# Patient Record
Sex: FEMALE | Race: WHITE
[De-identification: names, ages, dates, MRNs, and addresses within clinical notes are randomized per-mention and may not be internally consistent; named-entity substitution may affect disease eponyms.]

---

## 2019-12-24 ENCOUNTER — HOSPITAL ENCOUNTER (INPATIENT)
Dept: HOSPITAL 8 - ED | Age: 84
LOS: 3 days | Discharge: HOME | DRG: 871 | End: 2019-12-27
Attending: HOSPITALIST | Admitting: HOSPITALIST
Payer: COMMERCIAL

## 2019-12-24 VITALS — SYSTOLIC BLOOD PRESSURE: 104 MMHG | DIASTOLIC BLOOD PRESSURE: 53 MMHG

## 2019-12-24 VITALS — SYSTOLIC BLOOD PRESSURE: 108 MMHG | DIASTOLIC BLOOD PRESSURE: 56 MMHG

## 2019-12-24 VITALS — BODY MASS INDEX: 31.6 KG/M2 | WEIGHT: 156.75 LBS | HEIGHT: 59 IN

## 2019-12-24 DIAGNOSIS — G90.9: ICD-10-CM

## 2019-12-24 DIAGNOSIS — E11.65: ICD-10-CM

## 2019-12-24 DIAGNOSIS — Z83.3: ICD-10-CM

## 2019-12-24 DIAGNOSIS — N17.9: ICD-10-CM

## 2019-12-24 DIAGNOSIS — E78.5: ICD-10-CM

## 2019-12-24 DIAGNOSIS — A41.9: Primary | ICD-10-CM

## 2019-12-24 DIAGNOSIS — I10: ICD-10-CM

## 2019-12-24 DIAGNOSIS — R65.20: ICD-10-CM

## 2019-12-24 DIAGNOSIS — J18.9: ICD-10-CM

## 2019-12-24 DIAGNOSIS — K05.10: ICD-10-CM

## 2019-12-24 DIAGNOSIS — E87.6: ICD-10-CM

## 2019-12-24 LAB
<PLATELET ESTIMATE>: ADEQUATE
<PLT MORPHOLOGY>: (no result)
ALBUMIN SERPL-MCNC: 3 G/DL (ref 3.4–5)
ANION GAP SERPL CALC-SCNC: 8 MMOL/L (ref 5–15)
BAND#(MANUAL): 3 X10^3/UL
BILIRUB DIRECT SERPL-MCNC: NORMAL MG/DL
CALCIUM SERPL-MCNC: 8.3 MG/DL (ref 8.5–10.1)
CHLORIDE SERPL-SCNC: 103 MMOL/L (ref 98–107)
CREAT SERPL-MCNC: 1.26 MG/DL (ref 0.55–1.02)
CULTURE INDICATED?: YES
ERYTHROCYTE [DISTWIDTH] IN BLOOD BY AUTOMATED COUNT: 11.9 % (ref 9.6–15.2)
EST. AVERAGE GLUCOSE BLD GHB EST-MCNC: 128 MG/DL (ref 0–126)
LYMPH#(MANUAL): 0.6 X10^3/UL (ref 1–3.4)
LYMPHS% (MANUAL): 4 % (ref 22–44)
MCH RBC QN AUTO: 32.3 PG (ref 27–34.8)
MCHC RBC AUTO-ENTMCNC: 33.1 G/DL (ref 32.4–35.8)
MCV RBC AUTO: 97.5 FL (ref 80–100)
MD: YES
METAMYELOCYTES# (MANUAL): 0.15 X10^3/UL (ref 0–0)
METAMYELOCYTES% (MANUAL): 1 % (ref 0–1)
MICROSCOPIC: (no result)
MONOS#(MANUAL): 0.3 X10^3/UL (ref 0.3–2.7)
MONOS% (MANUAL): 2 % (ref 2–9)
NEUTS BAND NFR BLD: 20 % (ref 0–7)
PLATELET # BLD AUTO: 312 X10^3/UL (ref 130–400)
PMV BLD AUTO: 7.6 FL (ref 7.4–10.4)
RBC # BLD AUTO: 3.78 X10^6/UL (ref 3.82–5.3)
SEG#(MANUAL): 10.95 X10^3/UL (ref 1.8–6.8)
SEGS% (MANUAL): 73 % (ref 42–75)

## 2019-12-24 PROCEDURE — 0T9B70Z DRAINAGE OF BLADDER WITH DRAINAGE DEVICE, VIA NATURAL OR ARTIFICIAL OPENING: ICD-10-PCS | Performed by: HOSPITALIST

## 2019-12-24 PROCEDURE — 87077 CULTURE AEROBIC IDENTIFY: CPT

## 2019-12-24 PROCEDURE — 82040 ASSAY OF SERUM ALBUMIN: CPT

## 2019-12-24 PROCEDURE — 87040 BLOOD CULTURE FOR BACTERIA: CPT

## 2019-12-24 PROCEDURE — 85025 COMPLETE CBC W/AUTO DIFF WBC: CPT

## 2019-12-24 PROCEDURE — 87186 SC STD MICRODIL/AGAR DIL: CPT

## 2019-12-24 PROCEDURE — 80053 COMPREHEN METABOLIC PANEL: CPT

## 2019-12-24 PROCEDURE — 87400 INFLUENZA A/B EACH AG IA: CPT

## 2019-12-24 PROCEDURE — 80048 BASIC METABOLIC PNL TOTAL CA: CPT

## 2019-12-24 PROCEDURE — 84100 ASSAY OF PHOSPHORUS: CPT

## 2019-12-24 PROCEDURE — 96375 TX/PRO/DX INJ NEW DRUG ADDON: CPT

## 2019-12-24 PROCEDURE — 96365 THER/PROPH/DIAG IV INF INIT: CPT

## 2019-12-24 PROCEDURE — 71045 X-RAY EXAM CHEST 1 VIEW: CPT

## 2019-12-24 PROCEDURE — 83735 ASSAY OF MAGNESIUM: CPT

## 2019-12-24 PROCEDURE — 83036 HEMOGLOBIN GLYCOSYLATED A1C: CPT

## 2019-12-24 PROCEDURE — 83880 ASSAY OF NATRIURETIC PEPTIDE: CPT

## 2019-12-24 PROCEDURE — 36415 COLL VENOUS BLD VENIPUNCTURE: CPT

## 2019-12-24 PROCEDURE — 93005 ELECTROCARDIOGRAM TRACING: CPT

## 2019-12-24 PROCEDURE — 84145 PROCALCITONIN (PCT): CPT

## 2019-12-24 PROCEDURE — 83605 ASSAY OF LACTIC ACID: CPT

## 2019-12-24 PROCEDURE — 87086 URINE CULTURE/COLONY COUNT: CPT

## 2019-12-24 PROCEDURE — 81001 URINALYSIS AUTO W/SCOPE: CPT

## 2019-12-24 RX ADMIN — SODIUM CHLORIDE SCH MLS/HR: 0.9 INJECTION, SOLUTION INTRAVENOUS at 18:08

## 2019-12-24 RX ADMIN — SODIUM CHLORIDE SCH MLS/HR: 0.9 INJECTION, SOLUTION INTRAVENOUS at 22:00

## 2019-12-24 RX ADMIN — SODIUM CHLORIDE SCH MLS/HR: 0.9 INJECTION, SOLUTION INTRAVENOUS at 18:13

## 2019-12-24 RX ADMIN — AMPICILLIN AND SULBACTAM SCH MLS/HR: 1; .5 INJECTION, POWDER, FOR SOLUTION INTRAVENOUS at 23:18

## 2019-12-24 RX ADMIN — HEPARIN SODIUM SCH UNITS: 5000 INJECTION, SOLUTION INTRAVENOUS; SUBCUTANEOUS at 18:21

## 2019-12-24 RX ADMIN — SODIUM CHLORIDE SCH MLS/HR: 0.9 INJECTION, SOLUTION INTRAVENOUS at 22:13

## 2019-12-24 RX ADMIN — LOVASTATIN SCH MG: 20 TABLET ORAL at 19:51

## 2019-12-24 RX ADMIN — AMPICILLIN AND SULBACTAM SCH MLS/HR: 1; .5 INJECTION, POWDER, FOR SOLUTION INTRAVENOUS at 18:13

## 2019-12-24 NOTE — NUR
TWO ATTEMPTS  TO CATH PT FOR URINE   UNSUCCESSFUL       ERP TO HOLD FURTHER 
FLUID RE CHEST XRAY AND CONCERNS FOR FLUID RETENTION IN THE LUNGS

## 2019-12-24 NOTE — NUR
ASSIST RN: PT BIB [facility] FROM HOME FOR C/O DEHYDRATION, WEAKNESS & LOW BLOOD 
PRESSURE. PER FAMILY, PT HAD AN EPISODE OF "SHAKING, LIKE SHE WAS HYPOTHERMIC" 
THIS MORNING. PT HADN'T TAKEN HER BP MED [place], AND FAMILY NOTED HER BP WAS 
HIGH, SO SHE TOOK IT THEN (AROUND 1100). THEN PT STARTED FEELING WEAK AND 
WANTED TO GO TO BED, AND FAMILY NOTICED PT'S BP WAS LOW. BS  (PT IS 
PRE-DIABETIC). EMS NOTED BP OF 76/51. PT WAS GIVEN 500ML NS EN ROUTE. NO 
12-LEAD DONE BY EMS. PT IS CURRENTLY TAKING AMOXICILLIN FOR CHRONIC GINGIVITIS. 
[place] HERE VISITING FAMILY. ARRIVES TO ED A&OX4, VSS AT THIS TIME. ERP IN 
ROOM IMMEDIATELY. REPORTED TO ESTUARDO PRIMARY RN.

## 2019-12-25 VITALS — DIASTOLIC BLOOD PRESSURE: 62 MMHG | SYSTOLIC BLOOD PRESSURE: 143 MMHG

## 2019-12-25 VITALS — DIASTOLIC BLOOD PRESSURE: 61 MMHG | SYSTOLIC BLOOD PRESSURE: 101 MMHG

## 2019-12-25 VITALS — DIASTOLIC BLOOD PRESSURE: 61 MMHG | SYSTOLIC BLOOD PRESSURE: 94 MMHG

## 2019-12-25 VITALS — DIASTOLIC BLOOD PRESSURE: 64 MMHG | SYSTOLIC BLOOD PRESSURE: 117 MMHG

## 2019-12-25 LAB
<PLATELET ESTIMATE>: ADEQUATE
<PLT MORPHOLOGY>: (no result)
ALBUMIN SERPL-MCNC: 2.3 G/DL (ref 3.4–5)
ALP SERPL-CCNC: 51 U/L (ref 45–117)
ALT SERPL-CCNC: 16 U/L (ref 12–78)
ANION GAP SERPL CALC-SCNC: 7 MMOL/L (ref 5–15)
BAND#(MANUAL): 2.52 X10^3/UL
BILIRUB DIRECT SERPL-MCNC: NORMAL MG/DL
BILIRUB SERPL-MCNC: 0.4 MG/DL (ref 0.2–1)
CALCIUM SERPL-MCNC: 7.4 MG/DL (ref 8.5–10.1)
CHLORIDE SERPL-SCNC: 108 MMOL/L (ref 98–107)
CREAT SERPL-MCNC: 1.12 MG/DL (ref 0.55–1.02)
ERYTHROCYTE [DISTWIDTH] IN BLOOD BY AUTOMATED COUNT: 12.1 % (ref 9.6–15.2)
LYMPH#(MANUAL): 0.67 X10^3/UL (ref 1–3.4)
LYMPHS% (MANUAL): 4 % (ref 22–44)
MCH RBC QN AUTO: 32.6 PG (ref 27–34.8)
MCHC RBC AUTO-ENTMCNC: 33.5 G/DL (ref 32.4–35.8)
MCV RBC AUTO: 97.5 FL (ref 80–100)
MD: YES
NEUTS BAND NFR BLD: 15 % (ref 0–7)
PLATELET # BLD AUTO: 235 X10^3/UL (ref 130–400)
PMV BLD AUTO: 7.5 FL (ref 7.4–10.4)
PROT SERPL-MCNC: 5 G/DL (ref 6.4–8.2)
RBC # BLD AUTO: 3.23 X10^6/UL (ref 3.82–5.3)
SEG#(MANUAL): 13.61 X10^3/UL (ref 1.8–6.8)
SEGS% (MANUAL): 81 % (ref 42–75)

## 2019-12-25 RX ADMIN — SODIUM CHLORIDE SCH MLS/HR: 0.9 INJECTION, SOLUTION INTRAVENOUS at 03:58

## 2019-12-25 RX ADMIN — ASPIRIN SCH MG: 81 TABLET, COATED ORAL at 08:32

## 2019-12-25 RX ADMIN — ACETAMINOPHEN PRN MG: 325 TABLET, FILM COATED ORAL at 22:53

## 2019-12-25 RX ADMIN — HEPARIN SODIUM SCH UNITS: 5000 INJECTION, SOLUTION INTRAVENOUS; SUBCUTANEOUS at 18:23

## 2019-12-25 RX ADMIN — DOXYCYCLINE SCH MLS/HR: 100 INJECTION, POWDER, LYOPHILIZED, FOR SOLUTION INTRAVENOUS at 16:30

## 2019-12-25 RX ADMIN — LACTOBACILLUS TAB SCH TAB: TAB at 20:40

## 2019-12-25 RX ADMIN — ACETAMINOPHEN PRN MG: 325 TABLET, FILM COATED ORAL at 00:40

## 2019-12-25 RX ADMIN — LACTOBACILLUS TAB SCH TAB: TAB at 16:31

## 2019-12-25 RX ADMIN — LOVASTATIN SCH MG: 20 TABLET ORAL at 20:40

## 2019-12-25 RX ADMIN — DOXYCYCLINE SCH MLS/HR: 100 INJECTION, POWDER, LYOPHILIZED, FOR SOLUTION INTRAVENOUS at 04:30

## 2019-12-25 RX ADMIN — SODIUM CHLORIDE, SODIUM LACTATE, POTASSIUM CHLORIDE, AND CALCIUM CHLORIDE SCH MLS/HR: .6; .31; .03; .02 INJECTION, SOLUTION INTRAVENOUS at 08:32

## 2019-12-25 RX ADMIN — AMPICILLIN AND SULBACTAM SCH MLS/HR: 1; .5 INJECTION, POWDER, FOR SOLUTION INTRAVENOUS at 11:46

## 2019-12-25 RX ADMIN — HEPARIN SODIUM SCH UNITS: 5000 INJECTION, SOLUTION INTRAVENOUS; SUBCUTANEOUS at 01:30

## 2019-12-25 RX ADMIN — AMPICILLIN AND SULBACTAM SCH MLS/HR: 1; .5 INJECTION, POWDER, FOR SOLUTION INTRAVENOUS at 18:23

## 2019-12-25 RX ADMIN — HEPARIN SODIUM SCH UNITS: 5000 INJECTION, SOLUTION INTRAVENOUS; SUBCUTANEOUS at 08:32

## 2019-12-25 RX ADMIN — AMPICILLIN AND SULBACTAM SCH MLS/HR: 1; .5 INJECTION, POWDER, FOR SOLUTION INTRAVENOUS at 05:30

## 2019-12-26 VITALS — SYSTOLIC BLOOD PRESSURE: 156 MMHG | DIASTOLIC BLOOD PRESSURE: 73 MMHG

## 2019-12-26 VITALS — DIASTOLIC BLOOD PRESSURE: 77 MMHG | SYSTOLIC BLOOD PRESSURE: 169 MMHG

## 2019-12-26 VITALS — SYSTOLIC BLOOD PRESSURE: 138 MMHG | DIASTOLIC BLOOD PRESSURE: 65 MMHG

## 2019-12-26 VITALS — DIASTOLIC BLOOD PRESSURE: 66 MMHG | SYSTOLIC BLOOD PRESSURE: 147 MMHG

## 2019-12-26 LAB
ALBUMIN SERPL-MCNC: 2.5 G/DL (ref 3.4–5)
ALP SERPL-CCNC: 65 U/L (ref 45–117)
ALT SERPL-CCNC: 30 U/L (ref 12–78)
ANION GAP SERPL CALC-SCNC: 9 MMOL/L (ref 5–15)
BASOPHILS # BLD AUTO: 0.07 X10^3/UL (ref 0–0.1)
BASOPHILS NFR BLD AUTO: 1 % (ref 0–1)
BILIRUB SERPL-MCNC: 0.5 MG/DL (ref 0.2–1)
CALCIUM SERPL-MCNC: 7.5 MG/DL (ref 8.5–10.1)
CHLORIDE SERPL-SCNC: 103 MMOL/L (ref 98–107)
CREAT SERPL-MCNC: 0.8 MG/DL (ref 0.55–1.02)
EOSINOPHIL # BLD AUTO: 0.19 X10^3/UL (ref 0–0.4)
EOSINOPHIL NFR BLD AUTO: 2 % (ref 1–7)
ERYTHROCYTE [DISTWIDTH] IN BLOOD BY AUTOMATED COUNT: 11.6 % (ref 9.6–15.2)
LYMPHOCYTES # BLD AUTO: 0.83 X10^3/UL (ref 1–3.4)
LYMPHOCYTES NFR BLD AUTO: 10 % (ref 22–44)
MCH RBC QN AUTO: 32.2 PG (ref 27–34.8)
MCHC RBC AUTO-ENTMCNC: 33.4 G/DL (ref 32.4–35.8)
MCV RBC AUTO: 96.3 FL (ref 80–100)
MD: NO
MONOCYTES # BLD AUTO: 0.4 X10^3/UL (ref 0.2–0.8)
MONOCYTES NFR BLD AUTO: 5 % (ref 2–9)
NEUTROPHILS # BLD AUTO: 6.96 X10^3/UL (ref 1.8–6.8)
NEUTROPHILS NFR BLD AUTO: 82 % (ref 42–75)
PLATELET # BLD AUTO: 189 X10^3/UL (ref 130–400)
PMV BLD AUTO: 8.4 FL (ref 7.4–10.4)
PROT SERPL-MCNC: 5.3 G/DL (ref 6.4–8.2)
RBC # BLD AUTO: 3.4 X10^6/UL (ref 3.82–5.3)

## 2019-12-26 RX ADMIN — AMPICILLIN AND SULBACTAM SCH MLS/HR: 1; .5 INJECTION, POWDER, FOR SOLUTION INTRAVENOUS at 12:13

## 2019-12-26 RX ADMIN — HEPARIN SODIUM SCH UNITS: 5000 INJECTION, SOLUTION INTRAVENOUS; SUBCUTANEOUS at 18:17

## 2019-12-26 RX ADMIN — AMPICILLIN AND SULBACTAM SCH MLS/HR: 1; .5 INJECTION, POWDER, FOR SOLUTION INTRAVENOUS at 00:35

## 2019-12-26 RX ADMIN — AMPICILLIN AND SULBACTAM SCH MLS/HR: 1; .5 INJECTION, POWDER, FOR SOLUTION INTRAVENOUS at 18:17

## 2019-12-26 RX ADMIN — ACETAMINOPHEN PRN MG: 325 TABLET, FILM COATED ORAL at 20:14

## 2019-12-26 RX ADMIN — SODIUM CHLORIDE, SODIUM LACTATE, POTASSIUM CHLORIDE, AND CALCIUM CHLORIDE SCH MLS/HR: .6; .31; .03; .02 INJECTION, SOLUTION INTRAVENOUS at 04:04

## 2019-12-26 RX ADMIN — ASPIRIN SCH MG: 81 TABLET, COATED ORAL at 09:19

## 2019-12-26 RX ADMIN — DOXYCYCLINE SCH MLS/HR: 100 INJECTION, POWDER, LYOPHILIZED, FOR SOLUTION INTRAVENOUS at 15:38

## 2019-12-26 RX ADMIN — LOVASTATIN SCH MG: 20 TABLET ORAL at 20:14

## 2019-12-26 RX ADMIN — LACTOBACILLUS TAB SCH TAB: TAB at 09:19

## 2019-12-26 RX ADMIN — LACTOBACILLUS TAB SCH TAB: TAB at 20:11

## 2019-12-26 RX ADMIN — LACTOBACILLUS TAB SCH TAB: TAB at 15:38

## 2019-12-26 RX ADMIN — AMPICILLIN AND SULBACTAM SCH MLS/HR: 1; .5 INJECTION, POWDER, FOR SOLUTION INTRAVENOUS at 06:29

## 2019-12-26 RX ADMIN — DOXYCYCLINE SCH MLS/HR: 100 INJECTION, POWDER, LYOPHILIZED, FOR SOLUTION INTRAVENOUS at 04:04

## 2019-12-26 RX ADMIN — HEPARIN SODIUM SCH UNITS: 5000 INJECTION, SOLUTION INTRAVENOUS; SUBCUTANEOUS at 12:13

## 2019-12-26 RX ADMIN — HEPARIN SODIUM SCH UNITS: 5000 INJECTION, SOLUTION INTRAVENOUS; SUBCUTANEOUS at 01:43

## 2019-12-27 VITALS — DIASTOLIC BLOOD PRESSURE: 60 MMHG | SYSTOLIC BLOOD PRESSURE: 138 MMHG

## 2019-12-27 VITALS — SYSTOLIC BLOOD PRESSURE: 166 MMHG | DIASTOLIC BLOOD PRESSURE: 86 MMHG

## 2019-12-27 VITALS — SYSTOLIC BLOOD PRESSURE: 127 MMHG | DIASTOLIC BLOOD PRESSURE: 78 MMHG

## 2019-12-27 VITALS — DIASTOLIC BLOOD PRESSURE: 70 MMHG | SYSTOLIC BLOOD PRESSURE: 150 MMHG

## 2019-12-27 LAB
ALBUMIN SERPL-MCNC: 2.4 G/DL (ref 3.4–5)
ALP SERPL-CCNC: 70 U/L (ref 45–117)
ALT SERPL-CCNC: 29 U/L (ref 12–78)
ANION GAP SERPL CALC-SCNC: 9 MMOL/L (ref 5–15)
BASOPHILS # BLD AUTO: 0.05 X10^3/UL (ref 0–0.1)
BASOPHILS NFR BLD AUTO: 1 % (ref 0–1)
BILIRUB SERPL-MCNC: 0.4 MG/DL (ref 0.2–1)
CALCIUM SERPL-MCNC: 7.6 MG/DL (ref 8.5–10.1)
CHLORIDE SERPL-SCNC: 100 MMOL/L (ref 98–107)
CREAT SERPL-MCNC: 0.71 MG/DL (ref 0.55–1.02)
EOSINOPHIL # BLD AUTO: 0.42 X10^3/UL (ref 0–0.4)
EOSINOPHIL NFR BLD AUTO: 6 % (ref 1–7)
ERYTHROCYTE [DISTWIDTH] IN BLOOD BY AUTOMATED COUNT: 11.8 % (ref 9.6–15.2)
LYMPHOCYTES # BLD AUTO: 1.45 X10^3/UL (ref 1–3.4)
LYMPHOCYTES NFR BLD AUTO: 20 % (ref 22–44)
MCH RBC QN AUTO: 32.5 PG (ref 27–34.8)
MCHC RBC AUTO-ENTMCNC: 33.5 G/DL (ref 32.4–35.8)
MCV RBC AUTO: 97 FL (ref 80–100)
MD: NO
MONOCYTES # BLD AUTO: 0.5 X10^3/UL (ref 0.2–0.8)
MONOCYTES NFR BLD AUTO: 7 % (ref 2–9)
NEUTROPHILS # BLD AUTO: 4.9 X10^3/UL (ref 1.8–6.8)
NEUTROPHILS NFR BLD AUTO: 67 % (ref 42–75)
PLATELET # BLD AUTO: 185 X10^3/UL (ref 130–400)
PMV BLD AUTO: 8.2 FL (ref 7.4–10.4)
PROT SERPL-MCNC: 5.3 G/DL (ref 6.4–8.2)
RBC # BLD AUTO: 3.26 X10^6/UL (ref 3.82–5.3)

## 2019-12-27 RX ADMIN — AMPICILLIN AND SULBACTAM SCH MLS/HR: 1; .5 INJECTION, POWDER, FOR SOLUTION INTRAVENOUS at 00:39

## 2019-12-27 RX ADMIN — AMPICILLIN AND SULBACTAM SCH MLS/HR: 1; .5 INJECTION, POWDER, FOR SOLUTION INTRAVENOUS at 06:07

## 2019-12-27 RX ADMIN — DOXYCYCLINE SCH MLS/HR: 100 INJECTION, POWDER, LYOPHILIZED, FOR SOLUTION INTRAVENOUS at 04:44

## 2019-12-27 RX ADMIN — AMPICILLIN AND SULBACTAM SCH MLS/HR: 1; .5 INJECTION, POWDER, FOR SOLUTION INTRAVENOUS at 12:30

## 2019-12-27 RX ADMIN — LACTOBACILLUS TAB SCH TAB: TAB at 08:45

## 2019-12-27 RX ADMIN — HEPARIN SODIUM SCH UNITS: 5000 INJECTION, SOLUTION INTRAVENOUS; SUBCUTANEOUS at 10:30

## 2019-12-27 RX ADMIN — ASPIRIN SCH MG: 81 TABLET, COATED ORAL at 08:45

## 2019-12-27 RX ADMIN — HEPARIN SODIUM SCH UNITS: 5000 INJECTION, SOLUTION INTRAVENOUS; SUBCUTANEOUS at 01:38

## 2020-10-06 ENCOUNTER — HOSPITAL ENCOUNTER (EMERGENCY)
Dept: HOSPITAL 8 - ED | Age: 85
Discharge: HOME | End: 2020-10-06
Payer: COMMERCIAL

## 2020-10-06 VITALS — DIASTOLIC BLOOD PRESSURE: 54 MMHG | SYSTOLIC BLOOD PRESSURE: 144 MMHG

## 2020-10-06 VITALS — WEIGHT: 146.39 LBS | HEIGHT: 59 IN | BODY MASS INDEX: 29.51 KG/M2

## 2020-10-06 DIAGNOSIS — R11.2: ICD-10-CM

## 2020-10-06 DIAGNOSIS — Z90.89: ICD-10-CM

## 2020-10-06 DIAGNOSIS — I10: ICD-10-CM

## 2020-10-06 DIAGNOSIS — Z87.891: ICD-10-CM

## 2020-10-06 DIAGNOSIS — E11.9: ICD-10-CM

## 2020-10-06 DIAGNOSIS — R10.9: ICD-10-CM

## 2020-10-06 DIAGNOSIS — E87.1: Primary | ICD-10-CM

## 2020-10-06 LAB
ALBUMIN SERPL-MCNC: 3.3 G/DL (ref 3.4–5)
ALP SERPL-CCNC: 80 U/L (ref 45–117)
ALT SERPL-CCNC: 16 U/L (ref 12–78)
ANION GAP SERPL CALC-SCNC: 9 MMOL/L (ref 5–15)
BASOPHILS # BLD AUTO: 0.1 X10^3/UL (ref 0–0.1)
BASOPHILS NFR BLD AUTO: 1 % (ref 0–1)
BILIRUB SERPL-MCNC: 0.9 MG/DL (ref 0.2–1)
CALCIUM SERPL-MCNC: 8.7 MG/DL (ref 8.5–10.1)
CHLORIDE SERPL-SCNC: 91 MMOL/L (ref 98–107)
CREAT SERPL-MCNC: 0.93 MG/DL (ref 0.55–1.02)
EOSINOPHIL # BLD AUTO: 0.1 X10^3/UL (ref 0–0.4)
EOSINOPHIL NFR BLD AUTO: 1 % (ref 1–7)
ERYTHROCYTE [DISTWIDTH] IN BLOOD BY AUTOMATED COUNT: 14.3 % (ref 9.6–15.2)
LYMPHOCYTES # BLD AUTO: 2.1 X10^3/UL (ref 1–3.4)
LYMPHOCYTES NFR BLD AUTO: 19 % (ref 22–44)
MCH RBC QN AUTO: 32.3 PG (ref 27–34.8)
MCHC RBC AUTO-ENTMCNC: 34.4 G/DL (ref 32.4–35.8)
MD: NO
MONOCYTES # BLD AUTO: 0.8 X10^3/UL (ref 0.2–0.8)
MONOCYTES NFR BLD AUTO: 7 % (ref 2–9)
NEUTROPHILS # BLD AUTO: 8 X10^3/UL (ref 1.8–6.8)
NEUTROPHILS NFR BLD AUTO: 72 % (ref 42–75)
PLATELET # BLD AUTO: 328 X10^3/UL (ref 130–400)
PMV BLD AUTO: 8 FL (ref 7.4–10.4)
PROT SERPL-MCNC: 6.6 G/DL (ref 6.4–8.2)
RBC # BLD AUTO: 3.73 X10^6/UL (ref 3.82–5.3)
TROPONIN I SERPL-MCNC: 0.01 NG/ML (ref 0–0.04)

## 2020-10-06 PROCEDURE — 93005 ELECTROCARDIOGRAM TRACING: CPT

## 2020-10-06 PROCEDURE — 71045 X-RAY EXAM CHEST 1 VIEW: CPT

## 2020-10-06 PROCEDURE — 76700 US EXAM ABDOM COMPLETE: CPT

## 2020-10-06 PROCEDURE — 84484 ASSAY OF TROPONIN QUANT: CPT

## 2020-10-06 PROCEDURE — 36415 COLL VENOUS BLD VENIPUNCTURE: CPT

## 2020-10-06 PROCEDURE — 80053 COMPREHEN METABOLIC PANEL: CPT

## 2020-10-06 PROCEDURE — 99285 EMERGENCY DEPT VISIT HI MDM: CPT

## 2020-10-06 PROCEDURE — 85025 COMPLETE CBC W/AUTO DIFF WBC: CPT

## 2020-10-06 PROCEDURE — 83690 ASSAY OF LIPASE: CPT

## 2020-10-06 NOTE — NUR
BREAK RN NOTE: PT PRESENTS TO ED WITH C/O NAUSEA X 1 WEEK. PT REFERRED TO ED 
FOR ULTRASOUND AFTER BEING SEEN AT URGENT CARE AND INSURANCE DENIED BY OP 
IMAGING.  DENIES VOMITING, DENIES PAIN, DENIES OTHER SX. PT PLACED ON ALL 
MONITORS, NSR ON CARDIAC MONITOR WITH NO ECTOPY NOTED. FAMILY AT BEDSIDE. PT 
A&O, RESPS EVEN AND UNLABORED, NADN.

## 2020-11-02 ENCOUNTER — TELEPHONE (OUTPATIENT)
Dept: SCHEDULING | Facility: IMAGING CENTER | Age: 85
End: 2020-11-02

## 2020-12-16 ENCOUNTER — APPOINTMENT (RX ONLY)
Dept: URBAN - METROPOLITAN AREA CLINIC 20 | Facility: CLINIC | Age: 85
Setting detail: DERMATOLOGY
End: 2020-12-16

## 2020-12-16 DIAGNOSIS — L21.8 OTHER SEBORRHEIC DERMATITIS: ICD-10-CM

## 2020-12-16 DIAGNOSIS — L57.0 ACTINIC KERATOSIS: ICD-10-CM

## 2020-12-16 PROCEDURE — 17000 DESTRUCT PREMALG LESION: CPT

## 2020-12-16 PROCEDURE — 17003 DESTRUCT PREMALG LES 2-14: CPT

## 2020-12-16 PROCEDURE — 99202 OFFICE O/P NEW SF 15 MIN: CPT | Mod: 25

## 2020-12-16 PROCEDURE — ? COUNSELING

## 2020-12-16 PROCEDURE — ? ADDITIONAL NOTES

## 2020-12-16 PROCEDURE — ? LIQUID NITROGEN

## 2020-12-16 ASSESSMENT — LOCATION SIMPLE DESCRIPTION DERM
LOCATION SIMPLE: ANTERIOR SCALP
LOCATION SIMPLE: RIGHT CHEEK
LOCATION SIMPLE: LEFT CHEEK
LOCATION SIMPLE: RIGHT ZYGOMA
LOCATION SIMPLE: LEFT TEMPLE

## 2020-12-16 ASSESSMENT — LOCATION DETAILED DESCRIPTION DERM
LOCATION DETAILED: RIGHT LATERAL SUBMANDIBULAR CHEEK
LOCATION DETAILED: LEFT LATERAL MALAR CHEEK
LOCATION DETAILED: MID-FRONTAL SCALP
LOCATION DETAILED: RIGHT LATERAL MALAR CHEEK
LOCATION DETAILED: LEFT CENTRAL TEMPLE
LOCATION DETAILED: RIGHT CENTRAL ZYGOMA

## 2020-12-16 ASSESSMENT — LOCATION ZONE DERM
LOCATION ZONE: FACE
LOCATION ZONE: SCALP

## 2020-12-16 NOTE — PROCEDURE: MIPS QUALITY
Quality 111:Pneumonia Vaccination Status For Older Adults: Pneumococcal Vaccination not Administered or Previously Received, Reason not Otherwise Specified
Quality 130: Documentation Of Current Medications In The Medical Record: Current Medications Documented
Quality 431: Preventive Care And Screening: Unhealthy Alcohol Use - Screening: Patient screened for unhealthy alcohol use using a single question and scores less than 2 times per year
Quality 226: Preventive Care And Screening: Tobacco Use: Screening And Cessation Intervention: Patient screened for tobacco use and is an ex/non-smoker
Detail Level: Detailed

## 2020-12-16 NOTE — PROCEDURE: LIQUID NITROGEN
Consent: The patient's consent was obtained including but not limited to risks of crusting, scabbing, blistering, scarring, darker or lighter pigmentary change, recurrence, incomplete removal and infection. RTC in 2 months if lesion(s) persistent.
Duration Of Freeze Thaw-Cycle (Seconds): 10
Render Note In Bullet Format When Appropriate: No
Render Post-Care Instructions In Note?: yes
Detail Level: Detailed
Number Of Freeze-Thaw Cycles: 2 freeze-thaw cycles
Post-Care Instructions: I reviewed with the patient in detail post-care instructions. Patient is to wear sunprotection, and avoid picking at any of the treated lesions. Pt may apply Vaseline to crusted or scabbing areas.

## 2020-12-18 ENCOUNTER — OFFICE VISIT (OUTPATIENT)
Dept: MEDICAL GROUP | Facility: PHYSICIAN GROUP | Age: 85
End: 2020-12-18
Payer: MEDICARE

## 2020-12-18 VITALS
TEMPERATURE: 98.2 F | OXYGEN SATURATION: 96 % | BODY MASS INDEX: 28.63 KG/M2 | RESPIRATION RATE: 16 BRPM | SYSTOLIC BLOOD PRESSURE: 128 MMHG | DIASTOLIC BLOOD PRESSURE: 84 MMHG | HEART RATE: 78 BPM | HEIGHT: 59 IN | WEIGHT: 142 LBS

## 2020-12-18 DIAGNOSIS — R26.89 BALANCE PROBLEM: ICD-10-CM

## 2020-12-18 DIAGNOSIS — Z23 NEED FOR VACCINATION: ICD-10-CM

## 2020-12-18 DIAGNOSIS — R05.9 COUGH: ICD-10-CM

## 2020-12-18 PROBLEM — J84.9 INTERSTITIAL LUNG DISEASE (HCC): Status: ACTIVE | Noted: 2020-02-10

## 2020-12-18 PROCEDURE — 99204 OFFICE O/P NEW MOD 45 MIN: CPT | Mod: 25 | Performed by: FAMILY MEDICINE

## 2020-12-18 PROCEDURE — G0008 ADMIN INFLUENZA VIRUS VAC: HCPCS | Performed by: FAMILY MEDICINE

## 2020-12-18 PROCEDURE — 90662 IIV NO PRSV INCREASED AG IM: CPT | Performed by: FAMILY MEDICINE

## 2020-12-18 RX ORDER — LOSARTAN POTASSIUM 100 MG/1
TABLET ORAL
COMMUNITY
Start: 2020-01-07 | End: 2020-12-18 | Stop reason: SDUPTHER

## 2020-12-18 RX ORDER — LOVASTATIN 20 MG/1
TABLET ORAL
COMMUNITY
Start: 2019-04-02 | End: 2020-12-18 | Stop reason: SDUPTHER

## 2020-12-18 RX ORDER — HYDROCHLOROTHIAZIDE 12.5 MG/1
12.5 TABLET ORAL DAILY
Qty: 90 TAB | Refills: 3 | Status: SHIPPED | OUTPATIENT
Start: 2020-12-18 | End: 2021-03-18

## 2020-12-18 RX ORDER — LOSARTAN POTASSIUM 100 MG/1
100 TABLET ORAL DAILY
Qty: 90 TAB | Refills: 3 | Status: SHIPPED | OUTPATIENT
Start: 2020-12-18 | End: 2022-05-03 | Stop reason: SDUPTHER

## 2020-12-18 RX ORDER — LOVASTATIN 20 MG/1
20 TABLET ORAL DAILY
Qty: 90 TAB | Refills: 3 | Status: SHIPPED | OUTPATIENT
Start: 2020-12-18 | End: 2022-02-01 | Stop reason: SDUPTHER

## 2020-12-18 RX ORDER — BLOOD-GLUCOSE METER
EACH MISCELLANEOUS
COMMUNITY
Start: 2019-01-30 | End: 2021-01-29

## 2020-12-18 RX ORDER — DEXAMETHASONE 4 MG/1
1 TABLET ORAL 2 TIMES DAILY
Qty: 1 EACH | Refills: 6 | Status: SHIPPED | OUTPATIENT
Start: 2020-12-18 | End: 2021-04-26 | Stop reason: SDUPTHER

## 2020-12-18 RX ORDER — HYDROCHLOROTHIAZIDE 12.5 MG/1
TABLET ORAL
COMMUNITY
Start: 2020-01-07 | End: 2020-12-18 | Stop reason: SDUPTHER

## 2020-12-18 SDOH — HEALTH STABILITY: MENTAL HEALTH: HOW MANY STANDARD DRINKS CONTAINING ALCOHOL DO YOU HAVE ON A TYPICAL DAY?: 1 OR 2

## 2020-12-18 SDOH — HEALTH STABILITY: MENTAL HEALTH: HOW OFTEN DO YOU HAVE 6 OR MORE DRINKS ON ONE OCCASION?: LESS THAN MONTHLY

## 2020-12-18 SDOH — HEALTH STABILITY: MENTAL HEALTH: HOW OFTEN DO YOU HAVE A DRINK CONTAINING ALCOHOL?: NEVER

## 2020-12-18 NOTE — PROGRESS NOTES
"Subjective:     CC:  Diagnoses of Need for vaccination, Cough, and Balance problem were pertinent to this visit.    HISTORY OF THE PRESENT ILLNESS: Patient is a 85 y.o. female. This pleasant patient is here today to establish care and discuss the following problems.   Prior PCP: Desiree    Cough  This is a chronic condition.  Symptom onset: has had a persistent cough since 12/19 after being hospitalized for PNA  Current symptoms: dry cough, episodic.   Since onset symptoms are: Unchanged  Modifying factors: she had imaging done 12/19 while in the hospital and told she had \"lung damage\"   Treatments tried: none. She smoked for 30 years but quit 20 years.   Associated symptoms: denies any SOB or wheezing. No acid reflux. No postnasal drip.       Balance problem  This is a chronic problem  The patient has been getting worsening balance problem  She feels less steady when walking but no hx of falls.       Allergies: Patient has no known allergies.    Current Outpatient Medications Ordered in Epic   Medication Sig Dispense Refill   • Aspirin Buf,CaCarb-MgCarb-MgO, 81 MG Tab Take  by mouth.     • Blood Glucose Monitoring Suppl (ONETOUCH VERIO FLEX SYSTEM) w/Device Kit Use as directed     • glucose blood strip Use as directed     • hydroCHLOROthiazide (HYDRODIURIL) 12.5 MG tablet Take 1 tablet by mouth daily     • losartan (COZAAR) 100 MG Tab Take 1 tablet by mouth daily     • lovastatin (MEVACOR) 20 MG Tab Take 1 tablet by mouth daily     • fluticasone (FLOVENT HFA) 110 MCG/ACT Aerosol Inhale 1 Puff 2 times a day. 1 Each 6     No current Epic-ordered facility-administered medications on file.        Past Medical History:   Diagnosis Date   • HTN (hypertension)        No past surgical history on file.    Social History     Tobacco Use   • Smoking status: Former Smoker   • Smokeless tobacco: Never Used   Substance Use Topics   • Alcohol use: Yes     Frequency: Never     Drinks per session: 1 or 2     Binge frequency: Less " "than monthly   • Drug use: Not Currently       Social History     Social History Narrative   • Not on file       Family History   Problem Relation Age of Onset   • Stroke Mother    • Cancer Father         lung       Health Maintenance: Completed    ROS:   Gen: no fevers/chills, no changes in weight  Eyes: no changes in vision  ENT: no sore throat, no hearing loss, no bloody nose  Pulm: no sob  CV: no chest pain, no palpitations  GI: no nausea/vomiting, no diarrhea  : no dysuria  MSk: no myalgias  Skin: no rash  Neuro: no headaches, no numbness/tingling  Heme/Lymph: no easy bruising      Objective:     Exam: /84   Pulse 78   Temp 36.8 °C (98.2 °F) (Temporal)   Resp 16   Ht 1.499 m (4' 11\")   Wt 64.4 kg (142 lb)   SpO2 96%  Body mass index is 28.68 kg/m².    General: Normal appearing. No distress.  HENT: Normocephalic. Ears normal shape and contour, canals are clear bilaterally, tympanic membranes are benign, nasal mucosa benign, oropharynx is without erythema, edema or exudates.   Eyes: Conjunctiva clear lids without ptosis, pupils equal and reactive to light accommodation.  Neck: Supple without JVD. Thyroid is not enlarged.  Pulmonary: Clear to ausculation.  Normal effort. No rales, ronchi, or wheezing.  Cardiovascular: Regular rate and rhythm without murmur. Radial pulses are intact and equal bilaterally.  Abdomen: Soft, nontender, nondistended. Normal bowel sounds. Liver and spleen are not palpable  Neurologic: Moves all extremities  Lymph: No cervical or supraclavicular lymph nodes are palpable  Skin: Warm and dry.  No obvious lesions.  Musculoskeletal: Normal gait. No extremity cyanosis, clubbing, or edema.  Psych: Normal mood and affect. Alert and oriented x3. Judgment and insight is normal.      Assessment & Plan:   85 y.o. female with the following -    1. Need for vaccination  - INFLUENZA VACCINE, HIGH DOSE (65+ ONLY)    2. Cough  This is a chronic problem.  The patient has been experiencing a " nonproductive cough over the past year ever since she was hospitalized for pneumonia.  She does have a history of smoking and does chronic pulmonary disease exacerbated by the pneumonia is a likely possibility.  At this time I would like to do a trial of ICS to see if this improves things.  I would also like to pursue PFTs.  The patient denies any acid reflux or postnasal drip symptomatology to suggest other potential causes.  However, we will continue to investigate this.  - PULMONARY FUNCTION TESTS -Test requested: Complete Pulmonary Function Test; Future  - fluticasone (FLOVENT HFA) 110 MCG/ACT Aerosol; Inhale 1 Puff 2 times a day.  Dispense: 1 Each; Refill: 6    3. Balance problem  This is a chronic, worsening condition.  The patient has been noticing balance problems especially upon walking.  Fortunately, no history of falls.  She is amenable to being referred to physical therapy for strengthening exercises.  - REFERRAL TO PHYSICAL THERAPY      Return in about 6 months (around 6/18/2021).    Please note that this dictation was created using voice recognition software. I have made every reasonable attempt to correct obvious errors, but I expect that there are errors of grammar and possibly content that I did not discover before finalizing the note.

## 2020-12-18 NOTE — ASSESSMENT & PLAN NOTE
"This is a chronic condition.  Symptom onset: has had a persistent cough since 12/19 after being hospitalized for PNA  Current symptoms: dry cough, episodic.   Since onset symptoms are: Unchanged  Modifying factors: she had imaging done 12/19 while in the hospital and told she had \"lung damage\"   Treatments tried: none. She smoked for 30 years but quit 20 years.   Associated symptoms: denies any SOB or wheezing. No acid reflux. No postnasal drip.     "

## 2020-12-18 NOTE — ASSESSMENT & PLAN NOTE
This is a chronic problem  The patient has been getting worsening balance problem  She feels less steady when walking but no hx of falls.

## 2020-12-31 NOTE — OP THERAPY EVALUATION
Outpatient Physical Therapy  INITIAL EVALUATION    [facility]  [address], [address]  [place] [address]  Phone:  [phone]    Date of Evaluation: 2021    Patient: Grisel Saha  YOB: 1935  MRN: [id number]     Referring Provider: Cricket Miller M.D.  [address]  [place] [address]   Referring Diagnosis Balance problem [R26.89]     Time Calculation    Start time: 1000  Stop time: 1100 Time Calculation (min): 60 minutes         Chief Complaint: Difficulty Walking and Loss Of Balance    Visit Diagnoses     ICD-10-CM   1. Balance problem  R26.89         Subjective:   History of Present Illness:     Date of onset:  2020    Mechanism of injury:  The patient is an 85 year old female who reports having trouble with her balance  For ~1-2 years. She tries to walk 45-60 minutes day. She has trouble with stairs and curbs. She has more trouble going down stairs. She has hx of diabetes II and possibly vestibular. She likes to walk every day. She indicates having no stairs in her home. She uses handrails quite often to go up and down the steps. 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  Quality of life:  Good  Ear problems: none  Sleep disturbance:  Not disrupted  Pain:     Current pain ratin    At best pain ratin    At worst pain ratin    Quality:  Aching    Pain timing:  In the evening and in the morning    Relieving factors:  Pain medication and cane    Aggravating factors:  Prolonged standing, walking and stair climbing  Social Support:     Lives in:  One-story house  Hand dominance:  Right  Patient Goals:     Patient goals for therapy:  Improved balance    Other patient goals:  Confidence in balance; increased strength       Past Medical History:   Diagnosis Date   • HTN (hypertension)      No past surgical history on file.  Social History     Tobacco Use   • Smoking status: Former Smoker   • Smokeless tobacco: Never Used    Substance Use Topics   • Alcohol use: Yes     Frequency: Never     Drinks per session: 1 or 2     Binge frequency: Less than monthly     Family and Occupational History     Socioeconomic History   • Marital status:      Spouse name: Not on file   • Number of children: Not on file   • Years of education: Not on file   • Highest education level: Not on file   Occupational History   • Not on file       Objective     Strength:      Left Hip   Planes of Motion   Flexion: 4+  Extension: 5  Abduction: 4+  Adduction: 4+  External rotation: 4+  Internal rotation: 4+    Right Hip   Planes of Motion   Flexion: 4+  Extension: 5  Abduction: 5  Adduction: 4+  External rotation: 4+  Internal rotation: 4+  Ambulation     Observational Gait   Increased right stance time. Decreased walking speed, stride length, left stance time, left swing time, right swing time, left step length and right step length. Stride width: narrow.    Left foot contact pattern: foot flat  Right foot contact pattern: foot flat    Functional Assessment   Squat   Trunk lean right.     Single Leg Stance   Left: 1 seconds  Right: 3 seconds        Therapeutic Exercises (CPT 51866):     1. Bridges, 10x    2. SLS , 5x each LE, needs support as reference    3. Standing trunk rotations , 5x each side       Time-based treatments/modalities:    Physical Therapy Timed Treatment Charges  Therapeutic exercise minutes (CPT 14010): 10 minutes      Assessment, Response and Plan:   Impairments: activity intolerance, impaired balance, impaired physical strength and lacks appropriate home exercise program    Assessment details:  The patient is a 85 year old female who reports having balance deficits and bilateral knee pain. She has difficulty with stepping up and down off of curbs. She feels limited with negotiating steps but more with stepping down. She was given a Leija Balance test which indicates a low fall risk in the community scoring 49/56 overall score. She  "demonstrates deviation in squatting motion during functional movement and has difficulty with performing full squat without feeling unsteady needing support. The patient has limitations descending and ascending stairs without the need for support. The patient would benefit from skilled physical therapy to address balance and gait training limitations working on strength and conditioning, mobility and stability, activity tolerance and functional movement.   Barriers to therapy:  None  Prognosis: good    Goals:   Short Term Goals:   1) Indep with HEP   2) Able to step on/off a 3-4\" step with/without support 25-50% of the time  3) Increase strength to quads/hamstring muscles bilaterally 4+/5 to 5/5  Short term goal time span:  2-4 weeks      Long Term Goals:    1) Progression/regression advancing HEP    2) Negotiates up and down the stairs without the need for support   3) Performs tasks requiring squatting motions more often 50-75% without the need for support.  Long term goal time span:  4-6 weeks    Plan:   Therapy options:  Physical therapy treatment to continue  Planned therapy interventions:  Functional Training, Self Care (CPT 68451), Gait Training (CPT 73489), Neuromuscular Re-education (CPT 78332), Therapeutic Activities (CPT 98124), Therapeutic Exercise (CPT 38108) and Self Care ADL Training (CPT 37724)  Frequency:  2x week  Duration in weeks:  6  Duration in visits:  12  Discussed with:  Patient      Functional Assessment Used  Leija Balance Total Score (0-56): 49     Referring provider co-signature:  I have reviewed this plan of care and my co-signature certifies the need for services.    Certification Period: 01/04/2021 to  02/15/21    Physician Signature: ________________________________ Date: ______________               "

## 2021-01-04 ENCOUNTER — PHYSICAL THERAPY (OUTPATIENT)
Dept: PHYSICAL THERAPY | Facility: REHABILITATION | Age: 86
End: 2021-01-04
Attending: FAMILY MEDICINE
Payer: MEDICARE

## 2021-01-04 DIAGNOSIS — R26.89 BALANCE PROBLEM: ICD-10-CM

## 2021-01-04 PROCEDURE — 97110 THERAPEUTIC EXERCISES: CPT

## 2021-01-04 PROCEDURE — 97162 PT EVAL MOD COMPLEX 30 MIN: CPT

## 2021-01-04 SDOH — ECONOMIC STABILITY: GENERAL: QUALITY OF LIFE: GOOD

## 2021-01-04 ASSESSMENT — ENCOUNTER SYMPTOMS
EXACERBATED BY: WALKING
EXACERBATED BY: PROLONGED STANDING
PAIN SCALE: 1
PAIN TIMING: IN THE MORNING
EXACERBATED BY: STAIR CLIMBING
PAIN SCALE AT HIGHEST: 1
ALLEVIATING FACTORS: PAIN MEDICATION
PAIN TIMING: IN THE EVENING
PAIN SCALE AT LOWEST: 0
QUALITY: ACHING

## 2021-01-04 ASSESSMENT — BALANCE ASSESSMENTS
LONG VERSION TOTAL SCORE (MAX 56): 49
STANDING ON ONE LEG: 2
LOOK OVER LEFT AND RIGHT SHOULDERS WHILE STANDING: 4
STANDING TO SITTING: 4
SITTING UNSUPPORTED: 4
SITTING TO STANDING: 4
REACHING FORWARD WITH OUTSTRETCHED ARM WHILE STANDING: 3
TRANSFERS: 4
PLACE ALTERNATE FOOT ON STEP OR STOOL WHILE STANDING UNSUPPORTED: 3
PICK UP OBJECT FROM THE FLOOR FROM A STANDING POSITION: 4
TURN 360 DEGREES: 3
STANDING UNSUPPORTED ONE FOOT IN FRONT: 2
STANDING UNSUPPORTED WITH FEET TOGETHER: 4
STANDING UNSUPPORTED: 4
LONG VERSION TOTAL SCORE (MAX 56): 49
STANDING UNSUPPORTED WITH EYES CLOSED: 4

## 2021-01-04 ASSESSMENT — ACTIVITIES OF DAILY LIVING (ADL): POOR_BALANCE: 1

## 2021-01-06 ENCOUNTER — PATIENT MESSAGE (OUTPATIENT)
Dept: MEDICAL GROUP | Facility: PHYSICIAN GROUP | Age: 86
End: 2021-01-06

## 2021-01-06 ENCOUNTER — PHYSICAL THERAPY (OUTPATIENT)
Dept: PHYSICAL THERAPY | Facility: REHABILITATION | Age: 86
End: 2021-01-06
Attending: FAMILY MEDICINE
Payer: MEDICARE

## 2021-01-06 ENCOUNTER — TELEPHONE (OUTPATIENT)
Dept: MEDICAL GROUP | Facility: PHYSICIAN GROUP | Age: 86
End: 2021-01-06

## 2021-01-06 DIAGNOSIS — R26.89 BALANCE PROBLEM: ICD-10-CM

## 2021-01-06 DIAGNOSIS — E11.9 CONTROLLED TYPE 2 DIABETES MELLITUS WITHOUT COMPLICATION, WITHOUT LONG-TERM CURRENT USE OF INSULIN (HCC): ICD-10-CM

## 2021-01-06 PROCEDURE — 97112 NEUROMUSCULAR REEDUCATION: CPT

## 2021-01-06 PROCEDURE — 97110 THERAPEUTIC EXERCISES: CPT

## 2021-01-06 NOTE — OP THERAPY DAILY TREATMENT
"  Outpatient Physical Therapy  DAILY TREATMENT     [facility] [address], [address]  [place] [address]  Phone:  [phone]    Date: 01/06/2021    Patient: Grisel Saha  YOB: 1935  MRN: [id number]     Time Calculation    Start time: 1130  Stop time: 1200 Time Calculation (min): 30 minutes         Chief Complaint: Difficulty Walking and Loss Of Balance    Visit #: 2    SUBJECTIVE:  The patient reports having trouble with walking over giving surfaces and losing her balance at times.    OBJECTIVE:  Current objective measures:       improve stability and balance with flat and variable surfaces      Therapeutic Exercises (CPT 04833):     1. Bridges, 10x    2. SLS , 5x each LE, needs support as reference    3. Supine trunk rotations , 5x each side     4. Step ups 7\" lateral and forward steps , 10x each    5. Sit to stands, 5x, fatigue response in LE's    Therapeutic Treatments and Modalities:     1. Gait Training (CPT 22597),  gait pattern, used dowels for recipriocal UE?LE movement during gait     2. Neuromuscular Re-education (CPT 92297), lumbar , pelvic control; neuro-reeducation     Time-based treatments/modalities:    Physical Therapy Timed Treatment Charges  Gait training minutes (CPT 23873): 5 minutes  Neuromusc re-ed, balance, coor, post minutes (CPT 13731): 10 minutes  Therapeutic exercise minutes (CPT 47563): 15 minutes      ASSESSMENT:   Response to treatment:   Patient needed support during tasks requiring double and single leg stance positions; had fear avoidance without grabbing onto a wall bar and taking forward and lateral steps    PLAN/RECOMMENDATIONS:   Plan for treatment: therapy treatment to continue next visit.  Planned interventions for next visit: continue with current treatment.       "

## 2021-01-07 RX ORDER — GLUCOSAMINE HCL/CHONDROITIN SU 500-400 MG
CAPSULE ORAL
Qty: 100 EACH | Refills: 0 | Status: SHIPPED | OUTPATIENT
Start: 2021-01-07 | End: 2021-06-02

## 2021-01-07 RX ORDER — LANCETS 30 GAUGE
EACH MISCELLANEOUS
Qty: 100 EACH | Refills: 0 | Status: SHIPPED | OUTPATIENT
Start: 2021-01-07 | End: 2021-09-07

## 2021-01-08 ENCOUNTER — HOSPITAL ENCOUNTER (OUTPATIENT)
Dept: LAB | Facility: MEDICAL CENTER | Age: 86
End: 2021-01-08
Attending: FAMILY MEDICINE
Payer: MEDICARE

## 2021-01-08 DIAGNOSIS — E11.9 CONTROLLED TYPE 2 DIABETES MELLITUS WITHOUT COMPLICATION, WITHOUT LONG-TERM CURRENT USE OF INSULIN (HCC): ICD-10-CM

## 2021-01-08 PROCEDURE — 83036 HEMOGLOBIN GLYCOSYLATED A1C: CPT

## 2021-01-08 PROCEDURE — 36415 COLL VENOUS BLD VENIPUNCTURE: CPT

## 2021-01-09 LAB
EST. AVERAGE GLUCOSE BLD GHB EST-MCNC: 137 MG/DL
HBA1C MFR BLD: 6.4 % (ref 0–5.6)

## 2021-01-09 NOTE — OP THERAPY DAILY TREATMENT
"  Outpatient Physical Therapy  DAILY TREATMENT     [facility] Therapy [address], [address]  [place] [address]  Phone:  [phone]    Date: 01/11/2021    Patient: Grisel Saha  YOB: 1935  MRN: [id number]     Time Calculation    Start time: 0800  Stop time: 0835 Time Calculation (min): 35 minutes         Chief Complaint: Difficulty Walking and Loss Of Balance    Visit #: 3    SUBJECTIVE:  The patient reports walking more this weekend. Notes some (L) knee pain during activity    OBJECTIVE:  Current objective measures:        Improve short base of support activity; decrease fear avoidance of using steps      Therapeutic Exercises (CPT 57631):     1. Bridges, 10x    2. SLS , 5x each LE, needs support     3. Supine trunk rotations , 5x each side     4. Step ups 7\" lateral and forward steps , 10x each, diffiuclty fwd steps using (L) LE; lateral steps OK    5. Tandem heel toe 1/4 squats, \"popping\" and pain to (L) LE    6. Squats wide, 15x    7. Tandem heel toe walks, 4 x15 feet , occasional support with II bars    8. Treadmill, 1.6mph    Therapeutic Treatments and Modalities:     1. Gait Training (CPT 17597),  gait pattern, used dowels for recipriocal UE?LE movement during gait     2. Neuromuscular Re-education (CPT 64995), lumbar , pelvic control; neuro-reeducation     Time-based treatments/modalities:    Physical Therapy Timed Treatment Charges  Neuromusc re-ed, balance, coor, post minutes (CPT 75834): 15 minutes  Therapeutic exercise minutes (CPT 78896): 20 minutes      ASSESSMENT:   Response to treatment:   Performed static tasks in tandem positions; increased difficulty over the (L) LE with knee \"popping\" during 1/4 squats from tandem positioning; recommended patient try using wraparound knee brace for (L) LE to provide support.  Patient had increased (L) knee pain stepping up with (L) LE but not laterally from (L) side of steps.    PLAN/RECOMMENDATIONS:   Plan for treatment: " therapy treatment to continue next visit.  Planned interventions for next visit: continue with current treatment.

## 2021-01-11 ENCOUNTER — PHYSICAL THERAPY (OUTPATIENT)
Dept: PHYSICAL THERAPY | Facility: REHABILITATION | Age: 86
End: 2021-01-11
Attending: FAMILY MEDICINE
Payer: MEDICARE

## 2021-01-11 DIAGNOSIS — Z23 NEED FOR VACCINATION: ICD-10-CM

## 2021-01-11 DIAGNOSIS — R26.89 BALANCE PROBLEM: ICD-10-CM

## 2021-01-11 PROCEDURE — 97110 THERAPEUTIC EXERCISES: CPT

## 2021-01-11 PROCEDURE — 97112 NEUROMUSCULAR REEDUCATION: CPT

## 2021-01-12 NOTE — OP THERAPY DAILY TREATMENT
"  Outpatient Physical Therapy  DAILY TREATMENT     [facility] [address] [facility], [address]  [place] [address]  Phone:  [phone]    Date: 01/13/2021    Patient: Grisel Saha  YOB: 1935  MRN: [id number]     Time Calculation    Start time: 0800  Stop time: 0830 Time Calculation (min): 30 minutes         Chief Complaint: Difficulty Walking and Loss Of Balance    Visit #: 4    SUBJECTIVE:  The patient reports walking some on the treadmill 3x per day for a total of 60 minutes.    OBJECTIVE:  Current objective measures:       Static and dynamic balance over (L) LE    Therapeutic Exercises (CPT 19283):     1. Bridges, 10x    2. SLS/ heel toe raises/ calf stretches    3. Supine trunk rotations , 5x each side     4. Step ups 7\" lateral and forward steps , 10x each, diffiuclty fwd steps using (L) LE; lateral steps OK    5. Tandem heel toe 1/4 squats, \"popping\" and pain to (L) LE    6. Squats wide, 15x    7. Tandem heel toe walks, 4 x15 feet , occasional support with II bars    8. Treadmill, 1.6mph    9. Abdominal crunches    10. Sit to stands    11. Leg extensions green tb, 2 x10 reps    12. Hamstring curls  green tb, 2 x10 reps    Therapeutic Treatments and Modalities:     1. Gait Training (CPT 31720),  gait pattern, used dowels for recipriocal UE?LE movement during gait     2. Neuromuscular Re-education (CPT 94003), lumbar , pelvic control; neuro-reeducation     Time-based treatments/modalities:    Physical Therapy Timed Treatment Charges  Neuromusc re-ed, balance, coor, post minutes (CPT 41654): 15 minutes  Therapeutic exercise minutes (CPT 18689): 15 minutes      ASSESSMENT:   Response to treatment:   Strengthening quadriceps and hamstring muscles using theraband for isolated functional movement; patient has difficulty with standing over the (L) LE and weightbearing to step onto 7\" step without occasional support; weakness during transition over steps    PLAN/RECOMMENDATIONS: "   Plan for treatment: therapy treatment to continue next visit.  Planned interventions for next visit: continue with current treatment.

## 2021-01-13 ENCOUNTER — PHYSICAL THERAPY (OUTPATIENT)
Dept: PHYSICAL THERAPY | Facility: REHABILITATION | Age: 86
End: 2021-01-13
Attending: FAMILY MEDICINE
Payer: MEDICARE

## 2021-01-13 DIAGNOSIS — R26.89 BALANCE PROBLEM: ICD-10-CM

## 2021-01-13 PROCEDURE — 97112 NEUROMUSCULAR REEDUCATION: CPT

## 2021-01-13 PROCEDURE — 97110 THERAPEUTIC EXERCISES: CPT

## 2021-01-16 NOTE — OP THERAPY DAILY TREATMENT
"  Outpatient Physical Therapy  DAILY TREATMENT     [facility] [address] [facility], [address]  [place] [address]  Phone:  [phone]    Date: 01/18/2021    Patient: Grisel Saha  YOB: 1935  MRN: [id number]     Time Calculation                   Chief Complaint: No chief complaint on file.    Visit #: 5    SUBJECTIVE:  The patient reports     OBJECTIVE:  Current objective measures:           Therapeutic Exercises (CPT 42228):     1. Bridges, 10x    2. SLS/ heel toe raises/ calf stretches    3. Supine trunk rotations , 5x each side     4. Step ups 7\" lateral and forward steps , 10x each, diffiuclty fwd steps using (L) LE; lateral steps OK    5. Tandem heel toe 1/4 squats, \"popping\" and pain to (L) LE    6. Squats wide, 15x    7. Tandem heel toe walks, 4 x15 feet , occasional support with II bars    8. Treadmill, 1.6mph    9. Abdominal crunches    10. Sit to stands    11. Leg extensions green tb, 2 x10 reps    12. Hamstring curls  green tb, 2 x10 reps    Therapeutic Treatments and Modalities:     1. Gait Training (CPT 43832),  gait pattern, used dowels for recipriocal UE?LE movement during gait     2. Neuromuscular Re-education (CPT 86560), lumbar , pelvic control; neuro-reeducation     Time-based treatments/modalities:           ASSESSMENT:   Response to treatment:     PLAN/RECOMMENDATIONS:   Plan for treatment: therapy treatment to continue next visit.  Planned interventions for next visit: continue with current treatment.       "

## 2021-01-18 ENCOUNTER — APPOINTMENT (OUTPATIENT)
Dept: PHYSICAL THERAPY | Facility: REHABILITATION | Age: 86
End: 2021-01-18
Attending: FAMILY MEDICINE
Payer: MEDICARE

## 2021-01-21 ENCOUNTER — PHYSICAL THERAPY (OUTPATIENT)
Dept: PHYSICAL THERAPY | Facility: REHABILITATION | Age: 86
End: 2021-01-21
Attending: FAMILY MEDICINE
Payer: MEDICARE

## 2021-01-21 DIAGNOSIS — R26.89 BALANCE PROBLEM: ICD-10-CM

## 2021-01-21 PROCEDURE — 97110 THERAPEUTIC EXERCISES: CPT

## 2021-01-21 PROCEDURE — 97112 NEUROMUSCULAR REEDUCATION: CPT

## 2021-01-21 NOTE — OP THERAPY DAILY TREATMENT
"  Outpatient Physical Therapy  DAILY TREATMENT     [facility] Physical Therapy [place] [address] [facility], [address]  [place] [address]  Phone:  [phone]    Date: 01/21/2021    Patient: Grisel Saha  YOB: 1935  MRN: [id number]     Time Calculation    Start time: 0800  Stop time: 0830 Time Calculation (min): 30 minutes         Chief Complaint: Difficulty Walking and Loss Of Balance    Visit #: 5    SUBJECTIVE:  The patient reports (L) knee pain the last few days; would like to go follow up with her physician for treatment.    OBJECTIVE:  Current objective measures:       Improve static and dynamic balance over variable surfaces    Therapeutic Exercises (CPT 75478):     1. Bridges, 10x    2. SLS/ heel toe raises    3. Supine trunk rotations , 5x each side     4. Step ups 7\" lateral and forward steps , 10x each, diffiuclty fwd steps using (L) LE; lateral steps OK    5. Tandem heel toe 1/4 squats, \"popping\" and pain to (L) LE    6. Squats wide, 15x    7. Tandem heel toe walks, 4 x15 feet , occasional support with II bars    8. Treadmill, 1.6mph    9. Abdominal crunches    10. Sit to stands    11. Leg extensions green tb, 2 x10 reps    12. Hamstring curls  green tb, 2 x10 reps    13. Calf stretch, 3 x30 sec    Therapeutic Treatments and Modalities:     2. Neuromuscular Re-education (CPT 40371), lumbar , pelvic control; neuro-reeducation , use of 1/2 foam roller for static dynamci balance     Time-based treatments/modalities:    Physical Therapy Timed Treatment Charges  Neuromusc re-ed, balance, coor, post minutes (CPT 36382): 15 minutes  Therapeutic exercise minutes (CPT 58190): 15 minutes      ASSESSMENT:   Response to treatment:   Demonstrated good control using 1/2 foam roller in plantarflexion/dorsiflexion movement occasional support needed; unable to perform inversion eversion without support from wall bar    PLAN/RECOMMENDATIONS:   Plan for treatment: therapy treatment to continue next " visit.  Planned interventions for next visit: continue with current treatment.

## 2021-01-26 ENCOUNTER — IMMUNIZATION (OUTPATIENT)
Dept: FAMILY PLANNING/WOMEN'S HEALTH CLINIC | Facility: IMMUNIZATION CENTER | Age: 86
End: 2021-01-26
Attending: INTERNAL MEDICINE
Payer: MEDICARE

## 2021-01-26 DIAGNOSIS — Z23 NEED FOR VACCINATION: ICD-10-CM

## 2021-01-26 DIAGNOSIS — Z23 ENCOUNTER FOR VACCINATION: Primary | ICD-10-CM

## 2021-01-26 PROCEDURE — 0001A PFIZER SARS-COV-2 VACCINE: CPT

## 2021-01-26 PROCEDURE — 91300 PFIZER SARS-COV-2 VACCINE: CPT

## 2021-02-18 ENCOUNTER — IMMUNIZATION (OUTPATIENT)
Dept: FAMILY PLANNING/WOMEN'S HEALTH CLINIC | Facility: IMMUNIZATION CENTER | Age: 86
End: 2021-02-18
Attending: INTERNAL MEDICINE
Payer: MEDICARE

## 2021-02-18 DIAGNOSIS — Z23 ENCOUNTER FOR VACCINATION: Primary | ICD-10-CM

## 2021-02-18 PROCEDURE — 91300 PFIZER SARS-COV-2 VACCINE: CPT | Performed by: INTERNAL MEDICINE

## 2021-02-18 PROCEDURE — 0002A PFIZER SARS-COV-2 VACCINE: CPT | Performed by: INTERNAL MEDICINE

## 2021-04-14 ENCOUNTER — PATIENT MESSAGE (OUTPATIENT)
Dept: HEALTH INFORMATION MANAGEMENT | Facility: OTHER | Age: 86
End: 2021-04-14

## 2021-04-20 ENCOUNTER — OFFICE VISIT (OUTPATIENT)
Dept: MEDICAL GROUP | Facility: PHYSICIAN GROUP | Age: 86
End: 2021-04-20
Payer: MEDICARE

## 2021-04-20 VITALS
HEART RATE: 85 BPM | OXYGEN SATURATION: 96 % | TEMPERATURE: 98.2 F | DIASTOLIC BLOOD PRESSURE: 60 MMHG | WEIGHT: 143.6 LBS | RESPIRATION RATE: 15 BRPM | BODY MASS INDEX: 29 KG/M2 | SYSTOLIC BLOOD PRESSURE: 130 MMHG

## 2021-04-20 DIAGNOSIS — G44.89 OTHER HEADACHE SYNDROME: ICD-10-CM

## 2021-04-20 DIAGNOSIS — R79.9 ABNORMAL FINDING OF BLOOD CHEMISTRY, UNSPECIFIED: ICD-10-CM

## 2021-04-20 DIAGNOSIS — J84.9 INTERSTITIAL LUNG DISEASE (HCC): ICD-10-CM

## 2021-04-20 DIAGNOSIS — G44.201 ACUTE INTRACTABLE TENSION-TYPE HEADACHE: ICD-10-CM

## 2021-04-20 PROCEDURE — 8041 PR SCP AHA: Performed by: FAMILY MEDICINE

## 2021-04-20 PROCEDURE — 99214 OFFICE O/P EST MOD 30 MIN: CPT | Performed by: FAMILY MEDICINE

## 2021-04-20 ASSESSMENT — PATIENT HEALTH QUESTIONNAIRE - PHQ9: CLINICAL INTERPRETATION OF PHQ2 SCORE: 0

## 2021-04-20 NOTE — PROGRESS NOTES
QuestionableSubjective:     Chief Complaint   Patient presents with   • Head Pain     since sat.        HPI:   Grisel presents today to discuss the following.    Interstitial lung disease (HCC)  This is a chronic condition.  Well-controlled with Flovent  No acute issues at this time.    Other headache syndrome  New issue  Does not have history of headaches  However, 3 days ago developed a sharp pain starting to the right neck and radiated to the top of the head.   Very sharp  Rated 8/10  Woke up with the headache  Lasted for about 30-45min  Did not take anything to make it better  Denies any associated numbness, vision changes, weakness or extremity numbness  However, she has noted some increased right eye droopiness (already had some at baseline)  Has not happened before.   Takes baby aspirin        Past Medical History:   Diagnosis Date   • HTN (hypertension)        Social History     Tobacco Use   • Smoking status: Former Smoker   • Smokeless tobacco: Never Used   Substance Use Topics   • Alcohol use: Yes   • Drug use: Not Currently       Current Outpatient Medications Ordered in Epic   Medication Sig Dispense Refill   • Blood Glucose Meter Kit Test blood sugar 1x daily. Lopez Freedom Lite blood glucose monitoring kit. 1 Kit 0   • Blood Glucose Test Strips Use one Abbott Freedom Lite strip to test blood sugar once daily early morning before first meal. 100 Each 3   • Lancets Use one Abbott Susan Lite lancet to test blood sugar once daily early morning before first meal. 100 Each 0   • Alcohol Swabs Wipe site with prep pad prior to injection. 100 Each 0   • fluticasone (FLOVENT HFA) 110 MCG/ACT Aerosol Inhale 1 Puff 2 times a day. 1 Each 6   • losartan (COZAAR) 100 MG Tab Take 1 Tab by mouth every day. TAKE 1 TABLET BY MOUTH DAILY 90 Tab 3   • lovastatin (MEVACOR) 20 MG Tab Take 1 Tab by mouth every day. TAKE 1 TABLET BY MOUTH DAILY 90 Tab 3     No current Epic-ordered facility-administered medications on file.        Allergies:  Patient has no known allergies.    Health Maintenance: Completed    ROS:  Gen: no fevers/chills, no changes in weight  Eyes: no changes in vision  ENT: no sore throat, no hearing loss, no bloody nose  Pulm: no sob, no cough      Objective:     Exam:  /60 (BP Location: Left arm, Patient Position: Sitting, BP Cuff Size: Adult)   Pulse 85   Temp 36.8 °C (98.2 °F) (Temporal)   Resp 15   Wt 65.1 kg (143 lb 9.6 oz)   SpO2 96%   BMI 29.00 kg/m²  Body mass index is 29 kg/m².    Gen: Alert and oriented, No apparent distress.  HENT: TMs are clear. Oropharynx is w/o erythema or exudates. Neck is supple without cervical lymphadenopathy. No JVD.   Eyes: PERRLA  Lungs: Normal effort, CTA bilaterally, no wheezes, rhonchi, or rales  CV: Regular rate and rhythm. No murmurs, rubs, or gallops.  Abdomen: Soft, nontender, nondistended. Normal bowel sounds. Liver and spleen are not palpable  Ext: No clubbing, cyanosis, edema.  Skin: no rash, lesions or ulcers  Neuro: Cranial nerves II through XII are intact.  Except for questionable left tongue deviation and correctable right eye ptosis.       Assessment & Plan:     85 y.o. female with the following -     1. Other headache syndrome  2. Acute intractable tension-type headache  3. Abnormal finding of blood chemistry, unspecified   This is a new problem.  Patient presents today reporting worrisome new onset headache 3 days ago with no prior/significant headache history in the past.  The headache has resolved but did last for 35 to 40 minutes.  A started on her right neck and radiated to the top of her head.  She describes the pain is very intense.  No associated vision loss or other neurological symptoms.  However, on physical exam she does appear to have some right sided ptosis that is correctable.  Neurological exam also shows a questionable left tongue deviation.  I discussed my differential diagnosis with the patient and how I would like to proceed with an  MRI for further evaluation.  She takes baby aspirin as part of her daily regimen.  I did provide her with strict emergency room precautions should the headache return and present with additional symptoms.  I will also order baseline blood work for risk stratification.  I will consider referral to neurology based on results.  The patient feels comfortable with plan.  - MR-BRAIN-WITH & W/O; Future  - CBC WITHOUT DIFFERENTIAL; Future  - Comp Metabolic Panel; Future  - HEMOGLOBIN A1C; Future  - Lipid Profile; Future  - TSH WITH REFLEX TO FT4; Future    4. Interstitial lung disease (HCC)  Chronic, stable condition.  On Flovent      Return in about 6 weeks (around 6/1/2021).    Please note that this dictation was created using voice recognition software. I have made every reasonable attempt to correct obvious errors, but I expect that there are errors of grammar and possibly content that I did not discover before finalizing the note.

## 2021-04-20 NOTE — PROGRESS NOTES
Annual Health Assessment Questions:    1.  Are you currently engaging in any exercise or physical activity? Yes    2.  How would you describe your mood or emotional well-being today? fair    3.  Have you had any falls in the last year? No    4.  Have you noticed any problems with your balance or had difficulty walking? Yes    5.  In the last six months have you experienced any leakage of urine? Yes    6. DPA/Advanced Directive: Patient has Durable Power of , but it is not on file. Instructed to bring in a copy to scan into their chart.

## 2021-04-20 NOTE — ASSESSMENT & PLAN NOTE
New issue  Does not have history of headaches  However, 3 days ago developed a sharp pain starting to the right neck and radiated to the top of the head.   Very sharp  Rated 8/10  Woke up with the headache  Lasted for about 30-45min  Did not take anything to make it better  Denies any associated numbness, vision changes, weakness or extremity numbness  However, she has noted some increased right eye droopiness (already had some at baseline)  Has not happened before.   Takes baby aspirin

## 2021-04-26 ENCOUNTER — TELEPHONE (OUTPATIENT)
Dept: MEDICAL GROUP | Facility: PHYSICIAN GROUP | Age: 86
End: 2021-04-26

## 2021-04-26 ENCOUNTER — TELEMEDICINE (OUTPATIENT)
Dept: MEDICAL GROUP | Facility: PHYSICIAN GROUP | Age: 86
End: 2021-04-26
Payer: MEDICARE

## 2021-04-26 VITALS — WEIGHT: 142 LBS | BODY MASS INDEX: 28.63 KG/M2 | TEMPERATURE: 97.2 F | HEIGHT: 59 IN

## 2021-04-26 DIAGNOSIS — J84.9 INTERSTITIAL LUNG DISEASE (HCC): ICD-10-CM

## 2021-04-26 DIAGNOSIS — R05.9 COUGH: ICD-10-CM

## 2021-04-26 PROCEDURE — 99214 OFFICE O/P EST MOD 30 MIN: CPT | Mod: 95,CR | Performed by: FAMILY MEDICINE

## 2021-04-26 PROCEDURE — 8041 PR SCP AHA: Mod: 95,CR | Performed by: FAMILY MEDICINE

## 2021-04-26 RX ORDER — DEXAMETHASONE 4 MG/1
1 TABLET ORAL 2 TIMES DAILY
Qty: 1 EACH | Refills: 6 | Status: SHIPPED | OUTPATIENT
Start: 2021-04-26 | End: 2021-06-02

## 2021-04-26 RX ORDER — BENZONATATE 100 MG/1
100 CAPSULE ORAL 3 TIMES DAILY PRN
Qty: 21 CAPSULE | Refills: 0 | Status: SHIPPED | OUTPATIENT
Start: 2021-04-26 | End: 2021-05-03

## 2021-04-26 RX ORDER — AZELASTINE 1 MG/ML
1 SPRAY, METERED NASAL 2 TIMES DAILY
Qty: 30 ML | Refills: 3 | Status: SHIPPED | OUTPATIENT
Start: 2021-04-26 | End: 2021-06-02

## 2021-04-26 NOTE — PROGRESS NOTES
Annual Health Assessment Questions:    1.  Are you currently engaging in any exercise or physical activity? Yes    2.  How would you describe your mood or emotional well-being today? good    3.  Have you had any falls in the last year? No    4.  Have you noticed any problems with your balance or had difficulty walking? No    5.  In the last six months have you experienced any leakage of urine? Yes    6. DPA/Advanced Directive: Patient has Living Will, but it is not on file. Instructed to bring in a copy to scan into their chart.

## 2021-04-26 NOTE — PROGRESS NOTES
Virtual Visit: Established Patient   This visit was conducted via Zoom using secure and encrypted videoconferencing technology. The patient was in a private location in the state of [place].    The patient's identity was confirmed and verbal consent was obtained for this virtual visit.    Subjective:   CC:   Chief Complaint   Patient presents with   • Cough     dry cough, x1 week        Grisel Saha is a 85 y.o. female presenting for evaluation and management of:    #cough  New issue  Has had it for the past week  Described as sharp  Feels a tickle in her throat   Does not have any associated symptoms   Wakes up at night and gets coughing spells   Denies any vomiting   Denies any SOB  Overall, she feels as though it is getting worse  Moved to [place] 6 months ago  Denies any GERD issue  Denies metallic taste in her mouth    #pulmonary fibrosis  This is a chronic condition.  The patient is on Flovent as needed for this issue.  At baseline denies any shortness of breath or cough.      ROS   Denies any recent fevers or chills. No nausea or vomiting. No chest pains or shortness of breath.     No Known Allergies    Current medicines (including changes today)  Current Outpatient Medications   Medication Sig Dispense Refill   • Blood Glucose Meter Kit Test blood sugar 1x daily. Lopez Freedom Lite blood glucose monitoring kit. 1 Kit 0   • Blood Glucose Test Strips Use one Abbott Freedom Lite strip to test blood sugar once daily early morning before first meal. 100 Each 3   • Lancets Use one Abbott Glasford Lite lancet to test blood sugar once daily early morning before first meal. 100 Each 0   • Alcohol Swabs Wipe site with prep pad prior to injection. 100 Each 0   • losartan (COZAAR) 100 MG Tab Take 1 Tab by mouth every day. TAKE 1 TABLET BY MOUTH DAILY 90 Tab 3   • lovastatin (MEVACOR) 20 MG Tab Take 1 Tab by mouth every day. TAKE 1 TABLET BY MOUTH DAILY 90 Tab 3   • fluticasone (FLOVENT HFA) 110 MCG/ACT Aerosol Inhale 1 Puff  "2 times a day. (Patient not taking: Reported on 4/26/2021) 1 Each 6     No current facility-administered medications for this visit.       Patient Active Problem List    Diagnosis Date Noted   • Other headache syndrome 04/20/2021   • Cough 12/18/2020   • Balance problem 12/18/2020   • Interstitial lung disease (HCC) 02/10/2020   • Essential hypertension 09/13/2004   • Hyperlipidemia 03/07/2004       Family History   Problem Relation Age of Onset   • Stroke Mother    • Cancer Father         lung       She  has a past medical history of HTN (hypertension).  She  has no past surgical history on file.       Objective:   Temp 36.2 °C (97.2 °F) (Temporal) Comment: pt reported  Ht 1.499 m (4' 11\") Comment: pt reported  Wt 64.4 kg (142 lb) Comment: pt reported  BMI 28.68 kg/m²     Physical Exam:  Constitutional: Alert, no distress, well-groomed.  Skin: No rashes in visible areas.  Eye: Round. Conjunctiva clear, lids normal. No icterus.   ENMT: Lips pink without lesions, good dentition, moist mucous membranes. Phonation normal.  Neck: No masses, no thyromegaly. Moves freely without pain.  Respiratory: Unlabored respiratory effort, no cough or audible wheeze  Psych: Alert and oriented x3, normal affect and mood.       Assessment and Plan:   The following treatment plan was discussed:     1. Cough  This is a new problem.  She has been complaining of a cough of 1 week duration.  She feels as though it is getting worse.  Described as a dry coughing spells.  She denies any other associated symptoms.  However, I do recommend that she get screened for COVID-19 and she is amenable to that.  I also wonder as to whether her interstitial lung disease may be acting up.  As such, I do recommend resuming her Flovent.  I will also provide her with a prescription for azelastine as it is possible that she may be experiencing seasonal allergies.  Tessalon Perles prescribed for symptomatic relief in the meantime.    2. Interstitial lung " disease (HCC)  Chronic, stable condition.  Has been on Flovent in the past.  Currently not taking it.  Will resume today.      Follow-up: No follow-ups on file.

## 2021-04-26 NOTE — TELEPHONE ENCOUNTER
ESTABLISHED PATIENT PRE-VISIT PLANNING     Patient was NOT contacted to complete PVP.     Note: Patient will not be contacted if there is no indication to call.     1.  Reviewed notes from the last few office visits within the medical group: Yes    2.  If any orders were placed at last visit or intended to be done for this visit (i.e. 6 mos follow-up), do we have Results/Consult Notes?         •  Labs - labs ordered, not completed  Note: If patient appointment is for lab review and patient did not complete labs, check with provider if OK to reschedule patient until labs completed.       •  Imaging - scheduled       •  Referrals - No referrals were ordered at last office visit.    3. Is this appointment scheduled as a Hospital Follow-Up? No    4.  Immunizations were updated in Epic using Reconcile Outside Information activity? Yes    5.  Patient is due for the following Health Maintenance Topics:   Health Maintenance Due   Topic Date Due   • Annual Wellness Visit  Never done   • FASTING LIPID PROFILE  Never done   • URINE ACR / MICROALBUMIN  Never done   • DIABETES MONOFILAMENT / LE EXAM  Never done   • RETINAL SCREENING  Never done   • SERUM CREATININE  Never done   • IMM HEP B VACCINE (1 of 3 - Risk 3-dose series) Never done   • PAP SMEAR  Never done   • MAMMOGRAM  Never done   • COLONOSCOPY  Never done   • BONE DENSITY  Never done   • IMM ZOSTER VACCINES (2 of 3) 02/07/2014         6.  AHA (Pulse8) form printed for Provider? Yes

## 2021-05-20 ENCOUNTER — HOSPITAL ENCOUNTER (OUTPATIENT)
Dept: LAB | Facility: MEDICAL CENTER | Age: 86
End: 2021-05-20
Attending: FAMILY MEDICINE
Payer: MEDICARE

## 2021-05-20 DIAGNOSIS — R79.9 ABNORMAL FINDING OF BLOOD CHEMISTRY, UNSPECIFIED: ICD-10-CM

## 2021-05-20 DIAGNOSIS — R05.9 COUGH: ICD-10-CM

## 2021-05-20 DIAGNOSIS — G44.89 OTHER HEADACHE SYNDROME: ICD-10-CM

## 2021-05-20 DIAGNOSIS — G44.201 ACUTE INTRACTABLE TENSION-TYPE HEADACHE: ICD-10-CM

## 2021-05-20 LAB
ALBUMIN SERPL BCP-MCNC: 4 G/DL (ref 3.2–4.9)
ALBUMIN/GLOB SERPL: 1.4 G/DL
ALP SERPL-CCNC: 89 U/L (ref 30–99)
ALT SERPL-CCNC: 9 U/L (ref 2–50)
ANION GAP SERPL CALC-SCNC: 10 MMOL/L (ref 7–16)
AST SERPL-CCNC: 9 U/L (ref 12–45)
BILIRUB SERPL-MCNC: 0.4 MG/DL (ref 0.1–1.5)
BUN SERPL-MCNC: 13 MG/DL (ref 8–22)
CALCIUM SERPL-MCNC: 9.2 MG/DL (ref 8.5–10.5)
CHLORIDE SERPL-SCNC: 97 MMOL/L (ref 96–112)
CHOLEST SERPL-MCNC: 156 MG/DL (ref 100–199)
CO2 SERPL-SCNC: 25 MMOL/L (ref 20–33)
COVID ORDER STATUS COVID19: NORMAL
CREAT SERPL-MCNC: 0.6 MG/DL (ref 0.5–1.4)
ERYTHROCYTE [DISTWIDTH] IN BLOOD BY AUTOMATED COUNT: 40.9 FL (ref 35.9–50)
EST. AVERAGE GLUCOSE BLD GHB EST-MCNC: 131 MG/DL
GLOBULIN SER CALC-MCNC: 2.8 G/DL (ref 1.9–3.5)
GLUCOSE SERPL-MCNC: 149 MG/DL (ref 65–99)
HBA1C MFR BLD: 6.2 % (ref 4–5.6)
HCT VFR BLD AUTO: 37.8 % (ref 37–47)
HDLC SERPL-MCNC: 103 MG/DL
HGB BLD-MCNC: 13 G/DL (ref 12–16)
LDLC SERPL CALC-MCNC: 47 MG/DL
MCH RBC QN AUTO: 32.7 PG (ref 27–33)
MCHC RBC AUTO-ENTMCNC: 34.4 G/DL (ref 33.6–35)
MCV RBC AUTO: 95 FL (ref 81.4–97.8)
PLATELET # BLD AUTO: 354 K/UL (ref 164–446)
PMV BLD AUTO: 10.2 FL (ref 9–12.9)
POTASSIUM SERPL-SCNC: 4 MMOL/L (ref 3.6–5.5)
PROT SERPL-MCNC: 6.8 G/DL (ref 6–8.2)
RBC # BLD AUTO: 3.98 M/UL (ref 4.2–5.4)
SODIUM SERPL-SCNC: 132 MMOL/L (ref 135–145)
TRIGL SERPL-MCNC: 31 MG/DL (ref 0–149)
TSH SERPL DL<=0.005 MIU/L-ACNC: 0.81 UIU/ML (ref 0.38–5.33)
WBC # BLD AUTO: 8.2 K/UL (ref 4.8–10.8)

## 2021-05-20 PROCEDURE — C9803 HOPD COVID-19 SPEC COLLECT: HCPCS

## 2021-05-20 PROCEDURE — U0005 INFEC AGEN DETEC AMPLI PROBE: HCPCS

## 2021-05-20 PROCEDURE — 85027 COMPLETE CBC AUTOMATED: CPT

## 2021-05-20 PROCEDURE — 84443 ASSAY THYROID STIM HORMONE: CPT

## 2021-05-20 PROCEDURE — 83036 HEMOGLOBIN GLYCOSYLATED A1C: CPT

## 2021-05-20 PROCEDURE — 80061 LIPID PANEL: CPT

## 2021-05-20 PROCEDURE — 36415 COLL VENOUS BLD VENIPUNCTURE: CPT

## 2021-05-20 PROCEDURE — 80053 COMPREHEN METABOLIC PANEL: CPT

## 2021-05-20 PROCEDURE — U0003 INFECTIOUS AGENT DETECTION BY NUCLEIC ACID (DNA OR RNA); SEVERE ACUTE RESPIRATORY SYNDROME CORONAVIRUS 2 (SARS-COV-2) (CORONAVIRUS DISEASE [COVID-19]), AMPLIFIED PROBE TECHNIQUE, MAKING USE OF HIGH THROUGHPUT TECHNOLOGIES AS DESCRIBED BY CMS-2020-01-R: HCPCS

## 2021-05-21 LAB
SARS-COV-2 RNA RESP QL NAA+PROBE: NOTDETECTED
SPECIMEN SOURCE: NORMAL

## 2021-05-24 ENCOUNTER — HOSPITAL ENCOUNTER (OUTPATIENT)
Dept: RADIOLOGY | Facility: MEDICAL CENTER | Age: 86
End: 2021-05-24
Attending: FAMILY MEDICINE
Payer: MEDICARE

## 2021-05-24 DIAGNOSIS — G44.201 ACUTE INTRACTABLE TENSION-TYPE HEADACHE: ICD-10-CM

## 2021-05-24 DIAGNOSIS — G44.89 OTHER HEADACHE SYNDROME: ICD-10-CM

## 2021-05-24 PROCEDURE — 70553 MRI BRAIN STEM W/O & W/DYE: CPT | Mod: MG

## 2021-05-24 PROCEDURE — 700117 HCHG RX CONTRAST REV CODE 255: Performed by: FAMILY MEDICINE

## 2021-05-24 PROCEDURE — A9576 INJ PROHANCE MULTIPACK: HCPCS | Performed by: FAMILY MEDICINE

## 2021-05-24 RX ADMIN — GADOTERIDOL 13 ML: 279.3 INJECTION, SOLUTION INTRAVENOUS at 08:55

## 2021-05-25 ENCOUNTER — HOSPITAL ENCOUNTER (EMERGENCY)
Facility: MEDICAL CENTER | Age: 86
End: 2021-05-25
Attending: EMERGENCY MEDICINE
Payer: MEDICARE

## 2021-05-25 VITALS
OXYGEN SATURATION: 95 % | DIASTOLIC BLOOD PRESSURE: 93 MMHG | WEIGHT: 142.2 LBS | SYSTOLIC BLOOD PRESSURE: 198 MMHG | RESPIRATION RATE: 17 BRPM | BODY MASS INDEX: 27.92 KG/M2 | TEMPERATURE: 98 F | HEIGHT: 60 IN | HEART RATE: 95 BPM
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
## 2021-05-25 DIAGNOSIS — S81.812A SKIN TEAR OF LEFT LOWER LEG WITHOUT COMPLICATION, INITIAL ENCOUNTER: ICD-10-CM

## 2021-05-25 DIAGNOSIS — S41.119A: ICD-10-CM

## 2021-05-25 PROCEDURE — 90715 TDAP VACCINE 7 YRS/> IM: CPT | Performed by: EMERGENCY MEDICINE

## 2021-05-25 PROCEDURE — A6403 STERILE GAUZE>16 <= 48 SQ IN: HCPCS

## 2021-05-25 PROCEDURE — 700111 HCHG RX REV CODE 636 W/ 250 OVERRIDE (IP): Performed by: EMERGENCY MEDICINE

## 2021-05-25 PROCEDURE — 90471 IMMUNIZATION ADMIN: CPT

## 2021-05-25 PROCEDURE — 99282 EMERGENCY DEPT VISIT SF MDM: CPT | Mod: 25

## 2021-05-25 RX ORDER — HYDROCHLOROTHIAZIDE 25 MG/1
25 TABLET ORAL DAILY
Status: ON HOLD | COMMUNITY
End: 2022-01-10

## 2021-05-25 RX ADMIN — CLOSTRIDIUM TETANI TOXOID ANTIGEN (FORMALDEHYDE INACTIVATED), CORYNEBACTERIUM DIPHTHERIAE TOXOID ANTIGEN (FORMALDEHYDE INACTIVATED), BORDETELLA PERTUSSIS TOXOID ANTIGEN (GLUTARALDEHYDE INACTIVATED), BORDETELLA PERTUSSIS FILAMENTOUS HEMAGGLUTININ ANTIGEN (FORMALDEHYDE INACTIVATED), BORDETELLA PERTUSSIS PERTACTIN ANTIGEN, AND BORDETELLA PERTUSSIS FIMBRIAE 2/3 ANTIGEN 0.5 ML: 5; 2; 2.5; 5; 3; 5 INJECTION, SUSPENSION INTRAMUSCULAR at 09:19

## 2021-05-25 ASSESSMENT — PAIN DESCRIPTION - PAIN TYPE: TYPE: ACUTE PAIN

## 2021-05-25 ASSESSMENT — FIBROSIS 4 INDEX: FIB4 SCORE: 0.72

## 2021-05-25 NOTE — ED NOTES
Discharge teaching and paperwork provided regarding wound care and all questions/concerns answered. VSS, assessment stable. Given information regarding home care and MD follow up. Patient discharged to the care of her spouse and ambulated out of the ED.

## 2021-05-25 NOTE — ED TRIAGE NOTES
"Chief Complaint   Patient presents with   • T-5000 GLF     last night     86 yo female ambulatory to triage after GLF last night. Pt states \"I drank too much\" and fell in her home. Denies hitting head, -LOC -thinners. Patient has skin tear to L forearm and L lower leg. Patient hypertensive on arrival, reports she did not take her meds this am.    BP (!) 211/124   Pulse (!) 104   Temp 37.2 °C (98.9 °F) (Temporal)   Resp 16   Ht 1.524 m (5')   Wt 64.5 kg (142 lb 3.2 oz)   SpO2 95%   BMI 27.77 kg/m²     "

## 2021-05-25 NOTE — ED PROVIDER NOTES
"ED Provider Note      ER PROVIDER NOTE      CHIEF COMPLAINT  Chief Complaint   Patient presents with   • T-5000 GLF     last night       HPI  Grisel Saha is a 85 y.o. female who presents to the emergency department complaining of fall and wounds.  Patient reports she was \"drinking too much last night\".  She tripped and fell scraping her arm and leg.  She reports no headache or head injury or LOC.  No neck pain, no focal weakness numbness or tingling.  No chest pain or shortness of breath, no abdominal pain, nausea vomiting.  She reports no bony pain just pain at the skin tears.  Her tetanus status is unknown.  Does not take any blood thinners  REVIEW OF SYSTEMS  Pertinent positives include skin tear. Pertinent negatives include no headache. See HPI for details. All other systems reviewed and are negative.    PAST MEDICAL HISTORY   has a past medical history of HTN (hypertension).    SURGICAL HISTORY  patient denies any surgical history    FAMILY HISTORY  Family History   Problem Relation Age of Onset   • Stroke Mother    • Cancer Father         lung       SOCIAL HISTORY  Social History     Socioeconomic History   • Marital status:      Spouse name: Not on file   • Number of children: Not on file   • Years of education: Not on file   • Highest education level: Not on file   Occupational History   • Not on file   Tobacco Use   • Smoking status: Former Smoker   • Smokeless tobacco: Never Used   Vaping Use   • Vaping Use: Never used   Substance and Sexual Activity   • Alcohol use: Yes   • Drug use: Not Currently   • Sexual activity: Not Currently   Other Topics Concern   • Not on file   Social History Narrative   • Not on file     Social Determinants of Health     Financial Resource Strain:    • Difficulty of Paying Living Expenses:    Food Insecurity:    • Worried About Running Out of Food in the Last Year:    • Ran Out of Food in the Last Year:    Transportation Needs:    • Lack of Transportation (Medical):  "   • Lack of Transportation (Non-Medical):    Physical Activity:    • Days of Exercise per Week:    • Minutes of Exercise per Session:    Stress:    • Feeling of Stress :    Social Connections:    • Frequency of Communication with Friends and Family:    • Frequency of Social Gatherings with Friends and Family:    • Attends Yazidism Services:    • Active Member of Clubs or Organizations:    • Attends Club or Organization Meetings:    • Marital Status:    Intimate Partner Violence:    • Fear of Current or Ex-Partner:    • Emotionally Abused:    • Physically Abused:    • Sexually Abused:       Social History     Substance and Sexual Activity   Drug Use Not Currently       CURRENT MEDICATIONS  Home Medications     Reviewed by Agus Loera R.N. (Registered Nurse) on 05/25/21 at 0839  Med List Status: Partial   Medication Last Dose Status   Alcohol Swabs  Active   azelastine (ASTELIN) 137 MCG/SPRAY nasal spray  Active   Blood Glucose Meter Kit  Active   Blood Glucose Test Strips  Active   fluticasone (FLOVENT HFA) 110 MCG/ACT Aerosol  Active   hydroCHLOROthiazide (HYDRODIURIL) 25 MG Tab 5/24/2021 Active   Lancets  Active   losartan (COZAAR) 100 MG Tab 5/24/2021 Active   lovastatin (MEVACOR) 20 MG Tab 5/24/2021 Active                ALLERGIES  No Known Allergies    PHYSICAL EXAM  VITAL SIGNS: BP (!) 211/124   Pulse (!) 104   Temp 37.2 °C (98.9 °F) (Temporal)   Resp 16   Ht 1.524 m (5')   Wt 64.5 kg (142 lb 3.2 oz)   SpO2 95%   BMI 27.77 kg/m²   Pulse ox interpretation: I interpret this pulse ox as normal.    Constitutional: Alert in no apparent distress.  HENT: No signs of trauma, Bilateral external ears normal, Nose normal.   Eyes: Pupils are equal and reactive, Conjunctiva normal, Non-icteric.   Neck: Normal range of motion, No tenderness, Supple, No stridor.   Cardiovascular: Regular rate and rhythm, no murmurs.   Thorax & Lungs: Normal breath sounds, No respiratory distress, No wheezing, No chest  tenderness.   Abdomen: Bowel sounds normal, Soft, No tenderness, No masses, No pulsatile masses. No peritoneal signs.  Skin: Warm, Dry, No erythema, No rash.  There is a superficial skin tear, approximately 10 cm x 4 cm over the anterior shin on the left, there is some devascularized skin, no deep wound no bony tenderness, additional skin tear noted to the left forearm, approximately 2 cm, similar with some slight devascularized tissue, no deeper wound or bony tenderness, distal capillary refills less than 2 seconds and distal sensation is intact   back: No bony tenderness, No CVA tenderness.   Extremities: Intact distal pulses, No edema, No tenderness, No cyanosis,Negative Erica's sign.  Musculoskeletal: Good range of motion in all major joints. No tenderness to palpation or major deformities noted.   Neurologic: Alert , Normal motor function, Normal sensory function, No focal deficits noted.   Psychiatric: Affect normal, Judgment normal, Mood normal.     DIAGNOSTIC STUDIES / PROCEDURES      RADIOLOGY  No orders to display     The radiologist's interpretation of all radiological studies have been reviewed and images independently viewed by me.    COURSE & MEDICAL DECISION MAKING  Nursing notes, VS, PMSFHx reviewed in chart.    8:55 AM Patient seen and examined at bedside. Patient will be treated with tetanus.   Wound care is provided with trimming of the devascularized tissue and nonstick dressing        Decision Making:  This is a 85 y.o. female presenting after fall while intoxicated last night.  Patient does have 2 skin tears, no deep wounds requiring sutures or evidence of deep structure involvement.  She is neurovascularly intact.  She has no bony tenderness to suggest concomitant fracture, no headaches, chest pain, abdominal pain or other focal complaints of pain to suggest other traumatic injury.  Her tetanus has been updated.  She has been educated on wound care and return precautions.  Patient was noted to  be hypertensive but had not taken her medications today    The patient will return for new or worsening symptoms and is stable at the time of discharge.    The patient is referred to a primary physician for blood pressure management, diabetic screening, and for all other preventative health concerns.        DISPOSITION:  Patient will be discharged home in stable condition.    FOLLOW UP:  Cricket Miller M.D.  [address]  [facility] [id number]  [phone]    In 1 week        OUTPATIENT MEDICATIONS:  New Prescriptions    No medications on file         FINAL IMPRESSION  1. Fall, initial encounter    2. Skin tear of left lower leg without complication, initial encounter    3. Skin tear of upper arm without complication, unspecified laterality, initial encounter         The note accurately reflects work and decisions made by me.  Arjun Lopes M.D.  5/25/2021  9:05 AM

## 2021-05-27 ENCOUNTER — OFFICE VISIT (OUTPATIENT)
Dept: URGENT CARE | Facility: CLINIC | Age: 86
End: 2021-05-27
Payer: MEDICARE

## 2021-05-27 ENCOUNTER — PATIENT MESSAGE (OUTPATIENT)
Dept: MEDICAL GROUP | Facility: PHYSICIAN GROUP | Age: 86
End: 2021-05-27

## 2021-05-27 VITALS
HEART RATE: 88 BPM | WEIGHT: 143.6 LBS | HEIGHT: 60 IN | DIASTOLIC BLOOD PRESSURE: 82 MMHG | BODY MASS INDEX: 28.19 KG/M2 | SYSTOLIC BLOOD PRESSURE: 130 MMHG | OXYGEN SATURATION: 95 % | RESPIRATION RATE: 16 BRPM | TEMPERATURE: 97.9 F

## 2021-05-27 DIAGNOSIS — L03.90 WOUND CELLULITIS: ICD-10-CM

## 2021-05-27 DIAGNOSIS — Z51.89 ENCOUNTER FOR WOUND RE-CHECK: ICD-10-CM

## 2021-05-27 PROCEDURE — 99212 OFFICE O/P EST SF 10 MIN: CPT | Performed by: PHYSICIAN ASSISTANT

## 2021-05-27 RX ORDER — HYDROCODONE BITARTRATE AND ACETAMINOPHEN 5; 325 MG/1; MG/1
1 TABLET ORAL EVERY 8 HOURS PRN
Qty: 42 TABLET | Refills: 0 | Status: SHIPPED | OUTPATIENT
Start: 2021-05-27 | End: 2021-06-02

## 2021-05-27 ASSESSMENT — FIBROSIS 4 INDEX: FIB4 SCORE: 0.72

## 2021-05-28 ASSESSMENT — ENCOUNTER SYMPTOMS
NAUSEA: 0
ABDOMINAL PAIN: 0
MUSCULOSKELETAL NEGATIVE: 1
VOMITING: 0
DIARRHEA: 0
FEVER: 0
SHORTNESS OF BREATH: 0
DIZZINESS: 0
CHILLS: 0

## 2021-05-28 NOTE — PROGRESS NOTES
Subjective:      Grisel Saha is a 85 y.o. female who presents with Wound Check (fell monday morning, left leg, left arm, wants dressing change)        Patient is accompanied by her  and daughter.       Wound Check  She was originally treated 2 to 3 days ago (2 days). Her temperature was unmeasured prior to arrival. There has been no drainage from the wound. The redness has improved. The swelling has improved. The pain has improved. She has no difficulty moving the affected extremity or digit.     Patient presents to urgent care for a dressing change of wounds on her left forearm and left shin. She fell in the middle of the night 2 days ago and obtained a large skin tear to the left shin and a smaller skin tear to the left forearm. She reports some improvement in the pain but states it is still very painful. No fevers or apparent discharge from the site. She is waiting to follow up with the wound clinic. She is not taking any blood thinners.       Review of Systems   Constitutional: Negative for chills and fever.   HENT: Negative for congestion.    Respiratory: Negative for shortness of breath.    Cardiovascular: Negative for chest pain.   Gastrointestinal: Negative for abdominal pain, diarrhea, nausea and vomiting.   Genitourinary: Negative.    Musculoskeletal: Negative.    Skin: Negative for rash.        + skin tears   Neurological: Negative for dizziness.        Objective:     /82 (BP Location: Left arm, Patient Position: Sitting, BP Cuff Size: Adult)   Pulse 88   Temp 36.6 °C (97.9 °F) (Temporal)   Resp 16   Ht 1.524 m (5')   Wt 65.1 kg (143 lb 9.6 oz)   SpO2 95%   BMI 28.04 kg/m²      Physical Exam  Vitals and nursing note reviewed.   Constitutional:       General: She is not in acute distress.     Appearance: Normal appearance. She is well-developed. She is not diaphoretic.   HENT:      Head: Normocephalic and atraumatic.      Right Ear: External ear normal.      Left Ear: External ear  normal.   Eyes:      Conjunctiva/sclera: Conjunctivae normal.   Cardiovascular:      Rate and Rhythm: Normal rate.   Pulmonary:      Effort: Pulmonary effort is normal.   Musculoskeletal:         General: Normal range of motion.      Cervical back: Normal range of motion.   Skin:     General: Skin is warm and dry.             Comments: Large skin tear present on left anterior skin with mild amount of active bleeding over mid inferior aspect of the wound. Surrounding ecchymosis without surrounding erythema, edema, or overlying discharge noted.     Superficial skin tear noted on left medial forearm with overlying scabbing present. No surrounding erythema, edema, or discharge noted.    Neurological:      Mental Status: She is alert and oriented to person, place, and time.   Psychiatric:         Behavior: Behavior normal.               PMH:  has a past medical history of HTN (hypertension).  MEDS:   Current Outpatient Medications:   •  HYDROcodone-acetaminophen (NORCO) 5-325 MG Tab per tablet, Take 1 tablet by mouth every 8 hours as needed for up to 14 days., Disp: 42 tablet, Rfl: 0  •  hydroCHLOROthiazide (HYDRODIURIL) 25 MG Tab, Take 25 mg by mouth every day., Disp: , Rfl:   •  azelastine (ASTELIN) 137 MCG/SPRAY nasal spray, Administer 1 Spray into affected nostril(S) 2 times a day., Disp: 30 mL, Rfl: 3  •  fluticasone (FLOVENT HFA) 110 MCG/ACT Aerosol, Inhale 1 Puff 2 times a day., Disp: 1 Each, Rfl: 6  •  Blood Glucose Meter Kit, Test blood sugar 1x daily. Lopez Freedom Lite blood glucose monitoring kit., Disp: 1 Kit, Rfl: 0  •  Blood Glucose Test Strips, Use one Abbott Freedom Lite strip to test blood sugar once daily early morning before first meal., Disp: 100 Each, Rfl: 3  •  Lancets, Use one Abbott [place] Lite lancet to test blood sugar once daily early morning before first meal., Disp: 100 Each, Rfl: 0  •  Alcohol Swabs, Wipe site with prep pad prior to injection., Disp: 100 Each, Rfl: 0  •  losartan (COZAAR)  100 MG Tab, Take 1 Tab by mouth every day. TAKE 1 TABLET BY MOUTH DAILY, Disp: 90 Tab, Rfl: 3  •  lovastatin (MEVACOR) 20 MG Tab, Take 1 Tab by mouth every day. TAKE 1 TABLET BY MOUTH DAILY, Disp: 90 Tab, Rfl: 3  ALLERGIES: No Known Allergies  SURGHX: History reviewed. No pertinent surgical history.  SOCHX:  reports that she has quit smoking. She has never used smokeless tobacco. She reports current alcohol use. She reports previous drug use.  FH: family history includes Cancer in her father; Stroke in her mother.         Assessment/Plan:        1. Encounter for wound re-check      Dressing change:    -Local anesthesia of left shin wound with 2% lidocaine without epinephrine   -Dressing removed  -Wound cleaned with warm water and hibiclens, flushed extensively with sterile saline   -Topical polysporin applied   -Xeroform applied, nonadherent pads, and wound wrapped with coban   -RTC in 2 days for dressing change, follow up with wound care as soon as possible

## 2021-05-29 ENCOUNTER — OFFICE VISIT (OUTPATIENT)
Dept: URGENT CARE | Facility: CLINIC | Age: 86
End: 2021-05-29
Payer: MEDICARE

## 2021-05-29 VITALS
BODY MASS INDEX: 28.07 KG/M2 | TEMPERATURE: 98.3 F | SYSTOLIC BLOOD PRESSURE: 132 MMHG | WEIGHT: 143 LBS | HEIGHT: 60 IN | HEART RATE: 84 BPM | DIASTOLIC BLOOD PRESSURE: 76 MMHG

## 2021-05-29 DIAGNOSIS — T14.8XXA MULTIPLE SKIN TEARS: ICD-10-CM

## 2021-05-29 DIAGNOSIS — Z51.89 ENCOUNTER FOR WOUND RE-CHECK: ICD-10-CM

## 2021-05-29 PROCEDURE — 99213 OFFICE O/P EST LOW 20 MIN: CPT | Performed by: NURSE PRACTITIONER

## 2021-05-29 ASSESSMENT — FIBROSIS 4 INDEX: FIB4 SCORE: 0.73

## 2021-05-29 NOTE — PROGRESS NOTES
Chief Complaint   Patient presents with   • Wound Check     seen 2x days ago, left knee, wants dressing change       HISTORY OF PRESENT ILLNESS: Patient is a pleasant 86 y.o. female who presents to urgent care today with request for wound check and dressing change.  States that she had an accidental fall 4 days ago, causing a significant skin tear to her left lower leg and smaller tear to left upper arm.  She was seen in the emergency department after the incident and was instructed to follow-up with wound care.  She was seen by our clinic 2 days ago for a dressing change until she can be seen by wound care.  At that time a dressing change was performed.  Today she reports significant improvement and is requesting evaluation and dressing change.  She denies any fever, chills, malaise.  Notes her tetanus is up-to-date.    Patient Active Problem List    Diagnosis Date Noted   • Other headache syndrome 04/20/2021   • Cough 12/18/2020   • Balance problem 12/18/2020   • Interstitial lung disease (HCC) 02/10/2020   • Essential hypertension 09/13/2004   • Hyperlipidemia 03/07/2004       Allergies:Patient has no known allergies.    Current Outpatient Medications Ordered in Epic   Medication Sig Dispense Refill   • HYDROcodone-acetaminophen (NORCO) 5-325 MG Tab per tablet Take 1 tablet by mouth every 8 hours as needed for up to 14 days. 42 tablet 0   • hydroCHLOROthiazide (HYDRODIURIL) 25 MG Tab Take 25 mg by mouth every day.     • azelastine (ASTELIN) 137 MCG/SPRAY nasal spray Administer 1 Spray into affected nostril(S) 2 times a day. 30 mL 3   • fluticasone (FLOVENT HFA) 110 MCG/ACT Aerosol Inhale 1 Puff 2 times a day. 1 Each 6   • Blood Glucose Meter Kit Test blood sugar 1x daily. Lopez Freedom Lite blood glucose monitoring kit. 1 Kit 0   • Blood Glucose Test Strips Use one Abbott Freedom Lite strip to test blood sugar once daily early morning before first meal. 100 Each 3   • Lancets Use one Abbott Freedom Lite lancet  to test blood sugar once daily early morning before first meal. 100 Each 0   • Alcohol Swabs Wipe site with prep pad prior to injection. 100 Each 0   • losartan (COZAAR) 100 MG Tab Take 1 Tab by mouth every day. TAKE 1 TABLET BY MOUTH DAILY 90 Tab 3   • lovastatin (MEVACOR) 20 MG Tab Take 1 Tab by mouth every day. TAKE 1 TABLET BY MOUTH DAILY 90 Tab 3     No current Epic-ordered facility-administered medications on file.       Past Medical History:   Diagnosis Date   • HTN (hypertension)        Social History     Tobacco Use   • Smoking status: Former Smoker   • Smokeless tobacco: Never Used   Vaping Use   • Vaping Use: Never used   Substance Use Topics   • Alcohol use: Yes   • Drug use: Not Currently       Family Status   Relation Name Status   • Mo  (Not Specified)   • Fa  (Not Specified)     Family History   Problem Relation Age of Onset   • Stroke Mother    • Cancer Father         lung       ROS:  Review of Systems   Constitutional: Negative for fever, chills, weight loss, malaise, and fatigue.   HENT: Negative for ear pain, nosebleeds, congestion, sore throat and neck pain.    Eyes: Negative for vision changes.   Neuro: Negative for headache, sensory changes, weakness, seizure, LOC.   Cardiovascular: Negative for chest pain, palpitations, orthopnea and leg swelling.   Respiratory: Negative for cough, sputum production, shortness of breath and wheezing.   Gastrointestinal: Negative for abdominal pain, nausea, vomiting or diarrhea.   Genitourinary: Negative for dysuria, urgency and frequency.  Musculoskeletal: Positive for falls.    Negative for neck pain, back pain, joint pain, myalgias.   Skin: Positive for skin tears.  Negative for rash, diaphoresis.     Exam:  /76 (BP Location: Left arm, Patient Position: Sitting, BP Cuff Size: Adult)   Pulse 84   Temp 36.8 °C (98.3 °F) (Temporal)   Ht 1.524 m (5')   Wt 64.9 kg (143 lb)   General: well-nourished, well-developed female in NAD  Head: normocephalic,  atraumatic  Eyes: PERRLA, no conjunctival injection, acuity grossly intact, lids normal.  Ears: normal shape and symmetry, no tenderness, no discharge. External canals are without any significant edema or erythema. Tympanic membranes are without any inflammation, no effusion. Gross auditory acuity is intact.  Nose: symmetrical without tenderness, no discharge.  Mouth/Throat: reasonable hygiene, no erythema, exudates or tonsillar enlargement.  Neck: no masses, range of motion within normal limits, no tracheal deviation. No obvious thyroid enlargement.   Lymph: no cervical adenopathy. No supraclavicular adenopathy.   Neuro: alert and oriented. Cranial nerves 1-12 grossly intact. No sensory deficit.   Cardiovascular: regular rate and rhythm. No edema.  Pulmonary: no distress. Chest is symmetrical with respiration, no wheezes, crackles, or rhonchi.   Musculoskeletal: no clubbing, appropriate muscle tone, gait is antalgic.  Left upper extremity: There is a superficial skin tear noted to left medial forearm, no surrounding erythema, no active drainage.  Left lower extremity: There is a large skin tear present over the left anterior shin with mild bleeding after dressing removal.  There is no surrounding erythema or swelling.  Skin: warm, dry, intact, no clubbing, no cyanosis, no rashes.   Psych: appropriate mood, affect, judgement.         Assessment/Plan:  1. Encounter for wound re-check     2. Multiple skin tears           Previous clinic visit encounter reviewed and considered in medical decision making today.   Supportive care, differential diagnoses, and indications for immediate follow-up discussed with patient.  Patient's dressing was removed.  2% lidocaine applied topically to the left lower extremity followed by irrigation with saline.  Topical Polysporin, Xeroform, nonstick pads and Coban placed.  Left upper extremity is also cleaned with saline.  Topical Polysporin, Xeroform, nonstick pad and Coban is also  placed.  She is instructed to have dressing change again in 2 days either by urgent care and or wound care.  Pathogenesis of diagnosis discussed including typical length and natural progression.   Instructed to return to clinic or nearest emergency department for any change in condition, further concerns, or worsening of symptoms.  Patient states understanding of the plan of care and discharge instructions.  Instructed to make an appointment, for follow up, with her primary care provider.      I spent a total of 30 minutes with record review, exam, communication with the patient, wound care, and documentation of this encounter.  Please note that this dictation was created using voice recognition software. I have made every reasonable attempt to correct obvious errors, but I expect that there are errors of grammar and possibly content that I did not discover before finalizing the note.      MAXIMINO Dacosta.

## 2021-05-31 ENCOUNTER — OFFICE VISIT (OUTPATIENT)
Dept: URGENT CARE | Facility: CLINIC | Age: 86
End: 2021-05-31
Payer: MEDICARE

## 2021-05-31 VITALS
HEART RATE: 88 BPM | SYSTOLIC BLOOD PRESSURE: 124 MMHG | DIASTOLIC BLOOD PRESSURE: 66 MMHG | RESPIRATION RATE: 18 BRPM | TEMPERATURE: 96.9 F | OXYGEN SATURATION: 96 %

## 2021-05-31 DIAGNOSIS — S81.812D NONINFECTED SKIN TEAR OF LEFT LOWER EXTREMITY, SUBSEQUENT ENCOUNTER: ICD-10-CM

## 2021-05-31 PROCEDURE — 99213 OFFICE O/P EST LOW 20 MIN: CPT | Performed by: FAMILY MEDICINE

## 2021-05-31 ASSESSMENT — ENCOUNTER SYMPTOMS: FEVER: 0

## 2021-05-31 NOTE — PATIENT INSTRUCTIONS
Skin Tear  A skin tear is a wound in which the top layers of skin peel off. This is a common problem for older people. It can also be a problem for people who take certain medicines for too long.  To repair the skin, your doctor may use:  · Tape.  · Skin tape (adhesive) strips.  A bandage (dressing) may also be placed over the tape or skin tape strips.  Follow these instructions at home:  Wound care    · Clean the wound as told by your doctor. You may be told to keep the wound dry for the first few days. If you are told to clean the wound:  ? Wash the wound with mild soap and water, a wound cleanser, or a salt-water (saline) solution.  ? If you use soap, rinse the wound with water to remove all soap.  ? Do not rub the wound dry. Pat the wound gently, or let it air dry.  ? Keep the bandage dry as told by your doctor.  · Change any bandage as told by your doctor. This includes changing the bandage if it gets wet, gets dirty, or starts to smell bad. To change your bandage:  ? Wash your hands with soap and water before and after you change your bandage. If you cannot use soap and water, use hand .  ? Leave tape or skin tape strips in place. They may need to stay in place for 2 weeks or longer. If tape strips get loose and curl up, you may trim the loose edges. Do not remove tape strips completely unless your doctor says it is okay.  · Check your wound every day for signs of infection. Check for:  ? Redness, swelling, or pain.  ? More fluid or blood.  ? Warmth.  ? Pus or a bad smell.  · Do not scratch or pick at the wound.  · Protect the injured area until it has healed.  Medicines  · Take or apply over-the-counter and prescription medicines only as told by your doctor.  · If you were prescribed an antibiotic medicine, take or apply it as told by your doctor. Do not stop using the antibiotic even if your condition gets better.  General instructions    · Keep the bandage dry as told by your doctor.  · Do not take  baths, swim, use a hot tub, or do anything that puts your wound underwater until your doctor approves. Ask your doctor if you may take showers. You may only be allowed to take sponge baths.  · Keep all follow-up visits as told by your doctor. This is important.  Contact a doctor if:  · You have redness, swelling, or pain around your wound.  · You have more fluid or blood coming from your wound.  · Your wound feels warm to the touch.  · You have pus or a bad smell coming from your wound.  Get help right away if:  · You have a red streak that goes away from the skin tear.  · You have a fever and chills, and your symptoms get worse all of a sudden.  Summary  · A skin tear is a wound in which the top layers of skin peel off.  · To repair the skin, your doctor may use tape or skin tape strips.  · Change any bandage as told by your doctor.  · Take or apply over-the-counter and prescription medicines only as told by your doctor.  · Contact a doctor if you have signs of infection.  This information is not intended to replace advice given to you by your health care provider. Make sure you discuss any questions you have with your health care provider.  Document Released: 09/26/2009 Document Revised: 04/09/2020 Document Reviewed: 10/08/2019  Elsevier Patient Education © 2020 [employer]

## 2021-05-31 NOTE — PROGRESS NOTES
Subjective:   Grisel Saha is a 86 y.o. female who presents for Wound Check (lower left leg wound check)        Wound Check  Treated in ED: 5/25/2021. Previous treatment included wound cleansing or irrigation (Patient reports difficulty in changing dressings and states  is able to assist with wound dressings changes and is awaiting follow-up with primary care provider). There has been no drainage from the wound. There is no redness present. There is no swelling present. There is no pain present. She has no difficulty moving the affected extremity or digit.     PMH:  has a past medical history of HTN (hypertension).  MEDS:   Current Outpatient Medications:   •  HYDROcodone-acetaminophen (NORCO) 5-325 MG Tab per tablet, Take 1 tablet by mouth every 8 hours as needed for up to 14 days., Disp: 42 tablet, Rfl: 0  •  hydroCHLOROthiazide (HYDRODIURIL) 25 MG Tab, Take 25 mg by mouth every day., Disp: , Rfl:   •  azelastine (ASTELIN) 137 MCG/SPRAY nasal spray, Administer 1 Spray into affected nostril(S) 2 times a day., Disp: 30 mL, Rfl: 3  •  fluticasone (FLOVENT HFA) 110 MCG/ACT Aerosol, Inhale 1 Puff 2 times a day., Disp: 1 Each, Rfl: 6  •  Blood Glucose Meter Kit, Test blood sugar 1x daily. Lopez Freedom Lite blood glucose monitoring kit., Disp: 1 Kit, Rfl: 0  •  Blood Glucose Test Strips, Use one Abbott Freedom Lite strip to test blood sugar once daily early morning before first meal., Disp: 100 Each, Rfl: 3  •  Lancets, Use one Abbott [place] Lite lancet to test blood sugar once daily early morning before first meal., Disp: 100 Each, Rfl: 0  •  Alcohol Swabs, Wipe site with prep pad prior to injection., Disp: 100 Each, Rfl: 0  •  losartan (COZAAR) 100 MG Tab, Take 1 Tab by mouth every day. TAKE 1 TABLET BY MOUTH DAILY, Disp: 90 Tab, Rfl: 3  •  lovastatin (MEVACOR) 20 MG Tab, Take 1 Tab by mouth every day. TAKE 1 TABLET BY MOUTH DAILY, Disp: 90 Tab, Rfl: 3  ALLERGIES: No Known Allergies  SURGHX: History reviewed.  No pertinent surgical history.  SOCHX:  reports that she has quit smoking. She has never used smokeless tobacco. She reports current alcohol use. She reports previous drug use.  FH:   Family History   Problem Relation Age of Onset   • Stroke Mother    • Cancer Father         lung     Review of Systems   Constitutional: Negative for fever.        Objective:   /66 (BP Location: Left arm, Patient Position: Sitting, BP Cuff Size: Adult)   Pulse 88   Temp 36.1 °C (96.9 °F) (Temporal)   Resp 18   SpO2 96%   Physical Exam  Vitals and nursing note reviewed.   Constitutional:       General: She is not in acute distress.     Appearance: She is well-developed.   HENT:      Head: Normocephalic and atraumatic.      Right Ear: External ear normal.      Left Ear: External ear normal.      Nose: Nose normal.      Mouth/Throat:      Mouth: Mucous membranes are moist.   Eyes:      Conjunctiva/sclera: Conjunctivae normal.   Cardiovascular:      Rate and Rhythm: Normal rate.   Pulmonary:      Effort: Pulmonary effort is normal. No respiratory distress.      Breath sounds: Normal breath sounds.   Abdominal:      General: There is no distension.   Musculoskeletal:         General: Normal range of motion.      Left lower leg: No swelling, tenderness or bony tenderness. No edema.        Legs:    Skin:     General: Skin is warm and dry.   Neurological:      General: No focal deficit present.      Mental Status: She is alert and oriented to person, place, and time. Mental status is at baseline.      Gait: Gait (gait at baseline) normal.   Psychiatric:         Judgment: Judgment normal.           Assessment/Plan:   1. Noninfected skin tear of left lower extremity, subsequent encounter  - Dressing  - Burn Treatment; Future        Medical Decision Making/Course:  In the course of preparing for this visit with review of the pertinent past medical history, recent and past clinic visits, current medications, and in the further course of  obtaining the current history pertinent to the clinic visit today, performing an exam and evaluation, ordering and independently evaluating labs, tests, and/or procedures including wound dressing change and application of new dressing with Xeroform petroleum gauze and Covan elastic dressing, providing any pertinent counseling and education and recommending further coordination of care including recommendations to follow-up with primary care provider for recheck reevaluation consideration further management and/or return to the urgent care for any persistent or worsening symptoms, at least 20 minutes of total time were spent during this encounter.      Discussed close monitoring, return precautions, and supportive measures of maintaining adequate fluid hydration and caloric intake, relative rest and symptom management as needed for pain and/or fever.    Differential diagnosis, natural history, supportive care, and indications for immediate follow-up discussed.     Advised the patient to follow-up with the primary care physician for recheck, reevaluation, and consideration of further management.    Please note that this dictation was created using voice recognition software. I have worked with consultants from the vendor as well as technical experts from [facility] to optimize the interface. I have made every reasonable attempt to correct obvious errors, but I expect that there are errors of grammar and possibly content that I did not discover before finalizing the note.

## 2021-06-02 ENCOUNTER — OFFICE VISIT (OUTPATIENT)
Dept: URGENT CARE | Facility: CLINIC | Age: 86
End: 2021-06-02
Payer: MEDICARE

## 2021-06-02 VITALS
OXYGEN SATURATION: 96 % | TEMPERATURE: 98.1 F | BODY MASS INDEX: 28.07 KG/M2 | HEART RATE: 74 BPM | DIASTOLIC BLOOD PRESSURE: 82 MMHG | SYSTOLIC BLOOD PRESSURE: 142 MMHG | HEIGHT: 60 IN | RESPIRATION RATE: 16 BRPM | WEIGHT: 143 LBS

## 2021-06-02 DIAGNOSIS — Z51.89 ENCOUNTER FOR WOUND CARE: ICD-10-CM

## 2021-06-02 DIAGNOSIS — S81.812A NONINFECTED SKIN TEAR OF LEFT LOWER EXTREMITY, INITIAL ENCOUNTER: ICD-10-CM

## 2021-06-02 DIAGNOSIS — R26.89 BALANCE PROBLEM: ICD-10-CM

## 2021-06-02 PROCEDURE — 99213 OFFICE O/P EST LOW 20 MIN: CPT | Performed by: NURSE PRACTITIONER

## 2021-06-02 RX ORDER — ASPIRIN 81 MG/1
81 TABLET, CHEWABLE ORAL DAILY
COMMUNITY
End: 2022-05-03

## 2021-06-02 ASSESSMENT — ENCOUNTER SYMPTOMS
CHILLS: 0
NAUSEA: 0
VOMITING: 0
DIZZINESS: 0
FEVER: 0
HEADACHES: 0

## 2021-06-02 ASSESSMENT — FIBROSIS 4 INDEX: FIB4 SCORE: 0.73

## 2021-06-02 NOTE — PROGRESS NOTES
Grisel Saha is a 86 y.o. female who presents for Wound Check (wound check on lt. lower leg and lt. arm)      SAL Recinos is an 86-year-old female who is here today for wound check.  She was initially treated in the emergency department on 5/25/2021 for a skin tear on her left lower leg.    She has since then been seen in urgent care for dressing change and reevaluation of her wound.  She is having difficulty changing the dressings at home as her  has a hard time doing the dressing changes.  There has been no wound drainage.  There is no increasing pain or swelling.  There is pain if she accidentally bumps her leg.  She tries to keep it elevated during the day which helps with the discomfort.  She has no difficulty walking or moving her extremity.  She denies fever chills.  She has a past medical history of balance problems which is how she initially injured her leg no other aggravating or alleviating factors.  She was previously given a referral to wound care clinic but no one has contacted her to schedule an appointment.    Review of Systems   Constitutional: Negative for chills and fever.   Gastrointestinal: Negative for nausea and vomiting.   Musculoskeletal: Negative for joint pain.   Neurological: Negative for dizziness and headaches.       No Known Allergies  Past Medical History:   Diagnosis Date   • HTN (hypertension)      No past surgical history on file.  Family History   Problem Relation Age of Onset   • Stroke Mother    • Cancer Father         lung     Social History     Tobacco Use   • Smoking status: Former Smoker   • Smokeless tobacco: Never Used   Substance Use Topics   • Alcohol use: Yes     Patient Active Problem List   Diagnosis   • Essential hypertension   • Hyperlipidemia   • Interstitial lung disease (HCC)   • Cough   • Balance problem   • Other headache syndrome     Current Outpatient Medications on File Prior to Visit   Medication Sig Dispense Refill   • aspirin (ASA) 81 MG Chew Tab  chewable tablet Chew 81 mg every day.     • hydroCHLOROthiazide (HYDRODIURIL) 25 MG Tab Take 25 mg by mouth every day.     • Blood Glucose Meter Kit Test blood sugar 1x daily. Lopez Freedom Lite blood glucose monitoring kit. 1 Kit 0   • Blood Glucose Test Strips Use one Abbott Freedom Lite strip to test blood sugar once daily early morning before first meal. 100 Each 3   • Lancets Use one Abbott [place] Lite lancet to test blood sugar once daily early morning before first meal. 100 Each 0   • losartan (COZAAR) 100 MG Tab Take 1 Tab by mouth every day. TAKE 1 TABLET BY MOUTH DAILY 90 Tab 3   • lovastatin (MEVACOR) 20 MG Tab Take 1 Tab by mouth every day. TAKE 1 TABLET BY MOUTH DAILY 90 Tab 3   • HYDROcodone-acetaminophen (NORCO) 5-325 MG Tab per tablet Take 1 tablet by mouth every 8 hours as needed for up to 14 days. (Patient not taking: Reported on 6/2/2021) 42 tablet 0   • azelastine (ASTELIN) 137 MCG/SPRAY nasal spray Administer 1 Spray into affected nostril(S) 2 times a day. (Patient not taking: Reported on 6/2/2021) 30 mL 3   • fluticasone (FLOVENT HFA) 110 MCG/ACT Aerosol Inhale 1 Puff 2 times a day. (Patient not taking: Reported on 6/2/2021) 1 Each 6   • Alcohol Swabs Wipe site with prep pad prior to injection. (Patient not taking: Reported on 6/2/2021) 100 Each 0     No current facility-administered medications on file prior to visit.          Objective:     /82 (BP Location: Left arm, Patient Position: Sitting, BP Cuff Size: Adult)   Pulse 74   Temp 36.7 °C (98.1 °F) (Temporal)   Resp 16   Ht 1.524 m (5')   Wt 64.9 kg (143 lb)   SpO2 96%   BMI 27.93 kg/m²     Physical Exam  Vitals and nursing note reviewed.   Constitutional:       General: She is not in acute distress.     Appearance: Normal appearance. She is normal weight. She is not ill-appearing.   Cardiovascular:      Rate and Rhythm: Normal rate.      Pulses: Normal pulses.   Pulmonary:      Effort: Pulmonary effort is normal.   Skin:      General: Skin is warm.      Capillary Refill: Capillary refill takes less than 2 seconds.          Neurological:      Mental Status: She is alert and oriented to person, place, and time.   Psychiatric:         Mood and Affect: Mood normal.         Behavior: Behavior normal.         Thought Content: Thought content normal.             Assessment /Associated Orders:      1. Noninfected skin tear of left lower extremity, initial encounter     2. Encounter for wound care     3. Balance problem           Medical Decision Making:    Pt is clinically stable at today's acute urgent care visit.  No acute distress noted. Appropriate for outpatient management at this time.   Acute problem today with uncertain prognosis.     Reviewed chart today.  A wound referral had been placed.  It appears it is waiting to be scheduled.  I have given the patient phone number and address for her to call and schedule a wound care appointment in the wound care clinic.  If she cannot get an appointment in 2 days she should return to urgent care for redressing and evaluation of her wound.  Overall it appears to be healing well.  We have cleaned it and dressed it in urgent care today with Xeroform gauze and Telfa.  She tolerated it well.      Advised to follow-up with the primary care provider for recheck, reevaluation, and consideration of further management if necessary.   Discussed management options (risks,benefits, and alternatives to treatment). Expressed understanding and the treatment plan was agreed upon. Questions were encouraged and answered       Return to urgent care prn if new or worsening sx or if there is no improvement in condition prn . Red flags discussed and indications to immediately call 911 or present to the Emergency Department.     I personally reviewed prior external notes and test results pertinent to today's visit.  I have independently reviewed and interpreted all diagnostics ordered during this urgent care acute  visit.     Previous ER visit and urgent care visits for same complaint.  Time spent evaluating this patient was at least 20 minutes and includes preparing for visit, counseling/education, exam and evaluation, obtaining history, independent interpretation, ordering lab/test/procedures,medication management and documentation.

## 2021-06-03 ENCOUNTER — OFFICE VISIT (OUTPATIENT)
Dept: MEDICAL GROUP | Facility: PHYSICIAN GROUP | Age: 86
End: 2021-06-03
Payer: MEDICARE

## 2021-06-03 VITALS
SYSTOLIC BLOOD PRESSURE: 118 MMHG | OXYGEN SATURATION: 96 % | RESPIRATION RATE: 16 BRPM | BODY MASS INDEX: 27.92 KG/M2 | HEART RATE: 75 BPM | HEIGHT: 60 IN | TEMPERATURE: 99 F | DIASTOLIC BLOOD PRESSURE: 78 MMHG | WEIGHT: 142.2 LBS
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
## 2021-06-03 DIAGNOSIS — S81.802A WOUND OF LEFT LOWER EXTREMITY, INITIAL ENCOUNTER: ICD-10-CM

## 2021-06-03 PROCEDURE — 99214 OFFICE O/P EST MOD 30 MIN: CPT | Performed by: FAMILY MEDICINE

## 2021-06-03 ASSESSMENT — FIBROSIS 4 INDEX: FIB4 SCORE: 0.73

## 2021-06-03 NOTE — ASSESSMENT & PLAN NOTE
This is a new problem. The patient is status post fall with significant wound to her left lower extremity. She went to the emergency room on 5/25/2021. She has been experiencing a lot of pain to her leg. Went to urgent care yesterday for wound check. They recommended follow-up with me today.  I have placed a referral for her to establish with wound care.  Unfortunately, she has been unable to connect with them in spite of multiple attempts.  Her wound continues to heal without signs of infection.  Has been unable to establish with them

## 2021-06-03 NOTE — PROGRESS NOTES
Subjective:     Chief Complaint   Patient presents with   • Follow-Up     leg and arm injury       HPI:   Grisel presents today to discuss the following.      Wound of left leg  This is a new problem. The patient is status post fall with significant wound to her left lower extremity. She went to the emergency room on 5/25/2021. She has been experiencing a lot of pain to her leg. Went to urgent care yesterday for wound check. They recommended follow-up with me today.  I have placed a referral for her to establish with wound care.  Unfortunately, she has been unable to connect with them in spite of multiple attempts.  Her wound continues to heal without signs of infection.  Has been unable to establish with them        Past Medical History:   Diagnosis Date   • HTN (hypertension)        Current Outpatient Medications Ordered in Epic   Medication Sig Dispense Refill   • aspirin (ASA) 81 MG Chew Tab chewable tablet Chew 81 mg every day.     • hydroCHLOROthiazide (HYDRODIURIL) 25 MG Tab Take 25 mg by mouth every day.     • Blood Glucose Meter Kit Test blood sugar 1x daily. Lopez Freedom Lite blood glucose monitoring kit. 1 Kit 0   • Blood Glucose Test Strips Use one Abbott Freedom Lite strip to test blood sugar once daily early morning before first meal. 100 Each 3   • Lancets Use one Abbott [place] Lite lancet to test blood sugar once daily early morning before first meal. 100 Each 0   • losartan (COZAAR) 100 MG Tab Take 1 Tab by mouth every day. TAKE 1 TABLET BY MOUTH DAILY 90 Tab 3   • lovastatin (MEVACOR) 20 MG Tab Take 1 Tab by mouth every day. TAKE 1 TABLET BY MOUTH DAILY 90 Tab 3     No current Epic-ordered facility-administered medications on file.       Allergies:  Patient has no known allergies.    Health Maintenance: Completed    ROS:  Gen: no fevers/chills, no changes in weight  Eyes: no changes in vision  Pulm: no sob, no cough  CV: no chest pain, no palpitations  GI: no nausea/vomiting, no  diarrhea      Objective:     Exam:  /78 (BP Location: Right arm, Patient Position: Sitting, BP Cuff Size: Adult)   Pulse 75   Temp 37.2 °C (99 °F) (Temporal)   Resp 16   Ht 1.524 m (5')   Wt 64.5 kg (142 lb 3.2 oz)   SpO2 96%   BMI 27.77 kg/m²  Body mass index is 27.77 kg/m².        Constitutional: Alert, no distress, well-groomed.  Skin: Warm, dry, good turgor, no rashes in visible areas.  Eye: Equal, round and reactive, conjunctiva clear, lids normal.  ENMT: Lips without lesions, good dentition, moist mucous membranes.  Neck: Trachea midline, no masses, no thyromegaly.  Respiratory: Unlabored respiratory effort, no cough.  MSK: Normal gait, moves all extremities.  Neuro: Grossly non-focal.   Psych: Alert and oriented x3, normal affect and mood.        Assessment & Plan:     86 y.o. female with the following -     1. Wound of left lower extremity, initial encounter  2. Fall, initial encounter  This is a new problem.  The patient is status post fall and had to go to the emergency room on 5/25/2021 for left lower extremity wound.  This wound has been very significant and large.  Fortunately, there have been no signs of infection.  She went to urgent care yesterday for wound check.  Dressing was not removed today.  I have placed a referral to have her establish with wound care.  Unfortunately, wound care has not been able to connect with the patient successfully.  She plans on walking in today and receiving an appointment.  I endorsed this.  -Awaiting wound care's recommendations      Return in about 3 months (around 9/3/2021).    Please note that this dictation was created using voice recognition software. I have made every reasonable attempt to correct obvious errors, but I expect that there are errors of grammar and possibly content that I did not discover before finalizing the note.

## 2021-06-04 ENCOUNTER — OFFICE VISIT (OUTPATIENT)
Dept: URGENT CARE | Facility: CLINIC | Age: 86
End: 2021-06-04
Payer: MEDICARE

## 2021-06-04 VITALS
TEMPERATURE: 99.1 F | WEIGHT: 142 LBS | DIASTOLIC BLOOD PRESSURE: 72 MMHG | RESPIRATION RATE: 17 BRPM | BODY MASS INDEX: 27.88 KG/M2 | HEART RATE: 72 BPM | SYSTOLIC BLOOD PRESSURE: 118 MMHG | HEIGHT: 60 IN | OXYGEN SATURATION: 97 %

## 2021-06-04 DIAGNOSIS — S81.812D NONINFECTED SKIN TEAR OF LEFT LOWER EXTREMITY, SUBSEQUENT ENCOUNTER: ICD-10-CM

## 2021-06-04 DIAGNOSIS — Z51.89 ENCOUNTER FOR WOUND CARE: ICD-10-CM

## 2021-06-04 DIAGNOSIS — T14.8XXA MULTIPLE SKIN TEARS: ICD-10-CM

## 2021-06-04 PROCEDURE — 99213 OFFICE O/P EST LOW 20 MIN: CPT | Performed by: PHYSICIAN ASSISTANT

## 2021-06-04 ASSESSMENT — ENCOUNTER SYMPTOMS
FEVER: 0
CHILLS: 0
MYALGIAS: 0

## 2021-06-04 ASSESSMENT — FIBROSIS 4 INDEX: FIB4 SCORE: 0.73

## 2021-06-04 NOTE — PROGRESS NOTES
Subjective:      Grisel Saha is a 86 y.o. female who presents with Open Wound (wound recheck on left leg )    Medications:    • aspirin Chew  • Blood Glucose Meter Kit  • Blood Glucose Test Strips  • hydroCHLOROthiazide Tabs  • Lancets  • losartan Tabs  • lovastatin Tabs    Allergies: Patient has no known allergies.    Problem List: Grisel Saha does not have any pertinent problems on file.    Surgical History:  No past surgical history on file.    Past Social Hx: Grisel Saha  reports that she has quit smoking. She has never used smokeless tobacco. She reports current alcohol use. She reports previous drug use.     Past Family Hx:  Grisel Saha family history includes Cancer in her father; Stroke in her mother.     Problem list, medications, and allergies reviewed by myself today in Epic.           PT presents to clinic today for wound check and dressing change of large skin tear to anterior left leg, and left upper arm (patient states this is well healing and has not needed bandaging since the last visit).  PT fell on 5/25/2021 and was seen in the ER initially for her wounds.  PT has followed up with both her PCP as well as in UC due to scheduling difficulty with wound care.  I anticipate she will need to followed by them once established for some time due to size of wound.      PT denies fever, chills, increased pain or erythema of wound. Pt does endorse some increase in swelling around her ankle, but denies pain or difficulty ambulating due to the swelling.  Patient states she feels that her bandages may have been a little too tight on the last dressing change and that may be causing the swelling.  PT has not yet heard from wound care for her appointment, so she is here as instructed by her primary care provider for wound check/bandage change.  Patient denies any other complaint today.    Wound Check  She was originally treated 5 to 10 days ago. Previous treatment included wound cleansing or irrigation (Xeroform  dressing). Maximum temperature: Denies fever. There is no redness present. Swelling Status: Mild no swelling at ankle inferior to the dressing. The pain has not changed. She has no difficulty moving the affected extremity or digit.       Review of Systems   Constitutional: Negative for chills, fever and malaise/fatigue.   Cardiovascular: Positive for leg swelling (mild LLE ankle swelling).   Musculoskeletal: Negative for joint pain and myalgias.   Skin:        Large skin tear to LLE, healing skin tear to LUE   All other systems reviewed and are negative.         Objective:     /72 (BP Location: Left arm, Patient Position: Sitting, BP Cuff Size: Adult)   Pulse 72   Temp 37.3 °C (99.1 °F) (Temporal)   Resp 17   Ht 1.524 m (5')   Wt 64.4 kg (142 lb)   SpO2 97%   BMI 27.73 kg/m²      Physical Exam  Vitals and nursing note reviewed. Exam conducted with a chaperone present.   Constitutional:       Appearance: Normal appearance. She is well-developed, well-groomed and normal weight. She is not ill-appearing or toxic-appearing.   HENT:      Head: Normocephalic and atraumatic.      Nose: Nose normal.      Mouth/Throat:      Mouth: Mucous membranes are moist.   Eyes:      Extraocular Movements: Extraocular movements intact.      Conjunctiva/sclera: Conjunctivae normal.      Pupils: Pupils are equal, round, and reactive to light.   Cardiovascular:      Rate and Rhythm: Normal rate and regular rhythm.      Pulses: Normal pulses.   Pulmonary:      Effort: Pulmonary effort is normal.   Musculoskeletal:         General: Normal range of motion.        Arms:       Cervical back: Normal range of motion and neck supple.        Legs:       Comments: Pt exhibits FROM of LLE and normal gait. Mild ankle edema consistent with injury.    Skin:     General: Skin is warm and dry.      Capillary Refill: Capillary refill takes less than 2 seconds.   Neurological:      General: No focal deficit present.      Mental Status: She is  alert and oriented to person, place, and time.      Gait: Gait normal.   Psychiatric:         Mood and Affect: Mood normal.         Behavior: Behavior is cooperative.              Assessment/Plan:         1. Noninfected skin tear of left lower extremity, subsequent encounter     2. Encounter for wound care     3. Multiple skin tears       I have reviewed the patient's chart, compared the picture in her chart to her wound today, which looks relatively unchanged, and not worse.  PT has significant skin loss, so will need follow up care for some time.  Her wound is stable today and is appropriate for outpatient care.     Until patient can be seen by wound care for bandage changes, and wound checks, advised patient to continue her appointments either in urgent care or with her primary care provider.  Patient verbalized agreement with this.    We did attempt to schedule an appointment with wound care for the patient however we were unable to do that during her visit, but wound care did say that would follow-up with her this morning to get her scheduled for an appointment.    Her left leg wound was cleaned with hibiclens, irrigated with saline and dressed with antibiotic ointment, and dressed with Xeroform gauze, 4 x 4's and a 4 inch Ace wrap.  Patient was more comfortable with the Ace wrap versus Coban, she felt that it would be easier to loosen if she noticed a worsening of her ankle swelling.  Patient tolerated procedure well.    PT left arm wound did not need any dressing or care in clinic today.     Time spent evaluating this patient was at least 20 minutes and includes preparing for visit, counseling/education, exam and evaluation, obtaining history, independent interpretation, ordering lab/test/procedures,medication management and documentation.     PT should follow up with PCP/UC or wound care in 1-2 days for wound check and dressing changes as discussed.      Discussed red flags and reasons to return to UC or ED.       Pt and/or family verbalized understanding of diagnosis and follow up instructions and was offered informational handout on diagnosis.  PT discharged.

## 2021-06-06 ENCOUNTER — OFFICE VISIT (OUTPATIENT)
Dept: URGENT CARE | Facility: CLINIC | Age: 86
End: 2021-06-06
Payer: MEDICARE

## 2021-06-06 VITALS
BODY MASS INDEX: 27.88 KG/M2 | HEART RATE: 86 BPM | OXYGEN SATURATION: 95 % | RESPIRATION RATE: 16 BRPM | SYSTOLIC BLOOD PRESSURE: 140 MMHG | HEIGHT: 60 IN | TEMPERATURE: 98.7 F | WEIGHT: 142 LBS | DIASTOLIC BLOOD PRESSURE: 78 MMHG

## 2021-06-06 DIAGNOSIS — S81.812D NONINFECTED SKIN TEAR OF LEFT LOWER EXTREMITY, SUBSEQUENT ENCOUNTER: ICD-10-CM

## 2021-06-06 PROCEDURE — 99213 OFFICE O/P EST LOW 20 MIN: CPT | Performed by: PHYSICIAN ASSISTANT

## 2021-06-06 ASSESSMENT — ENCOUNTER SYMPTOMS
SENSORY CHANGE: 0
FEVER: 0
CHILLS: 0
NAUSEA: 0
PALPITATIONS: 0
VOMITING: 0
SORE THROAT: 0
SHORTNESS OF BREATH: 0
TINGLING: 0
BLURRED VISION: 0

## 2021-06-06 ASSESSMENT — FIBROSIS 4 INDEX: FIB4 SCORE: 0.73

## 2021-06-06 NOTE — PROGRESS NOTES
Subjective:      Grisel Saha is a 86 y.o. female who presents with Wound Check (wants a dressing change, left leg )    HPI:  Grisel Saha is a 86 y.o. female who presents today for wound check.  Patient sustained a large skin tear to the anterior aspect of the left lower extremity on 5/25/2021.  Patient was initially seen in the emergency department for evaluation of her wounds.  Since that time patient has had multiple follow-ups with her PCP as well as urgent care for wound checks.  She was unable to get an appointment with wound care until 6/15/2021.  Patient was last in the urgent care 2 days ago.  At that time there was no evidence of infection.  Wound was cleaned with Hibiclens, irrigated with sterile saline, and then dressed with antibiotic ointment, Xeroform, Telfa, and an Ace wrap was applied.  Patient has not changed the dressing since her previous visit.  States the pain has mildly improved since previous visit.  She continues to have full range of motion and no fever or chills.  Denies any new injury.      Review of Systems   Constitutional: Negative for chills and fever.   HENT: Negative for sore throat.    Eyes: Negative for blurred vision.   Respiratory: Negative for shortness of breath.    Cardiovascular: Negative for chest pain and palpitations.   Gastrointestinal: Negative for nausea and vomiting.   Musculoskeletal: Negative for joint pain.   Skin:        skin tear of left lower leg   Neurological: Negative for tingling and sensory change.       PMH:  has a past medical history of HTN (hypertension).  MEDS:   Current Outpatient Medications:   •  aspirin (ASA) 81 MG Chew Tab chewable tablet, Chew 81 mg every day., Disp: , Rfl:   •  hydroCHLOROthiazide (HYDRODIURIL) 25 MG Tab, Take 25 mg by mouth every day., Disp: , Rfl:   •  Blood Glucose Meter Kit, Test blood sugar 1x daily. Lopez Freedom Lite blood glucose monitoring kit., Disp: 1 Kit, Rfl: 0  •  Blood Glucose Test Strips, Use one Abbott Freedom  Lite strip to test blood sugar once daily early morning before first meal., Disp: 100 Each, Rfl: 3  •  Lancets, Use one Abbott [place] Lite lancet to test blood sugar once daily early morning before first meal., Disp: 100 Each, Rfl: 0  •  losartan (COZAAR) 100 MG Tab, Take 1 Tab by mouth every day. TAKE 1 TABLET BY MOUTH DAILY, Disp: 90 Tab, Rfl: 3  •  lovastatin (MEVACOR) 20 MG Tab, Take 1 Tab by mouth every day. TAKE 1 TABLET BY MOUTH DAILY, Disp: 90 Tab, Rfl: 3  ALLERGIES: No Known Allergies  SURGHX: No past surgical history on file.  SOCHX:  reports that she has quit smoking. She has never used smokeless tobacco. She reports current alcohol use. She reports previous drug use.  FH: Family history was reviewed, no pertinent findings to report     Objective:     /78 (BP Location: Left arm, Patient Position: Sitting, BP Cuff Size: Adult)   Pulse 86   Temp 37.1 °C (98.7 °F) (Temporal)   Resp 16   Ht 1.524 m (5')   Wt 64.4 kg (142 lb)   SpO2 95%   BMI 27.73 kg/m²      Physical Exam  Constitutional:       Appearance: She is well-developed.   HENT:      Head: Normocephalic and atraumatic.      Right Ear: External ear normal.      Left Ear: External ear normal.   Eyes:      Conjunctiva/sclera: Conjunctivae normal.      Pupils: Pupils are equal, round, and reactive to light.   Cardiovascular:      Rate and Rhythm: Normal rate and regular rhythm.      Heart sounds: Normal heart sounds. No murmur heard.     Pulmonary:      Effort: Pulmonary effort is normal.      Breath sounds: Normal breath sounds. No wheezing.   Skin:     General: Skin is warm and dry.      Capillary Refill: Capillary refill takes less than 2 seconds.      Comments: Left anterior shin exhibits a very large area of exposed tissue with overlying skin flap.  There is some very mild scabbing noted at the superior aspect of the wound and edges of the wound continue to have granulation tissue.  There is still some very mild purplish erythema around  the wound edges circumferentially which seems to be unchanged from previous visit.  No purulent drainage.  No surrounding induration.  Very minimal tenderness to the area.  Patient has full range of motion of left lower extremity.  Distal neurovascular intact.  Normal gait.  There is still some mild ankle edema noted which is unchanged from previous visit.        Neurological:      Mental Status: She is alert and oriented to person, place, and time.   Psychiatric:         Behavior: Behavior normal.         Judgment: Judgment normal.              Assessment/Plan:     1. Noninfected skin tear of left lower extremity, subsequent encounter  -Wound was again cleaned with Hibiclens and irrigated with sterile saline.  A new dressing of antibiotic ointment, Xeroform, Telfa, and Ace wrap was applied.  Patient tolerated this well.  Patient was requesting to come back in 3 days versus 2.  I think that would be agreeable but if patient notices an increased amount of discharge on her dressing or if she starts to develop any worsening symptoms recommend that she return sooner.  Recommend the patient keep her left lower extremity elevated above the level of her heart to help with the ankle swelling.  She may continue to use OTC analgesics as needed for moderate to severe pain.            Differential Diagnosis, natural history, and supportive care discussed. Return to the Urgent Care or follow up with your PCP if symptoms fail to resolve, or for any new or worsening symptoms. Emergency room precautions discussed. Patient and/or family appears understanding of information.

## 2021-06-08 ENCOUNTER — OFFICE VISIT (OUTPATIENT)
Dept: URGENT CARE | Facility: CLINIC | Age: 86
End: 2021-06-08
Payer: MEDICARE

## 2021-06-08 VITALS
HEART RATE: 99 BPM | BODY MASS INDEX: 27.88 KG/M2 | RESPIRATION RATE: 18 BRPM | OXYGEN SATURATION: 99 % | SYSTOLIC BLOOD PRESSURE: 116 MMHG | WEIGHT: 142 LBS | DIASTOLIC BLOOD PRESSURE: 76 MMHG | TEMPERATURE: 98.8 F | HEIGHT: 60 IN

## 2021-06-08 DIAGNOSIS — S81.812D NONINFECTED SKIN TEAR OF LEFT LOWER EXTREMITY, SUBSEQUENT ENCOUNTER: ICD-10-CM

## 2021-06-08 PROCEDURE — 99212 OFFICE O/P EST SF 10 MIN: CPT | Performed by: PHYSICIAN ASSISTANT

## 2021-06-08 ASSESSMENT — FIBROSIS 4 INDEX: FIB4 SCORE: 0.73

## 2021-06-08 NOTE — PROGRESS NOTES
Subjective:   Grisel Saha is a 86 y.o. female who presents for Wound Check (wound check on left lower extremity )      HPI  86 y.o. female presents to urgent care with new problem to provider of wound check over E.   Patient sustained a large skin tear to the anterior aspect of the left lower extremity on 5/25/2021.  Patient was initially seen in the emergency department for evaluation of her wounds.  Since she has been seen in urgent care for wound checks.  Appointment with wound care scheduled 6/15/2021.  Patient was last in the urgent care 2 days ago.  At that time there was no evidence of infection.  Wound was cleaned with Hibiclens, irrigated with sterile saline, and then dressed with antibiotic ointment, Xeroform, Telfa, and an Ace wrap was applied.  Patient has not changed the dressing since her previous visit.  States the pain has mildly improved since previous visit.  She continues to have full range of motion and no fever or chills.  No increasing redness or pain.     Review of Systems   Constitutional: Negative for chills and fever.   Cardiovascular: Positive for leg swelling.   Gastrointestinal: Negative for nausea and vomiting.   Skin:        Skin tear    Neurological: Negative for tingling and sensory change.   Endo/Heme/Allergies: Does not bruise/bleed easily.   All other systems reviewed and are negative.      Patient Active Problem List   Diagnosis   • Essential hypertension   • Hyperlipidemia   • Interstitial lung disease (HCC)   • Cough   • Balance problem   • Other headache syndrome   • Wound of left leg   • Fall     History reviewed. No pertinent surgical history.  Social History     Tobacco Use   • Smoking status: Former Smoker   • Smokeless tobacco: Never Used   Vaping Use   • Vaping Use: Never used   Substance Use Topics   • Alcohol use: Yes   • Drug use: Not Currently      Family History   Problem Relation Age of Onset   • Stroke Mother    • Cancer Father         lung      (Allergies,  Medications, & Tobacco/Substance Use were reconciled by the Medical Assistant and reviewed by myself. The family history is prepopulated)     Objective:     /76 (BP Location: Left arm, Patient Position: Sitting, BP Cuff Size: Adult)   Pulse 99   Temp 37.1 °C (98.8 °F) (Temporal)   Resp 18   Ht 1.524 m (5')   Wt 64.4 kg (142 lb)   SpO2 99%   BMI 27.73 kg/m²     Physical Exam  Vitals reviewed.   Constitutional:       General: She is not in acute distress.     Appearance: She is well-developed and normal weight.   HENT:      Head: Normocephalic and atraumatic.      Mouth/Throat:      Mouth: Mucous membranes are moist.      Pharynx: Oropharynx is clear.   Eyes:      Conjunctiva/sclera: Conjunctivae normal.   Cardiovascular:      Rate and Rhythm: Normal rate and regular rhythm.   Pulmonary:      Effort: Pulmonary effort is normal. No respiratory distress.   Musculoskeletal:      Cervical back: Normal range of motion and neck supple.   Skin:     General: Skin is warm and dry.      Findings: No erythema.             Comments: large area of exposed tissue with overlying skin flap left anterior shin.  There is some very mild scabbing noted at the superior aspect of the wound and edges with good granulation tissue.  mild purplish erythema around the wound edges circumferentially which is unchanged from previous visit.  No purulent drainage.  No surrounding induration, extending erythema, or warmth. minimal tenderness. Patient has full range of motion of left lower extremity.  Distal neurovascular intact. BLE edema - mild   Neurological:      Mental Status: She is alert and oriented to person, place, and time.   Psychiatric:         Mood and Affect: Mood normal.         Behavior: Behavior normal.         Thought Content: Thought content normal.         Judgment: Judgment normal.         Assessment/Plan:     1. Noninfected skin tear of left lower extremity, subsequent encounter       Wound was again cleaned with  Hibiclens and irrigated with sterile saline.  A new dressing of antibiotic ointment, Xeroform, Telfa, and Ace wrap was applied.    Recommend continued dressing changes. Follow up with wound care as scheduled. Monitor for worsen symptoms or development of infection.   Differential diagnosis, natural history, supportive care, and indications for immediate follow-up discussed.    Advised the patient to follow-up with the primary care physician for recheck, reevaluation, and consideration of further management.  Patient verbalized understanding of treatment plan and has no further questions regarding care.     I personally reviewed prior external notes and test results pertinent to today's visit.     Please note that this dictation was created using voice recognition software. I have made a reasonable attempt to correct obvious errors, but I expect that there are errors of grammar and possibly content that I did not discover before finalizing the note.    This note was electronically signed by Kelly Dunlap PA-C

## 2021-06-10 ENCOUNTER — OFFICE VISIT (OUTPATIENT)
Dept: URGENT CARE | Facility: CLINIC | Age: 86
End: 2021-06-10
Payer: MEDICARE

## 2021-06-10 VITALS
BODY MASS INDEX: 27.88 KG/M2 | RESPIRATION RATE: 16 BRPM | HEART RATE: 76 BPM | OXYGEN SATURATION: 98 % | SYSTOLIC BLOOD PRESSURE: 124 MMHG | TEMPERATURE: 97.5 F | WEIGHT: 142 LBS | HEIGHT: 60 IN | DIASTOLIC BLOOD PRESSURE: 80 MMHG

## 2021-06-10 DIAGNOSIS — S81.812D NONINFECTED SKIN TEAR OF LEFT LOWER EXTREMITY, SUBSEQUENT ENCOUNTER: ICD-10-CM

## 2021-06-10 PROCEDURE — 99212 OFFICE O/P EST SF 10 MIN: CPT | Performed by: STUDENT IN AN ORGANIZED HEALTH CARE EDUCATION/TRAINING PROGRAM

## 2021-06-10 ASSESSMENT — FIBROSIS 4 INDEX: FIB4 SCORE: 0.73

## 2021-06-10 NOTE — PROGRESS NOTES
Subjective:   CHIEF COMPLAINT  Chief Complaint   Patient presents with   • Wound Check     dressing change       HPI  Grisel Saha is a 86 y.o. female who presents today for wound check.  Patient sustained a large skin tear to the anterior aspect of the left lower extremity on 5/25/2021.  Patient was initially seen in the emergency department for evaluation of her wounds.  Since that time patient has had multiple follow-ups with her PCP as well as urgent care for wound checks.  She was unable to get an appointment with wound care until 6/15/2021.  Patient was last in the urgent care 2 days ago.  At that time there was no evidence of infection.  Wound was cleaned with Hibiclens, irrigated with sterile saline, and then dressed with antibiotic ointment, Xeroform, Telfa, and an Ace wrap was applied.  Patient has not changed the dressing since her previous visit.  States the pain has mildly improved since previous visit.  She continues to have full range of motion and no fever or chills.  Denies any new injury.       REVIEW OF SYSTEMS  General: no fever or chills  GI: no nausea or vomiting  See HPI for further details.    PAST MEDICAL HISTORY  Patient Active Problem List    Diagnosis Date Noted   • Wound of left leg 06/03/2021   • Fall 06/03/2021   • Other headache syndrome 04/20/2021   • Cough 12/18/2020   • Balance problem 12/18/2020   • Interstitial lung disease (HCC) 02/10/2020   • Essential hypertension 09/13/2004   • Hyperlipidemia 03/07/2004       SURGICAL HISTORY  patient denies any surgical history    ALLERGIES  No Known Allergies    CURRENT MEDICATIONS  Home Medications     Reviewed by Blayne Lentz Ass't (Medical Assistant) on 06/10/21 at 0821  Med List Status: <None>   Medication Last Dose Status   aspirin (ASA) 81 MG Chew Tab chewable tablet Taking Active   Blood Glucose Meter Kit Taking Active   Blood Glucose Test Strips Taking Active   hydroCHLOROthiazide (HYDRODIURIL) 25 MG Tab Taking Active    Lancets Taking Active   losartan (COZAAR) 100 MG Tab Taking Active   lovastatin (MEVACOR) 20 MG Tab Taking Active                SOCIAL HISTORY  Social History     Tobacco Use   • Smoking status: Former Smoker   • Smokeless tobacco: Never Used   Vaping Use   • Vaping Use: Never used   Substance and Sexual Activity   • Alcohol use: Yes   • Drug use: Not Currently   • Sexual activity: Not Currently       FAMILY HISTORY  Family History   Problem Relation Age of Onset   • Stroke Mother    • Cancer Father         lung          Objective:   PHYSICAL EXAM  VITAL SIGNS: /80 (BP Location: Left arm, Patient Position: Sitting, BP Cuff Size: Adult)   Pulse 76   Temp 36.4 °C (97.5 °F) (Temporal)   Resp 16   Ht 1.524 m (5')   Wt 64.4 kg (142 lb)   SpO2 98%   BMI 27.73 kg/m²     Gen: no acute distress, normal voice  Skin: Left anterior shin exhibits a very large area of exposed tissue with overlying skin flap.  There is some very mild scabbing noted at the superior aspect of the wound and edges of the wound continue to have granulation tissue.  There is still some very mild purplish erythema around the wound edges circumferentially which seems to be unchanged from previous visit.  No purulent drainage.  No surrounding induration.  Very minimal tenderness to the area.  Patient has full range of motion of left lower extremity.  Distal neurovascular intact.   There is still some mild ankle edema noted .  Psych: normal affect, normal judgement, alert, awake    Assessment/Plan:     1. Noninfected skin tear of left lower extremity, subsequent encounter     Wound was again cleaned with Hibiclens and irrigated with sterile saline.  A new dressing of antibiotic ointment, Xeroform, Telfa, and Ace wrap was applied.  Patient tolerated this well.  Patient would like to begin managing the wound at home until following up with wound care next week, which I fully support.  She was given supplies and step-by-step instructions.   Certainly if she has any issues she was instructed to come back to the clinic so we can help with further management.  She may continue to use OTC analgesics as needed for moderate to severe pain    Differential diagnosis, natural history, supportive care, and indications for immediate follow-up discussed. All questions answered. Patient agrees with the plan of care.    Follow-up as needed if symptoms worsen or fail to improve to PCP, Urgent care or Emergency Room.    Please note that this dictation was created using voice recognition software. I have made a reasonable attempt to correct obvious errors, but I expect that there are errors of grammar and possibly content that I did not discover before finalizing the note.

## 2021-06-13 ENCOUNTER — OFFICE VISIT (OUTPATIENT)
Dept: URGENT CARE | Facility: CLINIC | Age: 86
End: 2021-06-13
Payer: MEDICARE

## 2021-06-13 VITALS
OXYGEN SATURATION: 95 % | HEART RATE: 85 BPM | HEIGHT: 60 IN | WEIGHT: 142 LBS | RESPIRATION RATE: 14 BRPM | TEMPERATURE: 98.1 F | BODY MASS INDEX: 27.88 KG/M2 | DIASTOLIC BLOOD PRESSURE: 72 MMHG | SYSTOLIC BLOOD PRESSURE: 132 MMHG

## 2021-06-13 DIAGNOSIS — S81.819A: ICD-10-CM

## 2021-06-13 DIAGNOSIS — L03.116 CELLULITIS OF LEFT LOWER EXTREMITY: ICD-10-CM

## 2021-06-13 PROCEDURE — 99213 OFFICE O/P EST LOW 20 MIN: CPT | Performed by: NURSE PRACTITIONER

## 2021-06-13 RX ORDER — CEPHALEXIN 500 MG/1
500 CAPSULE ORAL 4 TIMES DAILY
Qty: 20 CAPSULE | Refills: 0 | Status: SHIPPED | OUTPATIENT
Start: 2021-06-13 | End: 2021-06-15

## 2021-06-13 ASSESSMENT — ENCOUNTER SYMPTOMS
MYALGIAS: 1
FEVER: 0
NAUSEA: 0
CHILLS: 0
VOMITING: 0

## 2021-06-13 ASSESSMENT — FIBROSIS 4 INDEX: FIB4 SCORE: 0.73

## 2021-06-13 NOTE — PROGRESS NOTES
Subjective:     Grisel Saha is a 86 y.o. female who presents for Wound Check (left leg, pain, noticed red/ tender skin on top of wound, wants a dressing change )      Wound Check      Grisel Saha is a 86 y.o. female who presents today for wound check.  Patient sustained a large skin tear to the anterior aspect of the left lower extremity on 5/25/2021.  Patient was initially seen in the emergency department for evaluation of her wounds.  Since that time patient has had multiple follow-ups with her PCP as well as urgent care for wound checks.  She was unable to get an appointment with wound care until 6/15/2021.  Her last visit for a wound check was 3 days ago.  In this time.  She has developed pain in her lower extremity.  She states that this has been uncommon as her wound has not been painful up until this point.  Her pain level ranges from a 2/10 to a 4/10.  She has not used anything for the relief of her symptoms.  She notes that her legs are always swollen however, her left lower extremity is appearing red.     Review of Systems   Constitutional: Negative for chills and fever.   Gastrointestinal: Negative for nausea and vomiting.   Musculoskeletal: Positive for myalgias.       PMH:   Past Medical History:   Diagnosis Date   • HTN (hypertension)      ALLERGIES: No Known Allergies  SURGHX: No past surgical history on file.  SOCHX:   Social History     Socioeconomic History   • Marital status:      Spouse name: Not on file   • Number of children: Not on file   • Years of education: Not on file   • Highest education level: Not on file   Occupational History   • Not on file   Tobacco Use   • Smoking status: Former Smoker   • Smokeless tobacco: Never Used   Vaping Use   • Vaping Use: Never used   Substance and Sexual Activity   • Alcohol use: Yes   • Drug use: Not Currently   • Sexual activity: Not Currently   Other Topics Concern   • Not on file   Social History Narrative   • Not on file     Social  Determinants of Health     Financial Resource Strain:    • Difficulty of Paying Living Expenses:    Food Insecurity:    • Worried About Running Out of Food in the Last Year:    • Ran Out of Food in the Last Year:    Transportation Needs:    • Lack of Transportation (Medical):    • Lack of Transportation (Non-Medical):    Physical Activity:    • Days of Exercise per Week:    • Minutes of Exercise per Session:    Stress:    • Feeling of Stress :    Social Connections:    • Frequency of Communication with Friends and Family:    • Frequency of Social Gatherings with Friends and Family:    • Attends [employer] Services:    • Active Member of Clubs or Organizations:    • Attends Club or Organization Meetings:    • Marital Status:    Intimate Partner Violence:    • Fear of Current or Ex-Partner:    • Emotionally Abused:    • Physically Abused:    • Sexually Abused:      FH:   Family History   Problem Relation Age of Onset   • Stroke Mother    • Cancer Father         lung         Objective:   /72 (BP Location: Left arm, Patient Position: Sitting, BP Cuff Size: Adult)   Pulse 85   Temp 36.7 °C (98.1 °F) (Temporal)   Resp 14   Ht 1.524 m (5')   Wt 64.4 kg (142 lb)   SpO2 95%   BMI 27.73 kg/m²     Physical Exam  Vitals and nursing note reviewed.   Constitutional:       General: She is not in acute distress.     Appearance: Normal appearance. She is not ill-appearing.   HENT:      Head: Normocephalic and atraumatic.      Right Ear: External ear normal.      Left Ear: External ear normal.      Nose: No congestion or rhinorrhea.      Mouth/Throat:      Mouth: Mucous membranes are moist.   Eyes:      Extraocular Movements: Extraocular movements intact.      Pupils: Pupils are equal, round, and reactive to light.   Cardiovascular:      Rate and Rhythm: Normal rate and regular rhythm.      Pulses: Normal pulses.      Heart sounds: Normal heart sounds.   Pulmonary:      Effort: Pulmonary effort is normal.      Breath  sounds: Normal breath sounds.   Abdominal:      General: Abdomen is flat. Bowel sounds are normal.      Palpations: Abdomen is soft.      Tenderness: There is no abdominal tenderness. There is no right CVA tenderness or left CVA tenderness.   Musculoskeletal:         General: Normal range of motion.      Cervical back: Normal range of motion and neck supple.   Skin:     General: Skin is warm and dry.      Capillary Refill: Capillary refill takes less than 2 seconds.             Comments: Large skin tear with for stages of healing to left lower extremity.  Skin surrounding skin tear is warm to touch and slightly erythemic.  Negative for visualized drainage.  There is slight tenderness to palpation.   Neurological:      General: No focal deficit present.      Mental Status: She is alert and oriented to person, place, and time. Mental status is at baseline.   Psychiatric:         Mood and Affect: Mood normal.         Behavior: Behavior normal.         Thought Content: Thought content normal.         Judgment: Judgment normal.         Assessment/Plan:   Assessment    1. Cellulitis of left lower extremity  cephALEXin (KEFLEX) 500 MG Cap   2. ISTAP type 3 skin tear of lower extremity     She will be treated for beginning of cellulitis secondary to skin tear of left lower extremity.  She does have follow-up visit with wound care in 2 days.  Worsening signs of cellulitis discussed.  Follow-up in clinic sooner if needed.  AVS handout given and reviewed with patient. Pt educated on red flags and when to seek treatment back in ER or UC.

## 2021-06-15 ENCOUNTER — OFFICE VISIT (OUTPATIENT)
Dept: WOUND CARE | Facility: MEDICAL CENTER | Age: 86
End: 2021-06-15
Attending: FAMILY MEDICINE
Payer: MEDICARE

## 2021-06-15 VITALS
OXYGEN SATURATION: 96 % | RESPIRATION RATE: 20 BRPM | SYSTOLIC BLOOD PRESSURE: 149 MMHG | HEART RATE: 93 BPM | TEMPERATURE: 97.2 F | DIASTOLIC BLOOD PRESSURE: 87 MMHG

## 2021-06-15 DIAGNOSIS — R60.0 EDEMA OF BOTH LEGS: ICD-10-CM

## 2021-06-15 DIAGNOSIS — R23.8 AGE-RELATED SKIN FRAGILITY: ICD-10-CM

## 2021-06-15 DIAGNOSIS — S81.802D WOUND OF LEFT LOWER EXTREMITY, SUBSEQUENT ENCOUNTER: ICD-10-CM

## 2021-06-15 PROCEDURE — 11045 DBRDMT SUBQ TISS EACH ADDL: CPT

## 2021-06-15 PROCEDURE — 99213 OFFICE O/P EST LOW 20 MIN: CPT | Mod: 25 | Performed by: NURSE PRACTITIONER

## 2021-06-15 PROCEDURE — 11045 DBRDMT SUBQ TISS EACH ADDL: CPT | Performed by: NURSE PRACTITIONER

## 2021-06-15 PROCEDURE — 11042 DBRDMT SUBQ TIS 1ST 20SQCM/<: CPT

## 2021-06-15 PROCEDURE — 11042 DBRDMT SUBQ TIS 1ST 20SQCM/<: CPT | Performed by: NURSE PRACTITIONER

## 2021-06-15 PROCEDURE — 99214 OFFICE O/P EST MOD 30 MIN: CPT | Mod: 25

## 2021-06-15 ASSESSMENT — ENCOUNTER SYMPTOMS
DEPRESSION: 0
DIZZINESS: 0
PALPITATIONS: 0
FEVER: 0
NERVOUS/ANXIOUS: 0
BACK PAIN: 0
NAUSEA: 0
VOMITING: 0
CHILLS: 0
COUGH: 0
CONSTIPATION: 0
DIARRHEA: 0
SHORTNESS OF BREATH: 0

## 2021-06-15 ASSESSMENT — PAIN SCALES - GENERAL: PAINLEVEL: NO PAIN

## 2021-06-15 NOTE — PROGRESS NOTES
Provider Encounter- Full Thickness wound    HISTORY OF PRESENT ILLNESS  Wound History:    START OF CARE IN CLINIC: 6/15/2021    REFERRING PROVIDER: Cricket Miller M.D.     WOUND- Full Thickness Wound   LOCATION: Left anterior lower leg   HISTORY: Patient fell at home, striking her leg on a door jam, creating a very large skin tear on May 25, 2021.  She did not immediately seek medical attention, but went into an urgent care several days later.  A referral to the wound clinic was placed at that time, however she is not able to get an appointment for several weeks.  In the interim, she was going to urgent care every 2 days for dressing changes, they were applying Xeroform and gauze.  She completed a 5-day course of Keflex.    Pertinent Medical History: Interstitial lung disease, balance problem, controlled diabetes mellitus type 2    TOBACCO USE: Former smoker, quit 20 years ago.  Never used smokeless tobacco    Patient's problem list, allergies, and current medications reviewed and updated in Epic    Interval History:  6/15/2021 Initial clinic visit with HUGO Tanner, FNP-BC, [facility], [facility].  Grisel presents today accompanied by her .  She states she is feeling well, denies fevers, chills, nausea, vomiting, cough or shortness of breath.  She has been trying to elevate her leg throughout much of the day.  However, this is difficult for her, she states she likes to walk a lot.  She is willing to allow for some compression using a Tubigrip.  If she tolerates okay, we will plan to increase to 2 layer compression next week.  She would also like home health to assist with dressing changes in between clinic visits.      REVIEW OF SYSTEMS:   Review of Systems   Constitutional: Negative for chills and fever.   Respiratory: Negative for cough and shortness of breath.    Cardiovascular: Positive for leg swelling. Negative for chest pain and palpitations.        Swelling her legs for many years   Gastrointestinal:  Negative for constipation, diarrhea, nausea and vomiting.   Genitourinary: Negative for dysuria.   Musculoskeletal: Negative for back pain and joint pain.   Neurological: Negative for dizziness.   Psychiatric/Behavioral: Negative for depression. The patient is not nervous/anxious.        PHYSICAL EXAMINATION:   /87   Pulse 93   Temp 36.2 °C (97.2 °F)   Resp 20   SpO2 96%     Physical Exam  Constitutional:       Appearance: Normal appearance.   HENT:      Head: Normocephalic.   Eyes:      Pupils: Pupils are equal, round, and reactive to light.   Cardiovascular:      Rate and Rhythm: Normal rate.      Pulses: Normal pulses.      Comments: Pedal pulses palpable, 2+  Pulmonary:      Effort: Pulmonary effort is normal.   Musculoskeletal:         General: Swelling present.      Comments: Bilateral lower extremity edema, 2-3+   Skin:     General: Skin is warm.      Comments: Large full-thickness wound to the anterior left lower leg  Refer to wound flowsheet and photos   Neurological:      Mental Status: She is alert and oriented to person, place, and time.   Psychiatric:         Mood and Affect: Mood normal.         Behavior: Behavior normal.         Thought Content: Thought content normal.         Judgment: Judgment normal.         WOUND ASSESSMENT  Wound 06/15/21 Full Thickness Wound Leg Anterior;Lower Left (Active)   Wound Image    06/15/21 0815   Site Assessment Yellow;Pink;Red 06/15/21 0815   Periwound Assessment Clean;Dry;Fragile 06/15/21 0815   Margins Attached edges;Defined edges 06/15/21 0815   Drainage Amount Small 06/15/21 0815   Drainage Description Serosanguineous 06/15/21 0815   Treatments Cleansed;Topical Lidocaine;Provider debridement 06/15/21 0815   Wound Cleansing Normal Saline Irrigation 06/15/21 0815   Periwound Protectant Barrier Paste 06/15/21 0815   Dressing Options Antimicrobial 7 Day (Acticoat 7);Hydrofiber;Absorbent Abdominal Pad;Dry Roll Gauze;Tubigrip 06/15/21 0815   Dressing Changed  New 06/15/21 0815   Dressing Status Clean;Dry;Intact 06/15/21 0815   Dressing Change/Treatment Frequency Every 72 hrs, and As Needed 06/15/21 0815   NEXT Dressing Change/Treatment Date 06/18/21 06/15/21 0815   Non-staged Wound Description Full thickness 06/15/21 0815   Wound Length (cm) 17.5 cm 06/15/21 0815   Wound Width (cm) 8.2 cm 06/15/21 0815   Wound Depth (cm) 0.1 cm 06/15/21 0815   Wound Surface Area (cm^2) 143.5 cm^2 06/15/21 0815   Wound Volume (cm^3) 14.35 cm^3 06/15/21 0815   Post-Procedure Length (cm) 17.5 cm 06/15/21 0815   Post-Procedure Width (cm) 7.5 cm 06/15/21 0815   Post-Procedure Depth (cm) 0.1 cm 06/15/21 0815   Post-Procedure Surface Area (cm^2) 131.25 cm^2 06/15/21 0815   Post-Procedure Volume (cm^3) 13.12 cm^3 06/15/21 0815   Wound Bed Slough (%) 90 % 06/15/21 0815   Tunneling (cm) 0 cm 06/15/21 0815   Undermining (cm) 0 cm 06/15/21 0815   Wound Odor None 06/15/21 0815   Pulses Left;DP;PT;2+ 06/15/21 0815   Exposed Structures None 06/15/21 0815        PROCEDURE:   -2% viscous lidocaine applied topically to wound bed for approximately 5 minutes prior to debridement  -Curette used to debride wound bed.  Excisional debridement was performed to remove devitalized tissue until healthy, bleeding tissue was visualized.   Entire surface of wound, 131.25 cm2 debrided.  Tissue debrided into the subcutaneous layer.    -Bleeding controlled with manual pressure.    -Wound care completed by wound RN, refer to flowsheet  -Patient tolerated the procedure well, without c/o pain or discomfort.       Pertinent Labs and Diagnostics:    Labs:     A1c:   Lab Results   Component Value Date/Time    HBA1C 6.2 (H) 05/20/2021 06:49 AM          IMAGING: none    VASCULAR STUDIES: none     LAST  WOUND CULTURE:  DATE : None found in epic          ASSESSMENT AND PLAN:     1. Wound of left lower extremity, subsequent encounter  Comments: Full-thickness skin tear caused from fall at home    6/15/2021: Initial clinic visit.   Wound bed covered with thick layer of slough  -Excisional debridement of wound in clinic today, medically necessary to promote wound healing.  -Patient to return to clinic weekly for assessment and debridement  -Home health referral.  Home health to change dressing 1-2 times per week in between clinic visits    Wound care: Acticoat 7 to manage bioburden, Hydrofiber to manage exudate, foam cover dressing, Hypafix tape to secure,   -We will have home health switch this to silver Hydrofiber next clinic visit.    2. Edema of both legs  Comments: Patient has long history of edema to both legs    6/15/2021: 2-3+ edema both lower legs.  -Tubigrip applied to left lower leg to manage edema.  If patient tolerates, will increase to 2 layer compression next clinic visit to manage edema, and to accelerate healing    3. Age-related skin fragility  Comments: Complicating factor.          PATIENT EDUCATION  - Importance of adequate nutrition for wound healing  -Advised to go to ER for any increased redness, swelling, drainage, or odor, or if patient develops fever, chills, nausea or vomiting.     20 min spent face to face with patient, >50% of time spent counseling, coordinating care, reviewing records, discussing POC, educating patient regarding wound healing and progression.  This time was spent in excess to procedure time.       Please note that this note may have been created using voice recognition software. I have worked with technical experts from [employer] to optimize the interface.  I have made every reasonable attempt to correct obvious errors, but there may be errors of grammar and possibly content that I did not discover before finalizing the note.    N

## 2021-06-15 NOTE — PATIENT INSTRUCTIONS
-Keep your wound dressing clean, dry, and intact.    -Change your dressing every 3-4 days or if it becomes soiled, soaked, or falls off.    -Should you experience any significant changes in your wound(s), such as infection (redness, swelling, localized heat, increased pain, fever > 101 F, chills) or have any questions regarding your home care instructions, please contact the wound center at [phone]. If after hours, contact your primary care physician or go to the hospital emergency room.

## 2021-06-16 ENCOUNTER — HOME HEALTH ADMISSION (OUTPATIENT)
Dept: HOME HEALTH SERVICES | Facility: HOME HEALTHCARE | Age: 86
End: 2021-06-16
Payer: MEDICARE

## 2021-06-17 ASSESSMENT — ENCOUNTER SYMPTOMS
SENSORY CHANGE: 0
NAUSEA: 0
FEVER: 0
BRUISES/BLEEDS EASILY: 0
TINGLING: 0
ROS SKIN COMMENTS: SKIN TEAR
VOMITING: 0
CHILLS: 0

## 2021-06-19 ENCOUNTER — HOME CARE VISIT (OUTPATIENT)
Dept: HOME HEALTH SERVICES | Facility: HOME HEALTHCARE | Age: 86
End: 2021-06-19
Payer: MEDICARE

## 2021-06-19 VITALS
WEIGHT: 142 LBS | DIASTOLIC BLOOD PRESSURE: 80 MMHG | RESPIRATION RATE: 16 BRPM | SYSTOLIC BLOOD PRESSURE: 124 MMHG | OXYGEN SATURATION: 96 % | BODY MASS INDEX: 27.73 KG/M2 | HEART RATE: 76 BPM | TEMPERATURE: 98.4 F

## 2021-06-19 PROCEDURE — G0493 RN CARE EA 15 MIN HH/HOSPICE: HCPCS

## 2021-06-19 PROCEDURE — 665001 SOC-HOME HEALTH

## 2021-06-19 ASSESSMENT — PATIENT HEALTH QUESTIONNAIRE - PHQ9
CLINICAL INTERPRETATION OF PHQ2 SCORE: 0
2. FEELING DOWN, DEPRESSED, IRRITABLE, OR HOPELESS: 00
1. LITTLE INTEREST OR PLEASURE IN DOING THINGS: 00

## 2021-06-19 ASSESSMENT — FIBROSIS 4 INDEX: FIB4 SCORE: 0.73

## 2021-06-21 ENCOUNTER — DOCUMENTATION (OUTPATIENT)
Dept: MEDICAL GROUP | Facility: PHYSICIAN GROUP | Age: 86
End: 2021-06-21

## 2021-06-21 NOTE — PROGRESS NOTES
Medication chart review for [facility] services    PCP:  Cricket Miller M.D.  [address]  [place] [address]  Fax: [phone]    Current medication list     Current Outpatient Medications:   •  ibuprofen, 200 mg, Oral, PRN  •  aspirin, 81 mg, Oral, DAILY  •  hydroCHLOROthiazide, 25 mg, Oral, DAILY  •  Blood Glucose Meter Kit, Test blood sugar 1x daily. Lopez Freedom Lite blood glucose monitoring kit.  •  Blood Glucose Test Strips, Use one Abbott Freedom Lite strip to test blood sugar once daily early morning before first meal.  •  Lancets, Use one Abbott [place] Lite lancet to test blood sugar once daily early morning before first meal.  •  losartan, 100 mg, Oral, DAILY  •  lovastatin, 20 mg, Oral, DAILY    No Known Allergies    Labs     Lab Results   Component Value Date/Time    SODIUM 132 (L) 05/20/2021 06:49 AM    POTASSIUM 4.0 05/20/2021 06:49 AM    CHLORIDE 97 05/20/2021 06:49 AM    CO2 25 05/20/2021 06:49 AM    GLUCOSE 149 (H) 05/20/2021 06:49 AM    BUN 13 05/20/2021 06:49 AM    CREATININE 0.60 05/20/2021 06:49 AM      Lab Results   Component Value Date/Time    ALKPHOSPHAT 89 05/20/2021 06:49 AM    ASTSGOT 9 (L) 05/20/2021 06:49 AM    ALTSGPT 9 05/20/2021 06:49 AM    TBILIRUBIN 0.4 05/20/2021 06:49 AM    ALBUMIN 4.0 05/20/2021 06:49 AM          Assessment and Plan:   • Received referral from [facility]. Medications reviewed.         Cristian Garcia, PharmD, MS, BCACP, LCC  [facility]  Phone [phone] fax [phone]    This note was created using voice recognition software (Dragon). The accuracy of the dictation is limited by the abilities of the software. I have reviewed the note prior to signing, however some errors in grammar and context are still possible. If you have any questions related to this note please do not hesitate to contact our office.

## 2021-06-22 ENCOUNTER — OFFICE VISIT (OUTPATIENT)
Dept: WOUND CARE | Facility: MEDICAL CENTER | Age: 86
End: 2021-06-22
Attending: FAMILY MEDICINE
Payer: MEDICARE

## 2021-06-22 VITALS
OXYGEN SATURATION: 95 % | HEART RATE: 80 BPM | SYSTOLIC BLOOD PRESSURE: 158 MMHG | RESPIRATION RATE: 18 BRPM | DIASTOLIC BLOOD PRESSURE: 78 MMHG | TEMPERATURE: 97.4 F

## 2021-06-22 DIAGNOSIS — R23.8 AGE-RELATED SKIN FRAGILITY: ICD-10-CM

## 2021-06-22 DIAGNOSIS — S81.802D WOUND OF LEFT LOWER EXTREMITY, SUBSEQUENT ENCOUNTER: ICD-10-CM

## 2021-06-22 DIAGNOSIS — R60.0 EDEMA OF BOTH LEGS: ICD-10-CM

## 2021-06-22 PROCEDURE — 11042 DBRDMT SUBQ TIS 1ST 20SQCM/<: CPT

## 2021-06-22 PROCEDURE — 99213 OFFICE O/P EST LOW 20 MIN: CPT

## 2021-06-22 PROCEDURE — 99213 OFFICE O/P EST LOW 20 MIN: CPT | Mod: 25 | Performed by: NURSE PRACTITIONER

## 2021-06-22 PROCEDURE — 11045 DBRDMT SUBQ TISS EACH ADDL: CPT

## 2021-06-22 PROCEDURE — 11042 DBRDMT SUBQ TIS 1ST 20SQCM/<: CPT | Performed by: NURSE PRACTITIONER

## 2021-06-22 PROCEDURE — 11045 DBRDMT SUBQ TISS EACH ADDL: CPT | Performed by: NURSE PRACTITIONER

## 2021-06-22 ASSESSMENT — ENCOUNTER SYMPTOMS
NAUSEA: 0
VOMITING: 0
DIZZINESS: 0
PALPITATIONS: 0
CHILLS: 0
FEVER: 0
COUGH: 0
DEPRESSION: 0
SHORTNESS OF BREATH: 0
CONSTIPATION: 0
NERVOUS/ANXIOUS: 0
BACK PAIN: 0
DIARRHEA: 0

## 2021-06-22 ASSESSMENT — PAIN SCALES - GENERAL: PAINLEVEL: 3=SLIGHT PAIN

## 2021-06-22 NOTE — PATIENT INSTRUCTIONS
Avoid prolonged standing or sitting without elevating your legs.  - Apply tubigrip to your legs ending 2 fingers below back of knee without wrinkles.     Should you experience any significant changes in your wound(s), such as infection (redness, swelling, localized heat, increased pain, fever > 101 F, chills) or have any questions regarding your home care instructions, please contact the wound center at [phone]. If after hours, contact your primary care physician or go to the hospital emergency room.   Keep dressing clean, dry and covered while bathing. Only change dressing if it becomes over saturated, soiled or falls off.

## 2021-06-22 NOTE — PROGRESS NOTES
Provider Encounter- Full Thickness wound    HISTORY OF PRESENT ILLNESS  Wound History:    START OF CARE IN CLINIC: 6/15/2021    REFERRING PROVIDER: Cricket Miller M.D.     WOUND- Full Thickness Wound   LOCATION: Left anterior lower leg   HISTORY: Patient fell at home, striking her leg on a door jam, creating a very large skin tear on May 25, 2021.  She did not immediately seek medical attention, but went into an urgent care several days later.  A referral to the wound clinic was placed at that time, however she is not able to get an appointment for several weeks.  In the interim, she was going to urgent care every 2 days for dressing changes, they were applying Xeroform and gauze.  She completed a 5-day course of Keflex.    Pertinent Medical History: Interstitial lung disease, balance problem, controlled diabetes mellitus type 2    TOBACCO USE: Former smoker, quit 20 years ago.  Never used smokeless tobacco    Patient's problem list, allergies, and current medications reviewed and updated in Epic    Interval History:  6/15/2021 Initial clinic visit with HUGO Tanner, ROCCO-BC, CWOCN, CFCN.  Grisel presents today accompanied by her .  She states she is feeling well, denies fevers, chills, nausea, vomiting, cough or shortness of breath.  She has been trying to elevate her leg throughout much of the day.  However, this is difficult for her, she states she likes to walk a lot.  She is willing to allow for some compression using a Tubigrip.  If she tolerates okay, we will plan to increase to 2 layer compression next week.  She would also like home health to assist with dressing changes in between clinic visits.    6/22/21: Clinic visit with HUGO Stewart, ROCCO-BC. Patient reports feeling well, overall she is happy with the wound progress. Denies wound drainage, odor. Denies erythema. She does have some mild swelling to bilateral LE, which patient states is due to the fact that she hasn't been walking  much since her fall.  She does have significant pain associated with the wound, especially surrounding debridement.  She is requesting use of injectable lidocaine, stating that the pain was unbearable last week with topical lidocaine. Pt does not check her home BS, stating that she is compliant with her DM regimen and her A1C has always been well controlled. BP slightly elevated in clinic.  Pt is managed by her PCP for HTN.  She states that she is very nervous about the pain of debridement and feels that is why her BP is elevated.  Denies chest pain, shortness of breath, headache, dizziness, vision changes.      REVIEW OF SYSTEMS:   Review of Systems   Constitutional: Negative for chills and fever.   Respiratory: Negative for cough and shortness of breath.    Cardiovascular: Positive for leg swelling. Negative for chest pain and palpitations.        Swelling her legs for many years   Gastrointestinal: Negative for constipation, diarrhea, nausea and vomiting.   Genitourinary: Negative for dysuria.   Musculoskeletal: Negative for back pain and joint pain.   Neurological: Negative for dizziness.   Psychiatric/Behavioral: Negative for depression. The patient is not nervous/anxious.        PHYSICAL EXAMINATION:   /78   Pulse 80   Temp 36.3 °C (97.4 °F)   Resp 18   SpO2 95%     Physical Exam  Constitutional:       Appearance: Normal appearance.   HENT:      Head: Normocephalic.   Eyes:      Pupils: Pupils are equal, round, and reactive to light.   Cardiovascular:      Rate and Rhythm: Normal rate.      Pulses: Normal pulses.      Comments: Pedal pulses palpable, 2+  Pulmonary:      Effort: Pulmonary effort is normal.   Musculoskeletal:         General: Swelling present.      Comments: Bilateral lower extremity edema, 2-3+   Skin:     General: Skin is warm.      Comments: Large full-thickness wound to the anterior left lower leg  Refer to wound flowsheet and photos   Neurological:      Mental Status: She is alert  and oriented to person, place, and time.   Psychiatric:         Mood and Affect: Mood normal.         Behavior: Behavior normal.         Thought Content: Thought content normal.         Judgment: Judgment normal.         WOUND ASSESSMENT     Wound 06/15/21 Full Thickness Wound Leg Anterior;Lower Left (Active)   Wound Image    06/22/21 1045   Site Assessment Red;Yellow;Other (Comment) 06/22/21 1045   Periwound Assessment Other (Comment);Edema 06/22/21 1045   Margins Attached edges 06/22/21 1045   Drainage Amount Moderate 06/22/21 1045   Drainage Description Serosanguineous 06/22/21 1045   Treatments Cleansed;Topical Lidocaine;Injectible Lidocaine;Provider debridement;Site care 06/22/21 1045   Wound Cleansing Puracyn Spray 06/22/21 1045   Periwound Protectant Barrier Paste 06/22/21 1045   Dressing Options Hydrofiber Silver;Absorbent Abdominal Pad;Dry Roll Gauze;Hypafix Tape;Tubigrip 06/22/21 1045   Dressing Changed New 06/15/21 0815   Dressing Status Clean;Dry;Intact 06/15/21 0815   Dressing Change/Treatment Frequency Every 72 hrs, and As Needed 06/15/21 0815   NEXT Dressing Change/Treatment Date 06/18/21 06/15/21 0815   Non-staged Wound Description Full thickness 06/22/21 1045   Wound Length (cm) 16.1 cm 06/22/21 1045   Wound Width (cm) 7.1 cm 06/22/21 1045   Wound Depth (cm) 0.1 cm 06/22/21 1045   Wound Surface Area (cm^2) 114.31 cm^2 06/22/21 1045   Wound Volume (cm^3) 11.43 cm^3 06/22/21 1045   Post-Procedure Length (cm) 16.1 cm 06/22/21 1045   Post-Procedure Width (cm) 7.2 cm 06/22/21 1045   Post-Procedure Depth (cm) 0.1 cm 06/22/21 1045   Post-Procedure Surface Area (cm^2) 115.92 cm^2 06/22/21 1045   Post-Procedure Volume (cm^3) 11.59 cm^3 06/22/21 1045   Wound Healing % 20 06/22/21 1045   Wound Bed Granulation (%) 25 % 06/22/21 1045   Wound Bed Slough (%) 75 % 06/22/21 1045   Tunneling (cm) 0 cm 06/22/21 1045   Undermining (cm) 0 cm 06/22/21 1045   Wound Odor None 06/22/21 1045   Pulses Left;DP;PT;2+ 06/15/21  0815   Exposed Structures None 06/22/21 1045         PROCEDURE:  Excisional debridement with injected lidocaine of left lower extremity wound. Consent signed. Time out performed prior to start of procedure.   -2% viscous lidocaine applied topically to wound bed for approximately 5 minutes prior to debridement. Injectable lidocaine also used.   -Curette used to debride wound bed.  Excisional debridement was performed to remove devitalized tissue until healthy, bleeding tissue was visualized.   Entire surface of wound, 115.92 cm2 debrided.  Tissue debrided into the subcutaneous layer.    -Bleeding controlled with manual pressure.    -Wound care completed by wound RN, refer to flowsheet  -Patient tolerated the procedure well, without c/o pain or discomfort.       Pertinent Labs and Diagnostics:    Labs:     A1c:   Lab Results   Component Value Date/Time    HBA1C 6.2 (H) 05/20/2021 06:49 AM          IMAGING: none    VASCULAR STUDIES: none     LAST  WOUND CULTURE:  DATE : None found in epic          ASSESSMENT AND PLAN:     1. Wound of left lower extremity, subsequent encounter  Comments: Full-thickness skin tear caused from fall at home    6/22/2021: Wound bed covered with thin layer of slough, some residual from Acticoat 7  -Excisional debridement of wound in clinic today, medically necessary to promote wound healing.  -Patient to return to clinic weekly for assessment and debridement  -Home health to change dressing 1-2 times per week in between clinic visits    Wound care: Hydrofiber silver to manage exudate, Absorbent Abdominal Pad;Dry Roll Gauze;Hypafix Tape;Tubigrip    2. Edema of both legs  Comments: Patient has long history of edema to both legs    6/22/2021: 2+ edema both lower legs.  -Tubigrip applied to bilateral LE to manage edema. Pt does have significant pain associated with wound and I am not sure she would be able to tolerate more compression. Continue to reassess and can potentially increase to 2 layer  compression next clinic visit to manage edema, and to accelerate healing    3. Age-related skin fragility  Comments: Complicating factor.      PATIENT EDUCATION  - Importance of adequate nutrition for wound healing  -Advised to go to ER for any increased redness, swelling, drainage, or odor, or if patient develops fever, chills, nausea or vomiting.     My total time spent caring for the patient on the day of the encounter was 25 minutes.   This does not include time spent on separately billable procedures/tests.        Please note that this note may have been created using voice recognition software. I have worked with technical experts from [employer] to optimize the interface.  I have made every reasonable attempt to correct obvious errors, but there may be errors of grammar and possibly content that I did not discover before finalizing the note.    N

## 2021-06-24 NOTE — PROGRESS NOTES
Received message from home health regarding request for topical lidocaine for wound care.  Rx for 5% topical lidocaine called to patient's pharmacy, with instructions to use for dressing changes.

## 2021-06-25 ENCOUNTER — HOME CARE VISIT (OUTPATIENT)
Dept: HOME HEALTH SERVICES | Facility: HOME HEALTHCARE | Age: 86
End: 2021-06-25
Payer: MEDICARE

## 2021-06-25 VITALS
OXYGEN SATURATION: 95 % | SYSTOLIC BLOOD PRESSURE: 124 MMHG | DIASTOLIC BLOOD PRESSURE: 80 MMHG | HEART RATE: 69 BPM | RESPIRATION RATE: 16 BRPM | TEMPERATURE: 98.9 F

## 2021-06-25 PROCEDURE — G0299 HHS/HOSPICE OF RN EA 15 MIN: HCPCS

## 2021-06-25 ASSESSMENT — ACTIVITIES OF DAILY LIVING (ADL)
CURRENT_FUNCTION: STAND BY ASSIST
AMBULATION ASSISTANCE: STAND BY ASSIST

## 2021-06-25 ASSESSMENT — ENCOUNTER SYMPTOMS
NAUSEA: DENIES
VOMITING: DENIES
MUSCLE WEAKNESS: 1

## 2021-06-29 ENCOUNTER — OFFICE VISIT (OUTPATIENT)
Dept: WOUND CARE | Facility: MEDICAL CENTER | Age: 86
End: 2021-06-29
Attending: FAMILY MEDICINE
Payer: MEDICARE

## 2021-06-29 ENCOUNTER — HOME CARE VISIT (OUTPATIENT)
Dept: HOME HEALTH SERVICES | Facility: HOME HEALTHCARE | Age: 86
End: 2021-06-29
Payer: MEDICARE

## 2021-06-29 VITALS
TEMPERATURE: 97.2 F | RESPIRATION RATE: 18 BRPM | OXYGEN SATURATION: 92 % | DIASTOLIC BLOOD PRESSURE: 77 MMHG | HEART RATE: 102 BPM | SYSTOLIC BLOOD PRESSURE: 173 MMHG

## 2021-06-29 DIAGNOSIS — R60.0 EDEMA OF BOTH LEGS: ICD-10-CM

## 2021-06-29 DIAGNOSIS — S81.802D WOUND OF LEFT LOWER EXTREMITY, SUBSEQUENT ENCOUNTER: Primary | ICD-10-CM

## 2021-06-29 DIAGNOSIS — R23.8 AGE-RELATED SKIN FRAGILITY: ICD-10-CM

## 2021-06-29 PROCEDURE — 99213 OFFICE O/P EST LOW 20 MIN: CPT | Performed by: NURSE PRACTITIONER

## 2021-06-29 PROCEDURE — 99213 OFFICE O/P EST LOW 20 MIN: CPT

## 2021-06-29 ASSESSMENT — ENCOUNTER SYMPTOMS
NERVOUS/ANXIOUS: 0
SHORTNESS OF BREATH: 0
FEVER: 0
PALPITATIONS: 0
VOMITING: 0
DIZZINESS: 0
CONSTIPATION: 0
COUGH: 0
NAUSEA: 0
CHILLS: 0
BACK PAIN: 0
DIARRHEA: 0
DEPRESSION: 0

## 2021-06-29 ASSESSMENT — ACTIVITIES OF DAILY LIVING (ADL): OASIS_M1830: 05

## 2021-06-29 NOTE — PROGRESS NOTES
Provider Encounter- Full Thickness wound    HISTORY OF PRESENT ILLNESS  Wound History:    START OF CARE IN CLINIC: 6/15/2021    REFERRING PROVIDER: Cricket Miller M.D.     WOUND- Full Thickness Wound   LOCATION: Left anterior lower leg   HISTORY: Patient fell at home, striking her leg on a door jam, creating a very large skin tear on May 25, 2021.  She did not immediately seek medical attention, but went into an urgent care several days later.  A referral to the wound clinic was placed at that time, however she is not able to get an appointment for several weeks.  In the interim, she was going to urgent care every 2 days for dressing changes, they were applying Xeroform and gauze.  She completed a 5-day course of Keflex.    Pertinent Medical History: Interstitial lung disease, balance problem, controlled diabetes mellitus type 2    TOBACCO USE: Former smoker, quit 20 years ago.  Never used smokeless tobacco    Patient's problem list, allergies, and current medications reviewed and updated in Epic    Interval History:  6/15/2021 Initial clinic visit with HUGO Tanner, ROCCO-BC, CWOCN, CFCN.  Grisel presents today accompanied by her .  She states she is feeling well, denies fevers, chills, nausea, vomiting, cough or shortness of breath.  She has been trying to elevate her leg throughout much of the day.  However, this is difficult for her, she states she likes to walk a lot.  She is willing to allow for some compression using a Tubigrip.  If she tolerates okay, we will plan to increase to 2 layer compression next week.  She would also like home health to assist with dressing changes in between clinic visits.    6/22/21: Clinic visit with HUGO Stewart, ROCCO-BC. Patient reports feeling well, overall she is happy with the wound progress. Denies wound drainage, odor. Denies erythema. She does have some mild swelling to bilateral LE, which patient states is due to the fact that she hasn't been walking  much since her fall.  She does have significant pain associated with the wound, especially surrounding debridement.  She is requesting use of injectable lidocaine, stating that the pain was unbearable last week with topical lidocaine. Pt does not check her home BS, stating that she is compliant with her DM regimen and her A1C has always been well controlled. BP slightly elevated in clinic.  Pt is managed by her PCP for HTN.  She states that she is very nervous about the pain of debridement and feels that is why her BP is elevated.  Denies chest pain, shortness of breath, headache, dizziness, vision changes.    6/29/2021 : Clinic visit with Angy Sorensen, HUGO, ROCCO-BC, DEYAN, CFDONNIE.  Grisel continues to feel well, reports that she has had significantly less pain from her wound over the past week.  She did not  topical lidocaine from her pharmacy, called in last week.  She states normally she gets a phone call or text from her pharmacy when prescriptions are ready, which she did not.  She also states that she does not think she needs it anymore, as her pain has diminished.  Home health continues to see her in between clinic visits.      REVIEW OF SYSTEMS:   Review of Systems   Constitutional: Negative for chills and fever.   Respiratory: Negative for cough and shortness of breath.    Cardiovascular: Positive for leg swelling. Negative for chest pain and palpitations.        Swelling her legs for many years   Gastrointestinal: Negative for constipation, diarrhea, nausea and vomiting.   Genitourinary: Negative for dysuria.   Musculoskeletal: Negative for back pain and joint pain.   Neurological: Negative for dizziness.   Psychiatric/Behavioral: Negative for depression. The patient is not nervous/anxious.        PHYSICAL EXAMINATION:   BP (!) 173/77   Pulse (!) 102   Temp 36.2 °C (97.2 °F) (Temporal)   Resp 18   SpO2 92%     Physical Exam  Constitutional:       Appearance: Normal appearance.   HENT:      Head:  Normocephalic.   Eyes:      Pupils: Pupils are equal, round, and reactive to light.   Cardiovascular:      Rate and Rhythm: Normal rate.      Pulses: Normal pulses.      Comments: Pedal pulses palpable, 2+  Pulmonary:      Effort: Pulmonary effort is normal.   Musculoskeletal:         General: Swelling present.      Comments: Bilateral lower extremity edema, 2-3+   Skin:     General: Skin is warm.      Comments: Large full-thickness wound to the anterior left lower leg  Refer to wound flowsheet and photos   Neurological:      Mental Status: She is alert and oriented to person, place, and time.   Psychiatric:         Mood and Affect: Mood normal.         Behavior: Behavior normal.         Thought Content: Thought content normal.         Judgment: Judgment normal.         WOUND ASSESSMENT   Wound 06/15/21 Full Thickness Wound LLE anterior  (Active)   Wound Image   06/29/21 1400   Site Assessment Red;Yellow 06/29/21 1400   Periwound Assessment Scar tissue;Fragile 06/29/21 1400   Margins Attached edges 06/29/21 1400   Drainage Amount Small 06/29/21 1400   Drainage Description Serosanguineous 06/29/21 1400   Treatments Cleansed;Site care 06/29/21 1400   Wound Cleansing Normal Saline Irrigation 06/29/21 1400   Periwound Protectant Barrier Paste 06/29/21 1400   Dressing Cleansing/Solutions Not Applicable 06/29/21 1400   Dressing Options Mepitel One;Hydrofiber Silver;Absorbent Abdominal Pad;Dry Roll Gauze;Hypafix Tape;Tubigrip 06/29/21 1400   Dressing Changed New 06/15/21 0815   Dressing Status Clean;Dry;Intact 06/15/21 0815   Dressing Change/Treatment Frequency Every 72 hrs, and As Needed 06/15/21 0815   NEXT Dressing Change/Treatment Date 06/18/21 06/15/21 0815   Non-staged Wound Description Full thickness 06/29/21 1400   Wound Length (cm) 16.1 cm 06/22/21 1045   Wound Width (cm) 7.1 cm 06/22/21 1045   Wound Depth (cm) 0.1 cm 06/22/21 1045   Wound Surface Area (cm^2) 114.31 cm^2 06/22/21 1045   Wound Volume (cm^3) 11.43  cm^3 06/22/21 1045   Post-Procedure Length (cm) 16.1 cm 06/22/21 1045   Post-Procedure Width (cm) 7.2 cm 06/22/21 1045   Post-Procedure Depth (cm) 0.1 cm 06/22/21 1045   Post-Procedure Surface Area (cm^2) 115.92 cm^2 06/22/21 1045   Post-Procedure Volume (cm^3) 11.59 cm^3 06/22/21 1045   Wound Healing % 20 06/22/21 1045   Wound Bed Granulation (%) 25 % 06/22/21 1045   Wound Bed Slough (%) 75 % 06/22/21 1045   Tunneling (cm) 0 cm 06/22/21 1045   Undermining (cm) 0 cm 06/22/21 1045   Wound Odor None 06/22/21 1045   Pulses Left;DP;PT;2+ 06/15/21 0815   Exposed Structures None 06/22/21 1045       Wound 06/29/21 Left anterolateral LE (Active)   Wound Image   06/29/21 1400   Site Assessment Red;Yellow 06/29/21 1400   Periwound Assessment Scar tissue;Fragile 06/29/21 1400   Margins Attached edges 06/29/21 1400   Drainage Amount Moderate 06/29/21 1400   Drainage Description Serosanguineous 06/29/21 1400   Treatments Cleansed;Site care 06/29/21 1400   Wound Cleansing Normal Saline Irrigation 06/29/21 1400   Periwound Protectant Barrier Paste 06/29/21 1400   Dressing Cleansing/Solutions Not Applicable 06/29/21 1400   Dressing Options Mepitel One;Hydrofiber Silver;Absorbent Abdominal Pad;Dry Roll Gauze;Hypafix Tape;Tubigrip 06/29/21 1400   Non-staged Wound Description Full thickness 06/29/21 1400   Wound Length (cm) 6 cm 06/29/21 1400   Wound Width (cm) 2.6 cm 06/29/21 1400   Wound Depth (cm) 0.1 cm 06/29/21 1400   Wound Surface Area (cm^2) 15.6 cm^2 06/29/21 1400   Wound Volume (cm^3) 1.56 cm^3 06/29/21 1400   Wound Bed Slough (%) 50 % 06/29/21 1400   Wound Bed Slough % - (Post-Procedure) 0 % 06/29/21 1400   Tunneling (cm) 0 cm 06/29/21 1400   Undermining (cm) 0 cm 06/29/21 1400   Wound Odor None 06/29/21 1400   Exposed Structures None 06/29/21 1400               PROCEDURE:    -No debridement today  -Wound care completed by wound RN, refer to flowsheet  -Patient tolerated the procedure well, without c/o pain or discomfort.        Pertinent Labs and Diagnostics:    Labs:     A1c:   Lab Results   Component Value Date/Time    HBA1C 6.2 (H) 05/20/2021 06:49 AM          IMAGING: none    VASCULAR STUDIES: none     LAST  WOUND CULTURE:  DATE : None found in epic          ASSESSMENT AND PLAN:     1. Wound of left lower extremity, subsequent encounter  Comments: Full-thickness skin tear caused from fall at home    6/29/2021: Most of the wound is covered with new epithelialization, and crusting.  Drainage has decreased significantly.  I did not feel debridement was necessary today.  -Patient advised to remove her dressing approximately 1 hour before home health visit, shower with tepid water, used soft washcloth with mild soap over her wound.  She may also do this before her clinic visit next week  -Patient to return to clinic weekly for assessment and debridement  -Home health to change dressing 1-2 times per week in between clinic visits    Wound care: Mepitel contact layer, Hydrofiber silver to manage exudate, Absorbent Abdominal Pad;Dry Roll Gauze;Hypafix Tape;Tubigrip    2. Edema of both legs  Comments: Patient has long history of edema to both legs    6/29/2021: 2+ edema both lower legs.  -Tubigrip applied to bilateral LE to manage edema. Pt does have significant pain associated with wound and I am not sure she would be able to tolerate more compression. Continue to reassess and can potentially increase to 2 layer compression next clinic visit to manage edema, and to accelerate healing    3. Age-related skin fragility  Comments: Complicating factor.      PATIENT EDUCATION  - Importance of adequate nutrition for wound healing  -Advised to go to ER for any increased redness, swelling, drainage, or odor, or if patient develops fever, chills, nausea or vomiting.     My total time spent caring for the patient on the day of the encounter was 20 minutes.   This does not include time spent on separately billable procedures/tests.        Please  note that this note may have been created using voice recognition software. I have worked with technical experts from [facility] to optimize the interface.  I have made every reasonable attempt to correct obvious errors, but there may be errors of grammar and possibly content that I did not discover before finalizing the note.    N

## 2021-06-29 NOTE — PATIENT INSTRUCTIONS
-Keep dressings clean and dry. Change dressings if they become over saturated, soiled or fall off.     -Avoid prolonged standing or sitting without elevating your legs.    -Remove your compression garments if you have severe pain, severe swelling, numbness, color change, or temperature change in your toes. If you need to remove your compression garments, do so by unrolling them. Do not cut the compression garments off, this is to prevent cutting yourself on accident.    -Should you experience any significant changes in your wound(s), such as signs of infection (redness, swelling, localized heat, increased pain, fever > 101 F, chills) or have any questions regarding your home care instructions, please contact the wound center at [phone]. If after hours, contact your primary care physician or go to the hospital emergency room.

## 2021-06-29 NOTE — CASE COMMUNICATION
Quality Review Completed for 6/19 OASIS by HARSH Beck RN on June 29, 2021:  Edits completed by HARSH Beck RN:  1. Added poor ambulation to homebound status per narrative  2.  is 6/16 per valid referral date  3.  is early in the episode of care  4.  added #1 falls to risks for readmits per EMR reports  5. Pneumonia response is yes per EMR  6. Per narrative for supervision level of assist for Current Level of Function,  , 1810, 1820, 1845 changed to 2.  changed to 5 due to lack of safety equipment in the shower, lacking grab bars, .  changed to 2 and  changed to 3  7.  changed to 3, per guidelines when ambulation is with supervision only    8.  F is CG assist per narrative  9. [id number] E, F, G, H is supervision per narrative  10. [id number] E, I, J, K  and P is supervision per narrative  11. Added ambulate only w/assist to safety measures and F2F to 485 forms  12. Resolved depression interventions. PHQ-2=0, no hx, not on medications.

## 2021-07-01 PROCEDURE — G0180 MD CERTIFICATION HHA PATIENT: HCPCS | Performed by: FAMILY MEDICINE

## 2021-07-02 ENCOUNTER — HOME CARE VISIT (OUTPATIENT)
Dept: HOME HEALTH SERVICES | Facility: HOME HEALTHCARE | Age: 86
End: 2021-07-02
Payer: MEDICARE

## 2021-07-02 PROCEDURE — G0493 RN CARE EA 15 MIN HH/HOSPICE: HCPCS

## 2021-07-03 VITALS
DIASTOLIC BLOOD PRESSURE: 60 MMHG | TEMPERATURE: 98.6 F | OXYGEN SATURATION: 98 % | HEART RATE: 67 BPM | RESPIRATION RATE: 16 BRPM | SYSTOLIC BLOOD PRESSURE: 121 MMHG

## 2021-07-03 ASSESSMENT — ENCOUNTER SYMPTOMS
MUSCLE WEAKNESS: 1
VOMITING: DENIES
NAUSEA: DENIES

## 2021-07-03 ASSESSMENT — ACTIVITIES OF DAILY LIVING (ADL)
CURRENT_FUNCTION: STAND BY ASSIST
AMBULATION ASSISTANCE: STAND BY ASSIST

## 2021-07-04 NOTE — CASE COMMUNICATION
I agree with changes.   Michael Ac RN  ----- Message -----  From: Jo Beck R.N.  Sent: 6/29/2021   8:59 AM PDT  To: Carine Ac R.N.      Quality Review Completed for 6/19 OASIS by HARSH Beck RN on June 29, 2021:  Edits completed by HARSH Beck RN:  1. Added poor ambulation to homebound status per narrative  2.  is 6/16 per valid referral date  3.  is early in the episode of care  4.  added #1 falls to risks for readmits per EMR reports  5. Pneumonia response is yes per EMR  6. Per narrative for supervision level of assist for Current Level of Function,  , 1810, 1820, 1845 changed to 2.  changed to 5 due to lack of safety equipment in the shower, lacking grab bars, .  changed to 2 and  changed to 3  7.  changed to 3, per guidelines when ambulation is with supervision only    8.  F is CG assist per narrative  9. [id number] E, F, G, H is supervision per narrative  10. [id number] E, I, J, K  and P is supervision per narrative  11. Added ambulate only w/assist to safety measures and F2F to 485 forms  12. Resolved depression interventions. PHQ-2=0, no hx, not on medications.

## 2021-07-06 ENCOUNTER — NON-PROVIDER VISIT (OUTPATIENT)
Dept: WOUND CARE | Facility: MEDICAL CENTER | Age: 86
End: 2021-07-06
Attending: FAMILY MEDICINE
Payer: MEDICARE

## 2021-07-06 PROCEDURE — 97597 DBRDMT OPN WND 1ST 20 CM/<: CPT

## 2021-07-06 NOTE — PATIENT INSTRUCTIONS
-Keep your wound dressing clean, dry, and intact.    -Should you experience any significant changes in your wound(s), such as infection (redness, swelling, localized heat, increased pain, fever > 101 F, chills) or have any questions regarding your home care instructions, please contact the wound center at [phone]. If after hours, contact your primary care physician or go to the hospital emergency room.

## 2021-07-06 NOTE — PROCEDURES
CSWD with scissors and forceps to remove non viable tissue over newly epithelialized tissue of anterior leg and non viable tissue over now 2 very small wounds anterolateral LLE. Scant bleeding noted from anterolateral wounds only, controlled with manual pressure. ~ 18 cm 2 debrided. Patient tolerated well.

## 2021-07-09 ENCOUNTER — HOME CARE VISIT (OUTPATIENT)
Dept: HOME HEALTH SERVICES | Facility: HOME HEALTHCARE | Age: 86
End: 2021-07-09
Payer: MEDICARE

## 2021-07-09 VITALS
TEMPERATURE: 98.3 F | OXYGEN SATURATION: 95 % | HEART RATE: 74 BPM | RESPIRATION RATE: 16 BRPM | SYSTOLIC BLOOD PRESSURE: 126 MMHG | DIASTOLIC BLOOD PRESSURE: 72 MMHG

## 2021-07-09 PROCEDURE — G0493 RN CARE EA 15 MIN HH/HOSPICE: HCPCS

## 2021-07-09 ASSESSMENT — ENCOUNTER SYMPTOMS
NAUSEA: DENIES
VOMITING: DENIES

## 2021-07-13 ENCOUNTER — OFFICE VISIT (OUTPATIENT)
Dept: WOUND CARE | Facility: MEDICAL CENTER | Age: 86
End: 2021-07-13
Attending: FAMILY MEDICINE
Payer: MEDICARE

## 2021-07-13 VITALS
HEART RATE: 83 BPM | TEMPERATURE: 97.7 F | DIASTOLIC BLOOD PRESSURE: 73 MMHG | SYSTOLIC BLOOD PRESSURE: 162 MMHG | RESPIRATION RATE: 18 BRPM | OXYGEN SATURATION: 92 %

## 2021-07-13 DIAGNOSIS — R23.8 AGE-RELATED SKIN FRAGILITY: ICD-10-CM

## 2021-07-13 DIAGNOSIS — S81.802D WOUND OF LEFT LOWER EXTREMITY, SUBSEQUENT ENCOUNTER: ICD-10-CM

## 2021-07-13 DIAGNOSIS — R60.0 EDEMA OF BOTH LEGS: ICD-10-CM

## 2021-07-13 PROCEDURE — 11042 DBRDMT SUBQ TIS 1ST 20SQCM/<: CPT

## 2021-07-13 PROCEDURE — 11042 DBRDMT SUBQ TIS 1ST 20SQCM/<: CPT | Performed by: NURSE PRACTITIONER

## 2021-07-13 ASSESSMENT — ENCOUNTER SYMPTOMS
CONSTIPATION: 0
COUGH: 0
FEVER: 0
BACK PAIN: 0
PALPITATIONS: 0
CHILLS: 0
DIZZINESS: 0
SHORTNESS OF BREATH: 0
DEPRESSION: 0
DIARRHEA: 0
VOMITING: 0
NAUSEA: 0
NERVOUS/ANXIOUS: 0

## 2021-07-13 NOTE — PATIENT INSTRUCTIONS
-Keep your wound dressing clean, dry, and intact.    -Change your dressing if it becomes soiled, soaked, or falls off.    -Should you experience any significant changes in your wound(s), such as infection (redness, swelling, localized heat, increased pain, fever > 101 F, chills) or have any questions regarding your home care instructions, please contact the wound center at [phone]. If after hours, contact your primary care physician or go to the hospital emergency room.

## 2021-07-13 NOTE — PROGRESS NOTES
Provider Encounter- Full Thickness wound    HISTORY OF PRESENT ILLNESS  Wound History:    START OF CARE IN CLINIC: 6/15/2021    REFERRING PROVIDER: Cricket Miller M.D.     WOUND- Full Thickness Wound   LOCATION: Left anterior lower leg   HISTORY: Patient fell at home, striking her leg on a door jam, creating a very large skin tear on May 25, 2021.  She did not immediately seek medical attention, but went into an urgent care several days later.  A referral to the wound clinic was placed at that time, however she is not able to get an appointment for several weeks.  In the interim, she was going to urgent care every 2 days for dressing changes, they were applying Xeroform and gauze.  She completed a 5-day course of Keflex.    Pertinent Medical History: Interstitial lung disease, balance problem, controlled diabetes mellitus type 2    TOBACCO USE: Former smoker, quit 20 years ago.  Never used smokeless tobacco    Patient's problem list, allergies, and current medications reviewed and updated in Epic    Interval History:  6/15/2021 Initial clinic visit with HUGO Tanner, ROCCO-BC, CWOCN, CFCN.  Grisel presents today accompanied by her .  She states she is feeling well, denies fevers, chills, nausea, vomiting, cough or shortness of breath.  She has been trying to elevate her leg throughout much of the day.  However, this is difficult for her, she states she likes to walk a lot.  She is willing to allow for some compression using a Tubigrip.  If she tolerates okay, we will plan to increase to 2 layer compression next week.  She would also like home health to assist with dressing changes in between clinic visits.    6/22/21: Clinic visit with HUGO Stewart, ROCCO-BC. Patient reports feeling well, overall she is happy with the wound progress. Denies wound drainage, odor. Denies erythema. She does have some mild swelling to bilateral LE, which patient states is due to the fact that she hasn't been walking  much since her fall.  She does have significant pain associated with the wound, especially surrounding debridement.  She is requesting use of injectable lidocaine, stating that the pain was unbearable last week with topical lidocaine. Pt does not check her home BS, stating that she is compliant with her DM regimen and her A1C has always been well controlled. BP slightly elevated in clinic.  Pt is managed by her PCP for HTN.  She states that she is very nervous about the pain of debridement and feels that is why her BP is elevated.  Denies chest pain, shortness of breath, headache, dizziness, vision changes.    6/29/2021 : Clinic visit with HUGO Tanner, DAHLIA, MISHEL, LEI.  Grisel continues to feel well, reports that she has had significantly less pain from her wound over the past week.  She did not  topical lidocaine from her pharmacy, called in last week.  She states normally she gets a phone call or text from her pharmacy when prescriptions are ready, which she did not.  She also states that she does not think she needs it anymore, as her pain has diminished.  Home health continues to see her in between clinic visits.    7/13/2021 : Clinic visit with HUGO Tanner, DAHLIA, MISHEL, LEI.  Grisel is feeling well today, pleased with progress of her wound.  She reports very little drainage over the past week.  Home health continues to see her in between clinic visits.      REVIEW OF SYSTEMS:   Review of Systems   Constitutional: Negative for chills and fever.   Respiratory: Negative for cough and shortness of breath.    Cardiovascular: Positive for leg swelling. Negative for chest pain and palpitations.        Swelling her legs for many years   Gastrointestinal: Negative for constipation, diarrhea, nausea and vomiting.   Genitourinary: Negative for dysuria.   Musculoskeletal: Negative for back pain and joint pain.   Neurological: Negative for dizziness.   Psychiatric/Behavioral: Negative for depression.  The patient is not nervous/anxious.        PHYSICAL EXAMINATION:   BP (!) 162/73   Pulse 83   Temp 36.5 °C (97.7 °F)   Resp 18   SpO2 92%     Physical Exam  Constitutional:       Appearance: Normal appearance.   HENT:      Head: Normocephalic.   Eyes:      Pupils: Pupils are equal, round, and reactive to light.   Cardiovascular:      Rate and Rhythm: Normal rate.      Pulses: Normal pulses.      Comments: Pedal pulses palpable, 2+  Pulmonary:      Effort: Pulmonary effort is normal.   Musculoskeletal:         General: Swelling present.      Comments: Bilateral lower extremity edema, 2-3+   Skin:     General: Skin is warm.      Comments: Large full-thickness wound to the anterior left lower leg  Refer to wound flowsheet and photos   Neurological:      Mental Status: She is alert and oriented to person, place, and time.   Psychiatric:         Mood and Affect: Mood normal.         Behavior: Behavior normal.         Thought Content: Thought content normal.         Judgment: Judgment normal.         WOUND ASSESSMENT      Wound 06/29/21 Left anterolateral LE superior and inferior small open area (Active)   Wound Image    07/13/21 0900   Site Assessment Pink;Red;Yellow 07/13/21 0900   Periwound Assessment Scar tissue;Fragile 07/13/21 0900   Margins Attached edges 07/13/21 0900   Drainage Amount Small 07/13/21 0900   Drainage Description Serosanguineous 07/13/21 0900   Treatments Cleansed;Topical Lidocaine;Provider debridement;Site care 07/13/21 0900   Wound Cleansing Puracyn [place] 07/13/21 0900   Periwound Protectant Skin Protectant Wipes to Periwound;Barrier Paste 07/13/21 0900   Dressing Cleansing/Solutions Not Applicable 07/13/21 0900   Dressing Options Hydrofiber Silver;Silicone Adhesive Foam;Tubigrip 07/13/21 0900   Dressing Changed Changed 07/13/21 0900   Dressing Status Clean;Dry;Intact 07/13/21 0900   Non-staged Wound Description Full thickness 07/13/21 0900   Wound Length (cm) 1.1 cm 07/13/21 0900   Wound  Width (cm) 0.4 cm 07/13/21 0900   Wound Depth (cm) 0.1 cm 07/13/21 0900   Wound Surface Area (cm^2) 0.44 cm^2 07/13/21 0900   Wound Volume (cm^3) 0.04 cm^3 07/13/21 0900   Post-Procedure Length (cm) 1.1 cm 07/13/21 0900   Post-Procedure Width (cm) 0.5 cm 07/13/21 0900   Post-Procedure Depth (cm) 0.1 cm 07/13/21 0900   Post-Procedure Surface Area (cm^2) 0.55 cm^2 07/13/21 0900   Post-Procedure Volume (cm^3) 0.06 cm^3 07/13/21 0900   Wound Healing % 97 07/13/21 0900   Wound Bed Slough (%) 50 % 06/29/21 1400   Wound Bed Slough % - (Post-Procedure) 0 % 06/29/21 1400   Tunneling (cm) 0 cm 07/13/21 0900   Undermining (cm) 0 cm 07/13/21 0900   Wound Odor None 07/13/21 0900   Exposed Structures None 07/13/21 0900         PROCEDURE:   -2% viscous lidocaine applied topically to wound bed for approximately 5 minutes prior to debridement  -Curette used to debride wound bed.  Excisional debridement was performed to remove devitalized tissue until healthy, bleeding tissue was visualized.   Entire surface of wound, 0.55cm2 debrided.  Tissue debrided into the subcutaneous layer.    -Bleeding controlled with manual pressure.    -Wound care completed by wound RN, refer to flowsheet  -Patient tolerated the procedure well, without c/o pain or discomfort.       Pertinent Labs and Diagnostics:    Labs:     A1c:   Lab Results   Component Value Date/Time    HBA1C 6.2 (H) 05/20/2021 06:49 AM          IMAGING: none    VASCULAR STUDIES: none     LAST  WOUND CULTURE:  DATE : None found in epic          ASSESSMENT AND PLAN:     1. Wound of left lower extremity, subsequent encounter  Comments: Full-thickness skin tear caused from fall at home    7/13/2021: Wound area continues to decrease significantly.  However, small area still remains open over knee joint.  For that reason, I have asked her to return to clinic next week.  Anticipate discharge at that time.  -Excisional debridement of wound in clinic today, medically necessary to promote wound  healing.  -Patient to return to clinic weekly for assessment and debridement  -Home health to change dressing 1-2 times per week in between clinic visits    Wound care: Mepitel contact layer, Hydrofiber silver to manage exudate, Absorbent Abdominal Pad;Dry Roll Gauze;Hypafix Tape;Tubigrip    2. Edema of both legs  Comments: Patient has long history of edema to both legs    7/13/2021: 2+ edema both lower legs.  -Tubigrip applied to bilateral LE to manage edema. Pt does have significant pain associated with wound and I am not sure she would be able to tolerate more compression. Continue to reassess and can potentially increase to 2 layer compression next clinic visit to manage edema, and to accelerate healing    3. Age-related skin fragility  Comments: Complicating factor.      PATIENT EDUCATION  - Importance of adequate nutrition for wound healing  -Advised to go to ER for any increased redness, swelling, drainage, or odor, or if patient develops fever, chills, nausea or vomiting.             Please note that this note may have been created using voice recognition software. I have worked with technical experts from [employer] to optimize the interface.  I have made every reasonable attempt to correct obvious errors, but there may be errors of grammar and possibly content that I did not discover before finalizing the note.    N

## 2021-07-16 ENCOUNTER — HOME CARE VISIT (OUTPATIENT)
Dept: HOME HEALTH SERVICES | Facility: HOME HEALTHCARE | Age: 86
End: 2021-07-16
Payer: MEDICARE

## 2021-07-16 VITALS
HEART RATE: 76 BPM | RESPIRATION RATE: 16 BRPM | TEMPERATURE: 98.2 F | OXYGEN SATURATION: 96 % | SYSTOLIC BLOOD PRESSURE: 128 MMHG | DIASTOLIC BLOOD PRESSURE: 68 MMHG

## 2021-07-16 PROCEDURE — G0493 RN CARE EA 15 MIN HH/HOSPICE: HCPCS

## 2021-07-16 SDOH — ECONOMIC STABILITY: HOUSING INSECURITY: HOME SAFETY: PER PATIENT, SMOKE ALARMS IN WORKING ORDER AND FIRE EXTINGUISHERS PRESENT IN HOME AND ON TRAILER

## 2021-07-16 ASSESSMENT — PATIENT HEALTH QUESTIONNAIRE - PHQ9: CLINICAL INTERPRETATION OF PHQ2 SCORE: 0

## 2021-07-16 ASSESSMENT — ACTIVITIES OF DAILY LIVING (ADL)
OASIS_M1830: 01
HOME_HEALTH_OASIS: 00

## 2021-07-16 NOTE — CASE COMMUNICATION
Agree with changes.   ----- Message -----  From: Christine Burnham R.N.  Sent: 7/16/2021  11:41 AM PDT  To: Gardenia Gay R.N.      Quality Review for 7/16/21 [place] OASIS by NNEKA Burnham, DAVID on  July 16, 2021      Edits completed by NNEKA Burnham RN:  1.  A and E are NA; C is yes per the care plan

## 2021-07-16 NOTE — CASE COMMUNICATION
Quality Review for 7/16/21 [place] OASIS by NNEKA Burnham, DAVID on  July 16, 2021      Edits completed by NNEKA Burnham RN:  1.  A and E are NA; C is yes per the care plan

## 2021-07-20 ENCOUNTER — OFFICE VISIT (OUTPATIENT)
Dept: WOUND CARE | Facility: MEDICAL CENTER | Age: 86
End: 2021-07-20
Attending: FAMILY MEDICINE
Payer: MEDICARE

## 2021-07-20 VITALS
RESPIRATION RATE: 20 BRPM | OXYGEN SATURATION: 97 % | HEART RATE: 84 BPM | SYSTOLIC BLOOD PRESSURE: 170 MMHG | DIASTOLIC BLOOD PRESSURE: 80 MMHG | TEMPERATURE: 97.7 F

## 2021-07-20 DIAGNOSIS — S81.802D WOUND OF LEFT LOWER EXTREMITY, SUBSEQUENT ENCOUNTER: ICD-10-CM

## 2021-07-20 DIAGNOSIS — T14.8XXA WOUND INFECTION: ICD-10-CM

## 2021-07-20 DIAGNOSIS — R60.0 EDEMA OF BOTH LEGS: ICD-10-CM

## 2021-07-20 DIAGNOSIS — L08.9 WOUND INFECTION: ICD-10-CM

## 2021-07-20 DIAGNOSIS — R23.8 AGE-RELATED SKIN FRAGILITY: ICD-10-CM

## 2021-07-20 PROCEDURE — 99211 OFF/OP EST MAY X REQ PHY/QHP: CPT

## 2021-07-20 PROCEDURE — 99213 OFFICE O/P EST LOW 20 MIN: CPT | Performed by: NURSE PRACTITIONER

## 2021-07-20 ASSESSMENT — ENCOUNTER SYMPTOMS
DEPRESSION: 0
BACK PAIN: 0
NERVOUS/ANXIOUS: 0
NAUSEA: 0
SHORTNESS OF BREATH: 0
DIZZINESS: 0
FEVER: 0
COUGH: 0
CHILLS: 0
CONSTIPATION: 0
VOMITING: 0
DIARRHEA: 0
PALPITATIONS: 0

## 2021-07-20 ASSESSMENT — PAIN SCALES - GENERAL: PAINLEVEL: NO PAIN

## 2021-07-20 NOTE — PROGRESS NOTES
Provider Encounter- Full Thickness wound    HISTORY OF PRESENT ILLNESS  Wound History:    START OF CARE IN CLINIC: 6/15/2021    REFERRING PROVIDER: Cricket Miller M.D.     WOUND- Full Thickness Wound   LOCATION: Left anterior lower leg   HISTORY: Patient fell at home, striking her leg on a door jam, creating a very large skin tear on May 25, 2021.  She did not immediately seek medical attention, but went into an urgent care several days later.  A referral to the wound clinic was placed at that time, however she is not able to get an appointment for several weeks.  In the interim, she was going to urgent care every 2 days for dressing changes, they were applying Xeroform and gauze.  She completed a 5-day course of Keflex.    Pertinent Medical History: Interstitial lung disease, balance problem, controlled diabetes mellitus type 2    TOBACCO USE: Former smoker, quit 20 years ago.  Never used smokeless tobacco    Patient's problem list, allergies, and current medications reviewed and updated in Epic    Interval History:  6/15/2021 Initial clinic visit with HUGO Tanner, ROCCO-BC, CWOCN, CFCN.  Grisel presents today accompanied by her .  She states she is feeling well, denies fevers, chills, nausea, vomiting, cough or shortness of breath.  She has been trying to elevate her leg throughout much of the day.  However, this is difficult for her, she states she likes to walk a lot.  She is willing to allow for some compression using a Tubigrip.  If she tolerates okay, we will plan to increase to 2 layer compression next week.  She would also like home health to assist with dressing changes in between clinic visits.    6/22/21: Clinic visit with HUGO Stewart, ROCCO-BC. Patient reports feeling well, overall she is happy with the wound progress. Denies wound drainage, odor. Denies erythema. She does have some mild swelling to bilateral LE, which patient states is due to the fact that she hasn't been walking  much since her fall.  She does have significant pain associated with the wound, especially surrounding debridement.  She is requesting use of injectable lidocaine, stating that the pain was unbearable last week with topical lidocaine. Pt does not check her home BS, stating that she is compliant with her DM regimen and her A1C has always been well controlled. BP slightly elevated in clinic.  Pt is managed by her PCP for HTN.  She states that she is very nervous about the pain of debridement and feels that is why her BP is elevated.  Denies chest pain, shortness of breath, headache, dizziness, vision changes.    6/29/2021 : Clinic visit with HUGO Tanner, DALHIA, MISHEL, LEI.  Grisel continues to feel well, reports that she has had significantly less pain from her wound over the past week.  She did not  topical lidocaine from her pharmacy, called in last week.  She states normally she gets a phone call or text from her pharmacy when prescriptions are ready, which she did not.  She also states that she does not think she needs it anymore, as her pain has diminished.  Home health continues to see her in between clinic visits.    7/13/2021 : Clinic visit with HUGO Tanner, DAHLIA, MISHEL, LEI.  Grisel is feeling well today, pleased with progress of her wound.  She reports very little drainage over the past week.  Home health continues to see her in between clinic visits.    7/20/2021 : Clinic visit with HUGO Tanner FNP-BC, MISHEL, LEI.  Grisel continues to feel well.  Her wound is almost resolved, however presents with a small amount of pus today, appears to be superficial.  Patient agreed to apply topical antibiotic (mupirocin), and return to clinic next week for what hopefully will be her final clinic visit.      REVIEW OF SYSTEMS:   Review of Systems   Constitutional: Negative for chills and fever.   Respiratory: Negative for cough and shortness of breath.    Cardiovascular: Positive for leg  swelling. Negative for chest pain and palpitations.        Swelling her legs for many years   Gastrointestinal: Negative for constipation, diarrhea, nausea and vomiting.   Genitourinary: Negative for dysuria.   Musculoskeletal: Negative for back pain and joint pain.   Neurological: Negative for dizziness.   Psychiatric/Behavioral: Negative for depression. The patient is not nervous/anxious.        PHYSICAL EXAMINATION:   BP (!) 170/80   Pulse 84   Temp 36.5 °C (97.7 °F)   Resp 20   SpO2 97%     Physical Exam  Constitutional:       Appearance: Normal appearance.   HENT:      Head: Normocephalic.   Eyes:      Pupils: Pupils are equal, round, and reactive to light.   Cardiovascular:      Rate and Rhythm: Normal rate.      Pulses: Normal pulses.      Comments: Pedal pulses palpable, 2+  Pulmonary:      Effort: Pulmonary effort is normal.   Musculoskeletal:         General: Swelling present.      Comments: Bilateral lower extremity edema, 2-3+   Skin:     General: Skin is warm.      Comments: Large full-thickness wound to the anterior left lower leg  Refer to wound flowsheet and photos   Neurological:      Mental Status: She is alert and oriented to person, place, and time.   Psychiatric:         Mood and Affect: Mood normal.         Behavior: Behavior normal.         Thought Content: Thought content normal.         Judgment: Judgment normal.         WOUND ASSESSMENT   Wound 06/29/21 Left anterolateral LE superior and inferior small open area (Active)   Wound Image   07/20/21 0900   Site Assessment Pink;Red;Yellow 07/13/21 0900   Periwound Assessment Scar tissue;Fragile 07/20/21 0900   Margins Attached edges 07/20/21 0900   Drainage Amount None 07/20/21 0900   Drainage Description Serosanguineous 07/13/21 0900   Treatments Cleansed 07/20/21 0900   Wound Cleansing Normal Saline Irrigation 07/20/21 0900   Periwound Protectant Skin Protectant Wipes to Periwound 07/20/21 0900   Dressing Cleansing/Solutions Normal Saline  07/20/21 0900   Dressing Options Other (Comments) 07/20/21 0900   Dressing Changed New 07/20/21 0900   Dressing Status Clean;Dry;Intact 07/20/21 0900   Non-staged Wound Description Full thickness 07/20/21 0900   Wound Length (cm) 1.1 cm 07/13/21 0900   Wound Width (cm) 0.4 cm 07/13/21 0900   Wound Depth (cm) 0.1 cm 07/13/21 0900   Wound Surface Area (cm^2) 0.44 cm^2 07/13/21 0900   Wound Volume (cm^3) 0.04 cm^3 07/13/21 0900   Post-Procedure Length (cm) 1.1 cm 07/13/21 0900   Post-Procedure Width (cm) 0.5 cm 07/13/21 0900   Post-Procedure Depth (cm) 0.1 cm 07/13/21 0900   Post-Procedure Surface Area (cm^2) 0.55 cm^2 07/13/21 0900   Post-Procedure Volume (cm^3) 0.06 cm^3 07/13/21 0900   Wound Healing % 97 07/13/21 0900   Wound Bed Slough (%) 50 % 06/29/21 1400   Wound Bed Slough % - (Post-Procedure) 0 % 06/29/21 1400   Tunneling (cm) 0 cm 07/20/21 0900   Undermining (cm) 0 cm 07/20/21 0900   Wound Odor None 07/20/21 0900   Exposed Structures None 07/20/21 0900            PROCEDURE:   -2% viscous lidocaine applied topically to wound bed for approximately 5 minutes prior to debridement  -No debridement today   -Manual pressure applied, expressed small amount of purulent drainage (approximately 0.1 mL)  -Wound care completed by RN  -Patient tolerated the procedure well, without c/o pain or discomfort.       Pertinent Labs and Diagnostics:    Labs:     A1c:   Lab Results   Component Value Date/Time    HBA1C 6.2 (H) 05/20/2021 06:49 AM          IMAGING: none    VASCULAR STUDIES: none     LAST  WOUND CULTURE:  DATE : None found in epic          ASSESSMENT AND PLAN:     1. Wound of left lower extremity, subsequent encounter  Comments: Full-thickness skin tear caused from fall at home    7/20/2021: Wound nearly resolved, however presents today with small amount of purulent drainage which appears to be superficial.  No periwound erythema, no odor, no induration..  -No need for debridement today  -Rx for mupirocin ointment  sent to patient's pharmacy, patient to apply daily  -Patient to return to clinic next week for reassessment, will hopefully be discharged at that time    Wound care: Mupirocin ointment, silicone foam cover dressing    2. Edema of both legs  Comments: Patient has long history of edema to both legs    7/20/2021: 2+ edema both lower legs.  -Tubigrip applied to bilateral LE to manage edema.   -Patient encouraged to wear at least some degree of compression every day, start with 15 to 20 mm compression    3. Age-related skin fragility  Comments: Complicating factor.      PATIENT EDUCATION  - Importance of adequate nutrition for wound healing  -Advised to go to ER for any increased redness, swelling, drainage, or odor, or if patient develops fever, chills, nausea or vomiting.         20min spent  with patient, time spent counseling, coordinating care, reviewing records, discussing POC, educating patient regarding wound healing and progression.  This time was spent in excess to procedure time.     Please note that this note may have been created using voice recognition software. I have worked with technical experts from [employer] to optimize the interface.  I have made every reasonable attempt to correct obvious errors, but there may be errors of grammar and possibly content that I did not discover before finalizing the note.    N

## 2021-07-20 NOTE — PATIENT INSTRUCTIONS
Reviewed POC, importance of keeping the secondary dressing dry and intact, nutrition for wound healing, compression therapy and leg elevation to reduce edema, s/s of complications/infection, when to notify MD/go to ER.  Pt verbalized understanding to all.

## 2021-07-27 ENCOUNTER — APPOINTMENT (OUTPATIENT)
Dept: WOUND CARE | Facility: MEDICAL CENTER | Age: 86
End: 2021-07-27
Attending: FAMILY MEDICINE
Payer: MEDICARE

## 2021-08-16 ENCOUNTER — APPOINTMENT (RX ONLY)
Dept: URBAN - METROPOLITAN AREA CLINIC 20 | Facility: CLINIC | Age: 86
Setting detail: DERMATOLOGY
End: 2021-08-16

## 2021-08-16 DIAGNOSIS — L57.0 ACTINIC KERATOSIS: ICD-10-CM

## 2021-08-16 DIAGNOSIS — L81.4 OTHER MELANIN HYPERPIGMENTATION: ICD-10-CM

## 2021-08-16 PROCEDURE — ? LIQUID NITROGEN

## 2021-08-16 PROCEDURE — 99212 OFFICE O/P EST SF 10 MIN: CPT | Mod: 25

## 2021-08-16 PROCEDURE — 17003 DESTRUCT PREMALG LES 2-14: CPT

## 2021-08-16 PROCEDURE — 17000 DESTRUCT PREMALG LESION: CPT

## 2021-08-16 PROCEDURE — ? COUNSELING

## 2021-08-16 ASSESSMENT — LOCATION DETAILED DESCRIPTION DERM
LOCATION DETAILED: LEFT SUPERIOR LATERAL BUCCAL CHEEK
LOCATION DETAILED: RIGHT INFERIOR MEDIAL MALAR CHEEK
LOCATION DETAILED: LEFT INFERIOR LATERAL MALAR CHEEK
LOCATION DETAILED: RIGHT INFERIOR CENTRAL MALAR CHEEK
LOCATION DETAILED: RIGHT LATERAL MALAR CHEEK

## 2021-08-16 ASSESSMENT — LOCATION SIMPLE DESCRIPTION DERM
LOCATION SIMPLE: RIGHT CHEEK
LOCATION SIMPLE: LEFT CHEEK

## 2021-08-16 ASSESSMENT — LOCATION ZONE DERM: LOCATION ZONE: FACE

## 2021-08-16 NOTE — PROCEDURE: LIQUID NITROGEN
Detail Level: Detailed
Number Of Freeze-Thaw Cycles: 2 freeze-thaw cycles
Render Post-Care Instructions In Note?: yes
Duration Of Freeze Thaw-Cycle (Seconds): 10
Post-Care Instructions: I reviewed with the patient in detail post-care instructions. Patient is to wear sunprotection, and avoid picking at any of the treated lesions. Pt may apply Vaseline to crusted or scabbing areas.
Consent: The patient's consent was obtained including but not limited to risks of crusting, scabbing, blistering, scarring, darker or lighter pigmentary change, recurrence, incomplete removal and infection. RTC in 2 months if lesion(s) persistent.
Render Note In Bullet Format When Appropriate: No

## 2021-09-07 ENCOUNTER — OFFICE VISIT (OUTPATIENT)
Dept: MEDICAL GROUP | Facility: PHYSICIAN GROUP | Age: 86
End: 2021-09-07
Payer: MEDICARE

## 2021-09-07 VITALS
DIASTOLIC BLOOD PRESSURE: 70 MMHG | WEIGHT: 138.6 LBS | SYSTOLIC BLOOD PRESSURE: 172 MMHG | BODY MASS INDEX: 27.94 KG/M2 | HEART RATE: 72 BPM | OXYGEN SATURATION: 93 % | HEIGHT: 59 IN | TEMPERATURE: 98.2 F

## 2021-09-07 DIAGNOSIS — E11.9 CONTROLLED TYPE 2 DIABETES MELLITUS WITHOUT COMPLICATION, WITHOUT LONG-TERM CURRENT USE OF INSULIN (HCC): ICD-10-CM

## 2021-09-07 DIAGNOSIS — R11.0 NAUSEA: ICD-10-CM

## 2021-09-07 DIAGNOSIS — R05.9 COUGH: ICD-10-CM

## 2021-09-07 DIAGNOSIS — I10 ESSENTIAL HYPERTENSION: ICD-10-CM

## 2021-09-07 PROCEDURE — 99214 OFFICE O/P EST MOD 30 MIN: CPT | Performed by: FAMILY MEDICINE

## 2021-09-07 RX ORDER — ALBUTEROL SULFATE 90 UG/1
2 AEROSOL, METERED RESPIRATORY (INHALATION) EVERY 4 HOURS PRN
Qty: 1 EACH | Refills: 3 | Status: SHIPPED | OUTPATIENT
Start: 2021-09-07 | End: 2021-09-30

## 2021-09-07 RX ORDER — ONDANSETRON 4 MG/1
4 TABLET, FILM COATED ORAL EVERY 4 HOURS PRN
Qty: 20 TABLET | Refills: 3 | Status: SHIPPED | OUTPATIENT
Start: 2021-09-07 | End: 2021-09-21

## 2021-09-07 ASSESSMENT — FIBROSIS 4 INDEX: FIB4 SCORE: 0.73

## 2021-09-07 NOTE — ASSESSMENT & PLAN NOTE
New issue  Has had nausea unrelated to eating.  Denies any vomiting  Denies any abd pain  Denies any d/c  Denies any vision changes  Denies any lightheadedness
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
## 2021-09-07 NOTE — ASSESSMENT & PLAN NOTE
Unstable. Monitoring BP at home. Currently taking losartan 100mg and HCTZ 25mg as directed. Not monitoring BP at home.
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
## 2021-09-10 ENCOUNTER — HOSPITAL ENCOUNTER (OUTPATIENT)
Dept: LAB | Facility: MEDICAL CENTER | Age: 86
End: 2021-09-10
Attending: FAMILY MEDICINE
Payer: MEDICARE

## 2021-09-10 DIAGNOSIS — E11.9 CONTROLLED TYPE 2 DIABETES MELLITUS WITHOUT COMPLICATION, WITHOUT LONG-TERM CURRENT USE OF INSULIN (HCC): ICD-10-CM

## 2021-09-10 DIAGNOSIS — I10 ESSENTIAL HYPERTENSION: ICD-10-CM

## 2021-09-10 LAB
ALBUMIN SERPL BCP-MCNC: 3.9 G/DL (ref 3.2–4.9)
ALBUMIN/GLOB SERPL: 1.4 G/DL
ALP SERPL-CCNC: 98 U/L (ref 30–99)
ALT SERPL-CCNC: 10 U/L (ref 2–50)
ANION GAP SERPL CALC-SCNC: 13 MMOL/L (ref 7–16)
AST SERPL-CCNC: 20 U/L (ref 12–45)
BILIRUB SERPL-MCNC: 0.9 MG/DL (ref 0.1–1.5)
BUN SERPL-MCNC: 11 MG/DL (ref 8–22)
CALCIUM SERPL-MCNC: 9 MG/DL (ref 8.5–10.5)
CHLORIDE SERPL-SCNC: 92 MMOL/L (ref 96–112)
CO2 SERPL-SCNC: 24 MMOL/L (ref 20–33)
CREAT SERPL-MCNC: 0.64 MG/DL (ref 0.5–1.4)
ERYTHROCYTE [DISTWIDTH] IN BLOOD BY AUTOMATED COUNT: 42.5 FL (ref 35.9–50)
EST. AVERAGE GLUCOSE BLD GHB EST-MCNC: 120 MG/DL
GLOBULIN SER CALC-MCNC: 2.7 G/DL (ref 1.9–3.5)
GLUCOSE SERPL-MCNC: 98 MG/DL (ref 65–99)
HBA1C MFR BLD: 5.8 % (ref 4–5.6)
HCT VFR BLD AUTO: 37.2 % (ref 37–47)
HGB BLD-MCNC: 13 G/DL (ref 12–16)
MCH RBC QN AUTO: 33.1 PG (ref 27–33)
MCHC RBC AUTO-ENTMCNC: 34.9 G/DL (ref 33.6–35)
MCV RBC AUTO: 94.7 FL (ref 81.4–97.8)
PLATELET # BLD AUTO: 381 K/UL (ref 164–446)
PMV BLD AUTO: 9.6 FL (ref 9–12.9)
POTASSIUM SERPL-SCNC: 3.7 MMOL/L (ref 3.6–5.5)
PROT SERPL-MCNC: 6.6 G/DL (ref 6–8.2)
RBC # BLD AUTO: 3.93 M/UL (ref 4.2–5.4)
SODIUM SERPL-SCNC: 129 MMOL/L (ref 135–145)
WBC # BLD AUTO: 9.1 K/UL (ref 4.8–10.8)

## 2021-09-10 PROCEDURE — 36415 COLL VENOUS BLD VENIPUNCTURE: CPT

## 2021-09-10 PROCEDURE — 83036 HEMOGLOBIN GLYCOSYLATED A1C: CPT

## 2021-09-10 PROCEDURE — 80053 COMPREHEN METABOLIC PANEL: CPT

## 2021-09-10 PROCEDURE — 85027 COMPLETE CBC AUTOMATED: CPT

## 2021-09-14 ENCOUNTER — OFFICE VISIT (OUTPATIENT)
Dept: MEDICAL GROUP | Facility: PHYSICIAN GROUP | Age: 86
End: 2021-09-14
Payer: MEDICARE

## 2021-09-14 ENCOUNTER — APPOINTMENT (OUTPATIENT)
Dept: LAB | Facility: MEDICAL CENTER | Age: 86
End: 2021-09-14
Payer: MEDICARE

## 2021-09-14 VITALS
OXYGEN SATURATION: 94 % | WEIGHT: 139.2 LBS | BODY MASS INDEX: 28.06 KG/M2 | DIASTOLIC BLOOD PRESSURE: 64 MMHG | HEIGHT: 59 IN | TEMPERATURE: 99.6 F | HEART RATE: 84 BPM | SYSTOLIC BLOOD PRESSURE: 140 MMHG

## 2021-09-14 DIAGNOSIS — E87.1 HYPONATREMIA: ICD-10-CM

## 2021-09-14 DIAGNOSIS — I10 ESSENTIAL HYPERTENSION: ICD-10-CM

## 2021-09-14 PROCEDURE — 99214 OFFICE O/P EST MOD 30 MIN: CPT | Performed by: FAMILY MEDICINE

## 2021-09-14 RX ORDER — AMLODIPINE BESYLATE 10 MG/1
10 TABLET ORAL DAILY
Qty: 90 TABLET | Refills: 3 | Status: SHIPPED | OUTPATIENT
Start: 2021-09-14 | End: 2021-09-30

## 2021-09-14 ASSESSMENT — FIBROSIS 4 INDEX: FIB4 SCORE: 1.43

## 2021-09-14 NOTE — ASSESSMENT & PLAN NOTE
Chronic issue  Noticing worsening hypoNa of 129 last checked this week  She is on HCTZ  She is asymptomatic   Eating okay

## 2021-09-14 NOTE — PROGRESS NOTES
"Subjective:     Chief Complaint   Patient presents with   • Lab Results       HPI:   Grisel presents today to discuss the following.    Hyponatremia  Chronic issue  Noticing worsening hypoNa of 129 last checked this week  She is on HCTZ  She is asymptomatic   Eating okay      Past Medical History:   Diagnosis Date   • HTN (hypertension)        Current Outpatient Medications Ordered in Epic   Medication Sig Dispense Refill   • amLODIPine (NORVASC) 10 MG Tab Take 1 Tablet by mouth every day. 90 Tablet 3   • albuterol 108 (90 Base) MCG/ACT Aero Soln inhalation aerosol Inhale 2 Puffs every four hours as needed for Shortness of Breath. 1 Each 3   • ondansetron (ZOFRAN) 4 MG Tab tablet Take 1 Tablet by mouth every four hours as needed for Nausea/Vomiting for up to 14 days. 20 Tablet 3   • aspirin (ASA) 81 MG Chew Tab chewable tablet Chew 81 mg every day.     • hydroCHLOROthiazide (HYDRODIURIL) 25 MG Tab Take 25 mg by mouth every day.     • losartan (COZAAR) 100 MG Tab Take 1 Tab by mouth every day. TAKE 1 TABLET BY MOUTH DAILY 90 Tab 3   • lovastatin (MEVACOR) 20 MG Tab Take 1 Tab by mouth every day. TAKE 1 TABLET BY MOUTH DAILY 90 Tab 3     No current Epic-ordered facility-administered medications on file.       Allergies:  Patient has no known allergies.    Health Maintenance: Completed    ROS:  Gen: no fevers/chills, no changes in weight  Eyes: no changes in vision  Pulm: no sob, no cough  CV: no chest pain, no palpitations  GI: no nausea/vomiting, no diarrhea      Objective:     Exam:  /64 (BP Location: Right arm, Patient Position: Sitting, BP Cuff Size: Adult)   Pulse 84   Temp 37.6 °C (99.6 °F) (Temporal)   Ht 1.499 m (4' 11\")   Wt 63.1 kg (139 lb 3.2 oz)   SpO2 94%   BMI 28.11 kg/m²  Body mass index is 28.11 kg/m².          Constitutional: Alert, no distress, well-groomed.  Skin: Warm, dry, good turgor, no rashes in visible areas.  Eye: Equal, round and reactive, conjunctiva clear, lids normal.  ENMT: " Lips without lesions, good dentition, moist mucous membranes.  Neck: Trachea midline, no masses, no thyromegaly.  Respiratory: Unlabored respiratory effort, no cough.  MSK: Normal gait, moves all extremities.  Neuro: Grossly non-focal.   Psych: Alert and oriented x3, normal affect and mood.        Assessment & Plan:     86 y.o. female with the following -     1. Hyponatremia  This is a chronic, worsening condition.  Over the past 3 months the patient has had worsening hyponatremia of 129 last checked a few days ago.  She is asymptomatic.  In terms of etiology I looked at her medication regimen and I suspect the hydrochlorothiazide is the likely culprit.  This was recently added to control her blood pressure.  I will discontinue hydrochlorothiazide effective immediately and replace it with amlodipine.  She will increase her salt intake slightly.  Strict emergency room precautions recommended if she ever becomes symptomatic.  Symptoms of hyponatremia were discussed with the patient.  She will follow up closely in 2 weeks and complete a BMP a few days prior.  - Basic Metabolic Panel; Future    2. Essential hypertension  Chronic, stable condition.  We will discontinue hydrochlorothiazide as above and replaced with amlodipine.  Continue with losartan 100 mg daily.  She will also continue to monitor blood pressure at home.  - amLODIPine (NORVASC) 10 MG Tab; Take 1 Tablet by mouth every day.  Dispense: 90 Tablet; Refill: 3  - Basic Metabolic Panel; Future      Return in about 2 weeks (around 9/28/2021).    Please note that this dictation was created using voice recognition software. I have made every reasonable attempt to correct obvious errors, but I expect that there are errors of grammar and possibly content that I did not discover before finalizing the note.

## 2021-09-24 ENCOUNTER — TELEPHONE (OUTPATIENT)
Dept: HEALTH INFORMATION MANAGEMENT | Facility: OTHER | Age: 86
End: 2021-09-24

## 2021-09-28 ENCOUNTER — HOSPITAL ENCOUNTER (OUTPATIENT)
Dept: LAB | Facility: MEDICAL CENTER | Age: 86
End: 2021-09-28
Attending: FAMILY MEDICINE
Payer: MEDICARE

## 2021-09-28 DIAGNOSIS — I10 ESSENTIAL HYPERTENSION: ICD-10-CM

## 2021-09-28 DIAGNOSIS — E87.1 HYPONATREMIA: ICD-10-CM

## 2021-09-28 LAB
ANION GAP SERPL CALC-SCNC: 13 MMOL/L (ref 7–16)
BUN SERPL-MCNC: 8 MG/DL (ref 8–22)
CALCIUM SERPL-MCNC: 9.1 MG/DL (ref 8.5–10.5)
CHLORIDE SERPL-SCNC: 97 MMOL/L (ref 96–112)
CO2 SERPL-SCNC: 25 MMOL/L (ref 20–33)
CREAT SERPL-MCNC: 0.64 MG/DL (ref 0.5–1.4)
GLUCOSE SERPL-MCNC: 110 MG/DL (ref 65–99)
POTASSIUM SERPL-SCNC: 3.9 MMOL/L (ref 3.6–5.5)
SODIUM SERPL-SCNC: 135 MMOL/L (ref 135–145)

## 2021-09-28 PROCEDURE — 80048 BASIC METABOLIC PNL TOTAL CA: CPT

## 2021-09-28 PROCEDURE — 36415 COLL VENOUS BLD VENIPUNCTURE: CPT

## 2021-09-30 ENCOUNTER — OFFICE VISIT (OUTPATIENT)
Dept: MEDICAL GROUP | Facility: PHYSICIAN GROUP | Age: 86
End: 2021-09-30
Payer: MEDICARE

## 2021-09-30 VITALS
BODY MASS INDEX: 27.62 KG/M2 | HEART RATE: 102 BPM | WEIGHT: 137 LBS | DIASTOLIC BLOOD PRESSURE: 58 MMHG | HEIGHT: 59 IN | TEMPERATURE: 99.2 F | OXYGEN SATURATION: 94 % | SYSTOLIC BLOOD PRESSURE: 132 MMHG

## 2021-09-30 DIAGNOSIS — L50.9 URTICARIA: ICD-10-CM

## 2021-09-30 DIAGNOSIS — I10 ESSENTIAL HYPERTENSION: ICD-10-CM

## 2021-09-30 DIAGNOSIS — K59.01 SLOW TRANSIT CONSTIPATION: ICD-10-CM

## 2021-09-30 PROCEDURE — 99213 OFFICE O/P EST LOW 20 MIN: CPT | Performed by: FAMILY MEDICINE

## 2021-09-30 RX ORDER — SENNA AND DOCUSATE SODIUM 50; 8.6 MG/1; MG/1
1 TABLET, FILM COATED ORAL DAILY
Qty: 30 TABLET | Refills: 11 | Status: SHIPPED | OUTPATIENT
Start: 2021-09-30 | End: 2022-01-08

## 2021-09-30 RX ORDER — ONDANSETRON 4 MG/1
4 TABLET, FILM COATED ORAL EVERY 4 HOURS PRN
Qty: 20 TABLET | Refills: 3 | Status: SHIPPED | OUTPATIENT
Start: 2021-09-30 | End: 2021-10-14

## 2021-09-30 RX ORDER — TRIAMCINOLONE ACETONIDE 1 MG/G
CREAM TOPICAL
Qty: 80 G | Refills: 3 | Status: SHIPPED | OUTPATIENT
Start: 2021-09-30 | End: 2022-05-03

## 2021-09-30 ASSESSMENT — FIBROSIS 4 INDEX: FIB4 SCORE: 1.43

## 2021-09-30 NOTE — ASSESSMENT & PLAN NOTE
Stable. Monitoring BP at home. Currently taking HCTZ and losartan as directed. Was recommending amlodipine instead of HCTZ due to hyponatremia. However she stopped taking it as it gave her constipation. Still taking HCTZ but hypoNa resolved on its own.    Also taking baby aspirin. Denies lightheadedness, vision changes, headache, palpitations or leg swelling.

## 2021-09-30 NOTE — PROGRESS NOTES
Subjective:     Chief Complaint   Patient presents with   • Lab Results   • Nausea   • Constipation   • Rash     swelling, left hand, x2days       HPI:   Grisel presents today to discuss the following.    Essential hypertension  Stable. Monitoring BP at home. Currently taking HCTZ and losartan as directed. Was recommending amlodipine instead of HCTZ due to hyponatremia. However she stopped taking it as it gave her constipation. Still taking HCTZ but hypoNa resolved on its own.    Also taking baby aspirin. Denies lightheadedness, vision changes, headache, palpitations or leg swelling.      Slow transit constipation  New issue  Has had constipation up to 1 week and a difficult one now once every 3-4 days.  Taking some miralax which didn't help    Urticaria  New issue  Has had a rash to dorsal left wrist with severe itching   Hydrocortisone cream only helped some      Past Medical History:   Diagnosis Date   • HTN (hypertension)        Current Outpatient Medications Ordered in Epic   Medication Sig Dispense Refill   • sennosides-docusate sodium (SENOKOT-S) 8.6-50 MG tablet Take 1 Tablet by mouth every day. 30 Tablet 11   • triamcinolone acetonide (KENALOG) 0.1 % Cream Apply to affected area twice daily for 14 day 80 g 3   • ondansetron (ZOFRAN) 4 MG Tab tablet Take 1 Tablet by mouth every four hours as needed for Nausea/Vomiting for up to 14 days. 20 Tablet 3   • aspirin (ASA) 81 MG Chew Tab chewable tablet Chew 81 mg every day.     • hydroCHLOROthiazide (HYDRODIURIL) 25 MG Tab Take 25 mg by mouth every day.     • losartan (COZAAR) 100 MG Tab Take 1 Tab by mouth every day. TAKE 1 TABLET BY MOUTH DAILY 90 Tab 3   • lovastatin (MEVACOR) 20 MG Tab Take 1 Tab by mouth every day. TAKE 1 TABLET BY MOUTH DAILY 90 Tab 3     No current Epic-ordered facility-administered medications on file.       Allergies:  Patient has no known allergies.    Health Maintenance: Completed    ROS:  Gen: no fevers/chills, no changes in  "weight  Eyes: no changes in vision  Pulm: no sob, no cough  CV: no chest pain, no palpitations  GI: no nausea/vomiting, no diarrhea      Objective:     Exam:  /58 (BP Location: Right arm, Patient Position: Sitting, BP Cuff Size: Adult)   Pulse (!) 102   Temp 37.3 °C (99.2 °F) (Temporal)   Ht 1.499 m (4' 11\")   Wt 62.1 kg (137 lb)   SpO2 94%   BMI 27.67 kg/m²  Body mass index is 27.67 kg/m².        Constitutional: Alert, no distress, well-groomed.  Skin: Inspection of left dorsal wrist shows evidence of a rash   eye: Equal, round and reactive, conjunctiva clear, lids normal.  ENMT: Lips without lesions, good dentition, moist mucous membranes.  Neck: Trachea midline, no masses, no thyromegaly.  Respiratory: Unlabored respiratory effort, no cough.  MSK: Normal gait, moves all extremities.  Neuro: Grossly non-focal.   Psych: Alert and oriented x3, normal affect and mood.        Assessment & Plan:     86 y.o. female with the following -     1. Essential hypertension  This is a chronic, stable condition.  We will continue with the same regimen hydrochlorothiazide and losartan due to intolerance to amlodipine.  Repeat blood testing today shows normal sodium level.  We will continue to monitor this in the future.    2. Slow transit constipation  This is a new problem.  Has had worsening constipation.  We will do a trial of Senokot today.  - sennosides-docusate sodium (SENOKOT-S) 8.6-50 MG tablet; Take 1 Tablet by mouth every day.  Dispense: 30 Tablet; Refill: 11    3. Urticaria  This is a new problem.  Likely contact dermatitis.  We will do a trial of Kenalog cream twice daily.      Return in about 3 months (around 12/30/2021).    Please note that this dictation was created using voice recognition software. I have made every reasonable attempt to correct obvious errors, but I expect that there are errors of grammar and possibly content that I did not discover before finalizing the note.      "

## 2021-09-30 NOTE — ASSESSMENT & PLAN NOTE
New issue  Has had a rash to dorsal left wrist with severe itching   Hydrocortisone cream only helped some

## 2021-09-30 NOTE — ASSESSMENT & PLAN NOTE
New issue  Has had constipation up to 1 week and a difficult one now once every 3-4 days.  Taking some miralax which didn't help

## 2022-01-08 ENCOUNTER — APPOINTMENT (OUTPATIENT)
Dept: RADIOLOGY | Facility: MEDICAL CENTER | Age: 87
End: 2022-01-08
Attending: EMERGENCY MEDICINE
Payer: MEDICARE

## 2022-01-08 ENCOUNTER — HOSPITAL ENCOUNTER (OUTPATIENT)
Facility: MEDICAL CENTER | Age: 87
End: 2022-01-10
Attending: EMERGENCY MEDICINE | Admitting: STUDENT IN AN ORGANIZED HEALTH CARE EDUCATION/TRAINING PROGRAM
Payer: MEDICARE

## 2022-01-08 DIAGNOSIS — S42.211A CLOSED DISPLACED FRACTURE OF SURGICAL NECK OF RIGHT HUMERUS, UNSPECIFIED FRACTURE MORPHOLOGY, INITIAL ENCOUNTER: ICD-10-CM

## 2022-01-08 DIAGNOSIS — S00.83XA CONTUSION OF FACE, INITIAL ENCOUNTER: ICD-10-CM

## 2022-01-08 DIAGNOSIS — S42.291A OTHER CLOSED DISPLACED FRACTURE OF PROXIMAL END OF RIGHT HUMERUS, INITIAL ENCOUNTER: ICD-10-CM

## 2022-01-08 DIAGNOSIS — R79.89 ELEVATED TROPONIN: ICD-10-CM

## 2022-01-08 DIAGNOSIS — S42.291A: ICD-10-CM

## 2022-01-08 DIAGNOSIS — S09.90XA CLOSED HEAD INJURY, INITIAL ENCOUNTER: ICD-10-CM
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
## 2022-01-08 DIAGNOSIS — S20.211A CONTUSION OF RIGHT CHEST WALL, INITIAL ENCOUNTER: ICD-10-CM

## 2022-01-08 PROBLEM — E87.6 HYPOKALEMIA: Status: ACTIVE | Noted: 2022-01-08

## 2022-01-08 PROBLEM — E87.1 HYPONATREMIA: Chronic | Status: ACTIVE | Noted: 2021-09-14

## 2022-01-08 PROBLEM — D72.829 LEUCOCYTOSIS: Status: ACTIVE | Noted: 2022-01-08

## 2022-01-08 LAB
ALBUMIN SERPL BCP-MCNC: 3.6 G/DL (ref 3.2–4.9)
ALBUMIN/GLOB SERPL: 1.6 G/DL
ALP SERPL-CCNC: 87 U/L (ref 30–99)
ALT SERPL-CCNC: 14 U/L (ref 2–50)
ANION GAP SERPL CALC-SCNC: 16 MMOL/L (ref 7–16)
APTT PPP: 28.5 SEC (ref 24.7–36)
AST SERPL-CCNC: 24 U/L (ref 12–45)
BASOPHILS # BLD AUTO: 0.5 % (ref 0–1.8)
BASOPHILS # BLD: 0.07 K/UL (ref 0–0.12)
BILIRUB SERPL-MCNC: 0.4 MG/DL (ref 0.1–1.5)
BUN SERPL-MCNC: 10 MG/DL (ref 8–22)
CALCIUM SERPL-MCNC: 7.9 MG/DL (ref 8.5–10.5)
CHLORIDE SERPL-SCNC: 98 MMOL/L (ref 96–112)
CK SERPL-CCNC: 208 U/L (ref 0–154)
CO2 SERPL-SCNC: 19 MMOL/L (ref 20–33)
CREAT SERPL-MCNC: 0.48 MG/DL (ref 0.5–1.4)
EKG IMPRESSION: NORMAL
EOSINOPHIL # BLD AUTO: 0.01 K/UL (ref 0–0.51)
EOSINOPHIL NFR BLD: 0.1 % (ref 0–6.9)
ERYTHROCYTE [DISTWIDTH] IN BLOOD BY AUTOMATED COUNT: 46.3 FL (ref 35.9–50)
GLOBULIN SER CALC-MCNC: 2.2 G/DL (ref 1.9–3.5)
GLUCOSE SERPL-MCNC: 133 MG/DL (ref 65–99)
HCT VFR BLD AUTO: 31.6 % (ref 37–47)
HGB BLD-MCNC: 11.2 G/DL (ref 12–16)
IMM GRANULOCYTES # BLD AUTO: 0.12 K/UL (ref 0–0.11)
IMM GRANULOCYTES NFR BLD AUTO: 0.8 % (ref 0–0.9)
INR PPP: 1.01 (ref 0.87–1.13)
LYMPHOCYTES # BLD AUTO: 1.25 K/UL (ref 1–4.8)
LYMPHOCYTES NFR BLD: 8.5 % (ref 22–41)
MCH RBC QN AUTO: 33.6 PG (ref 27–33)
MCHC RBC AUTO-ENTMCNC: 35.4 G/DL (ref 33.6–35)
MCV RBC AUTO: 94.9 FL (ref 81.4–97.8)
MONOCYTES # BLD AUTO: 0.75 K/UL (ref 0–0.85)
MONOCYTES NFR BLD AUTO: 5.1 % (ref 0–13.4)
NEUTROPHILS # BLD AUTO: 12.58 K/UL (ref 2–7.15)
NEUTROPHILS NFR BLD: 85 % (ref 44–72)
NRBC # BLD AUTO: 0 K/UL
NRBC BLD-RTO: 0 /100 WBC
PLATELET # BLD AUTO: 248 K/UL (ref 164–446)
PMV BLD AUTO: 9.5 FL (ref 9–12.9)
POTASSIUM SERPL-SCNC: 3.4 MMOL/L (ref 3.6–5.5)
PROT SERPL-MCNC: 5.8 G/DL (ref 6–8.2)
PROTHROMBIN TIME: 13 SEC (ref 12–14.6)
RBC # BLD AUTO: 3.33 M/UL (ref 4.2–5.4)
SODIUM SERPL-SCNC: 133 MMOL/L (ref 135–145)
TROPONIN T SERPL-MCNC: 29 NG/L (ref 6–19)
TROPONIN T SERPL-MCNC: 37 NG/L (ref 6–19)
WBC # BLD AUTO: 14.8 K/UL (ref 4.8–10.8)

## 2022-01-08 PROCEDURE — 700111 HCHG RX REV CODE 636 W/ 250 OVERRIDE (IP): Performed by: EMERGENCY MEDICINE

## 2022-01-08 PROCEDURE — 85025 COMPLETE CBC W/AUTO DIFF WBC: CPT | Mod: 91

## 2022-01-08 PROCEDURE — 85730 THROMBOPLASTIN TIME PARTIAL: CPT

## 2022-01-08 PROCEDURE — 96375 TX/PRO/DX INJ NEW DRUG ADDON: CPT

## 2022-01-08 PROCEDURE — G0378 HOSPITAL OBSERVATION PER HR: HCPCS

## 2022-01-08 PROCEDURE — 73030 X-RAY EXAM OF SHOULDER: CPT | Mod: RT

## 2022-01-08 PROCEDURE — 70450 CT HEAD/BRAIN W/O DYE: CPT | Mod: ME

## 2022-01-08 PROCEDURE — A9270 NON-COVERED ITEM OR SERVICE: HCPCS | Performed by: STUDENT IN AN ORGANIZED HEALTH CARE EDUCATION/TRAINING PROGRAM

## 2022-01-08 PROCEDURE — 700111 HCHG RX REV CODE 636 W/ 250 OVERRIDE (IP): Performed by: STUDENT IN AN ORGANIZED HEALTH CARE EDUCATION/TRAINING PROGRAM

## 2022-01-08 PROCEDURE — 71045 X-RAY EXAM CHEST 1 VIEW: CPT

## 2022-01-08 PROCEDURE — 80053 COMPREHEN METABOLIC PANEL: CPT | Mod: 91

## 2022-01-08 PROCEDURE — 700105 HCHG RX REV CODE 258: Performed by: STUDENT IN AN ORGANIZED HEALTH CARE EDUCATION/TRAINING PROGRAM

## 2022-01-08 PROCEDURE — 96374 THER/PROPH/DIAG INJ IV PUSH: CPT

## 2022-01-08 PROCEDURE — 700102 HCHG RX REV CODE 250 W/ 637 OVERRIDE(OP): Performed by: STUDENT IN AN ORGANIZED HEALTH CARE EDUCATION/TRAINING PROGRAM

## 2022-01-08 PROCEDURE — 96376 TX/PRO/DX INJ SAME DRUG ADON: CPT

## 2022-01-08 PROCEDURE — 85610 PROTHROMBIN TIME: CPT

## 2022-01-08 PROCEDURE — 700111 HCHG RX REV CODE 636 W/ 250 OVERRIDE (IP)

## 2022-01-08 PROCEDURE — 99285 EMERGENCY DEPT VISIT HI MDM: CPT

## 2022-01-08 PROCEDURE — 93005 ELECTROCARDIOGRAM TRACING: CPT | Performed by: EMERGENCY MEDICINE

## 2022-01-08 PROCEDURE — 99220 PR INITIAL OBSERVATION CARE,LEVL III: CPT | Performed by: STUDENT IN AN ORGANIZED HEALTH CARE EDUCATION/TRAINING PROGRAM

## 2022-01-08 PROCEDURE — 82550 ASSAY OF CK (CPK): CPT

## 2022-01-08 PROCEDURE — 70486 CT MAXILLOFACIAL W/O DYE: CPT | Mod: ME

## 2022-01-08 PROCEDURE — 84484 ASSAY OF TROPONIN QUANT: CPT | Mod: 91

## 2022-01-08 RX ORDER — ONDANSETRON 2 MG/ML
4 INJECTION INTRAMUSCULAR; INTRAVENOUS EVERY 4 HOURS PRN
Status: DISCONTINUED | OUTPATIENT
Start: 2022-01-08 | End: 2022-01-10 | Stop reason: HOSPADM

## 2022-01-08 RX ORDER — SODIUM CHLORIDE 9 MG/ML
INJECTION, SOLUTION INTRAVENOUS CONTINUOUS
Status: DISCONTINUED | OUTPATIENT
Start: 2022-01-08 | End: 2022-01-10

## 2022-01-08 RX ORDER — BISACODYL 10 MG
10 SUPPOSITORY, RECTAL RECTAL
Status: DISCONTINUED | OUTPATIENT
Start: 2022-01-08 | End: 2022-01-10 | Stop reason: HOSPADM

## 2022-01-08 RX ORDER — MORPHINE SULFATE 4 MG/ML
2 INJECTION INTRAVENOUS ONCE
Status: COMPLETED | OUTPATIENT
Start: 2022-01-08 | End: 2022-01-08

## 2022-01-08 RX ORDER — ACETAMINOPHEN 500 MG
1000 TABLET ORAL EVERY 6 HOURS PRN
Status: DISCONTINUED | OUTPATIENT
Start: 2022-01-13 | End: 2022-01-10 | Stop reason: HOSPADM

## 2022-01-08 RX ORDER — ACETAMINOPHEN 500 MG
1000 TABLET ORAL EVERY 6 HOURS
Status: DISCONTINUED | OUTPATIENT
Start: 2022-01-08 | End: 2022-01-10 | Stop reason: HOSPADM

## 2022-01-08 RX ORDER — POTASSIUM CHLORIDE 20 MEQ/1
40 TABLET, EXTENDED RELEASE ORAL ONCE
Status: COMPLETED | OUTPATIENT
Start: 2022-01-08 | End: 2022-01-08

## 2022-01-08 RX ORDER — AMOXICILLIN 250 MG
2 CAPSULE ORAL 2 TIMES DAILY
Status: DISCONTINUED | OUTPATIENT
Start: 2022-01-08 | End: 2022-01-10 | Stop reason: HOSPADM

## 2022-01-08 RX ORDER — ASPIRIN 81 MG/1
81 TABLET, CHEWABLE ORAL DAILY
Status: DISCONTINUED | OUTPATIENT
Start: 2022-01-08 | End: 2022-01-08

## 2022-01-08 RX ORDER — OXYCODONE HYDROCHLORIDE 5 MG/1
5 TABLET ORAL EVERY 4 HOURS PRN
Status: DISCONTINUED | OUTPATIENT
Start: 2022-01-08 | End: 2022-01-10 | Stop reason: HOSPADM

## 2022-01-08 RX ORDER — LOSARTAN POTASSIUM 50 MG/1
100 TABLET ORAL DAILY
Status: DISCONTINUED | OUTPATIENT
Start: 2022-01-08 | End: 2022-01-10 | Stop reason: HOSPADM

## 2022-01-08 RX ORDER — ACETAMINOPHEN 325 MG/1
650 TABLET ORAL EVERY 6 HOURS PRN
Status: DISCONTINUED | OUTPATIENT
Start: 2022-01-08 | End: 2022-01-08

## 2022-01-08 RX ORDER — ONDANSETRON 2 MG/ML
INJECTION INTRAMUSCULAR; INTRAVENOUS
Status: COMPLETED
Start: 2022-01-08 | End: 2022-01-08

## 2022-01-08 RX ORDER — LOVASTATIN 20 MG/1
20 TABLET ORAL DAILY
Status: DISCONTINUED | OUTPATIENT
Start: 2022-01-08 | End: 2022-01-10 | Stop reason: HOSPADM

## 2022-01-08 RX ORDER — ONDANSETRON 4 MG/1
4 TABLET, ORALLY DISINTEGRATING ORAL EVERY 4 HOURS PRN
Status: DISCONTINUED | OUTPATIENT
Start: 2022-01-08 | End: 2022-01-10 | Stop reason: HOSPADM

## 2022-01-08 RX ORDER — ONDANSETRON 2 MG/ML
4 INJECTION INTRAMUSCULAR; INTRAVENOUS ONCE
Status: COMPLETED | OUTPATIENT
Start: 2022-01-08 | End: 2022-01-08

## 2022-01-08 RX ORDER — POLYETHYLENE GLYCOL 3350 17 G/17G
1 POWDER, FOR SOLUTION ORAL
Status: DISCONTINUED | OUTPATIENT
Start: 2022-01-08 | End: 2022-01-10 | Stop reason: HOSPADM

## 2022-01-08 RX ORDER — LABETALOL HYDROCHLORIDE 5 MG/ML
20 INJECTION, SOLUTION INTRAVENOUS EVERY 4 HOURS PRN
Status: DISCONTINUED | OUTPATIENT
Start: 2022-01-08 | End: 2022-01-10 | Stop reason: HOSPADM

## 2022-01-08 RX ORDER — HYDROCHLOROTHIAZIDE 25 MG/1
25 TABLET ORAL DAILY
Status: DISCONTINUED | OUTPATIENT
Start: 2022-01-09 | End: 2022-01-09

## 2022-01-08 RX ADMIN — ACETAMINOPHEN 1000 MG: 500 TABLET ORAL at 14:20

## 2022-01-08 RX ADMIN — ONDANSETRON 4 MG: 2 INJECTION INTRAMUSCULAR; INTRAVENOUS at 09:30

## 2022-01-08 RX ADMIN — ONDANSETRON 4 MG: 2 INJECTION INTRAMUSCULAR; INTRAVENOUS at 19:34

## 2022-01-08 RX ADMIN — SODIUM CHLORIDE: 9 INJECTION, SOLUTION INTRAVENOUS at 16:07

## 2022-01-08 RX ADMIN — POTASSIUM CHLORIDE 40 MEQ: 1500 TABLET, EXTENDED RELEASE ORAL at 16:08

## 2022-01-08 RX ADMIN — SENNOSIDES AND DOCUSATE SODIUM 2 TABLET: 50; 8.6 TABLET ORAL at 17:13

## 2022-01-08 RX ADMIN — OXYCODONE 5 MG: 5 TABLET ORAL at 14:19

## 2022-01-08 RX ADMIN — ACETAMINOPHEN 1000 MG: 500 TABLET ORAL at 17:13

## 2022-01-08 RX ADMIN — MORPHINE SULFATE 2 MG: 4 INJECTION INTRAVENOUS at 10:52

## 2022-01-08 RX ADMIN — LOSARTAN POTASSIUM 100 MG: 50 TABLET, FILM COATED ORAL at 16:08

## 2022-01-08 RX ADMIN — ACETAMINOPHEN 1000 MG: 500 TABLET ORAL at 23:35

## 2022-01-08 ASSESSMENT — ENCOUNTER SYMPTOMS
SHORTNESS OF BREATH: 0
PALPITATIONS: 0
CONSTIPATION: 0
VOMITING: 0
DIARRHEA: 0
SORE THROAT: 0
ABDOMINAL PAIN: 0
HEADACHES: 0
FALLS: 1
DIZZINESS: 0
FEVER: 0
NAUSEA: 0
WHEEZING: 0
NERVOUS/ANXIOUS: 0
BLURRED VISION: 0
MYALGIAS: 0
FOCAL WEAKNESS: 0
COUGH: 0
SPUTUM PRODUCTION: 0
DOUBLE VISION: 0
SINUS PAIN: 0
CHILLS: 0

## 2022-01-08 ASSESSMENT — LIFESTYLE VARIABLES
HOW MANY TIMES IN THE PAST YEAR HAVE YOU HAD 5 OR MORE DRINKS IN A DAY: 0
HAVE PEOPLE ANNOYED YOU BY CRITICIZING YOUR DRINKING: NO
HOW MANY TIMES IN THE PAST YEAR HAVE YOU HAD 5 OR MORE DRINKS IN A DAY: 0
AVERAGE NUMBER OF DAYS PER WEEK YOU HAVE A DRINK CONTAINING ALCOHOL: 0
EVER HAD A DRINK FIRST THING IN THE MORNING TO STEADY YOUR NERVES TO GET RID OF A HANGOVER: NO
TOTAL SCORE: 0
CONSUMPTION TOTAL: NEGATIVE
AVERAGE NUMBER OF DAYS PER WEEK YOU HAVE A DRINK CONTAINING ALCOHOL: 0
ON A TYPICAL DAY WHEN YOU DRINK ALCOHOL HOW MANY DRINKS DO YOU HAVE: 0
DOES PATIENT WANT TO STOP DRINKING: NO
EVER FELT BAD OR GUILTY ABOUT YOUR DRINKING: NO
ALCOHOL_USE: NO
TOTAL SCORE: 0
HAVE YOU EVER FELT YOU SHOULD CUT DOWN ON YOUR DRINKING: NO
TOTAL SCORE: 0
HAVE PEOPLE ANNOYED YOU BY CRITICIZING YOUR DRINKING: NO
HAVE YOU EVER FELT YOU SHOULD CUT DOWN ON YOUR DRINKING: NO
DO YOU DRINK ALCOHOL: NO
EVER FELT BAD OR GUILTY ABOUT YOUR DRINKING: NO
TOTAL SCORE: 0
ON A TYPICAL DAY WHEN YOU DRINK ALCOHOL HOW MANY DRINKS DO YOU HAVE: 0
CONSUMPTION TOTAL: NEGATIVE
EVER HAD A DRINK FIRST THING IN THE MORNING TO STEADY YOUR NERVES TO GET RID OF A HANGOVER: NO
TOTAL SCORE: 0
TOTAL SCORE: 0

## 2022-01-08 ASSESSMENT — PAIN DESCRIPTION - PAIN TYPE
TYPE: ACUTE PAIN

## 2022-01-08 ASSESSMENT — PATIENT HEALTH QUESTIONNAIRE - PHQ9
SUM OF ALL RESPONSES TO PHQ9 QUESTIONS 1 AND 2: 0
2. FEELING DOWN, DEPRESSED, IRRITABLE, OR HOPELESS: NOT AT ALL
1. LITTLE INTEREST OR PLEASURE IN DOING THINGS: NOT AT ALL
2. FEELING DOWN, DEPRESSED, IRRITABLE, OR HOPELESS: NOT AT ALL
SUM OF ALL RESPONSES TO PHQ9 QUESTIONS 1 AND 2: 0
1. LITTLE INTEREST OR PLEASURE IN DOING THINGS: NOT AT ALL

## 2022-01-08 ASSESSMENT — PAIN DESCRIPTION - DESCRIPTORS: DESCRIPTORS: ACHING

## 2022-01-08 ASSESSMENT — FIBROSIS 4 INDEX
FIB4 SCORE: 2.22
FIB4 SCORE: 1.43

## 2022-01-08 NOTE — ASSESSMENT & PLAN NOTE
Ground-level fall versus possible syncope  Developed acute right-sided shoulder pain after fall  Right shoulder X ray - Comminuted acute angulated fracture of the humeral neck is identified which also appears to involve the greater tuberosity.  Orthopedic surgery was consulted in ER, no plan for surgical intervention, recommended a sling which was ordered by ERP.  Sling   Pain control   PT/OT

## 2022-01-08 NOTE — ED NOTES
Admitting physician at bedside. Patient medicated per orders. Awaiting bed assignment, patient updated on plan of care.

## 2022-01-08 NOTE — PROGRESS NOTES
Assessment completed. Pt A&Ox 4. Respirations are even and unlabored on room air. Pt reports pain on right shoulder at this time. Monitors applied, VS stable, call light and belongings within reach. POC updated (pain control). Pt educated on room and call light, pt verbalized understanding. Communication board updated. Needs met.

## 2022-01-08 NOTE — ED TRIAGE NOTES
Chief Complaint   Patient presents with   • GLF     denies hitting head, possible LOC   • Shoulder Pain     right shoulder      Patient brought in by [facility]. Patient states fell last night in kitchen, A&Ox 4. Has bruising around right eye, right chest. Denies hitting head, does not recall the event patient states she takes ASA.

## 2022-01-08 NOTE — H&P
Hospital Medicine History & Physical Note    Date of Service  1/8/2022    Primary Care Physician  Cricket Miller M.D.    Consultants  orthopedics    Specialist Names: Dr. Tadeo     Code Status  Full Code    Chief Complaint  Chief Complaint   Patient presents with   • GLF     denies hitting head, possible LOC   • Shoulder Pain     right shoulder        History of Presenting Illness  Grisel Saha is a 86 y.o. very pleasant female with medical history significant for hypertension, hyperlipidemia, who presented 1/8/2022 with ground-level fall sometime last night or earlier this morning (patient did not remember what actually happened, also possible for syncope ), associated with bruising of the face and chest, and severe acute right-sided shoulder pain. Pain is 7 out of 10, and able to move right shoulder because of pain. Patient denied chest pain, palpitation, cough, shortness of breath. The fall event was un witnessed,pt's  was asleep at the time. Pt lives with her  , both are very healthy & active. Her daughter lives down the road. Patient's daughter at bedside during exam. Patient does not use walker or cane at baseline, but daughter stated that she should use those.  At ER, vitals - stable.    Labs significant for leukocytosis, hyponatremia, hypokalemia, elevated troponin.  CT head & CT maxillofacial - nil acute   Right shoulder X ray - Comminuted acute angulated fracture of the humeral neck is identified which also appears to involve the greater tuberosity.  Orthopedic surgery was consulted in ER, no plan for surgical intervention, recommended a sling which was ordered by ERP.    I discussed the plan of care with patient, family, bedside RN and ERP.    Review of Systems  Review of Systems   Constitutional: Negative for chills, fever and malaise/fatigue.   HENT: Negative for congestion, ear discharge, ear pain, sinus pain and sore throat.    Eyes: Negative for blurred vision and double  vision.   Respiratory: Negative for cough, sputum production, shortness of breath and wheezing.    Cardiovascular: Negative for chest pain, palpitations and leg swelling.   Gastrointestinal: Negative for abdominal pain, constipation, diarrhea, nausea and vomiting.   Genitourinary: Negative for dysuria, frequency and urgency.   Musculoskeletal: Positive for falls and joint pain. Negative for myalgias.   Neurological: Negative for dizziness, focal weakness and headaches.   Psychiatric/Behavioral: The patient is not nervous/anxious.        Past Medical History   has a past medical history of HTN (hypertension).    Surgical History   has no past surgical history on file.     Family History  family history includes Cancer in her father; Stroke in her mother.   Family history reviewed with patient. There is no family history that is pertinent to the chief complaint.     Social History   reports that she has quit smoking. She has never used smokeless tobacco. She reports current alcohol use. She reports previous drug use.    Allergies  No Known Allergies    Medications  Prior to Admission Medications   Prescriptions Last Dose Informant Patient Reported? Taking?   ID NOW COVID-19 Kit   Yes No   Sig: TEST AS DIRECTED   aspirin (ASA) 81 MG Chew Tab chewable tablet   Yes No   Sig: Chew 81 mg every day.   hydroCHLOROthiazide (HYDRODIURIL) 25 MG Tab   Yes No   Sig: Take 25 mg by mouth every day.   losartan (COZAAR) 100 MG Tab   No No   Sig: Take 1 Tab by mouth every day. TAKE 1 TABLET BY MOUTH DAILY   lovastatin (MEVACOR) 20 MG Tab   No No   Sig: Take 1 Tab by mouth every day. TAKE 1 TABLET BY MOUTH DAILY   sennosides-docusate sodium (SENOKOT-S) 8.6-50 MG tablet   No No   Sig: Take 1 Tablet by mouth every day.   triamcinolone acetonide (KENALOG) 0.1 % Cream   No No   Sig: Apply to affected area twice daily for 14 day      Facility-Administered Medications Last Administration Doses Remaining   triamcinolone acetonide (KENALOG-40)  injection 80 mg 11/12/2021  7:03 AM    triamcinolone acetonide (KENALOG-40) injection 80 mg 11/12/2021  7:03 AM           Physical Exam  Temp:  [36.6 °C (97.8 °F)] 36.6 °C (97.8 °F)  Pulse:  [69-78] 69  Resp:  [16] 16  BP: (140-141)/(63-64) 141/63  SpO2:  [93 %] 93 %  Blood Pressure : 141/63   Temperature: 36.6 °C (97.8 °F)   Pulse: 69   Respiration: 16   Pulse Oximetry: 93 %       Physical Exam  Constitutional:       General: She is not in acute distress.  HENT:      Head: Normocephalic and atraumatic.      Nose: Nose normal.      Mouth/Throat:      Mouth: Mucous membranes are moist.      Pharynx: No posterior oropharyngeal erythema.   Eyes:      General: No scleral icterus.     Extraocular Movements: Extraocular movements intact.      Conjunctiva/sclera: Conjunctivae normal.      Pupils: Pupils are equal, round, and reactive to light.   Cardiovascular:      Rate and Rhythm: Normal rate and regular rhythm.      Pulses: Normal pulses.      Heart sounds: Normal heart sounds. No murmur heard.  No gallop.    Pulmonary:      Effort: Pulmonary effort is normal.      Breath sounds: Normal breath sounds. No stridor. No wheezing, rhonchi or rales.   Abdominal:      General: Bowel sounds are normal.      Palpations: Abdomen is soft.   Musculoskeletal:         General: Deformity and signs of injury present. No swelling or tenderness.      Cervical back: Normal range of motion and neck supple. No rigidity.      Comments: Right Shoulder Pain - ROM limited    Skin:     General: Skin is warm.      Findings: Bruising present.      Comments: Bruises over face & chest    Neurological:      General: No focal deficit present.      Mental Status: She is alert and oriented to person, place, and time.   Psychiatric:         Mood and Affect: Mood normal.         Behavior: Behavior normal.         Laboratory:  Recent Labs     01/08/22  0855   WBC 14.8*   RBC 3.33*   HEMOGLOBIN 11.2*   HEMATOCRIT 31.6*   MCV 94.9   MCH 33.6*   MCHC 35.4*    RDW 46.3   PLATELETCT 248   MPV 9.5     Recent Labs     01/08/22  0855   SODIUM 133*   POTASSIUM 3.4*   CHLORIDE 98   CO2 19*   GLUCOSE 133*   BUN 10   CREATININE 0.48*   CALCIUM 7.9*     Recent Labs     01/08/22  0855   ALTSGPT 14   ASTSGOT 24   ALKPHOSPHAT 87   TBILIRUBIN 0.4   GLUCOSE 133*     Recent Labs     01/08/22  0855   APTT 28.5   INR 1.01     No results for input(s): NTPROBNP in the last 72 hours.      Recent Labs     01/08/22  0855   TROPONINT 29*       Imaging:  DX-SHOULDER 2+ RIGHT   Final Result      1.  Comminuted acute angulated fracture of the humeral neck is identified which also appears to involve the greater tuberosity.      DX-CHEST-PORTABLE (1 VIEW)   Final Result         1.  Mild linear and interstitial opacifications are noted which could be due to edema pneumonitis or pneumonia.      2.  No consolidations identified.      3.  No pneumothorax.      CT-HEAD W/O   Final Result         NO ACUTE ABNORMALITIES ARE NOTED ON CT SCAN OF THE HEAD.      Decreased attenuation in the cerebral white matter likely indicates microvascular ischemic disease.         CT-MAXILLOFACIAL W/O PLUS RECONS   Final Result         No evidence of facial fracture.          X-Ray:  I have personally reviewed the images and compared with prior images.    Assessment/Plan:  I anticipate this patient is appropriate for observation status at this time.    * Fracture of anatomical neck of humerus, right, closed, initial encounter- (present on admission)  Assessment & Plan  Ground-level fall versus possible syncope  Developed acute right-sided shoulder pain after fall  Right shoulder X ray - Comminuted acute angulated fracture of the humeral neck is identified which also appears to involve the greater tuberosity.  Orthopedic surgery was consulted in ER, no plan for surgical intervention, recommended a sling which was ordered by ERP.  Sling   Pain control   PT/OT        Fall- (present on admission)  Assessment & Plan  Fall vs  possible syncope   Tele admit   Associated with right humeral neck fracture, no plan for surgical intervention per Ortho  Pain control  Fall precautions  PT/OT    Elevated troponin  Assessment & Plan  Will trend Trop   No chest pain   EKG reviewed       Leucocytosis  Assessment & Plan  Monitor for signs symptoms of active infection  Likely reactive from fall in right humeral neck fracture  CBC in AM    Hypokalemia  Assessment & Plan  Replete   CMP in AM  Pt is on thiazide     Hyponatremia- (present on admission)  Assessment & Plan  Chronic   Pt is on thiazide diuretics  Gentle IV fluid  CMP in AM     Hyperlipidemia- (present on admission)  Assessment & Plan  Continue home statin    Essential hypertension- (present on admission)  Assessment & Plan  Will monitor blood pressure  We will continue home losartan and HCTZ      VTE prophylaxis: SCDs/TEDs and enoxaparin ppx

## 2022-01-08 NOTE — ED PROVIDER NOTES
"ED Provider Note    CHIEF COMPLAINT  Chief Complaint   Patient presents with   • GLF     denies hitting head, possible LOC   • Shoulder Pain     right shoulder        HPI  Grisel Saha is a 86 y.o. female who presents stating that at sometime last night or early this morning she fell but she cannot remember.  The patient has severe right shoulder pain.  He also has bruising to the face.  The patient denies any focal neurologic deficits.  She denies chest pain.  She says that she bruises easily, she has a bruise on her chest.  She has bruising of her face.    REVIEW OF SYSTEMS  See HPI for further details. All other systems are negative.     PAST MEDICAL HISTORY   has a past medical history of HTN (hypertension).    SOCIAL HISTORY  Social History     Tobacco Use   • Smoking status: Former Smoker   • Smokeless tobacco: Never Used   Vaping Use   • Vaping Use: Never used   Substance and Sexual Activity   • Alcohol use: Yes   • Drug use: Not Currently   • Sexual activity: Not Currently       SURGICAL HISTORY  patient denies any surgical history    CURRENT MEDICATIONS  Home Medications     Reviewed by Erinn Smallwood R.N. (Registered Nurse) on 01/08/22 at 0906  Med List Status: Not Addressed   Medication Last Dose Status   aspirin (ASA) 81 MG Chew Tab chewable tablet  Active   hydroCHLOROthiazide (HYDRODIURIL) 25 MG Tab  Active   ID NOW COVID-19 Kit  Active   losartan (COZAAR) 100 MG Tab  Active   lovastatin (MEVACOR) 20 MG Tab  Active   sennosides-docusate sodium (SENOKOT-S) 8.6-50 MG tablet  Active   triamcinolone acetonide (KENALOG) 0.1 % Cream  Active   triamcinolone acetonide (KENALOG-40) injection 80 mg  Active   triamcinolone acetonide (KENALOG-40) injection 80 mg  Active                ALLERGIES  No Known Allergies    FAMILY HISTORY  No pertinent family history    PHYSICAL EXAM  VITAL SIGNS: /63   Pulse 69   Temp 36.6 °C (97.8 °F) (Temporal)   Resp 16   Ht 1.473 m (4' 10\")   Wt 61.2 kg (135 lb)   SpO2 " 93%   BMI 28.22 kg/m²  @VILMA[[id number]::@   Pulse ox interpretation: I interpret this pulse ox as normal.  Constitutional: Alert in no apparent distress.  HENT: Bruising under the right eye.  Eyes: Pupils are equal and reactive, Conjunctiva normal, Non-icteric.   Neck: Normal range of motion, No tenderness, Supple, No stridor.   Lymphatic: No lymphadenopathy noted.   Cardiovascular: Regular rate and rhythm, no murmurs.   Thorax & Lungs: Normal breath sounds, No respiratory distress, bruising to right chest wall.  Abdomen: Bowel sounds normal, Soft, No tenderness, No masses, No pulsatile masses. No peritoneal signs.  Skin: Warm, Dry, No erythema, No rash.   Back: No bony tenderness, No CVA tenderness.   Extremities: Intact distal pulses.  Musculoskeletal: Tenderness of right shoulder.  Neurologic: Alert , Normal motor function, Normal sensory function, No focal deficits noted.   Psychiatric: Affect normal, Judgment normal, Mood normal.       DIAGNOSTIC STUDIES / PROCEDURES    EKG  This is a 12-lead EKG interpretation by myself.  It is normal sinus rhythm at a rate of 72.  There is a PAC.  The intervals are normal, the axis is normal.  There are nonspecific ST-T wave changes.  My impression of this EKG, does not indicate ischemia at this time.    LABS  Labs Reviewed   CBC WITH DIFFERENTIAL - Abnormal; Notable for the following components:       Result Value    WBC 14.8 (*)     RBC 3.33 (*)     Hemoglobin 11.2 (*)     Hematocrit 31.6 (*)     MCH 33.6 (*)     MCHC 35.4 (*)     Neutrophils-Polys 85.00 (*)     Lymphocytes 8.50 (*)     Neutrophils (Absolute) 12.58 (*)     Immature Granulocytes (abs) 0.12 (*)     All other components within normal limits    Narrative:     Collected By:  Indicate which anticoagulants the patient is on:->UNKNOWN   COMP METABOLIC PANEL - Abnormal; Notable for the following components:    Sodium 133 (*)     Potassium 3.4 (*)     Co2 19 (*)     Glucose 133 (*)     Creatinine 0.48 (*)     Calcium  7.9 (*)     Total Protein 5.8 (*)     All other components within normal limits    Narrative:     Collected By:  Indicate which anticoagulants the patient is on:->UNKNOWN   TROPONIN - Abnormal; Notable for the following components:    Troponin T 29 (*)     All other components within normal limits    Narrative:     Collected By:  Indicate which anticoagulants the patient is on:->UNKNOWN   CREATINE KINASE - Abnormal; Notable for the following components:    CPK Total 208 (*)     All other components within normal limits    Narrative:     Collected By:  Indicate which anticoagulants the patient is on:->UNKNOWN   PROTHROMBIN TIME    Narrative:     Collected By:  Indicate which anticoagulants the patient is on:->UNKNOWN   APTT    Narrative:     Collected By:  Indicate which anticoagulants the patient is on:->UNKNOWN   ESTIMATED GFR    Narrative:     Collected By:  Indicate which anticoagulants the patient is on:->UNKNOWN         RADIOLOGY  DX-SHOULDER 2+ RIGHT   Final Result      1.  Comminuted acute angulated fracture of the humeral neck is identified which also appears to involve the greater tuberosity.      DX-CHEST-PORTABLE (1 VIEW)   Final Result         1.  Mild linear and interstitial opacifications are noted which could be due to edema pneumonitis or pneumonia.      2.  No consolidations identified.      3.  No pneumothorax.      CT-HEAD W/O   Final Result         NO ACUTE ABNORMALITIES ARE NOTED ON CT SCAN OF THE HEAD.      Decreased attenuation in the cerebral white matter likely indicates microvascular ischemic disease.         CT-MAXILLOFACIAL W/O PLUS RECONS   Final Result         No evidence of facial fracture.              COURSE & MEDICAL DECISION MAKING  Pertinent Labs & Imaging studies reviewed. (See chart for details)    The patient presents with right shoulder pain and bruising after a fall.  Head CT was ordered, maxillofacial CT was ordered, right shoulder x-ray was ordered, blood work was ordered,  EKG was ordered.    The patient received fentanyl IV in route.  She is currently nauseous, I have ordered 4 mg IV Zofran.    The patient's head CT, facial CT, chest x-ray are negative for acute disease.  The patient's right shoulder x-ray is positive for proximal humeral fracture.    The patient's total CPK is elevated, troponin is slightly elevated.    At this time is not clear whether the patient had a syncopal event, I spoke with the hospitalist Dr. Morales who will assess the patient for hospitalization.    I spoke with the orthopedic surgeon Dr. Tadeo who has reviewed the x-rays and thinks it will be nonoperative.  He would like me to place the patient in a sling which I have ordered.  The patient is in fair condition at the time of admission.        FINAL IMPRESSION  1. Other closed displaced fracture of proximal end of right humerus, initial encounter     2. Closed head injury, initial encounter     3. Contusion of face, initial encounter     4. Fall, initial encounter     5. Elevated troponin     6. Contusion of right chest wall, initial encounter             Electronically signed by: Salvador Silva M.D., 1/8/2022 9:03 AM

## 2022-01-08 NOTE — ASSESSMENT & PLAN NOTE
Monitor for signs symptoms of active infection  Likely reactive from fall in right humeral neck fracture  CBC in AM

## 2022-01-08 NOTE — ASSESSMENT & PLAN NOTE
Ground-level fall versus possible syncope  Developed acute right-sided shoulder pain after fall  Right shoulder X ray - Comminuted acute angulated fracture of the humeral neck is identified which also appears to involve the greater tuberosity.  Orthopedic surgery was consulted in ER, no plan for surgical intervention, recommended a sling which was ordered by ERP.  Sling   Pain control   PT/OT     He is in today for a Subsequent Medicare evaluation/ regular check  He continues to do very well, continues to remain active at the gym over the winter months, active at home during the summer, just back from [address], no cardiovascular complaints  He will continue on aspirin 81 mg daily, Crestor 10 mg daily along with metoprolol 12 5 mg daily as is  He last saw Cardiology at [facility] in August, he does plan to follow up with them  He has done Bright & Bright screening yearly, we reviewed his results from last year which are essentially unremarkable  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, avoid overuse, could try meloxicam 7 5 mg 1 or 2 daily sparingly with food watching for stomach upset, consider hand therapy/hand specialist if persists or worsens  He has no other arthritic complaints  Immunization History   Administered Date(s) Administered    Influenza Split High Dose Preservative Free IM 10/27/2016    Pneumococcal Polysaccharide PPV23 09/01/2006, 09/16/2014    TD (adult) Preservative Free 09/01/2006    Zoster 01/01/2012       Discussed Pneumococcal vaccines,    Prevnar  is up to date   Pneumovax  is up to date  He does do yearly Flu shot  Tdap/tetanus shot will be done at a future date  (done every 10 yrs for superficial cuts, every 5 yrs for deep wounds)   Can also look into coverage for new shingles shot, Shingrix (indicated if over 48years of age ) Can do that at pharmacy  Was never a smoker     Regarding Colon Cancer screening, we discussed Colonoscopy vs ColoGuard :    Not indicated     Discussed pros and cons regarding Prostate Cancer screening,   PSA blood test not indicated    We discussed end of life planning, does have a  "LIVING WILL"       Glaucoma screening is   due  Last one 2 yrs ago    We did review previous blood work,  His blood work from June 2017 showed HDL 67, LDL 64  CMP within normal limits other than borderline glucose, urinalysis was negative    He is up to date with Lipid screening  He is up to date with Diabetes screening    Discussed importance of routine exercise, healthy diet     Xochilt Parks -  Can see Dermatology for full body skin exam/skin cancer screening, -  Still see Dr Catalina Olivo re scalp actinics   should see Dentist routinely ( does)    We will see him again in 13 months, sooner as needed

## 2022-01-09 ENCOUNTER — APPOINTMENT (OUTPATIENT)
Dept: CARDIOLOGY | Facility: MEDICAL CENTER | Age: 87
End: 2022-01-09
Attending: INTERNAL MEDICINE
Payer: MEDICARE

## 2022-01-09 PROBLEM — R55 SYNCOPE: Status: ACTIVE | Noted: 2021-06-03

## 2022-01-09 LAB
ALBUMIN SERPL BCP-MCNC: 3.4 G/DL (ref 3.2–4.9)
ALBUMIN/GLOB SERPL: 1.6 G/DL
ALP SERPL-CCNC: 89 U/L (ref 30–99)
ALT SERPL-CCNC: 12 U/L (ref 2–50)
ANION GAP SERPL CALC-SCNC: 13 MMOL/L (ref 7–16)
AST SERPL-CCNC: 17 U/L (ref 12–45)
BASOPHILS # BLD AUTO: 0.6 % (ref 0–1.8)
BASOPHILS # BLD: 0.06 K/UL (ref 0–0.12)
BILIRUB SERPL-MCNC: 1.1 MG/DL (ref 0.1–1.5)
BUN SERPL-MCNC: 12 MG/DL (ref 8–22)
CALCIUM SERPL-MCNC: 7.9 MG/DL (ref 8.5–10.5)
CHLORIDE SERPL-SCNC: 97 MMOL/L (ref 96–112)
CO2 SERPL-SCNC: 19 MMOL/L (ref 20–33)
CREAT SERPL-MCNC: 0.59 MG/DL (ref 0.5–1.4)
EOSINOPHIL # BLD AUTO: 0.09 K/UL (ref 0–0.51)
EOSINOPHIL NFR BLD: 0.9 % (ref 0–6.9)
ERYTHROCYTE [DISTWIDTH] IN BLOOD BY AUTOMATED COUNT: 46.8 FL (ref 35.9–50)
GLOBULIN SER CALC-MCNC: 2.1 G/DL (ref 1.9–3.5)
GLUCOSE SERPL-MCNC: 140 MG/DL (ref 65–99)
HCT VFR BLD AUTO: 29.4 % (ref 37–47)
HGB BLD-MCNC: 10.3 G/DL (ref 12–16)
IMM GRANULOCYTES # BLD AUTO: 0.06 K/UL (ref 0–0.11)
IMM GRANULOCYTES NFR BLD AUTO: 0.6 % (ref 0–0.9)
LV EJECT FRACT  99904: 70
LV EJECT FRACT MOD 4C 99902: 79.4
LYMPHOCYTES # BLD AUTO: 1.59 K/UL (ref 1–4.8)
LYMPHOCYTES NFR BLD: 15.3 % (ref 22–41)
MCH RBC QN AUTO: 32.9 PG (ref 27–33)
MCHC RBC AUTO-ENTMCNC: 35 G/DL (ref 33.6–35)
MCV RBC AUTO: 93.9 FL (ref 81.4–97.8)
MONOCYTES # BLD AUTO: 0.71 K/UL (ref 0–0.85)
MONOCYTES NFR BLD AUTO: 6.8 % (ref 0–13.4)
NEUTROPHILS # BLD AUTO: 7.87 K/UL (ref 2–7.15)
NEUTROPHILS NFR BLD: 75.8 % (ref 44–72)
NRBC # BLD AUTO: 0 K/UL
NRBC BLD-RTO: 0 /100 WBC
PLATELET # BLD AUTO: 218 K/UL (ref 164–446)
PMV BLD AUTO: 9.8 FL (ref 9–12.9)
POTASSIUM SERPL-SCNC: 4.5 MMOL/L (ref 3.6–5.5)
PROT SERPL-MCNC: 5.5 G/DL (ref 6–8.2)
RBC # BLD AUTO: 3.13 M/UL (ref 4.2–5.4)
SODIUM SERPL-SCNC: 129 MMOL/L (ref 135–145)
TROPONIN T SERPL-MCNC: 39 NG/L (ref 6–19)
WBC # BLD AUTO: 10.4 K/UL (ref 4.8–10.8)

## 2022-01-09 PROCEDURE — 96366 THER/PROPH/DIAG IV INF ADDON: CPT

## 2022-01-09 PROCEDURE — 93306 TTE W/DOPPLER COMPLETE: CPT | Mod: 26 | Performed by: INTERNAL MEDICINE

## 2022-01-09 PROCEDURE — 700102 HCHG RX REV CODE 250 W/ 637 OVERRIDE(OP): Performed by: STUDENT IN AN ORGANIZED HEALTH CARE EDUCATION/TRAINING PROGRAM

## 2022-01-09 PROCEDURE — 700105 HCHG RX REV CODE 258: Performed by: STUDENT IN AN ORGANIZED HEALTH CARE EDUCATION/TRAINING PROGRAM

## 2022-01-09 PROCEDURE — 96376 TX/PRO/DX INJ SAME DRUG ADON: CPT | Mod: XU

## 2022-01-09 PROCEDURE — 700111 HCHG RX REV CODE 636 W/ 250 OVERRIDE (IP): Performed by: STUDENT IN AN ORGANIZED HEALTH CARE EDUCATION/TRAINING PROGRAM

## 2022-01-09 PROCEDURE — 99221 1ST HOSP IP/OBS SF/LOW 40: CPT | Performed by: ORTHOPAEDIC SURGERY

## 2022-01-09 PROCEDURE — 96365 THER/PROPH/DIAG IV INF INIT: CPT | Mod: XU

## 2022-01-09 PROCEDURE — 99225 PR SUBSEQUENT OBSERVATION CARE,LEVEL II: CPT | Performed by: INTERNAL MEDICINE

## 2022-01-09 PROCEDURE — 97162 PT EVAL MOD COMPLEX 30 MIN: CPT

## 2022-01-09 PROCEDURE — A9270 NON-COVERED ITEM OR SERVICE: HCPCS | Performed by: STUDENT IN AN ORGANIZED HEALTH CARE EDUCATION/TRAINING PROGRAM

## 2022-01-09 PROCEDURE — 97530 THERAPEUTIC ACTIVITIES: CPT

## 2022-01-09 PROCEDURE — 93306 TTE W/DOPPLER COMPLETE: CPT

## 2022-01-09 PROCEDURE — 99231 SBSQ HOSP IP/OBS SF/LOW 25: CPT | Performed by: ORTHOPAEDIC SURGERY

## 2022-01-09 PROCEDURE — 96375 TX/PRO/DX INJ NEW DRUG ADDON: CPT | Mod: XU

## 2022-01-09 PROCEDURE — G0378 HOSPITAL OBSERVATION PER HR: HCPCS

## 2022-01-09 RX ORDER — METOCLOPRAMIDE HYDROCHLORIDE 5 MG/ML
10 INJECTION INTRAMUSCULAR; INTRAVENOUS EVERY 6 HOURS
Status: DISCONTINUED | OUTPATIENT
Start: 2022-01-09 | End: 2022-01-10

## 2022-01-09 RX ORDER — MAGNESIUM SULFATE HEPTAHYDRATE 40 MG/ML
2 INJECTION, SOLUTION INTRAVENOUS ONCE
Status: COMPLETED | OUTPATIENT
Start: 2022-01-09 | End: 2022-01-09

## 2022-01-09 RX ADMIN — SODIUM CHLORIDE: 9 INJECTION, SOLUTION INTRAVENOUS at 17:14

## 2022-01-09 RX ADMIN — ONDANSETRON 4 MG: 2 INJECTION INTRAMUSCULAR; INTRAVENOUS at 14:23

## 2022-01-09 RX ADMIN — SODIUM CHLORIDE: 9 INJECTION, SOLUTION INTRAVENOUS at 04:28

## 2022-01-09 RX ADMIN — ACETAMINOPHEN 1000 MG: 500 TABLET ORAL at 05:30

## 2022-01-09 RX ADMIN — MAGNESIUM SULFATE HEPTAHYDRATE 2 G: 40 INJECTION, SOLUTION INTRAVENOUS at 17:13

## 2022-01-09 RX ADMIN — OXYCODONE 5 MG: 5 TABLET ORAL at 09:26

## 2022-01-09 RX ADMIN — ONDANSETRON 4 MG: 2 INJECTION INTRAMUSCULAR; INTRAVENOUS at 08:31

## 2022-01-09 RX ADMIN — LOVASTATIN 20 MG: 20 TABLET ORAL at 05:29

## 2022-01-09 RX ADMIN — OXYCODONE 5 MG: 5 TABLET ORAL at 20:11

## 2022-01-09 RX ADMIN — METOCLOPRAMIDE 10 MG: 5 INJECTION, SOLUTION INTRAMUSCULAR; INTRAVENOUS at 17:09

## 2022-01-09 RX ADMIN — HYDROCHLOROTHIAZIDE 25 MG: 25 TABLET ORAL at 05:29

## 2022-01-09 RX ADMIN — LOSARTAN POTASSIUM 100 MG: 50 TABLET, FILM COATED ORAL at 05:29

## 2022-01-09 RX ADMIN — METOCLOPRAMIDE 10 MG: 5 INJECTION, SOLUTION INTRAMUSCULAR; INTRAVENOUS at 23:39

## 2022-01-09 RX ADMIN — ACETAMINOPHEN 1000 MG: 500 TABLET ORAL at 23:39

## 2022-01-09 RX ADMIN — SENNOSIDES AND DOCUSATE SODIUM 2 TABLET: 50; 8.6 TABLET ORAL at 05:30

## 2022-01-09 RX ADMIN — OXYCODONE 5 MG: 5 TABLET ORAL at 00:15

## 2022-01-09 ASSESSMENT — COGNITIVE AND FUNCTIONAL STATUS - GENERAL
WALKING IN HOSPITAL ROOM: A LITTLE
MOVING TO AND FROM BED TO CHAIR: A LOT
TURNING FROM BACK TO SIDE WHILE IN FLAT BAD: A LOT
STANDING UP FROM CHAIR USING ARMS: A LITTLE
MOVING FROM LYING ON BACK TO SITTING ON SIDE OF FLAT BED: A LITTLE
CLIMB 3 TO 5 STEPS WITH RAILING: A LITTLE
MOBILITY SCORE: 16
SUGGESTED CMS G CODE MODIFIER MOBILITY: CK

## 2022-01-09 ASSESSMENT — ENCOUNTER SYMPTOMS
SHORTNESS OF BREATH: 0
BACK PAIN: 0
HEADACHES: 0
BRUISES/BLEEDS EASILY: 0
DIAPHORESIS: 0
SEIZURES: 0
DIARRHEA: 0
DIZZINESS: 0
VOMITING: 0
MYALGIAS: 0
FOCAL WEAKNESS: 0
NECK PAIN: 0
FLANK PAIN: 0
FEVER: 0
BLOOD IN STOOL: 0
CHILLS: 0
BLURRED VISION: 0
WHEEZING: 0
SPUTUM PRODUCTION: 0
NAUSEA: 0
SORE THROAT: 0
ABDOMINAL PAIN: 0
COUGH: 0
PALPITATIONS: 0

## 2022-01-09 ASSESSMENT — PAIN DESCRIPTION - PAIN TYPE
TYPE: ACUTE PAIN

## 2022-01-09 ASSESSMENT — GAIT ASSESSMENTS: GAIT LEVEL OF ASSIST: UNABLE TO PARTICIPATE

## 2022-01-09 NOTE — THERAPY
Physical Therapy   Initial Evaluation     Patient Name: Grisel Saha  Age:  86 y.o., Sex:  female  Medical Record #: [id number]  Today's Date: 1/9/2022     Precautions  Precautions: Fall Risk;Non Weight Bearing Right Upper Extremity  Comments: RUE sling     Assessment  Patient is 86 y.o. female admitted s/p GLF imaging indicative of fracture of humeral neck (nonoperataive). PMHx includes for hypertension, hyperlipidemia. Patient presents to PT evaluation with significant R shoulder pain 9/10, RUE weakness, RUE impaired ROM, impaired balance and decreased activity tolerance. Patient requiring increased time to perform bed mobility with modA d/t pain, however patient reports will be sleeping in recliner chair once home. Upon sitting EOB, patient c/o nausea and lightheadedness, requesting to return to bed. VSS. Patient has good support from  and daughters who live nearby. Anticipate will be able to dc home, however will require transfer/gait/stair training assessment.     Plan    Recommend Physical Therapy 4 times per week until therapy goals are met for the following treatments:  Gait Training, Stair Training and Therapeutic Activities    DC Equipment Recommendations: (P)  (hemiwalker vs SPC (owns) )  Discharge Recommendations: (P) Other - needs gait assessment prior to dc, if dc home will need HHPT        Objective       01/09/22 1050   Prior Living Situation   Housing / Facility 1 Story House   Steps Into Home 2   Steps In Home 0   Rail Left Rail  (Steps into Home)   Bathroom Set up Walk In Shower   Equipment Owned Single Point Cane   Lives with - Patient's Self Care Capacity Spouse   Comments has 2 daughters nearby that can assist PRN   Prior Level of Functional Mobility   Bed Mobility Independent   Transfer Status Independent   Ambulation Independent   Distance Ambulation (Feet)   (community )   Assistive Devices Used None   Stairs Independent   Comments active- walks x30min/dy    Gait Analysis   Gait Level  Of Assist Unable to Participate   Comments limited d/t nausea   Bed Mobility    Supine to Sit Moderate Assist   Sit to Supine Moderate Assist   Scooting Moderate Assist   Rolling Moderate Assist to Lt.   Comments will sleep in recliner   Functional Mobility   Sit to Stand Unable to Participate   Comments limited by nausea   Patient / Family Goals    Patient / Family Goal #1 to go home   Short Term Goals    Short Term Goal # 1 in 6 visits pt to demonstrate transfers SPV in order to go home   Short Term Goal # 2 in 6 visits pt to demo ambulation x200' SPV in order to go home   Short Term Goal # 3 in 6 visits pt to demo stair negotiation x2 with SPV in order to go home   Short Term Goal # 4 in 6 visits pt to demo understanding of weightbearing precautions during all mobility in order to go home safely    Anticipated Discharge Equipment and Recommendations   DC Equipment Recommendations   (hemiwalker vs SPC (owns) )   Discharge Recommendations Other -

## 2022-01-09 NOTE — DISCHARGE PLANNING
HTH/SCP TCN chart review completed. Collaborated with BRADEN Rodriguez, bedside RN Dakota and PT Odalis. The most current review of medical record, knowledge of pt's PLOF and social support, LACE+ score of 22, 6 clicks scores of 16 mobility were considered.      Pt seen at bedside with dtr present. Introduced TCN program to pt and provided education regarding differences in post acute resources including IRF, SNF and HH. Pt/family verbalize understanding. Current PT/OT recommendations are for HH services with family support; however, PT evaluation was limited by dizziness/nausea today. Gait and balance will need to be reassessed when pt is more able to participate. Choice forms obtained for HH and given to BRADEN Epstein. In case therapy recommendations upgrade to post acute placement, IRF/SNF choice forms were reviewed and left at bedside.  [facility] services introduced, pt/family wanting to participate in transitional care services. Referral placed via Email. TCN will continue to follow and collaborate with discharge planning team as additional post acute needs arise. Thank you.

## 2022-01-09 NOTE — CARE PLAN
The patient is Watcher - Medium risk of patient condition declining or worsening    Shift Goals  Clinical Goals: Labs, PT/OT, pain mgmt   Patient Goals: comfort, safety   Family Goals: comfort, safety     Progress made toward(s) clinical / shift goals:    Problem: Pain - Standard  Goal: Alleviation of pain or a reduction in pain to the patient’s comfort goal  1/9/2022 0037 by Gardenia Andre R.N.  Outcome: Progressing  1/9/2022 0035 by Gardenia Andre R.N.  Outcome: Progressing     Problem: Knowledge Deficit - Standard  Goal: Patient and family/care givers will demonstrate understanding of plan of care, disease process/condition, diagnostic tests and medications  1/9/2022 0037 by Gardenia Andre R.N.  Outcome: Progressing  1/9/2022 0035 by Gardenia Andre R.N.  Outcome: Progressing     Problem: Fall Risk  Goal: Patient will remain free from falls  1/9/2022 0037 by Gardenia Andre R.N.  Outcome: Progressing  1/9/2022 0035 by Gardenia Andre R.N.  Outcome: Progressing     Problem: Skin Integrity  Goal: Skin integrity is maintained or improved  Outcome: Progressing       Patient is not progressing towards the following goals:

## 2022-01-09 NOTE — PROGRESS NOTES
Assessment completed. Pt A&Ox 4. Respirations are even and unlabored on room air. Pt denies pain at this time. Monitors applied, VS stable, call light and belongings within reach. POC updated (PT/OT). Pt educated on room and call light, pt verbalized understanding. Communication board updated. Needs met.

## 2022-01-09 NOTE — PROGRESS NOTES
Hospital Medicine Daily Progress Note    Date of Service  1/9/2022    Chief Complaint  Grisel Saha is a 86 y.o. female admitted 1/8/2022 with syncope and right shoulder pain    Hospital Course  86-year-old female who presented with syncope, ground-level fall and right shoulder pain.  She was found to have a comminuted acute angulated fracture of the humeral neck.  Ortho recommended nonoperative management    Interval Problem Update  Patient's right arm is in sling.  She reports pain on movement.  Trop has been mildly trending up. Denies CP. Check 2D echo and cardiac monitoring.  PT OT    I have personally seen and examined the patient at bedside. I discussed the plan of care with patient.    Consultants/Specialty  orthopedics    Code Status  Full Code    Disposition  Patient is not medically cleared for discharge.   Anticipate discharge to to skilled nursing facility.  I have placed the appropriate orders for post-discharge needs.    Review of Systems  Review of Systems   Constitutional: Negative for chills, diaphoresis and fever.   HENT: Negative for hearing loss and sore throat.    Eyes: Negative for blurred vision.   Respiratory: Negative for cough, sputum production, shortness of breath and wheezing.    Cardiovascular: Negative for chest pain, palpitations and leg swelling.   Gastrointestinal: Negative for abdominal pain, blood in stool, diarrhea, nausea and vomiting.   Genitourinary: Negative for dysuria, flank pain and urgency.   Musculoskeletal: Positive for joint pain. Negative for back pain, myalgias and neck pain.   Skin: Negative for rash.   Neurological: Negative for dizziness, focal weakness, seizures and headaches.   Endo/Heme/Allergies: Does not bruise/bleed easily.   Psychiatric/Behavioral: Negative for suicidal ideas.   All other systems reviewed and are negative.       Physical Exam  Temp:  [36.1 °C (97 °F)-36.4 °C (97.6 °F)] 36.1 °C (97 °F)  Pulse:  [79-87] 87  Resp:  [16-18] 18  BP:  (148-174)/(40-92) 166/78  SpO2:  [89 %-93 %] 92 %    Physical Exam  Vitals and nursing note reviewed.   Constitutional:       General: She is not in acute distress.     Appearance: Normal appearance.   HENT:      Head: Normocephalic and atraumatic.      Nose: Nose normal.      Mouth/Throat:      Mouth: Mucous membranes are moist.   Eyes:      Extraocular Movements: Extraocular movements intact.      Conjunctiva/sclera: Conjunctivae normal.      Pupils: Pupils are equal, round, and reactive to light.   Cardiovascular:      Rate and Rhythm: Normal rate and regular rhythm.      Pulses: Normal pulses.      Heart sounds: Normal heart sounds.   Pulmonary:      Effort: Pulmonary effort is normal. No respiratory distress.      Breath sounds: Normal breath sounds. No wheezing, rhonchi or rales.   Abdominal:      General: Bowel sounds are normal. There is no distension.      Palpations: Abdomen is soft.      Tenderness: There is no abdominal tenderness.   Musculoskeletal:         General: No swelling or tenderness. Normal range of motion.      Cervical back: Normal range of motion and neck supple.      Comments: Right arm in sling   Lymphadenopathy:      Cervical: No cervical adenopathy.   Skin:     General: Skin is warm.      Coloration: Skin is not jaundiced.      Findings: No rash.   Neurological:      General: No focal deficit present.      Mental Status: She is alert and oriented to person, place, and time.      Cranial Nerves: No cranial nerve deficit.      Motor: No weakness.   Psychiatric:         Mood and Affect: Mood normal.         Behavior: Behavior normal.         Fluids  No intake or output data in the 24 hours ending 01/09/22 1452    Laboratory  Recent Labs     01/08/22  0855 01/08/22  2351   WBC 14.8* 10.4   RBC 3.33* 3.13*   HEMOGLOBIN 11.2* 10.3*   HEMATOCRIT 31.6* 29.4*   MCV 94.9 93.9   MCH 33.6* 32.9   MCHC 35.4* 35.0   RDW 46.3 46.8   PLATELETCT 248 218   MPV 9.5 9.8     Recent Labs     01/08/22  0855  01/08/22  2351   SODIUM 133* 129*   POTASSIUM 3.4* 4.5   CHLORIDE 98 97   CO2 19* 19*   GLUCOSE 133* 140*   BUN 10 12   CREATININE 0.48* 0.59   CALCIUM 7.9* 7.9*     Recent Labs     01/08/22  0855   APTT 28.5   INR 1.01               Imaging  DX-SHOULDER 2+ RIGHT   Final Result      1.  Comminuted acute angulated fracture of the humeral neck is identified which also appears to involve the greater tuberosity.      DX-CHEST-PORTABLE (1 VIEW)   Final Result         1.  Mild linear and interstitial opacifications are noted which could be due to edema pneumonitis or pneumonia.      2.  No consolidations identified.      3.  No pneumothorax.      CT-HEAD W/O   Final Result         NO ACUTE ABNORMALITIES ARE NOTED ON CT SCAN OF THE HEAD.      Decreased attenuation in the cerebral white matter likely indicates microvascular ischemic disease.         CT-MAXILLOFACIAL W/O PLUS RECONS   Final Result         No evidence of facial fracture.      EC-ECHOCARDIOGRAM COMPLETE W/O CONT    (Results Pending)        Assessment/Plan  * Fracture of anatomical neck of humerus, right, closed, initial encounter- (present on admission)  Assessment & Plan  Ground-level fall versus possible syncope  Developed acute right-sided shoulder pain after fall  Right shoulder X ray - Comminuted acute angulated fracture of the humeral neck is identified which also appears to involve the greater tuberosity.  Orthopedic surgery was consulted in ER, no plan for surgical intervention, recommended a sling which was ordered by ERP.  Sling   Pain control   PT/OT        Syncope- (present on admission)  Assessment & Plan  Rule out cardiogenic causes  Continuous cardiac monitoring on telemetry  Check 2D echo  Check orthostatics      Elevated troponin  Assessment & Plan  Will trend Trop   No chest pain   EKG reviewed       Leucocytosis  Assessment & Plan  Monitor for signs symptoms of active infection  Likely reactive from fall in right humeral neck fracture  CBC  in AM    Hypokalemia  Assessment & Plan  Replete   CMP in AM  Pt is on thiazide     Hyponatremia- (present on admission)  Assessment & Plan  Acute on Chronic   hold hctz  Gentle IV fluid  CMP in AM     Hyperlipidemia- (present on admission)  Assessment & Plan  Continue home statin    Essential hypertension- (present on admission)  Assessment & Plan  We will continue home losartan  DC hctz due to hyponatremia       VTE prophylaxis: enoxaparin ppx    I have performed a physical exam and reviewed and updated ROS and Plan today (1/9/2022). In review of yesterday's note (1/8/2022), there are no changes except as documented above.

## 2022-01-09 NOTE — PROGRESS NOTES
I have received report from day shift RN. I have assumed care of this patient. Patient has been assessed (see flow sheet) and plan of care has been discussed. Patient verbalizes understanding of plan and denies any further needs at this time. Patient is now resting in bed, bed is in the lowest position and locked, and the call light is within reach.

## 2022-01-09 NOTE — CARE PLAN
Problem: Pain - Standard  Goal: Alleviation of pain or a reduction in pain to the patient’s comfort goal  Outcome: Progressing     Problem: Knowledge Deficit - Standard  Goal: Patient and family/care givers will demonstrate understanding of plan of care, disease process/condition, diagnostic tests and medications  Outcome: Progressing     Problem: Fall Risk  Goal: Patient will remain free from falls  Outcome: Progressing     Problem: Skin Integrity  Goal: Skin integrity is maintained or improved  Outcome: Progressing   The patient is Stable - Low risk of patient condition declining or worsening    Shift Goals  Clinical Goals: Labs, PT/OT, pain mgmt   Patient Goals: pain control  Family Goals: comfort, safety     Progress made toward(s) clinical / shift goals: Patient updated on POC    Patient is not progressing towards the following goals:

## 2022-01-10 VITALS
WEIGHT: 139.77 LBS | SYSTOLIC BLOOD PRESSURE: 168 MMHG | DIASTOLIC BLOOD PRESSURE: 74 MMHG | HEART RATE: 88 BPM | RESPIRATION RATE: 20 BRPM | BODY MASS INDEX: 29.34 KG/M2 | TEMPERATURE: 98 F | HEIGHT: 58 IN | OXYGEN SATURATION: 92 %

## 2022-01-10 PROBLEM — E87.6 HYPOKALEMIA: Status: RESOLVED | Noted: 2022-01-08 | Resolved: 2022-01-10

## 2022-01-10 PROBLEM — R79.89 ELEVATED TROPONIN: Status: RESOLVED | Noted: 2022-01-08 | Resolved: 2022-01-10

## 2022-01-10 PROBLEM — D72.829 LEUCOCYTOSIS: Status: RESOLVED | Noted: 2022-01-08 | Resolved: 2022-01-10

## 2022-01-10 PROBLEM — R55 SYNCOPE: Status: RESOLVED | Noted: 2021-06-03 | Resolved: 2022-01-10

## 2022-01-10 LAB
ANION GAP SERPL CALC-SCNC: 11 MMOL/L (ref 7–16)
BASOPHILS # BLD AUTO: 0.7 % (ref 0–1.8)
BASOPHILS # BLD: 0.06 K/UL (ref 0–0.12)
BUN SERPL-MCNC: 10 MG/DL (ref 8–22)
CALCIUM SERPL-MCNC: 8 MG/DL (ref 8.5–10.5)
CHLORIDE SERPL-SCNC: 95 MMOL/L (ref 96–112)
CO2 SERPL-SCNC: 20 MMOL/L (ref 20–33)
CREAT SERPL-MCNC: 0.61 MG/DL (ref 0.5–1.4)
EOSINOPHIL # BLD AUTO: 0.14 K/UL (ref 0–0.51)
EOSINOPHIL NFR BLD: 1.6 % (ref 0–6.9)
ERYTHROCYTE [DISTWIDTH] IN BLOOD BY AUTOMATED COUNT: 46.1 FL (ref 35.9–50)
GLUCOSE SERPL-MCNC: 169 MG/DL (ref 65–99)
HCT VFR BLD AUTO: 32.1 % (ref 37–47)
HGB BLD-MCNC: 11.3 G/DL (ref 12–16)
IMM GRANULOCYTES # BLD AUTO: 0.07 K/UL (ref 0–0.11)
IMM GRANULOCYTES NFR BLD AUTO: 0.8 % (ref 0–0.9)
LYMPHOCYTES # BLD AUTO: 0.96 K/UL (ref 1–4.8)
LYMPHOCYTES NFR BLD: 10.8 % (ref 22–41)
MCH RBC QN AUTO: 33.2 PG (ref 27–33)
MCHC RBC AUTO-ENTMCNC: 35.2 G/DL (ref 33.6–35)
MCV RBC AUTO: 94.4 FL (ref 81.4–97.8)
MONOCYTES # BLD AUTO: 0.68 K/UL (ref 0–0.85)
MONOCYTES NFR BLD AUTO: 7.7 % (ref 0–13.4)
NEUTROPHILS # BLD AUTO: 6.97 K/UL (ref 2–7.15)
NEUTROPHILS NFR BLD: 78.4 % (ref 44–72)
NRBC # BLD AUTO: 0 K/UL
NRBC BLD-RTO: 0 /100 WBC
PLATELET # BLD AUTO: 230 K/UL (ref 164–446)
PMV BLD AUTO: 9.7 FL (ref 9–12.9)
POTASSIUM SERPL-SCNC: 4 MMOL/L (ref 3.6–5.5)
RBC # BLD AUTO: 3.4 M/UL (ref 4.2–5.4)
SARS-COV+SARS-COV-2 AG RESP QL IA.RAPID: NOTDETECTED
SODIUM SERPL-SCNC: 126 MMOL/L (ref 135–145)
SPECIMEN SOURCE: NORMAL
WBC # BLD AUTO: 8.9 K/UL (ref 4.8–10.8)

## 2022-01-10 PROCEDURE — 700111 HCHG RX REV CODE 636 W/ 250 OVERRIDE (IP): Performed by: STUDENT IN AN ORGANIZED HEALTH CARE EDUCATION/TRAINING PROGRAM

## 2022-01-10 PROCEDURE — 85025 COMPLETE CBC W/AUTO DIFF WBC: CPT

## 2022-01-10 PROCEDURE — 90662 IIV NO PRSV INCREASED AG IM: CPT | Performed by: STUDENT IN AN ORGANIZED HEALTH CARE EDUCATION/TRAINING PROGRAM

## 2022-01-10 PROCEDURE — A9270 NON-COVERED ITEM OR SERVICE: HCPCS | Performed by: STUDENT IN AN ORGANIZED HEALTH CARE EDUCATION/TRAINING PROGRAM

## 2022-01-10 PROCEDURE — 700105 HCHG RX REV CODE 258: Performed by: STUDENT IN AN ORGANIZED HEALTH CARE EDUCATION/TRAINING PROGRAM

## 2022-01-10 PROCEDURE — 99217 PR OBSERVATION CARE DISCHARGE: CPT | Performed by: INTERNAL MEDICINE

## 2022-01-10 PROCEDURE — 80048 BASIC METABOLIC PNL TOTAL CA: CPT

## 2022-01-10 PROCEDURE — 700102 HCHG RX REV CODE 250 W/ 637 OVERRIDE(OP): Performed by: INTERNAL MEDICINE

## 2022-01-10 PROCEDURE — G0378 HOSPITAL OBSERVATION PER HR: HCPCS

## 2022-01-10 PROCEDURE — 96375 TX/PRO/DX INJ NEW DRUG ADDON: CPT

## 2022-01-10 PROCEDURE — 700102 HCHG RX REV CODE 250 W/ 637 OVERRIDE(OP): Performed by: STUDENT IN AN ORGANIZED HEALTH CARE EDUCATION/TRAINING PROGRAM

## 2022-01-10 PROCEDURE — 96376 TX/PRO/DX INJ SAME DRUG ADON: CPT

## 2022-01-10 PROCEDURE — 36415 COLL VENOUS BLD VENIPUNCTURE: CPT

## 2022-01-10 PROCEDURE — 97530 THERAPEUTIC ACTIVITIES: CPT

## 2022-01-10 PROCEDURE — 90471 IMMUNIZATION ADMIN: CPT

## 2022-01-10 PROCEDURE — 700101 HCHG RX REV CODE 250: Performed by: STUDENT IN AN ORGANIZED HEALTH CARE EDUCATION/TRAINING PROGRAM

## 2022-01-10 PROCEDURE — 87426 SARSCOV CORONAVIRUS AG IA: CPT

## 2022-01-10 PROCEDURE — A9270 NON-COVERED ITEM OR SERVICE: HCPCS | Performed by: INTERNAL MEDICINE

## 2022-01-10 PROCEDURE — 97535 SELF CARE MNGMENT TRAINING: CPT

## 2022-01-10 PROCEDURE — 97116 GAIT TRAINING THERAPY: CPT

## 2022-01-10 RX ORDER — METOCLOPRAMIDE 10 MG/1
10 TABLET ORAL EVERY 6 HOURS
Status: DISCONTINUED | OUTPATIENT
Start: 2022-01-10 | End: 2022-01-10 | Stop reason: HOSPADM

## 2022-01-10 RX ADMIN — ONDANSETRON 4 MG: 4 TABLET, ORALLY DISINTEGRATING ORAL at 12:33

## 2022-01-10 RX ADMIN — ACETAMINOPHEN 1000 MG: 500 TABLET ORAL at 11:36

## 2022-01-10 RX ADMIN — OXYCODONE 5 MG: 5 TABLET ORAL at 01:11

## 2022-01-10 RX ADMIN — LOVASTATIN 20 MG: 20 TABLET ORAL at 05:20

## 2022-01-10 RX ADMIN — METOCLOPRAMIDE 10 MG: 10 TABLET ORAL at 11:36

## 2022-01-10 RX ADMIN — LABETALOL HYDROCHLORIDE 20 MG: 5 INJECTION, SOLUTION INTRAVENOUS at 16:38

## 2022-01-10 RX ADMIN — ACETAMINOPHEN 1000 MG: 500 TABLET ORAL at 17:11

## 2022-01-10 RX ADMIN — METOCLOPRAMIDE 10 MG: 10 TABLET ORAL at 17:11

## 2022-01-10 RX ADMIN — INFLUENZA A VIRUS A/VICTORIA/2570/2019 IVR-215 (H1N1) ANTIGEN (FORMALDEHYDE INACTIVATED), INFLUENZA A VIRUS A/TASMANIA/503/2020 IVR-221 (H3N2) ANTIGEN (FORMALDEHYDE INACTIVATED), INFLUENZA B VIRUS B/PHUKET/3073/2013 ANTIGEN (FORMALDEHYDE INACTIVATED), AND INFLUENZA B VIRUS B/WASHINGTON/02/2019 ANTIGEN (FORMALDEHYDE INACTIVATED) 0.7 ML: 60; 60; 60; 60 INJECTION, SUSPENSION INTRAMUSCULAR at 12:55

## 2022-01-10 RX ADMIN — LOSARTAN POTASSIUM 100 MG: 50 TABLET, FILM COATED ORAL at 05:20

## 2022-01-10 RX ADMIN — SODIUM CHLORIDE: 9 INJECTION, SOLUTION INTRAVENOUS at 11:36

## 2022-01-10 RX ADMIN — ACETAMINOPHEN 1000 MG: 500 TABLET ORAL at 05:20

## 2022-01-10 RX ADMIN — METOCLOPRAMIDE 10 MG: 5 INJECTION, SOLUTION INTRAMUSCULAR; INTRAVENOUS at 05:20

## 2022-01-10 ASSESSMENT — GAIT ASSESSMENTS
ASSISTIVE DEVICE: SINGLE POINT CANE
DISTANCE (FEET): 150
GAIT LEVEL OF ASSIST: SUPERVISED

## 2022-01-10 ASSESSMENT — PAIN DESCRIPTION - PAIN TYPE
TYPE: ACUTE PAIN
TYPE: ACUTE PAIN

## 2022-01-10 ASSESSMENT — COGNITIVE AND FUNCTIONAL STATUS - GENERAL
SUGGESTED CMS G CODE MODIFIER MOBILITY: CJ
TURNING FROM BACK TO SIDE WHILE IN FLAT BAD: UNABLE
CLIMB 3 TO 5 STEPS WITH RAILING: A LITTLE
MOBILITY SCORE: 20

## 2022-01-10 NOTE — DISCHARGE PLANNING
HTH/SCP TCN chart review completed. Collaborated with BRADEN Travis. Patient seen at bedside with dtr Raven present. Pt reports she is feeling better than she was yesterday, but is finding functional mobility quite taxing. Pt and family are in agreement that SNF placement for rehabilitation would be most appropriate prior to returning home. Choice form for SNF completed and given to BRADEN Travis. TCN will continue to follow and collaborate with discharge planning team as additional post acute needs arise. Thank you.    Previously completed:  - PT/OT evals complete  - Choice forms: HH, SNF   - [facility] introduced (Y), referral (sent).

## 2022-01-10 NOTE — CONSULTS
Date of Service: 01/09/22    Grisel Saha was seen today in consultation for right proximal humerus fracture at the request of Dr. Pelayo    CC: Right shoulder pain    HPI: Grisel Saha is a 86 y.o. female who presents with complaints of pain to right shoulder.  This started yesterday after a ground-level fall.  She was found on the floor by her family.  She was not complaining of other pain other than the right shoulder.  It is unclear the source of the fall whether this was syncopal in nature.  The pain is 7/10 and is described as sharp.  The pain is made worse by palpation of the area and made better by rest and immobilization.  Pain in the shoulder has improved since yesterday when the injury occurred    PMH:   Past Medical History:   Diagnosis Date   • HTN (hypertension)        PSH: No past surgical history on file.    FH:   Family History   Problem Relation Age of Onset   • Stroke Mother    • Cancer Father         lung       SH:   Social History     Socioeconomic History   • Marital status:      Spouse name: Not on file   • Number of children: Not on file   • Years of education: Not on file   • Highest education level: Not on file   Occupational History   • Not on file   Tobacco Use   • Smoking status: Former Smoker   • Smokeless tobacco: Never Used   Vaping Use   • Vaping Use: Never used   Substance and Sexual Activity   • Alcohol use: Yes   • Drug use: Not Currently   • Sexual activity: Not Currently   Other Topics Concern   • Not on file   Social History Narrative   • Not on file     Social Determinants of Health     Financial Resource Strain:    • Difficulty of Paying Living Expenses: Not on file   Food Insecurity:    • Worried About Running Out of Food in the Last Year: Not on file   • Ran Out of Food in the Last Year: Not on file   Transportation Needs:    • Lack of Transportation (Medical): Not on file   • Lack of Transportation (Non-Medical): Not on file   Physical Activity:    • Days of  "Exercise per Week: Not on file   • Minutes of Exercise per Session: Not on file   Stress:    • Feeling of Stress : Not on file   Social Connections:    • Frequency of Communication with Friends and Family: Not on file   • Frequency of Social Gatherings with Friends and Family: Not on file   • Attends Orthodoxy Services: Not on file   • Active Member of Clubs or Organizations: Not on file   • Attends Club or Organization Meetings: Not on file   • Marital Status: Not on file   Intimate Partner Violence:    • Fear of Current or Ex-Partner: Not on file   • Emotionally Abused: Not on file   • Physically Abused: Not on file   • Sexually Abused: Not on file   Housing Stability:    • Unable to Pay for Housing in the Last Year: Not on file   • Number of Places Lived in the Last Year: Not on file   • Unstable Housing in the Last Year: Not on file       ROS: In review of the following systems: Constitutional, Eyes, ENT, Cardiovascular,Respiratory, GI, , Musculoskeletal, Skin, Neuro, Psych, Hematologic, Endocrine, Allergic; no pertinent findings were found related to the chief complaint and orthopedic injury     /63   Pulse 77   Temp 36.3 °C (97.3 °F) (Temporal)   Resp 14   Ht 1.473 m (4' 10\")   Wt 63.4 kg (139 lb 12.4 oz)   SpO2 92%     Physical Exam:  General: Well nourished, well developed, age appropriate appearance   HEENT: Normocephalic, atraumatic  Psych: Normal mood and affect  Neck: Supple, no pain to motion  Chest/Pulmonary: breathing unlabored, no audible wheezing  Cardio: Regular heart rate and rhythm  Neuro: Sensation grossly intact to BUE and BLE, moving all four extremities  Skin: Intact with no full thickness abrasions or lacerations  MSK: Right shoulder and arm is in a sling.  Ecchymoses especially to the medial arm.  Swelling as expected.  No tenderness at the elbow forearm or fingers.  Left upper extremity and bilateral lower extremities are atraumatic and nontender to passive range of motion " and palpation    Imaging and labs: X-rays of the right shoulder show a impacted proximal humerus fracture with valgus alignment of the fracture site.  No displacement.  No dislocation or subluxation.    Recent Labs     01/08/22  0855 01/08/22  2351   WBC 14.8* 10.4   RBC 3.33* 3.13*   HEMOGLOBIN 11.2* 10.3*   HEMATOCRIT 31.6* 29.4*   MCV 94.9 93.9   MCH 33.6* 32.9   RDW 46.3 46.8   PLATELETCT 248 218   MPV 9.5 9.8   NEUTSPOLYS 85.00* 75.80*   LYMPHOCYTES 8.50* 15.30*   MONOCYTES 5.10 6.80   EOSINOPHILS 0.10 0.90   BASOPHILS 0.50 0.60       Assessment:   1. Other closed displaced fracture of proximal end of right humerus, initial encounter     2. Closed head injury, initial encounter     3. Contusion of face, initial encounter     4. Fall, initial encounter     5. Elevated troponin     6. Contusion of right chest wall, initial encounter         We discussed the diagnosis and findings with the patient at length.  We reviewed possible non operative and operative interventions and the risks and benefits of each of these.  I recommended continued nonoperative management with passive and active assisted range of motion.  She had a chance to ask questions and all of these were answered to her satisfaction.        Plan:  Sling for comfort to the right arm  Passive and active assisted range of motion to the right shoulder  PT/OT  Mobilization  Follow-up in clinic in 2 weeks time

## 2022-01-10 NOTE — DISCHARGE SUMMARY
Discharge Summary    CHIEF COMPLAINT ON ADMISSION  Chief Complaint   Patient presents with   • GLF     denies hitting head, possible LOC   • Shoulder Pain     right shoulder        Reason for Admission  GLF     Admission Date  1/8/2022    CODE STATUS  Full Code    HPI & HOSPITAL COURSE  This is a 86 y.o. female here with a possible syncopal event with ground-level fall and right shoulder pain.  Patient did not have recollection of the events.  In the ER her vitals were found to be stable.  CT head & CT maxillofacial  were negative.  Right shoulder x-ray revealed comminuted acute angulated fracture of the humeral neck.  Orthopedics was consulted and recommended nonsurgical treatment with passive and active assisted range of motion, right arm in sling.  She was also evaluated for syncope.  Her cardiac monitoring was negative for arrhythmias.  2D echo revealed LVEF 70% with no obvious valvular abnormality.  Patient will be discharged to skilled nursing facility for ongoing PT OT.  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  I have discontinued her HCTZ which may be contributing to her hyponatremia.      Therefore, she is discharged in fair and stable condition to home with close outpatient follow-up.      Discharge Date  1/10/22    FOLLOW UP ITEMS POST DISCHARGE  Follow-up with PCP and orthopedics    DISCHARGE DIAGNOSES  Principal Problem:    Fracture of anatomical neck of humerus, right, closed, initial encounter POA: Yes  Active Problems:    Syncope POA: Yes    Essential hypertension (Chronic) POA: Yes    Hyperlipidemia (Chronic) POA: Yes    Hyponatremia (Chronic) POA: Yes    Hypokalemia POA: Unknown    Leucocytosis POA: Unknown    Elevated troponin POA: Unknown  Resolved Problems:    Closed fracture of surgical neck of right humerus POA: Unknown      FOLLOW UP  Future Appointments   Date Time Provider Department Center   1/13/2022  8:00 AM Cricket Miller M.D.  [address]     No follow-up provider specified.    MEDICATIONS ON DISCHARGE     Medication List      CONTINUE taking these medications      Instructions   aspirin 81 MG Chew chewable tablet  Commonly known as: ASA   Chew 81 mg every day.  Dose: 81 mg     losartan 100 MG Tabs  Commonly known as: COZAAR   Take 1 Tab by mouth every day. TAKE 1 TABLET BY MOUTH DAILY  Dose: 100 mg     lovastatin 20 MG Tabs  Commonly known as: MEVACOR   Take 1 Tab by mouth every day. TAKE 1 TABLET BY MOUTH DAILY  Dose: 20 mg     triamcinolone acetonide 0.1 % Crea  Commonly known as: KENALOG   Apply to affected area twice daily for 14 day        STOP taking these medications    hydroCHLOROthiazide 25 MG Tabs  Commonly known as: HYDRODIURIL            Allergies  No Known Allergies    DIET  Orders Placed This Encounter   Procedures   • Diet Order Diet: Regular     Standing Status:   Standing     Number of Occurrences:   1     Order Specific Question:   Diet:     Answer:   Regular [1]       ACTIVITY  As tolerated.  Weight bearing as tolerated    CONSULTATIONS  Ortho    PROCEDURES  None    LABORATORY  Lab Results   Component Value Date    SODIUM 126 (L) 01/10/2022    POTASSIUM 4.0 01/10/2022    CHLORIDE 95 (L) 01/10/2022    CO2 20 01/10/2022    GLUCOSE 169 (H) 01/10/2022    BUN 10 01/10/2022    CREATININE 0.61 01/10/2022        Lab Results   Component Value Date    WBC 8.9 01/10/2022    HEMOGLOBIN 11.3 (L) 01/10/2022    HEMATOCRIT 32.1 (L) 01/10/2022    PLATELETCT 230 01/10/2022        Total time of the discharge process exceeds 35 minutes.

## 2022-01-10 NOTE — DISCHARGE PLANNING
Received Choice form at 0942  Agency/Facility Name: [facility]  Referral sent per Choice form [phone]- Spoke To: Yana  Agency/Facility Name: [facility]  Plan or Request: accepted, bed today. Transport time 1730 today by [facility].

## 2022-01-10 NOTE — THERAPY
"Physical Therapy   Daily Treatment     Patient Name: Grisel Saha  Age:  86 y.o., Sex:  female  Medical Record #: [id number]  Today's Date: 1/10/2022     Precautions  Precautions: Non Weight Bearing Right Upper Extremity  Comments: TOO collazo     Assessment    Rec'd pt alert, in bed, agreeable to work w/ PT.  She is able to both verbalize and demonstrate her NWB precautions t/o session.  She reports that she plans to sleep in her recliner at home.  She is able to sit eob w/ HOB elevated.  Able to stand and ambulate w/ a cane w/o loss of balance or need of assist.  She politely declines the need to perform stairs, stating that she will \"have plenty\" of people at home to assist.  She reports a desire to return home.  She owns her own cane.  PT for d/c needs only.      DC Equipment Recommendations: None  Discharge Recommendations: Recommend home health for continued physical therapy services        Objective       01/10/22 0728   Balance   Sitting Balance (Static) Fair +   Sitting Balance (Dynamic) Fair +   Standing Balance (Static) Fair   Standing Balance (Dynamic) Fair   Weight Shift Sitting Good   Weight Shift Standing Normal   Gait Analysis   Gait Level Of Assist Supervised   Assistive Device Single Point Cane   Distance (Feet) 150   Bed Mobility    Supine to Sit Supervised   Sit to Supine Supervised   Functional Mobility   Sit to Stand Supervised   Bed, Chair, Wheelchair Transfer Supervised   Short Term Goals    Short Term Goal # 1 in 6 visits pt to demonstrate transfers SPV in order to go home   Goal Outcome # 1 Goal met   Short Term Goal # 2 in 6 visits pt to demo ambulation x200' SPV in order to go home   Goal Outcome # 2 Goal met   Goal Outcome # 3   (declines need for stairs, states she has \"plenty of people\" )   Short Term Goal # 4 in 6 visits pt to demo understanding of weightbearing precautions during all mobility in order to go home safely    Goal Outcome # 4 Goal met   Anticipated Discharge Equipment and " Recommendations   DC Equipment Recommendations None   Discharge Recommendations Recommend home health for continued physical therapy services

## 2022-01-10 NOTE — DISCHARGE INSTRUCTIONS
Discharge Instructions    Discharged to [facility] by medical transportation with escort. Discharged via wheelchair, hospital escort: Yes.  Special equipment needed: Not Applicable    Be sure to schedule a follow-up appointment with your primary care doctor or any specialists as instructed.     Discharge Plan:   Diet Plan: Discussed  Activity Level: Discussed  Confirmed Follow up Appointment: Patient to Call and Schedule Appointment  Confirmed Symptoms Management: Discussed  Medication Reconciliation Updated: Yes  Influenza Vaccine Indication: Indicated: 65 years and older  Influenza Vaccine Given - only chart on this line when given: Influenza Vaccine Given (See MAR)    I understand that a diet low in cholesterol, fat, and sodium is recommended for good health. Unless I have been given specific instructions below for another diet, I accept this instruction as my diet prescription.     Other diet: Regular    · Is patient discharged on Warfarin / Coumadin?   No     Depression / Suicide Risk    As you are discharged from this [facility] facility, it is important to learn how to keep safe from harming yourself.    Recognize the warning signs:  · Abrupt changes in personality, positive or negative- including increase in energy   · Giving away possessions  · Change in eating patterns- significant weight changes-  positive or negative  · Change in sleeping patterns- unable to sleep or sleeping all the time   · Unwillingness or inability to communicate  · Depression  · Unusual sadness, discouragement and loneliness  · Talk of wanting to die  · Neglect of personal appearance   · Rebelliousness- reckless behavior  · Withdrawal from people/activities they love  · Confusion- inability to concentrate     If you or a loved one observes any of these behaviors or has concerns about self-harm, here's what you can do:  · Talk about it- your feelings and reasons for harming yourself  · Remove any means that you might use to hurt  yourself (examples: pills, rope, extension cords, firearm)  · Get professional help from the community (Mental Health, Substance Abuse, psychological counseling)  · Do not be alone:Call your Safe Contact- someone whom you trust who will be there for you.  · Call your local CRISIS HOTLINE 483-8289 or 637-097-1134  · Call your local Children's Mobile Crisis Response Team [place] (923) 679-3422 or www.SigFig  · Call the toll free National Suicide Prevention Hotlines   · National Suicide Prevention Lifeline 431-720-OKBM (0534)  · [place] [facility] Network 800-SUICIDE (092-7921)      Humerus Fracture Treated With Immobilization    A humerus fracture is a break in the large bone in the upper arm (humerus). If the joint is stable and the bones are still in their normal position (nondisplaced), the injury may be treated with immobilization. This involves the use of a cast, splint, or sling to hold your arm in place. Immobilization ensures that your bones continue to stay in the correct position while your arm is healing.  What are the causes?  This condition may be caused by:  · A fall.  · A hard, direct hit to the arm.  · A motor vehicle accident.  What increases the risk?  The following factors may make you more likely to develop this condition:  · Being elderly.  · Having a disease that makes the bones thin and weak.  What are the signs or symptoms?  Symptoms of this condition include:  · Pain.  · Swelling.  · Bruising.  · Not being able to move your arm normally.  How is this diagnosed?  This condition may be diagnosed based on:  · A physical exam.  · X-rays of your upper arm, elbow, and shoulder.  · CT scan.  How is this treated?  Treatment for this condition involves wearing a cast, splint, or sling until the injured area is stable enough for you to begin range-of-motion exercises. You may also be prescribed pain medicine.  Follow these instructions at home:  If you have a cast:  · Do not stick  anything inside the cast to scratch your skin. Doing that increases your risk of infection.  · Check the skin around the cast every day. Tell your health care provider about any concerns.  · You may put lotion on dry skin around the edges of the cast. Do not put lotion on the skin underneath the cast.  · Keep the cast clean and dry.  If you have a splint or sling:  · Wear the splint or sling as told by your health care provider. Remove it only as told by your health care provider.  · Loosen the splint or sling if your fingers tingle, become numb, or turn cold and blue.  · Keep the splint or sling clean and dry.  Bathing  · Do not take baths, swim, or use a hot tub until your health care provider approves. Ask your health care provider if you may take showers. You may only be allowed to take sponge baths.  · If the cast, splint, or sling is not waterproof:  ? Do not let it get wet.  ? Cover it with a watertight covering when you take a bath or shower.  · If you have a sling, remove it for bathing only if your health care provider tells you that it is safe to do that.  Managing pain, stiffness, and swelling    · If directed, put ice on the injured area.  ? If you have a removable splint or sling, remove it as told by your health care provider.  ? Put ice in a plastic bag.  ? Place a towel between your skin and the bag or between your cast and the bag.  ? Leave the ice on for 20 minutes, 2-3 times a day.  · Move your fingers often to reduce stiffness and swelling.  · Raise (elevate) the injured area above the level of your heart while you are sitting or lying down.  Driving  · Do not drive or use heavy machinery while taking prescription pain medicine.  · Do not drive while wearing a cast, splint, or sling on an arm that you use for driving. Ask your health care provider when it is safe to drive.  Activity  · Return to your normal activities as told by your health care provider. Ask your health care provider what  activities are safe for you.  · Do not lift anything until your health care provider says that it is safe.  · Do range-of-motion exercises only as told by your health care provider or physical therapist.  General instructions  · Do not put pressure on any part of the cast or splint until it is fully hardened. This may take several hours.  · Do not use any products that contain nicotine or tobacco, such as cigarettes, e-cigarettes, and chewing tobacco. These can delay bone healing. If you need help quitting, ask your health care provider.  · Take over-the-counter and prescription medicines only as told by your health care provider.  · Ask your health care provider if the medicine prescribed to you can cause constipation. You may need to take steps to prevent or treat constipation, such as:  ? Drink enough fluid to keep your urine pale yellow.  ? Take over-the-counter or prescription medicines.  ? Eat foods that are high in fiber, such as beans, whole grains, and fresh fruits and vegetables.  ? Limit foods that are high in fat and processed sugars, such as fried or sweet foods.  · Keep all follow-up visits as told by your health care provider. This is important.  Contact a health care provider if:  · You have any new pain, swelling, or bruising.  · Your pain, swelling, and bruising do not improve.  · Your cast, splint, or sling becomes loose or damaged.  Get help right away if:  · Your skin or fingers on your injured arm turn blue or gray.  · Your arm feels cold or numb.  · You have severe pain in your injured arm.  Summary  · A humerus fracture is a break in the large bone in the upper arm.  · Immobilization involves the use of a cast, splint, or sling to hold your arm in place while the injury heals.  · Wear a splint or sling as told by your health care provider. Remove it only as told by your health care provider.  · Move your fingers often to reduce stiffness and swelling.  This information is not intended to  replace advice given to you by your health care provider. Make sure you discuss any questions you have with your health care provider.  Document Released: 03/26/2002 Document Revised: 08/19/2019 Document Reviewed: 08/19/2019  Elsevier Patient Education © 2020 [employer]

## 2022-01-10 NOTE — PROGRESS NOTES
Report from night RN. Plan reviewed at bedside. Patient working with therapies this AM. Tolerating well. Daughter at bedside and provided with updates.

## 2022-01-10 NOTE — FACE TO FACE
Face to Face Supporting Documentation - Home Health    The encounter with this patient was in whole or in part the primary reason for home health admission.    Date of encounter:   Patient:                    MRN:                       YOB: 2022  Grisel Saha  [id number]  1935     Home health to see patient for:  Skilled Nursing care for assessment, interventions & education, Physical Therapy evaluation and treatment and Occupational therapy evaluation and treatment    Skilled need for:  Comment: right humerus fracture    Skilled nursing interventions to include:  Comment: pt ot    Homebound status evidenced by:  Needs the assistance of another person in order to leave the home. Leaving home requires a considerable and taxing effort. There is a normal inability to leave the home.    Community Physician to provide follow up care: Cricket Miller M.D.     Optional Interventions? No      I certify the face to face encounter for this home health care referral meets the CMS requirements and the encounter/clinical assessment with the patient was, in whole, or in part, for the medical condition(s) listed above, which is the primary reason for home health care. Based on my clinical findings: the service(s) are medically necessary, support the need for home health care, and the homebound criteria are met.  I certify that this patient has had a face to face encounter by myself.  Michael Moon M.D. - NPI: [id number]

## 2022-01-10 NOTE — THERAPY
"Occupational Therapy Contact Note     Patient Name: Grisel Saha  Age:  86 y.o., Sex:  female  Medical Record #: [id number]  Today's Date: 1/10/2022       01/10/22 0894   Interdisciplinary Plan of Care Collaboration   Collaboration Comments OT niki attempted, pt refused participation stating \"too close to other therapy session\", physical therapist recently took pt for a short walk. Education provided pathology of bedrest and it's impact on activity tolerance required for ADLs, as well as OT's roll in DC planning however pt continued to refuse participation and request rest. Pt daughter at bedside, reports her mother is usually much more energized than her current presentation.     "

## 2022-01-10 NOTE — CARE PLAN
The patient is Stable - Low risk of patient condition declining or worsening    Shift Goals  Clinical Goals: pain management  Patient Goals: work with therapies   Family Goals: safety, comfort     Progress made toward(s) clinical / shift goals:  Patient working with therapies, pain controlled by PRN meds    Patient is not progressing towards the following goals:NA      Problem: Pain - Standard  Goal: Alleviation of pain or a reduction in pain to the patient’s comfort goal  Outcome: Progressing       Problem: Mobility  Goal: Patient's capacity to carry out activities will improve  Outcome: Progressing

## 2022-01-10 NOTE — DISCHARGE PLANNING
Transfer packet printed and COBRA fille out. Spoke with patient at bedside and received verbal consent for transfer as patient unable to sign do to injury. Spoke with daughter via patients phone and updated on [facility] to [facility]. Transfer packet given to Tennille HERNANDEZ.

## 2022-01-10 NOTE — CARE PLAN
The patient is Stable - Low risk of patient condition declining or worsening    Shift Goals  Clinical Goals: PT/OT, pain mgmt   Patient Goals: safety, comfort   Family Goals: safety, comfort     Progress made toward(s) clinical / shift goals:    Problem: Pain - Standard  Goal: Alleviation of pain or a reduction in pain to the patient’s comfort goal  Outcome: Progressing     Problem: Knowledge Deficit - Standard  Goal: Patient and family/care givers will demonstrate understanding of plan of care, disease process/condition, diagnostic tests and medications  Outcome: Progressing     Problem: Fall Risk  Goal: Patient will remain free from falls  Outcome: Progressing     Problem: Skin Integrity  Goal: Skin integrity is maintained or improved  Outcome: Progressing       Patient is not progressing towards the following goals:

## 2022-01-11 ENCOUNTER — PATIENT OUTREACH (OUTPATIENT)
Dept: MEDICAL GROUP | Facility: PHYSICIAN GROUP | Age: 87
End: 2022-01-11

## 2022-01-11 PROBLEM — S42.291S: Chronic | Status: ACTIVE | Noted: 2022-01-11

## 2022-01-11 PROBLEM — R55 SYNCOPE: Chronic | Status: ACTIVE | Noted: 2021-06-03

## 2022-01-11 PROBLEM — Z91.81 HISTORY OF FALL: Status: ACTIVE | Noted: 2022-01-11

## 2022-01-11 PROBLEM — R26.89 BALANCE PROBLEM: Chronic | Status: ACTIVE | Noted: 2020-12-18

## 2022-01-11 PROBLEM — S42.213A CLOSED FRACTURE OF SURGICAL NECK OF HUMERUS: Status: ACTIVE | Noted: 2022-01-08

## 2022-01-11 PROBLEM — S42.291A: Chronic | Status: ACTIVE | Noted: 2022-01-08

## 2022-01-11 PROBLEM — S42.291S: Status: ACTIVE | Noted: 2022-01-11

## 2022-01-11 PROBLEM — K59.01 SLOW TRANSIT CONSTIPATION: Chronic | Status: ACTIVE | Noted: 2021-09-30

## 2022-01-12 NOTE — PROGRESS NOTES
Attempt 2 patient answered call for TCM follow up.  Reviewed patient's discharge instructions and medications.  Stated no new prescriptions but does need refills on all her medications.  Patient has 3 refills on file and just needs to call the pharmacy to have them filled.  Verbalized understanding.  Confirmed appointment for follow up with PCP.

## 2022-01-13 PROBLEM — M79.89 SWELLING IN RIGHT ARMPIT: Status: ACTIVE | Noted: 2022-01-13

## 2022-01-13 PROBLEM — R60.0 EDEMA OF RIGHT UPPER ARM: Status: ACTIVE | Noted: 2022-01-13

## 2022-01-17 PROBLEM — M79.89 SWELLING IN RIGHT ARMPIT: Status: RESOLVED | Noted: 2022-01-13 | Resolved: 2022-01-17

## 2022-01-17 PROBLEM — M79.89 LEG SWELLING: Chronic | Status: ACTIVE | Noted: 2022-01-13

## 2022-01-17 PROBLEM — R60.0 EDEMA OF RIGHT UPPER ARM: Chronic | Status: ACTIVE | Noted: 2022-01-13

## 2022-01-19 ENCOUNTER — HOME CARE VISIT (OUTPATIENT)
Dept: HOME HEALTH SERVICES | Facility: HOME HEALTHCARE | Age: 87
End: 2022-01-19

## 2022-01-21 ENCOUNTER — HOME HEALTH ADMISSION (OUTPATIENT)
Dept: HOME HEALTH SERVICES | Facility: HOME HEALTHCARE | Age: 87
End: 2022-01-21
Payer: MEDICARE

## 2022-01-26 ENCOUNTER — HOME CARE VISIT (OUTPATIENT)
Dept: HOME HEALTH SERVICES | Facility: HOME HEALTHCARE | Age: 87
End: 2022-01-26
Payer: MEDICARE

## 2022-01-26 ENCOUNTER — PATIENT MESSAGE (OUTPATIENT)
Dept: HEALTH INFORMATION MANAGEMENT | Facility: OTHER | Age: 87
End: 2022-01-26
Payer: MEDICARE

## 2022-01-26 VITALS
HEART RATE: 72 BPM | OXYGEN SATURATION: 98 % | WEIGHT: 139 LBS | BODY MASS INDEX: 28.02 KG/M2 | HEIGHT: 59 IN | TEMPERATURE: 97.4 F | SYSTOLIC BLOOD PRESSURE: 160 MMHG | RESPIRATION RATE: 20 BRPM | DIASTOLIC BLOOD PRESSURE: 80 MMHG

## 2022-01-26 PROCEDURE — 665001 SOC-HOME HEALTH

## 2022-01-26 PROCEDURE — G0493 RN CARE EA 15 MIN HH/HOSPICE: HCPCS

## 2022-01-26 ASSESSMENT — ENCOUNTER SYMPTOMS
PAIN LOCATION - RELIEVING FACTORS: REST
PAIN SEVERITY GOAL: 0/10
PAIN LOCATION: RIGHT SHOULDER
HIGHEST PAIN SEVERITY IN PAST 24 HOURS: 4/10
PAIN LOCATION - PAIN QUALITY: STABBING
PERSON REPORTING PAIN: PATIENT
PAIN LOCATION - PAIN DURATION: INTERMITTENT
PAIN LOCATION - PAIN SEVERITY: 4/10
SUBJECTIVE PAIN PROGRESSION: WAXING AND WANING
NAUSEA: DENIES
PAIN: 1
LOWEST PAIN SEVERITY IN PAST 24 HOURS: 0/10
VOMITING: DENIES
PAIN LOCATION - EXACERBATING FACTORS: MOVEMENT
ASSOCIATED SYMPTOMS: DENIES
PAIN LOCATION - PAIN FREQUENCY: WITH ACTIVITY

## 2022-01-26 ASSESSMENT — PATIENT HEALTH QUESTIONNAIRE - PHQ9
2. FEELING DOWN, DEPRESSED, IRRITABLE, OR HOPELESS: 00
1. LITTLE INTEREST OR PLEASURE IN DOING THINGS: 00
CLINICAL INTERPRETATION OF PHQ2 SCORE: 0

## 2022-01-26 ASSESSMENT — ACTIVITIES OF DAILY LIVING (ADL): OASIS_M1830: 03

## 2022-01-26 ASSESSMENT — FIBROSIS 4 INDEX: FIB4 SCORE: 1.83

## 2022-01-26 NOTE — CASE COMMUNICATION
"Primary dx/Skilled need:Syncope. HTN. GLF on 1/8 - Fracture right humerus. Skin tear left wrist and bug bite right axillary area on 1/25/22. History of NIDDM. HTN. Interstitial lung disease  Skilled care for Wound Care and HTN. Debility (PT and OT) Medication response assessment. .  SN frequency.1wk1  2wk2  1wk3  Zip code.[address]  Disciplines ordered.SN  PT  OT  Insurance and authorization.[facility]  Certification period.1/26/22 to 3/26/22  Spe cial considerations. Pt. refused GSC and will visit GP 1/27 Dr. Miller to have \"bug bite\" assessed Instructed to go to urgent care if the affected area increases with redness edema and or pain. Pt. receptive to instruction"

## 2022-01-27 ENCOUNTER — HOSPITAL ENCOUNTER (OUTPATIENT)
Dept: LAB | Facility: MEDICAL CENTER | Age: 87
End: 2022-01-27
Attending: FAMILY MEDICINE
Payer: MEDICARE

## 2022-01-27 ENCOUNTER — DOCUMENTATION (OUTPATIENT)
Dept: MEDICAL GROUP | Facility: PHYSICIAN GROUP | Age: 87
End: 2022-01-27

## 2022-01-27 ENCOUNTER — OFFICE VISIT (OUTPATIENT)
Dept: MEDICAL GROUP | Facility: PHYSICIAN GROUP | Age: 87
End: 2022-01-27
Payer: MEDICARE

## 2022-01-27 VITALS
WEIGHT: 139.8 LBS | SYSTOLIC BLOOD PRESSURE: 126 MMHG | OXYGEN SATURATION: 97 % | DIASTOLIC BLOOD PRESSURE: 66 MMHG | HEART RATE: 76 BPM | BODY MASS INDEX: 28.24 KG/M2 | RESPIRATION RATE: 16 BRPM | TEMPERATURE: 99.6 F

## 2022-01-27 DIAGNOSIS — R55 SYNCOPE, UNSPECIFIED SYNCOPE TYPE: Chronic | ICD-10-CM

## 2022-01-27 DIAGNOSIS — G31.9 CEREBRAL ATROPHY (HCC): ICD-10-CM

## 2022-01-27 DIAGNOSIS — R26.89 BALANCE PROBLEM: Chronic | ICD-10-CM

## 2022-01-27 DIAGNOSIS — E87.1 HYPONATREMIA: Chronic | ICD-10-CM

## 2022-01-27 DIAGNOSIS — J84.9 INTERSTITIAL LUNG DISEASE (HCC): ICD-10-CM

## 2022-01-27 DIAGNOSIS — S42.291S: Chronic | ICD-10-CM

## 2022-01-27 LAB
ANION GAP SERPL CALC-SCNC: 11 MMOL/L (ref 7–16)
BUN SERPL-MCNC: 12 MG/DL (ref 8–22)
CALCIUM SERPL-MCNC: 9 MG/DL (ref 8.5–10.5)
CHLORIDE SERPL-SCNC: 94 MMOL/L (ref 96–112)
CO2 SERPL-SCNC: 23 MMOL/L (ref 20–33)
CREAT SERPL-MCNC: 0.62 MG/DL (ref 0.5–1.4)
GLUCOSE SERPL-MCNC: 121 MG/DL (ref 65–99)
POTASSIUM SERPL-SCNC: 4.4 MMOL/L (ref 3.6–5.5)
SODIUM SERPL-SCNC: 128 MMOL/L (ref 135–145)

## 2022-01-27 PROCEDURE — 36415 COLL VENOUS BLD VENIPUNCTURE: CPT

## 2022-01-27 PROCEDURE — 99214 OFFICE O/P EST MOD 30 MIN: CPT | Performed by: FAMILY MEDICINE

## 2022-01-27 PROCEDURE — 8041 PR SCP AHA: Performed by: FAMILY MEDICINE

## 2022-01-27 PROCEDURE — 80048 BASIC METABOLIC PNL TOTAL CA: CPT

## 2022-01-27 RX ORDER — TRAMADOL HYDROCHLORIDE 50 MG/1
TABLET ORAL
COMMUNITY
Start: 2022-01-24 | End: 2022-05-03

## 2022-01-27 RX ORDER — SPIRONOLACTONE 25 MG/1
25 TABLET ORAL DAILY
Qty: 90 TABLET | Refills: 1 | Status: SHIPPED | OUTPATIENT
Start: 2022-01-27 | End: 2022-02-03

## 2022-01-27 ASSESSMENT — ENCOUNTER SYMPTOMS: SHORTNESS OF BREATH: T

## 2022-01-27 ASSESSMENT — FIBROSIS 4 INDEX: FIB4 SCORE: 1.83

## 2022-01-27 NOTE — ASSESSMENT & PLAN NOTE
New problem.  The patient experienced a syncopal episode with ground-level fall earlier this month.  She was hospitalized for this.  She suffered a right sided humeral neck fracture comminuted in nature.  Holter monitoring while in the hospital was completely negative.  TTE was also normal.  Presents today for hospital follow-up.

## 2022-01-27 NOTE — PROGRESS NOTES
Medication chart review for [facility] services    PCP:  Cricket Miller M.D.  [id number]  [place] [address]  Fax: [phone]    Current medication list     Current Outpatient Medications:   •  lidocaine, 1 Patch, Transdermal, Q24HRS  •  SPIRONOLACTONE PO, 25 mg, Oral, DAILY  •  senna-docusate, 2 Tablet, Oral, BID PRN  •  Acetaminophen (TYLENOL 8 HOUR PO), 500 mg, Oral, TID PRN  •  celecoxib, 200 mg, Oral, DAILY  •  triamcinolone acetonide, Apply to affected area twice daily for 14 day (Patient not taking: Reported on 1/26/2022)  •  aspirin, 81 mg, Oral, DAILY  •  losartan, 100 mg, Oral, DAILY  •  lovastatin, 20 mg, Oral, DAILY    Current Facility-Administered Medications:   •  triamcinolone acetonide  •  triamcinolone acetonide    No Known Allergies    Labs     Lab Results   Component Value Date/Time    SODIUM 126 (L) 01/10/2022 09:20 AM    POTASSIUM 4.0 01/10/2022 09:20 AM    CHLORIDE 95 (L) 01/10/2022 09:20 AM    CO2 20 01/10/2022 09:20 AM    GLUCOSE 169 (H) 01/10/2022 09:20 AM    BUN 10 01/10/2022 09:20 AM    CREATININE 0.61 01/10/2022 09:20 AM     Lab Results   Component Value Date/Time    ALKPHOSPHAT 89 01/08/2022 11:51 PM    ASTSGOT 17 01/08/2022 11:51 PM    ALTSGPT 12 01/08/2022 11:51 PM    TBILIRUBIN 1.1 01/08/2022 11:51 PM    INR 1.01 01/08/2022 08:55 AM    ALBUMIN 3.4 01/08/2022 11:51 PM        Assessment and Plan:   • Received referral from [facility]. Medications reviewed.       Cristian Garcia, PharmD, MS, BCACP, [facility]  Phone [phone] fax [phone]    This note was created using voice recognition software (Dragon). The accuracy of the dictation is limited by the abilities of the software. I have reviewed the note prior to signing, however some errors in grammar and context are still possible. If you have any questions related to this note please do not hesitate to contact our office.

## 2022-01-29 ENCOUNTER — OFFICE VISIT (OUTPATIENT)
Dept: URGENT CARE | Facility: CLINIC | Age: 87
End: 2022-01-29
Payer: MEDICARE

## 2022-01-29 VITALS
WEIGHT: 136 LBS | TEMPERATURE: 98.7 F | SYSTOLIC BLOOD PRESSURE: 130 MMHG | RESPIRATION RATE: 14 BRPM | DIASTOLIC BLOOD PRESSURE: 56 MMHG | OXYGEN SATURATION: 96 % | HEART RATE: 70 BPM | HEIGHT: 59 IN | BODY MASS INDEX: 27.42 KG/M2

## 2022-01-29 DIAGNOSIS — S81.812A SKIN TEAR OF LEFT LOWER LEG WITHOUT COMPLICATION, INITIAL ENCOUNTER: ICD-10-CM

## 2022-01-29 PROCEDURE — 99213 OFFICE O/P EST LOW 20 MIN: CPT | Performed by: FAMILY MEDICINE

## 2022-01-29 ASSESSMENT — FIBROSIS 4 INDEX: FIB4 SCORE: 1.83

## 2022-01-29 ASSESSMENT — ENCOUNTER SYMPTOMS
FOCAL WEAKNESS: 0
SENSORY CHANGE: 0

## 2022-01-29 NOTE — PROGRESS NOTES
"Subjective     Grisel Saha is a 86 y.o. female who presents with Fall (DOI 01/08/2022, upper right shoulder injury, swelling of both legs) and Laceration (1/28/2022, left leg)            Onset yesterday skin tear left shin.  No active bleeding.  Oozing fluid associated with edema.  No pus.  No expanding redness.  No fever.  Pain is mild to moderate.  No other aggravating or alleviating factors.      Review of Systems   Skin: Negative for itching and rash.   Neurological: Negative for sensory change and focal weakness.              Objective     /56 (BP Location: Left arm, Patient Position: Sitting, BP Cuff Size: Adult)   Pulse 70   Temp 37.1 °C (98.7 °F) (Temporal)   Resp 14   Ht 1.499 m (4' 11\")   Wt 61.7 kg (136 lb)   SpO2 96%   BMI 27.47 kg/m²      Physical Exam  Constitutional:       Appearance: Normal appearance.   Skin:     General: Skin is warm and dry.          Neurological:      Mental Status: She is alert.                             Assessment & Plan         1. Skin tear of left lower leg without complication, initial encounter        Wound care discussed                "

## 2022-01-31 ENCOUNTER — HOME CARE VISIT (OUTPATIENT)
Dept: HOME HEALTH SERVICES | Facility: HOME HEALTHCARE | Age: 87
End: 2022-01-31
Payer: MEDICARE

## 2022-01-31 ENCOUNTER — HOSPITAL ENCOUNTER (OUTPATIENT)
Dept: LAB | Facility: MEDICAL CENTER | Age: 87
End: 2022-01-31
Attending: FAMILY MEDICINE
Payer: MEDICARE

## 2022-01-31 ENCOUNTER — PATIENT MESSAGE (OUTPATIENT)
Dept: MEDICAL GROUP | Facility: PHYSICIAN GROUP | Age: 87
End: 2022-01-31

## 2022-01-31 DIAGNOSIS — Z11.1 SCREENING-PULMONARY TB: ICD-10-CM

## 2022-01-31 PROCEDURE — 86480 TB TEST CELL IMMUN MEASURE: CPT

## 2022-01-31 PROCEDURE — 36415 COLL VENOUS BLD VENIPUNCTURE: CPT

## 2022-01-31 RX ORDER — LIDOCAINE 50 MG/G
1 PATCH TOPICAL EVERY 24 HOURS
Qty: 10 PATCH | Refills: 0 | Status: SHIPPED | OUTPATIENT
Start: 2022-01-31 | End: 2022-02-01 | Stop reason: SDUPTHER

## 2022-01-31 NOTE — TELEPHONE ENCOUNTER
----- Message from Grisel Saha sent at 1/31/2022  8:44 AM PST -----  Regarding: Lidocaine Patch prescription refill  The lidocaine patches are working well to control my shoulder pain but I am on my last patch. Would love to have the prescription refilled and I can't do that through my chart.    Thank you!

## 2022-02-01 ENCOUNTER — PATIENT MESSAGE (OUTPATIENT)
Dept: MEDICAL GROUP | Facility: PHYSICIAN GROUP | Age: 87
End: 2022-02-01

## 2022-02-01 RX ORDER — LIDOCAINE 50 MG/G
1 PATCH TOPICAL EVERY 24 HOURS
Qty: 10 PATCH | Refills: 0 | Status: SHIPPED | OUTPATIENT
Start: 2022-02-01 | End: 2022-03-29

## 2022-02-01 RX ORDER — LOVASTATIN 20 MG/1
20 TABLET ORAL DAILY
Qty: 90 TABLET | Refills: 0 | Status: SHIPPED | OUTPATIENT
Start: 2022-02-01 | End: 2022-02-03 | Stop reason: SDUPTHER

## 2022-02-02 ENCOUNTER — HOME CARE VISIT (OUTPATIENT)
Dept: HOME HEALTH SERVICES | Facility: HOME HEALTHCARE | Age: 87
End: 2022-02-02
Payer: MEDICARE

## 2022-02-02 VITALS
HEART RATE: 64 BPM | OXYGEN SATURATION: 95 % | SYSTOLIC BLOOD PRESSURE: 134 MMHG | DIASTOLIC BLOOD PRESSURE: 58 MMHG | TEMPERATURE: 98.7 F | RESPIRATION RATE: 14 BRPM

## 2022-02-02 LAB
GAMMA INTERFERON BACKGROUND BLD IA-ACNC: 0.03 IU/ML
M TB IFN-G BLD-IMP: NEGATIVE
M TB IFN-G CD4+ BCKGRND COR BLD-ACNC: 0 IU/ML
MITOGEN IGNF BCKGRD COR BLD-ACNC: 1.63 IU/ML
QFT TB2 - NIL TBQ2: 0 IU/ML

## 2022-02-02 PROCEDURE — G0151 HHCP-SERV OF PT,EA 15 MIN: HCPCS

## 2022-02-02 RX ORDER — SPIRONOLACTONE 25 MG/1
25 TABLET ORAL DAILY
Qty: 90 TABLET | Refills: 1 | Status: CANCELLED | OUTPATIENT
Start: 2022-02-02

## 2022-02-02 ASSESSMENT — ENCOUNTER SYMPTOMS
PAIN LOCATION - PAIN QUALITY: ACHE
PAIN LOCATION - PAIN FREQUENCY: WITH ACTIVITY
PAIN SEVERITY GOAL: 1/10
HIGHEST PAIN SEVERITY IN PAST 24 HOURS: 5/10
PAIN LOCATION - PAIN SEVERITY: 3/10
LOWEST PAIN SEVERITY IN PAST 24 HOURS: 0/10
PAIN LOCATION: RIGHT ARM
SUBJECTIVE PAIN PROGRESSION: WAXING AND WANING
PAIN LOCATION - PAIN DURATION: DAILY
PERSON REPORTING PAIN: PATIENT
PAIN LOCATION - RELIEVING FACTORS: REST, MEDICATION
PAIN: 1

## 2022-02-02 ASSESSMENT — ACTIVITIES OF DAILY LIVING (ADL)
AMBULATION ASSISTANCE ON FLAT SURFACES: 1
AMBULATION_DISTANCE/DURATION_TOLERATED: 40 FEET

## 2022-02-02 NOTE — TELEPHONE ENCOUNTER
From: Grisel Saha  To: Physician Cricket Miller  Sent: 2/1/2022 8:07 PM PST  Subject: Celebrex refill    Dr Christian at [facility] prescribed Celebrex for my shoulder pain and I would like to refill that prescription if possible. It works well without making me groggy.

## 2022-02-02 NOTE — TELEPHONE ENCOUNTER
Was the patient seen in the last year in this department? Yes   Does patient have an active prescription for medications requested? Yes  Received Request Via: Pharmacy  [facility] pharmacy is requesting a new script due to missing/ illegible information on RX

## 2022-02-02 NOTE — CASE COMMUNICATION
Quality Review Completed for 1/26 SOC OASIS by HARSH Beck RN on 2/2/2022:  Edits completed by HARSH Beck RN:  1.  changed to NO for DM, she has no dx and is not on meds  2. Added F2F to 485 forms

## 2022-02-03 ENCOUNTER — HOME CARE VISIT (OUTPATIENT)
Dept: HOME HEALTH SERVICES | Facility: HOME HEALTHCARE | Age: 87
End: 2022-02-03
Payer: MEDICARE

## 2022-02-03 PROCEDURE — G0152 HHCP-SERV OF OT,EA 15 MIN: HCPCS

## 2022-02-03 RX ORDER — LOVASTATIN 20 MG/1
20 TABLET ORAL DAILY
Qty: 90 TABLET | Refills: 3 | Status: SHIPPED | OUTPATIENT
Start: 2022-02-03 | End: 2022-08-19

## 2022-02-03 RX ORDER — SPIRONOLACTONE 25 MG/1
12.5 TABLET ORAL DAILY
Qty: 90 TABLET | Refills: 3 | Status: SHIPPED | OUTPATIENT
Start: 2022-02-03 | End: 2022-05-03

## 2022-02-03 RX ORDER — SPIRONOLACTONE 25 MG/1
25 TABLET ORAL DAILY
Qty: 90 TABLET | Refills: 0 | OUTPATIENT
Start: 2022-02-03

## 2022-02-03 RX ORDER — CELECOXIB 200 MG/1
200 CAPSULE ORAL DAILY
Qty: 60 CAPSULE | Refills: 1 | Status: SHIPPED | OUTPATIENT
Start: 2022-02-03 | End: 2022-05-03

## 2022-02-03 NOTE — TELEPHONE ENCOUNTER
1. Caller Name:Aide Saha (daughter) Grisel Saha                       Call Back Number: [phone]      How would the patient prefer to be contacted with a response: Phone call OK to leave a detailed message    Med Managment    Patients daughter Called requesting Medication needs to be clarified as it was rejected by Pharmacy.

## 2022-02-03 NOTE — TELEPHONE ENCOUNTER
Received request via: Patient    Was the patient seen in the last year in this department? Yes    Does the patient have an active prescription (recently filled or refills available) for medication(s) requested? No     Please Clarify 1 tab or 1/2 tab

## 2022-02-03 NOTE — CASE COMMUNICATION
I agree with these changes  ----- Message -----  From: Jo Beck R.N.  Sent: 2/2/2022  12:54 PM PST  To: Shivani Luciano R.N.      Quality Review Completed for 1/26 SOC OASIS by HARSH Beck RN on 2/2/2022:  Edits completed by HARSH Beck RN:  1.  changed to NO for DM, she has no dx and is not on meds  2. Added F2F to 485 forms

## 2022-02-04 VITALS
HEART RATE: 82 BPM | RESPIRATION RATE: 18 BRPM | OXYGEN SATURATION: 96 % | DIASTOLIC BLOOD PRESSURE: 58 MMHG | SYSTOLIC BLOOD PRESSURE: 128 MMHG | TEMPERATURE: 97.9 F

## 2022-02-04 SDOH — ECONOMIC STABILITY: HOUSING INSECURITY: HOME SAFETY: PT WILL BE MOVING TO ATRIA WHICH HAS A WALK IN SHOWER, GRAB BARS AT TOILET AND SHOWER.

## 2022-02-04 ASSESSMENT — ACTIVITIES OF DAILY LIVING (ADL)
TOILETING: 1
FEEDING ASSESSED: 1
AMBULATION ASSISTANCE: 1
GROOMING ASSESSED: 1
CURRENT_FUNCTION: CONTACT GUARD ASSIST
AMBULATION ASSISTANCE: STAND BY ASSIST
BATHING ASSESSED: 1
CURRENT_FUNCTION: STAND BY ASSIST
FEEDING: SUPERVISION
PREPARING MEALS: DEPENDENT
PHYSICAL TRANSFERS ASSESSED: 1
GROOMING EQUIPMENT USED: STANDING AT SINK
DRESSING_UB_CURRENT_FUNCTION: MAXIMUM ASSIST
DRESSING_UB_CURRENT_FUNCTION: MODERATE ASSIST
GROOMING_CURRENT_FUNCTION: STAND BY ASSIST
DRESSING_LB_CURRENT_FUNCTION: MODERATE ASSIST
TOILETING: STAND BY ASSIST

## 2022-02-04 ASSESSMENT — ENCOUNTER SYMPTOMS
PAIN LOCATION - EXACERBATING FACTORS: MOVEMENT
PAIN LOCATION - PAIN FREQUENCY: INTERMITTENT
PAIN LOCATION - PAIN QUALITY: ACHEY
HIGHEST PAIN SEVERITY IN PAST 24 HOURS: 5/10
LOWEST PAIN SEVERITY IN PAST 24 HOURS: 0/10
PAIN LOCATION - PAIN SEVERITY: 3/10
PAIN SEVERITY GOAL: 0/10
PAIN: 1
SUBJECTIVE PAIN PROGRESSION: GRADUALLY IMPROVING
PAIN LOCATION: RIGHT SHOULDER
PERSON REPORTING PAIN: PATIENT
PAIN LOCATION - PAIN DURATION: DAILY

## 2022-02-07 ENCOUNTER — HOME CARE VISIT (OUTPATIENT)
Dept: HOME HEALTH SERVICES | Facility: HOME HEALTHCARE | Age: 87
End: 2022-02-07
Payer: MEDICARE

## 2022-02-07 VITALS
HEART RATE: 74 BPM | TEMPERATURE: 98.2 F | OXYGEN SATURATION: 93 % | RESPIRATION RATE: 16 BRPM | DIASTOLIC BLOOD PRESSURE: 50 MMHG | SYSTOLIC BLOOD PRESSURE: 120 MMHG

## 2022-02-07 PROCEDURE — G0157 HHC PT ASSISTANT EA 15: HCPCS | Mod: CQ

## 2022-02-07 PROCEDURE — G0180 MD CERTIFICATION HHA PATIENT: HCPCS | Performed by: FAMILY MEDICINE

## 2022-02-08 ENCOUNTER — HOME CARE VISIT (OUTPATIENT)
Dept: HOME HEALTH SERVICES | Facility: HOME HEALTHCARE | Age: 87
End: 2022-02-08
Payer: MEDICARE

## 2022-02-08 ASSESSMENT — ENCOUNTER SYMPTOMS
LOWEST PAIN SEVERITY IN PAST 24 HOURS: 4/10
PAIN: 1
PAIN SEVERITY GOAL: 0/10
PERSON REPORTING PAIN: PATIENT
HIGHEST PAIN SEVERITY IN PAST 24 HOURS: 8/10
PAIN LOCATION: RIGHT ARM
SUBJECTIVE PAIN PROGRESSION: GRADUALLY IMPROVING

## 2022-02-08 NOTE — CASE COMMUNICATION
Grisel did very well today. She is pleasantly confused as to why she is in [facility]. But then at times she expresses that she is there to let her arm get better and has help. She does fatigue with BLE but is eager to do as much as she can Will cont with POC to increase her functional mob and ind to prevent further decline in IND. S/B Santy Sanchez PT

## 2022-02-08 NOTE — CASE COMMUNICATION
RN arrived at 1420 instead of 1330 and family had taken patient out of the facility.  Unable to complete visit.

## 2022-02-09 ENCOUNTER — HOME CARE VISIT (OUTPATIENT)
Dept: HOME HEALTH SERVICES | Facility: HOME HEALTHCARE | Age: 87
End: 2022-02-09
Payer: MEDICARE

## 2022-02-09 VITALS
SYSTOLIC BLOOD PRESSURE: 128 MMHG | OXYGEN SATURATION: 96 % | DIASTOLIC BLOOD PRESSURE: 55 MMHG | RESPIRATION RATE: 16 BRPM | TEMPERATURE: 97.9 F | HEART RATE: 72 BPM

## 2022-02-09 VITALS
SYSTOLIC BLOOD PRESSURE: 138 MMHG | DIASTOLIC BLOOD PRESSURE: 55 MMHG | HEART RATE: 72 BPM | TEMPERATURE: 97.9 F | OXYGEN SATURATION: 96 % | RESPIRATION RATE: 16 BRPM

## 2022-02-09 PROCEDURE — G0152 HHCP-SERV OF OT,EA 15 MIN: HCPCS

## 2022-02-09 PROCEDURE — G0157 HHC PT ASSISTANT EA 15: HCPCS | Mod: CQ

## 2022-02-09 ASSESSMENT — ENCOUNTER SYMPTOMS
PAIN: 1
PERSON REPORTING PAIN: PATIENT
PAIN SEVERITY GOAL: 0/10
HIGHEST PAIN SEVERITY IN PAST 24 HOURS: 3/10
PAIN LOCATION - RELIEVING FACTORS: REST
PAIN LOCATION - PAIN QUALITY: SHARP, ACHEY
PAIN LOCATION - PAIN DURATION: DAILY
PAIN LOCATION - PAIN SEVERITY: 2/10
PAIN LOCATION - EXACERBATING FACTORS: CERTAIN MOVEMENTS
PAIN LOCATION - PAIN FREQUENCY: INTERMITTENT
LOWEST PAIN SEVERITY IN PAST 24 HOURS: 0/10
PAIN LOCATION: RIGHT SHOULDER

## 2022-02-10 ENCOUNTER — HOME CARE VISIT (OUTPATIENT)
Dept: HOME HEALTH SERVICES | Facility: HOME HEALTHCARE | Age: 87
End: 2022-02-10
Payer: MEDICARE

## 2022-02-10 VITALS
DIASTOLIC BLOOD PRESSURE: 56 MMHG | OXYGEN SATURATION: 94 % | TEMPERATURE: 98.6 F | RESPIRATION RATE: 16 BRPM | HEART RATE: 87 BPM | SYSTOLIC BLOOD PRESSURE: 110 MMHG

## 2022-02-10 PROCEDURE — G0299 HHS/HOSPICE OF RN EA 15 MIN: HCPCS

## 2022-02-10 ASSESSMENT — ENCOUNTER SYMPTOMS
PERSON REPORTING PAIN: PATIENT
PAIN LOCATION - PAIN QUALITY: ACHING
HIGHEST PAIN SEVERITY IN PAST 24 HOURS: 4/10
SUBJECTIVE PAIN PROGRESSION: GRADUALLY IMPROVING
PERSON REPORTING PAIN: PATIENT
PAIN: 1
PAIN LOCATION - RELIEVING FACTORS: REST, REPOSITION
PAIN LOCATION: RIGHT ARM
PAIN LOCATION - PAIN FREQUENCY: CONSTANT
PAIN SEVERITY GOAL: 0/10
PAIN: 1
PAIN LOCATION - EXACERBATING FACTORS: MOVEMENTS
SUBJECTIVE PAIN PROGRESSION: WAXING AND WANING
PAIN LOCATION - PAIN SEVERITY: 3/10
PAIN LOCATION - PAIN DURATION: VARIES
PAIN SEVERITY GOAL: 0/10
HIGHEST PAIN SEVERITY IN PAST 24 HOURS: 7/10
LOWEST PAIN SEVERITY IN PAST 24 HOURS: 2/10
LOWEST PAIN SEVERITY IN PAST 24 HOURS: 0/10
PAIN LOCATION: RIGHT ARM

## 2022-02-10 NOTE — CASE COMMUNICATION
On arrival Grisel was not wearing her R ue sling and reports that she didn't wear honey breakfast. Educated her on precauiotns and need to wear RUE sling. She did very with ther ex today. She is limited with amb as she is using SPC. She will do well with a 4wrw once she is able to use RUE. Will cont with POC to increase her functional mob and ind to prevent further decline in IND. Will cont with POC to increase her functional mob and ind to  prevent further decline in IND. S/B Santy Sanchez PT

## 2022-02-11 ASSESSMENT — ENCOUNTER SYMPTOMS: MUSCLE WEAKNESS: 1

## 2022-02-14 ENCOUNTER — HOME CARE VISIT (OUTPATIENT)
Dept: HOME HEALTH SERVICES | Facility: HOME HEALTHCARE | Age: 87
End: 2022-02-14
Payer: MEDICARE

## 2022-02-14 VITALS
SYSTOLIC BLOOD PRESSURE: 112 MMHG | HEART RATE: 78 BPM | DIASTOLIC BLOOD PRESSURE: 71 MMHG | OXYGEN SATURATION: 99 % | RESPIRATION RATE: 16 BRPM | TEMPERATURE: 97.7 F

## 2022-02-14 PROCEDURE — G0299 HHS/HOSPICE OF RN EA 15 MIN: HCPCS

## 2022-02-14 ASSESSMENT — ENCOUNTER SYMPTOMS
PAIN: 1
SUBJECTIVE PAIN PROGRESSION: GRADUALLY IMPROVING
PAIN LOCATION - EXACERBATING FACTORS: MOVEMENT
PAIN LOCATION - RELIEVING FACTORS: REST
LOWEST PAIN SEVERITY IN PAST 24 HOURS: 0/10
PAIN LOCATION - PAIN SEVERITY: 3/10
VOMITING: DENIES
HIGHEST PAIN SEVERITY IN PAST 24 HOURS: 3/10
PAIN LOCATION - PAIN QUALITY: ACHE
PAIN SEVERITY GOAL: 0/10
PAIN LOCATION - PAIN FREQUENCY: INTERMITTENT
PAIN LOCATION - PAIN DURATION: ACUTE
PAIN LOCATION: RIGHT SHOULDER
PERSON REPORTING PAIN: PATIENT
NAUSEA: DENIES

## 2022-02-15 ENCOUNTER — HOME CARE VISIT (OUTPATIENT)
Dept: HOME HEALTH SERVICES | Facility: HOME HEALTHCARE | Age: 87
End: 2022-02-15
Payer: MEDICARE

## 2022-02-15 VITALS
RESPIRATION RATE: 16 BRPM | DIASTOLIC BLOOD PRESSURE: 58 MMHG | HEART RATE: 78 BPM | OXYGEN SATURATION: 95 % | TEMPERATURE: 97.5 F | SYSTOLIC BLOOD PRESSURE: 124 MMHG

## 2022-02-15 PROCEDURE — G0152 HHCP-SERV OF OT,EA 15 MIN: HCPCS

## 2022-02-15 ASSESSMENT — ENCOUNTER SYMPTOMS
LOWEST PAIN SEVERITY IN PAST 24 HOURS: 0/10
PAIN LOCATION: RIGHT SHOULDER
HIGHEST PAIN SEVERITY IN PAST 24 HOURS: 5/10
PERSON REPORTING PAIN: PATIENT
PAIN LOCATION - PAIN SEVERITY: 3/10
PAIN SEVERITY GOAL: 0/10
PAIN LOCATION - PAIN QUALITY: ACHEY, DULL
PAIN LOCATION - PAIN DURATION: DAILY
PAIN: 1
PAIN LOCATION - PAIN FREQUENCY: INTERMITTENT
PAIN LOCATION - EXACERBATING FACTORS: CERTAIN MOVEMENTS

## 2022-02-16 ENCOUNTER — HOME CARE VISIT (OUTPATIENT)
Dept: HOME HEALTH SERVICES | Facility: HOME HEALTHCARE | Age: 87
End: 2022-02-16
Payer: MEDICARE

## 2022-02-16 VITALS
DIASTOLIC BLOOD PRESSURE: 60 MMHG | OXYGEN SATURATION: 94 % | HEART RATE: 78 BPM | SYSTOLIC BLOOD PRESSURE: 120 MMHG | TEMPERATURE: 98.6 F | RESPIRATION RATE: 16 BRPM

## 2022-02-16 PROCEDURE — G0157 HHC PT ASSISTANT EA 15: HCPCS | Mod: CQ

## 2022-02-16 ASSESSMENT — ENCOUNTER SYMPTOMS
PAIN: 1
PAIN SEVERITY GOAL: 0/10
LOWEST PAIN SEVERITY IN PAST 24 HOURS: 2/10
SUBJECTIVE PAIN PROGRESSION: GRADUALLY IMPROVING
PAIN LOCATION: RIGHT ARM
HIGHEST PAIN SEVERITY IN PAST 24 HOURS: 3/10
PERSON REPORTING PAIN: PATIENT

## 2022-02-16 NOTE — CASE COMMUNICATION
Grisel cont to be pleasantly confused She is amb more in [place] with SPC and makes all meals. Today her RUE was in her sling. She is still limted with act due to RUE. Long term she will beneift from 4wrw when she is able to use RUE. She is cont to progress with amb and BLE strength. Will cont with POC to increase her functional mob and ind to prevent further decline in IND. S/B Santy Sanchez PT

## 2022-02-17 ENCOUNTER — HOME CARE VISIT (OUTPATIENT)
Dept: HOME HEALTH SERVICES | Facility: HOME HEALTHCARE | Age: 87
End: 2022-02-17
Payer: MEDICARE

## 2022-02-17 VITALS
RESPIRATION RATE: 16 BRPM | SYSTOLIC BLOOD PRESSURE: 118 MMHG | DIASTOLIC BLOOD PRESSURE: 72 MMHG | TEMPERATURE: 99.1 F | HEART RATE: 72 BPM | OXYGEN SATURATION: 94 %

## 2022-02-17 PROCEDURE — G0299 HHS/HOSPICE OF RN EA 15 MIN: HCPCS

## 2022-02-17 PROCEDURE — A6214 FOAM DRG > 48 SQ IN W/BORDER: HCPCS

## 2022-02-17 ASSESSMENT — ENCOUNTER SYMPTOMS
VOMITING: NO
DIFFICULTY THINKING: 1
PERSON REPORTING PAIN: PATIENT
NAUSEA: NO
DENIES PAIN: 1

## 2022-02-18 ENCOUNTER — HOME CARE VISIT (OUTPATIENT)
Dept: HOME HEALTH SERVICES | Facility: HOME HEALTHCARE | Age: 87
End: 2022-02-18
Payer: MEDICARE

## 2022-02-18 DIAGNOSIS — S81.802A WOUND OF LEFT LOWER EXTREMITY, INITIAL ENCOUNTER: ICD-10-CM

## 2022-02-18 PROCEDURE — G0157 HHC PT ASSISTANT EA 15: HCPCS | Mod: CQ

## 2022-02-19 VITALS
RESPIRATION RATE: 16 BRPM | DIASTOLIC BLOOD PRESSURE: 60 MMHG | HEART RATE: 89 BPM | OXYGEN SATURATION: 92 % | SYSTOLIC BLOOD PRESSURE: 130 MMHG | TEMPERATURE: 98.6 F

## 2022-02-19 ASSESSMENT — ENCOUNTER SYMPTOMS
HIGHEST PAIN SEVERITY IN PAST 24 HOURS: 2/10
SUBJECTIVE PAIN PROGRESSION: GRADUALLY IMPROVING
DENIES PAIN: 1
PAIN SEVERITY GOAL: 0/10
LOWEST PAIN SEVERITY IN PAST 24 HOURS: 0/10
PERSON REPORTING PAIN: PATIENT

## 2022-02-20 NOTE — CASE COMMUNICATION
Grisel is cont to progress she is limited with task due to RUE in sling She uses SPC but would beneift from 4wrw once she is able to use her UE. She is reporting that she will return to her home this weekend.Also educated her to call MD to find out when her FU appt is. PT will f/u on next visit Will cont with POC to increase her functional mob and ind to prevent further decline in function. S/B Santy Sanchez PT

## 2022-02-21 ENCOUNTER — HOME CARE VISIT (OUTPATIENT)
Dept: HOME HEALTH SERVICES | Facility: HOME HEALTHCARE | Age: 87
End: 2022-02-21
Payer: MEDICARE

## 2022-02-21 VITALS
RESPIRATION RATE: 16 BRPM | HEART RATE: 83 BPM | SYSTOLIC BLOOD PRESSURE: 116 MMHG | DIASTOLIC BLOOD PRESSURE: 70 MMHG | TEMPERATURE: 97.9 F | OXYGEN SATURATION: 93 %

## 2022-02-21 PROCEDURE — G0151 HHCP-SERV OF PT,EA 15 MIN: HCPCS

## 2022-02-21 PROCEDURE — G0299 HHS/HOSPICE OF RN EA 15 MIN: HCPCS

## 2022-02-21 ASSESSMENT — ENCOUNTER SYMPTOMS
PAIN LOCATION - PAIN FREQUENCY: INFREQUENT
PAIN LOCATION - PAIN DURATION: SHORT
HIGHEST PAIN SEVERITY IN PAST 24 HOURS: 3/10
PAIN LOCATION - EXACERBATING FACTORS: MOVEMENT
PAIN LOCATION - PAIN SEVERITY: 3/10
LOWEST PAIN SEVERITY IN PAST 24 HOURS: 0/10
MUSCLE WEAKNESS: 1
PAIN: 1
HIGHEST PAIN SEVERITY IN PAST 24 HOURS: 2/10
PAIN SEVERITY GOAL: 0/10
PERSON REPORTING PAIN: PATIENT
PAIN LOCATION - PAIN QUALITY: ACHY, SHARP
VOMITING: DENIES
NAUSEA: DENIES
PAIN LOCATION - PAIN DURATION: VARIES
PAIN SEVERITY GOAL: 0/10
PAIN: 1
SUBJECTIVE PAIN PROGRESSION: GRADUALLY IMPROVING
PAIN LOCATION - PAIN QUALITY: TWINGE
PAIN LOCATION - PAIN FREQUENCY: INTERMITTENT
PERSON REPORTING PAIN: PATIENT
SUBJECTIVE PAIN PROGRESSION: UNCHANGED
LOWEST PAIN SEVERITY IN PAST 24 HOURS: 0/10
PAIN LOCATION: RIGHT SHOULDER
PAIN LOCATION - RELIEVING FACTORS: DISTRACTION, POSITION CHANGE
PAIN LOCATION: RIGHT ARM
PAIN LOCATION - PAIN SEVERITY: 2/10

## 2022-02-21 ASSESSMENT — ACTIVITIES OF DAILY LIVING (ADL)
CURRENT_FUNCTION: STAND BY ASSIST
AMBULATION ASSISTANCE: STAND BY ASSIST

## 2022-02-22 ENCOUNTER — HOME CARE VISIT (OUTPATIENT)
Dept: HOME HEALTH SERVICES | Facility: HOME HEALTHCARE | Age: 87
End: 2022-02-22
Payer: MEDICARE

## 2022-02-23 ENCOUNTER — HOME CARE VISIT (OUTPATIENT)
Dept: HOME HEALTH SERVICES | Facility: HOME HEALTHCARE | Age: 87
End: 2022-02-23
Payer: MEDICARE

## 2022-02-23 VITALS
HEART RATE: 76 BPM | RESPIRATION RATE: 16 BRPM | TEMPERATURE: 97.6 F | OXYGEN SATURATION: 97 % | SYSTOLIC BLOOD PRESSURE: 118 MMHG | DIASTOLIC BLOOD PRESSURE: 60 MMHG

## 2022-02-23 PROCEDURE — G0151 HHCP-SERV OF PT,EA 15 MIN: HCPCS

## 2022-02-23 ASSESSMENT — ENCOUNTER SYMPTOMS
HIGHEST PAIN SEVERITY IN PAST 24 HOURS: 0/10
LOWEST PAIN SEVERITY IN PAST 24 HOURS: 0/10
PERSON REPORTING PAIN: PATIENT
DENIES PAIN: 1
SUBJECTIVE PAIN PROGRESSION: UNCHANGED
DIFFICULTY THINKING: 1
PAIN SEVERITY GOAL: 0/10

## 2022-02-24 ENCOUNTER — HOME CARE VISIT (OUTPATIENT)
Dept: HOME HEALTH SERVICES | Facility: HOME HEALTHCARE | Age: 87
End: 2022-02-24
Payer: MEDICARE

## 2022-02-24 PROCEDURE — G0299 HHS/HOSPICE OF RN EA 15 MIN: HCPCS

## 2022-02-24 ASSESSMENT — ENCOUNTER SYMPTOMS
ARTHRALGIAS: 1
MUSCLE WEAKNESS: 1
VOMITING: DENIES
NAUSEA: DENIES
PERSON REPORTING PAIN: PATIENT
DENIES PAIN: 1

## 2022-02-24 ASSESSMENT — ACTIVITIES OF DAILY LIVING (ADL)
AMBULATION ASSISTANCE: STAND BY ASSIST
CURRENT_FUNCTION: STAND BY ASSIST

## 2022-02-28 ENCOUNTER — HOME CARE VISIT (OUTPATIENT)
Dept: HOME HEALTH SERVICES | Facility: HOME HEALTHCARE | Age: 87
End: 2022-02-28
Payer: MEDICARE

## 2022-02-28 VITALS
HEART RATE: 88 BPM | TEMPERATURE: 97.5 F | SYSTOLIC BLOOD PRESSURE: 118 MMHG | DIASTOLIC BLOOD PRESSURE: 66 MMHG | OXYGEN SATURATION: 97 % | RESPIRATION RATE: 16 BRPM

## 2022-02-28 VITALS
OXYGEN SATURATION: 97 % | DIASTOLIC BLOOD PRESSURE: 66 MMHG | HEART RATE: 88 BPM | TEMPERATURE: 97.5 F | RESPIRATION RATE: 16 BRPM | SYSTOLIC BLOOD PRESSURE: 118 MMHG

## 2022-02-28 PROCEDURE — G0151 HHCP-SERV OF PT,EA 15 MIN: HCPCS

## 2022-02-28 PROCEDURE — 665001 SOC-HOME HEALTH

## 2022-02-28 PROCEDURE — G0495 RN CARE TRAIN/EDU IN HH: HCPCS

## 2022-02-28 ASSESSMENT — ENCOUNTER SYMPTOMS
DENIES PAIN: 1
DENIES PAIN: 1
SUBJECTIVE PAIN PROGRESSION: UNCHANGED
HIGHEST PAIN SEVERITY IN PAST 24 HOURS: 0/10
HIGHEST PAIN SEVERITY IN PAST 24 HOURS: 0/10
PERSON REPORTING PAIN: PATIENT
LOWEST PAIN SEVERITY IN PAST 24 HOURS: 0/10
ASSOCIATED SYMPTOMS: DENIES
SUBJECTIVE PAIN PROGRESSION: UNCHANGED
PAIN SEVERITY GOAL: 0/10
DIFFICULTY THINKING: 1
LOWEST PAIN SEVERITY IN PAST 24 HOURS: 0/10
PAIN: DENIES ANY PAIN OR DISCOMFORT AT TIME OF NURSING VISIT.
PAIN SEVERITY GOAL: 0/10
VOMITING: DENIES
PERSON REPORTING PAIN: PATIENT
NAUSEA: DENIES

## 2022-03-02 ENCOUNTER — HOME CARE VISIT (OUTPATIENT)
Dept: HOME HEALTH SERVICES | Facility: HOME HEALTHCARE | Age: 87
End: 2022-03-02
Payer: MEDICARE

## 2022-03-02 VITALS
OXYGEN SATURATION: 98 % | TEMPERATURE: 97.6 F | DIASTOLIC BLOOD PRESSURE: 62 MMHG | RESPIRATION RATE: 16 BRPM | SYSTOLIC BLOOD PRESSURE: 122 MMHG | HEART RATE: 86 BPM

## 2022-03-02 PROCEDURE — G0151 HHCP-SERV OF PT,EA 15 MIN: HCPCS

## 2022-03-02 ASSESSMENT — ENCOUNTER SYMPTOMS
HIGHEST PAIN SEVERITY IN PAST 24 HOURS: 0/10
LOWEST PAIN SEVERITY IN PAST 24 HOURS: 0/10
DENIES PAIN: 1
DIFFICULTY THINKING: 1
PAIN SEVERITY GOAL: 0/10
PERSON REPORTING PAIN: PATIENT
SUBJECTIVE PAIN PROGRESSION: UNCHANGED

## 2022-03-03 ENCOUNTER — HOME CARE VISIT (OUTPATIENT)
Dept: HOME HEALTH SERVICES | Facility: HOME HEALTHCARE | Age: 87
End: 2022-03-03
Payer: MEDICARE

## 2022-03-03 VITALS
SYSTOLIC BLOOD PRESSURE: 116 MMHG | DIASTOLIC BLOOD PRESSURE: 62 MMHG | RESPIRATION RATE: 16 BRPM | HEART RATE: 84 BPM | TEMPERATURE: 97.9 F | OXYGEN SATURATION: 96 %

## 2022-03-03 PROCEDURE — G0152 HHCP-SERV OF OT,EA 15 MIN: HCPCS

## 2022-03-03 ASSESSMENT — ENCOUNTER SYMPTOMS
PAIN LOCATION - PAIN SEVERITY: 2/10
PERSON REPORTING PAIN: PATIENT
PAIN LOCATION: RIGHT SHOULDER
DIFFICULTY THINKING: 1
PAIN SEVERITY GOAL: 0/10
PAIN LOCATION - RELIEVING FACTORS: REST
PAIN LOCATION - EXACERBATING FACTORS: CERTAIN MOVEMENTS
PAIN LOCATION - PAIN FREQUENCY: INTERMITTENT
PAIN LOCATION - PAIN DURATION: DAILY
SUBJECTIVE PAIN PROGRESSION: GRADUALLY IMPROVING
PAIN LOCATION - PAIN QUALITY: DULL, SHARP
LOWEST PAIN SEVERITY IN PAST 24 HOURS: 0/10
HIGHEST PAIN SEVERITY IN PAST 24 HOURS: 5/10
PAIN: 1

## 2022-03-03 ASSESSMENT — ACTIVITIES OF DAILY LIVING (ADL)
OASIS_M1830: 02
HOME_HEALTH_OASIS: 01

## 2022-03-04 ENCOUNTER — HOME CARE VISIT (OUTPATIENT)
Dept: HOME HEALTH SERVICES | Facility: HOME HEALTHCARE | Age: 87
End: 2022-03-04
Payer: MEDICARE

## 2022-03-04 NOTE — CASE COMMUNICATION
Quality Review Completed for DC OASIS by HARSH Beck RN on 3/4/2022:  Edits completed by HARSH Beck RN:  1.  is NA to DM per care plan interventions

## 2022-03-05 NOTE — CASE COMMUNICATION
I agree with this change.  ----- Message -----  From: Jo Beck R.N.  Sent: 3/4/2022  12:11 PM PST  To: Neli Alejandre OT      Quality Review Completed for DC OASIS by HARSH Beck, RN on 3/4/2022:  Edits completed by HARSH Beck RN:  1.  is NA to DM per care plan interventions

## 2022-03-29 ENCOUNTER — OFFICE VISIT (OUTPATIENT)
Dept: MEDICAL GROUP | Facility: PHYSICIAN GROUP | Age: 87
End: 2022-03-29
Payer: MEDICARE

## 2022-03-29 VITALS
DIASTOLIC BLOOD PRESSURE: 60 MMHG | HEIGHT: 59 IN | OXYGEN SATURATION: 96 % | HEART RATE: 71 BPM | TEMPERATURE: 98.7 F | BODY MASS INDEX: 26.81 KG/M2 | SYSTOLIC BLOOD PRESSURE: 134 MMHG | WEIGHT: 133 LBS

## 2022-03-29 DIAGNOSIS — S42.291S: Chronic | ICD-10-CM

## 2022-03-29 DIAGNOSIS — R41.3 MEMORY LOSS: ICD-10-CM

## 2022-03-29 DIAGNOSIS — M79.89 LEG SWELLING: Chronic | ICD-10-CM

## 2022-03-29 PROBLEM — R05.9 COUGH: Status: RESOLVED | Noted: 2020-12-18 | Resolved: 2022-03-29

## 2022-03-29 PROCEDURE — 99214 OFFICE O/P EST MOD 30 MIN: CPT | Performed by: FAMILY MEDICINE

## 2022-03-29 ASSESSMENT — FIBROSIS 4 INDEX: FIB4 SCORE: 1.83

## 2022-03-29 NOTE — ASSESSMENT & PLAN NOTE
Nonsurgical in nature per orthopedics.  Following up closely with them.  It is slowly healing.  Plan is to repeat x-ray in 2 months.  Pain is well controlled on tramadol and has gotten Kenalog injections as well.  Denies any acute problems at this time.

## 2022-03-29 NOTE — ASSESSMENT & PLAN NOTE
Chronic issue, worsening  The patient's  and family have noted decreased memory decline over the past several months.  Their daughters are greatly concerned.   has noted that Grisel has asked the same question multiple times.   Has not wandered out of the house  Denies any long term memory loss

## 2022-03-29 NOTE — PROGRESS NOTES
Subjective:     Chief Complaint   Patient presents with   • Wound Check     RT Upper arm,       HPI:   Grisel presents today to discuss the following.    Fracture, humerus, anatomical neck, right, sequela  Nonsurgical in nature per orthopedics.  Following up closely with them.  It is slowly healing.  Plan is to repeat x-ray in 2 months.  Pain is well controlled on tramadol and has gotten Kenalog injections as well.  Denies any acute problems at this time.    Leg swelling  Chronic issue  Left ankle area is worse. On aldactone now, 12.5mg daily.     Memory loss  Chronic issue, worsening  The patient's  and family have noted decreased memory decline over the past several months.  Their daughters are greatly concerned.   has noted that Grisel has asked the same question multiple times.   Has not wandered out of the house  Denies any long term memory loss        Past Medical History:   Diagnosis Date   • HTN (hypertension)        Current Outpatient Medications Ordered in Epic   Medication Sig Dispense Refill   • celecoxib (CELEBREX) 200 MG Cap Take 1 Capsule by mouth every day. 60 Capsule 1   • spironolactone (ALDACTONE) 25 MG Tab Take 0.5 Tablets by mouth every day. 90 Tablet 3   • lovastatin (MEVACOR) 20 MG Tab Take 1 Tablet by mouth every day. 90 Tablet 3   • traMADol (ULTRAM) 50 MG Tab Take 50 mg by mouth every 8 hours as needed for Moderate Pain.     • senna-docusate (PERICOLACE OR SENOKOT S) 8.6-50 MG Tab Take 2 Tablets by mouth 2 times a day as needed for Constipation. take 2 tabs daily for constipation hold for loose stools     • triamcinolone acetonide (KENALOG) 0.1 % Cream Apply to affected area twice daily for 14 day 80 g 3   • aspirin (ASA) 81 MG Chew Tab chewable tablet Chew 81 mg every day.     • losartan (COZAAR) 100 MG Tab Take 1 Tab by mouth every day. TAKE 1 TABLET BY MOUTH DAILY 90 Tab 3   • traMADol (ULTRAM) 50 MG Tab        Current Facility-Administered Medications Ordered in Epic  "  Medication Dose Route Frequency Provider Last Rate Last Admin   • triamcinolone acetonide (KENALOG-40) injection 80 mg  80 mg Intra-articular  Sebastian Brewster M.D.   80 mg at 11/12/21 0703   • triamcinolone acetonide (KENALOG-40) injection 80 mg  80 mg Intra-articular  Sebastian Brewster M.D.   80 mg at 11/12/21 0703       Allergies:  Patient has no known allergies.    Health Maintenance: Completed    ROS:  Gen: no fevers/chills, no changes in weight  Eyes: no changes in vision  Pulm: no sob, no cough  CV: no chest pain, no palpitations  GI: no nausea/vomiting, no diarrhea      Objective:     Exam:  /60 (BP Location: Left arm, Patient Position: Sitting, BP Cuff Size: Adult)   Pulse 71   Temp 37.1 °C (98.7 °F) (Temporal)   Ht 1.499 m (4' 11\")   Wt 60.3 kg (133 lb)   SpO2 96%   BMI 26.86 kg/m²  Body mass index is 26.86 kg/m².      Constitutional: Alert, no distress, well-groomed.  Skin: Warm, dry, good turgor, no rashes in visible areas.  Eye: Equal, round and reactive, conjunctiva clear, lids normal.  ENMT: Lips without lesions, good dentition, moist mucous membranes.  Neck: Trachea midline, no masses, no thyromegaly.  Respiratory: Unlabored respiratory effort, no cough.  MSK: Normal gait, moves all extremities.  Neuro: Grossly non-focal.   Psych: Alert and oriented x3, normal affect and mood.        Assessment & Plan:     86 y.o. female with the following -     1. Fracture, humerus, anatomical neck, right, sequela  The patient is healing well.  I recommend she continues to follow-up with orthopedics.    2. Leg swelling  Chronic condition.  Stable at this time.  I recommend continuing with Aldactone and performing leg elevation and compression stocking use.    3. Memory loss  Chronic, worsening condition.  The patient's  relates to me that the patient's memory has been declining over the past several months.  I have also been notified by the skilled nursing facility of this.  Patient adamantly " denies this.  At this time however she is amenable to pursuing formal testing to ensure that dementia is not present.  For other reasons the patient did have an MRI of the brain completed May 2021.  Results were consistent with age-related cerebral atrophy and chronic microvascular ischemic gliosis.  I will refer to neurology for further assessment and testing.  I kindly appreciate recommendations.  - Referral to Neurology      Return in about 3 months (around 6/29/2022).    Please note that this dictation was created using voice recognition software. I have made every reasonable attempt to correct obvious errors, but I expect that there are errors of grammar and possibly content that I did not discover before finalizing the note.

## 2022-05-03 ENCOUNTER — OFFICE VISIT (OUTPATIENT)
Dept: MEDICAL GROUP | Facility: PHYSICIAN GROUP | Age: 87
End: 2022-05-03
Payer: MEDICARE

## 2022-05-03 VITALS
BODY MASS INDEX: 26 KG/M2 | TEMPERATURE: 98.4 F | WEIGHT: 129 LBS | HEART RATE: 75 BPM | SYSTOLIC BLOOD PRESSURE: 160 MMHG | HEIGHT: 59 IN | OXYGEN SATURATION: 97 % | DIASTOLIC BLOOD PRESSURE: 68 MMHG

## 2022-05-03 DIAGNOSIS — S42.291S: Chronic | ICD-10-CM

## 2022-05-03 DIAGNOSIS — I10 ESSENTIAL HYPERTENSION: Chronic | ICD-10-CM

## 2022-05-03 DIAGNOSIS — R26.89 BALANCE PROBLEM: Chronic | ICD-10-CM

## 2022-05-03 PROCEDURE — 99214 OFFICE O/P EST MOD 30 MIN: CPT | Performed by: FAMILY MEDICINE

## 2022-05-03 RX ORDER — LOSARTAN POTASSIUM 100 MG/1
100 TABLET ORAL DAILY
Qty: 90 TABLET | Refills: 3 | Status: SHIPPED | OUTPATIENT
Start: 2022-05-03 | End: 2022-05-03

## 2022-05-03 RX ORDER — SPIRONOLACTONE 25 MG/1
TABLET ORAL
Qty: 60 TABLET | Refills: 3 | Status: SHIPPED
Start: 2022-05-03 | End: 2022-08-19

## 2022-05-03 RX ORDER — LOSARTAN POTASSIUM 100 MG/1
100 TABLET ORAL DAILY
Qty: 30 TABLET | Refills: 3 | Status: SHIPPED | OUTPATIENT
Start: 2022-05-03 | End: 2022-10-27 | Stop reason: SDUPTHER

## 2022-05-03 RX ORDER — SPIRONOLACTONE 25 MG/1
12.5 TABLET ORAL DAILY
Qty: 90 TABLET | Refills: 3 | Status: SHIPPED | OUTPATIENT
Start: 2022-05-03 | End: 2022-05-03

## 2022-05-03 ASSESSMENT — FIBROSIS 4 INDEX: FIB4 SCORE: 1.83

## 2022-05-03 NOTE — PROGRESS NOTES
Subjective:     Chief Complaint   Patient presents with   • Hospital Follow-up     Fractured arm, from Fall,   • Referral Needed     Physical Therapy and Balance       HPI:   Grisel presents today to discuss the following.    Fracture, humerus, anatomical neck, right, sequela  nonsurgical and following up with orthopedics   Healing well  Everything is fine per ortho  Pain is tolerable     Essential hypertension  Chronic issue  She ran out of meds 1 week   Losartan 100mg and spironolactone 12.5mg     Balance problem  Chronic issue  Needs PT      Past Medical History:   Diagnosis Date   • HTN (hypertension)        Current Outpatient Medications Ordered in Epic   Medication Sig Dispense Refill   • losartan (COZAAR) 100 MG Tab Take 1 Tablet by mouth every day. TAKE 1 TABLET BY MOUTH DAILY 90 Tablet 3   • spironolactone (ALDACTONE) 25 MG Tab Take 0.5 Tablets by mouth every day. 90 Tablet 3   • lovastatin (MEVACOR) 20 MG Tab Take 1 Tablet by mouth every day. 90 Tablet 3   • senna-docusate (PERICOLACE OR SENOKOT S) 8.6-50 MG Tab Take 2 Tablets by mouth 2 times a day as needed for Constipation. take 2 tabs daily for constipation hold for loose stools       Current Facility-Administered Medications Ordered in Epic   Medication Dose Route Frequency Provider Last Rate Last Admin   • triamcinolone acetonide (KENALOG-40) injection 80 mg  80 mg Intra-articular  Sebastian Brewster M.D.   80 mg at 11/12/21 0703   • triamcinolone acetonide (KENALOG-40) injection 80 mg  80 mg Intra-articular  Sebastian Brewster M.D.   80 mg at 11/12/21 0703       Allergies:  Patient has no known allergies.    Health Maintenance: Completed    ROS:  Gen: no fevers/chills, no changes in weight  Eyes: no changes in vision  Pulm: no sob, no cough  CV: no chest pain, no palpitations  GI: no nausea/vomiting, no diarrhea      Objective:     Exam:  /68 (BP Location: Left arm, Patient Position: Sitting, BP Cuff Size: Adult)   Pulse 75   Temp 36.9 °C (98.4  "°F) (Temporal)   Ht 1.499 m (4' 11\")   Wt 58.5 kg (129 lb)   SpO2 97%   BMI 26.05 kg/m²  Body mass index is 26.05 kg/m².        Constitutional: Alert, no distress, well-groomed.  Skin: Warm, dry, good turgor, no rashes in visible areas.  Eye: Equal, round and reactive, conjunctiva clear, lids normal.  ENMT: Lips without lesions, good dentition, moist mucous membranes.  Neck: Trachea midline, no masses, no thyromegaly.  Respiratory: Unlabored respiratory effort, no cough.  MSK: Normal gait, moves all extremities.  Neuro: Grossly non-focal.   Psych: Alert and oriented x3, normal affect and mood.        Assessment & Plan:     86 y.o. female with the following -     1. Fracture, humerus, anatomical neck, right, sequela  The patient continues to heal well nonsurgically.  I recommend she continues to follow-up with orthopedics.    2. Essential hypertension  Chronic, uncontrolled condition.  She ran out of medications 1 week ago.  I am refilling medications right away and have her return in 1 week for blood pressure follow-up.  - losartan (COZAAR) 100 MG Tab; Take 1 Tablet by mouth every day. TAKE 1 TABLET BY MOUTH DAILY  Dispense: 90 Tablet; Refill: 3  - spironolactone (ALDACTONE) 25 MG Tab; Take 0.5 Tablets by mouth every day.  Dispense: 90 Tablet; Refill: 3    3. Balance problem  Chronic, unchanged condition.  She will be referred to gait training and balance.  - Referral to Physical Therapy      No follow-ups on file.    Please note that this dictation was created using voice recognition software. I have made every reasonable attempt to correct obvious errors, but I expect that there are errors of grammar and possibly content that I did not discover before finalizing the note.      "

## 2022-05-03 NOTE — TELEPHONE ENCOUNTER
Phone Number Called: [phone]    Call outcome:  [facility]    Message: Spoke to [facility] and asked why medications were not being covered. [facility] stated day supply was out of plan she changed it to 30 day supply and medication then became approved.

## 2022-05-03 NOTE — ASSESSMENT & PLAN NOTE
nonsurgical and following up with orthopedics   Healing well  Everything is fine per ortho  Pain is tolerable

## 2022-05-10 ENCOUNTER — OFFICE VISIT (OUTPATIENT)
Dept: MEDICAL GROUP | Facility: PHYSICIAN GROUP | Age: 87
End: 2022-05-10
Payer: MEDICARE

## 2022-05-10 VITALS
SYSTOLIC BLOOD PRESSURE: 134 MMHG | DIASTOLIC BLOOD PRESSURE: 62 MMHG | BODY MASS INDEX: 26 KG/M2 | OXYGEN SATURATION: 96 % | HEIGHT: 59 IN | HEART RATE: 81 BPM | WEIGHT: 129 LBS | TEMPERATURE: 98.3 F

## 2022-05-10 DIAGNOSIS — I10 ESSENTIAL HYPERTENSION: Chronic | ICD-10-CM

## 2022-05-10 PROCEDURE — 99213 OFFICE O/P EST LOW 20 MIN: CPT | Performed by: FAMILY MEDICINE

## 2022-05-10 ASSESSMENT — FIBROSIS 4 INDEX: FIB4 SCORE: 1.83

## 2022-05-10 NOTE — ASSESSMENT & PLAN NOTE
Chronic issue  BP is better now   She is taking losartan and spironolactone  Has not been checking BP at home

## 2022-05-10 NOTE — PROGRESS NOTES
"Subjective:     Chief Complaint   Patient presents with   • Hypertension Follow-up       HPI:   Grisel presents today to discuss the following.    Essential hypertension  Chronic issue  BP is better now   She is taking losartan and spironolactone  Has not been checking BP at home       Past Medical History:   Diagnosis Date   • HTN (hypertension)        Current Outpatient Medications Ordered in Epic   Medication Sig Dispense Refill   • spironolactone (ALDACTONE) 25 MG Tab TAKE 1/2 TABLET BY MOUTH EVERY DAY 60 Tablet 3   • losartan (COZAAR) 100 MG Tab TAKE 1 TABLET BY MOUTH EVERY DAY. TAKE 1 TABLET BY MOUTH DAILY 30 Tablet 3   • lovastatin (MEVACOR) 20 MG Tab Take 1 Tablet by mouth every day. 90 Tablet 3   • senna-docusate (PERICOLACE OR SENOKOT S) 8.6-50 MG Tab Take 2 Tablets by mouth 2 times a day as needed for Constipation. take 2 tabs daily for constipation hold for loose stools       Current Facility-Administered Medications Ordered in Epic   Medication Dose Route Frequency Provider Last Rate Last Admin   • triamcinolone acetonide (KENALOG-40) injection 80 mg  80 mg Intra-articular  Sebastian Brewster M.D.   80 mg at 11/12/21 0703   • triamcinolone acetonide (KENALOG-40) injection 80 mg  80 mg Intra-articular  Sebastian Brewster M.D.   80 mg at 11/12/21 0703       Allergies:  Patient has no known allergies.    Health Maintenance: Completed    ROS:  Gen: no fevers/chills, no changes in weight  Eyes: no changes in vision  Pulm: no sob, no cough  CV: no chest pain, no palpitations  GI: no nausea/vomiting, no diarrhea      Objective:     Exam:  /62 (BP Location: Left arm, Patient Position: Sitting, BP Cuff Size: Adult)   Pulse 81   Temp 36.8 °C (98.3 °F) (Temporal)   Ht 1.499 m (4' 11\")   Wt 58.5 kg (129 lb)   SpO2 96%   BMI 26.05 kg/m²  Body mass index is 26.05 kg/m².    Constitutional: Alert, no distress, well-groomed.  Skin: Warm, dry, good turgor, no rashes in visible areas.  Eye: Equal, round and " reactive, conjunctiva clear, lids normal.  ENMT: Lips without lesions, good dentition, moist mucous membranes.  Neck: Trachea midline, no masses, no thyromegaly.  Respiratory: Unlabored respiratory effort, no cough.  MSK: Normal gait, moves all extremities.  Neuro: Grossly non-focal.   Psych: Alert and oriented x3, normal affect and mood.        Assessment & Plan:     86 y.o. female with the following -     1. Essential hypertension  Chronic, stable condition.  Blood pressure is now optimized with losartan plus spironolactone.  Continue with blood pressure control.  I recommend she monitors blood pressure at home.      Return in about 3 months (around 8/10/2022).    Please note that this dictation was created using voice recognition software. I have made every reasonable attempt to correct obvious errors, but I expect that there are errors of grammar and possibly content that I did not discover before finalizing the note.

## 2022-05-23 ENCOUNTER — TELEPHONE (OUTPATIENT)
Dept: HEALTH INFORMATION MANAGEMENT | Facility: OTHER | Age: 87
End: 2022-05-23

## 2022-05-23 ENCOUNTER — PHYSICAL THERAPY (OUTPATIENT)
Dept: PHYSICAL THERAPY | Facility: REHABILITATION | Age: 87
End: 2022-05-23
Attending: FAMILY MEDICINE
Payer: MEDICARE

## 2022-05-23 DIAGNOSIS — Z91.81 HISTORY OF FALL: ICD-10-CM

## 2022-05-23 DIAGNOSIS — S42.291S: ICD-10-CM

## 2022-05-23 PROCEDURE — 97112 NEUROMUSCULAR REEDUCATION: CPT

## 2022-05-23 PROCEDURE — 97163 PT EVAL HIGH COMPLEX 45 MIN: CPT

## 2022-05-23 ASSESSMENT — BALANCE ASSESSMENTS
STANDING UNSUPPORTED WITH FEET TOGETHER: 4
STANDING TO SITTING: 4
PLACE ALTERNATE FOOT ON STEP OR STOOL WHILE STANDING UNSUPPORTED: 0
SITTING TO STANDING: 4
TRANSFERS: 4
LONG VERSION TOTAL SCORE (MAX 56): 40
LONG VERSION TOTAL SCORE (MAX 56): 40
TURN 360 DEGREES: 2
PICK UP OBJECT FROM THE FLOOR FROM A STANDING POSITION: 4
REACHING FORWARD WITH OUTSTRETCHED ARM WHILE STANDING: 4
STANDING ON ONE LEG: 0
STANDING UNSUPPORTED ONE FOOT IN FRONT: 1
LOOK OVER LEFT AND RIGHT SHOULDERS WHILE STANDING: 1
STANDING UNSUPPORTED: 4
STANDING UNSUPPORTED WITH EYES CLOSED: 4
SITTING UNSUPPORTED: 4

## 2022-05-23 NOTE — OP THERAPY EVALUATION
Outpatient Physical Therapy  INITIAL EVALUATION    [facility] [place]  [address], [address]  [place] [address]  Phone:  [phone]    Date of Evaluation: 05/23/2022    Patient: Grisel Saha  YOB: 1935  MRN: [id number]     Referring Provider: Cricket Miller M.D.  [address]  [place] [address]   Referring Diagnosis Balance problem [R26.89]     Time Calculation  Start time: 1445  Stop time: 1530 Time Calculation (min): 45 minutes         Chief Complaint: No chief complaint on file.    Date of onset of impairment: 12/18/2020    Subjective:   History of Present Illness:     Date of onset:  1/23/2022    Mechanism of injury:  Patient indicates she has noted slight decline in balance and function over the last few years. She notes that she had a fall in January slipping onto the transition on a corner from hard wood cheikh to carpet and landed on the shoulder. No other falls reported of note/none the last year. She indicates she broke her humerus. She saw ortho who along with her are managing her displaced humeral fracture conservatively. She feels her pain has improved here and has not done anything to manage her arm at this point. She is fairly happy with her function but feels weak and stiff. Doing all ADLs with this arm again. She has been using a SPC since the fall and notes her family has been protective of her getting out and doing much. She has mostly been staying at home since then due to this. She is otherwise quite independent in the community and notes her  is very physically capable.     Success for her looks like:  -Being independent and walking short community distances without help and without an AD  -maintaining her strength and cognition    Fall/balance questionnaire: ABC balance score low on items in regards to stairs/ramps/community settings  Feels more fearful of falling; feels better when walls/etc are close by. Thinks her family  is more afraid of her falling than she is.                Past Medical History:   Diagnosis Date   • HTN (hypertension)      No past surgical history on file.  Social History     Tobacco Use   • Smoking status: Former Smoker   • Smokeless tobacco: Never Used   Substance Use Topics   • Alcohol use: Yes     Family and Occupational History     Socioeconomic History   • Marital status:      Spouse name: Not on file   • Number of children: Not on file   • Years of education: Not on file   • Highest education level: Not on file   Occupational History   • Not on file       Objective     General Comments     Shoulder Comments   BBS 40/56  5 rep STS 14 sec no hands  Gait; poor hip extension/short strides especially without her SPC  3MWT 6.5 sec, quick pace 5.8 sec    STS 15 reps total; very hard per pt (hard at 12)    HHD 5lbs R, 5 L    PROM shoulder R/L  +*, 170  ER 0*+, 80    AROM shoulder R/L  FF 90* no change with support, 150            Therapeutic Exercises (CPT 34088):     1. UPOC 7/23/22      Therapeutic Exercise Summary: STS 3x8  Semi tandem at sink  Supine shoulder flexion AAROM just to point of pain    Therapeutic Treatments and Modalities:     1. Neuromuscular Re-education (CPT 66398), hampton, semi tandem stance and reviewed for HEP to dec fall risk    Time-based treatments/modalities:  Physical Therapy Timed Treatment Charges  Neuromusc re-ed, balance, coor, post minutes (CPT 21710): 10 minutes  Therapeutic exercise minutes (CPT 47747): 10 minutes  Assessment, Response and Plan:   Impairments: abnormal gait, impaired functional mobility, lacks appropriate home exercise program and pain with function    Assessment details:  PT finds s/sx consistent with referral of decreased balance and increased risk of falls. Also of note was a referral from Dr Reyna from [facility] for PT in which patient has not initiated any exercise/stretching for with a proximal humeral fracture from a fall back in January in which  she exhibits pain and mobility deficits. She is having a slight increased fall risk with a hampton score of 41/56 and fear in context of her recent injury of falling again. She is scoring slightly above her peers but fatigues with functional strengthening in which she can benefit from skilled PT care to address the above deficits and meet her goals.  Prognosis: fair    Goals:   Short Term Goals:   -pt meets MCID 5 rep STS  -pt meets MCID 3MWT fast pace  -pt improves AROM OH to 100 deg to improve IADL capacit  - pt has HEP to improve balance and strength  Short term goal time span:  2-4 weeks      Long Term Goals:    -pt meets MCID 5 rep STS  -pt meets MCID 3MWT fast pace  -pt improves AROM OH to 100 deg to improve IADL capacit  - pt has HEP to improve balance and strength  -pt begins walking at community distances with LRAD and no fear of falling to improve her community access  Long term goal time span:  6-8 weeks    Plan:   Therapy options:  Physical therapy treatment to continue  Planned therapy interventions:  Manual Therapy (CPT 60526), E Stim Attended (CPT 82140), E Stim Unattended (CPT 54764), Neuromuscular Re-education (CPT 32886), Therapeutic Activities (CPT 41432), Therapeutic Exercise (CPT 10840) and Hot or Cold Pack Therapy (CPT 63377)  Frequency:  1x week  Duration in weeks:  8  Duration in visits:  8  Discussed with:  Patient      Functional Assessment Used  Hampton Balance Total Score (0-56): 40     Referring provider co-signature:  I have reviewed this plan of care and my co-signature certifies the need for services.    Certification Period: 05/23/2022 to  07/22/22    Physician Signature: ________________________________ Date: ______________

## 2022-05-24 NOTE — TELEPHONE ENCOUNTER
Outcome: Left Message EMMANUELLE    Please transfer to Patient Outreach Team at [phone] when patient returns call.    Attempt # 3

## 2022-05-25 ENCOUNTER — APPOINTMENT (OUTPATIENT)
Dept: PHYSICAL THERAPY | Facility: REHABILITATION | Age: 87
End: 2022-05-25
Attending: FAMILY MEDICINE
Payer: MEDICARE

## 2022-05-31 NOTE — OP THERAPY DAILY TREATMENT
"  Outpatient Physical Therapy  DAILY TREATMENT     [facility] [place] [address], [address]  [place] [address]  Phone:  [phone]    Date: 06/01/2022    Patient: Grisel Saha  YOB: 1935  MRN: [id number]     Time Calculation    Start time: 1400  Stop time: 1430 Time Calculation (min): 30 minutes   Chief Complaint: No chief complaint on file.  Visit #: 7  SUBJECTIVE:  Pt indicates improvement in walking/getting out of chair. Shoulder feels the same. Not too worried about its function    Goal  -Being independent and walking short community distances without help and without an AD    OBJECTIVE:  Current objective measures:     Deferred below  Shoulder Comments   BBS 40/56  5 rep STS 14 sec no hands  Gait; poor hip extension/short strides especially without her SPC  3MWT 6.5 sec, quick pace 5.8 sec     STS 15 reps total; very hard per pt (hard at 12)    PROM shoulder R/L  +*, 170  ER 0*+, 80     AROM shoulder R/L  FF 90* no change with support, 150         Therapeutic Exercises (CPT 79293):     1. UPOC 7/22/22      Therapeutic Exercise Summary: Step ups 2x8  STS x12 8#, x8 (slight b knee discomfort/moderately hard)  Semi tandem 2x30\"  Side steps [id number] UE support    Wall walk x10  Seated AAROM cane x10    Therapeutic Treatments and Modalities:     1. Neuromuscular Re-education (CPT 74185), see above, tandem, side steps    Time-based treatments/modalities:  Physical Therapy Timed Treatment Charges  Neuromusc re-ed, balance, coor, post minutes (CPT 72867): 8 minutes  Therapeutic exercise minutes (CPT 34925): 22 minutes  ASSESSMENT:   Response to treatment: PT finds pt remains fatigued easily; limited today by 8/10 RPE fatigue and needing rest breaks. She cont to struggle with impairments linear from eval. Address as able.    PLAN/RECOMMENDATIONS:   Plan for treatment: therapy treatment to continue next visit.  Planned interventions for next visit: continue with current " treatment.

## 2022-06-01 ENCOUNTER — PHYSICAL THERAPY (OUTPATIENT)
Dept: PHYSICAL THERAPY | Facility: REHABILITATION | Age: 87
End: 2022-06-01
Attending: FAMILY MEDICINE
Payer: MEDICARE

## 2022-06-01 DIAGNOSIS — Z91.81 HISTORY OF FALL: ICD-10-CM

## 2022-06-01 DIAGNOSIS — S42.291S: ICD-10-CM

## 2022-06-01 PROCEDURE — 97110 THERAPEUTIC EXERCISES: CPT

## 2022-06-01 PROCEDURE — 97112 NEUROMUSCULAR REEDUCATION: CPT

## 2022-06-06 NOTE — OP THERAPY DAILY TREATMENT
"  Outpatient Physical Therapy  DAILY TREATMENT     [facility] [place] [address], [address]  [place] [address]  Phone:  [phone]    Date: 06/07/2022    Patient: Grisel Saha  YOB: 1935  MRN: [id number]     Time Calculation             Chief Complaint: No chief complaint on file.  Visit #: 8  SUBJECTIVE: ***  Pt indicates improvement in walking/getting out of chair. Shoulder feels the same. Not too worried about its function    Goal  -Being independent and walking short community distances without help and without an AD    OBJECTIVE: ***  Current objective measures:     Deferred below  Shoulder Comments   BBS 40/56  5 rep STS 14 sec no hands  Gait; poor hip extension/short strides especially without her SPC  3MWT 6.5 sec, quick pace 5.8 sec     STS 15 reps total; very hard per pt (hard at 12)    PROM shoulder R/L  +*, 170  ER 0*+, 80     AROM shoulder R/L  FF 90* no change with support, 150         Therapeutic Exercises (CPT 69221):     1. UPOC 7/22/22      Therapeutic Exercise Summary: Step ups 2x8  STS x12 8#, x8 (slight b knee discomfort/moderately hard)  Semi tandem 2x30\"  Side steps [id number] UE support    Wall walk x10  Seated AAROM cane x10    Therapeutic Treatments and Modalities:     1. Neuromuscular Re-education (CPT 71683), see above, tandem, side steps    Time-based treatments/modalities:     ASSESSMENT: ***  Response to treatment: PT finds pt remains fatigued easily; limited today by 8/10 RPE fatigue and needing rest breaks. She cont to struggle with impairments linear from eval. Address as able.    PLAN/RECOMMENDATIONS:   Plan for treatment: therapy treatment to continue next visit.  Planned interventions for next visit: continue with current treatment.       "

## 2022-06-07 ENCOUNTER — APPOINTMENT (OUTPATIENT)
Dept: PHYSICAL THERAPY | Facility: REHABILITATION | Age: 87
End: 2022-06-07
Attending: FAMILY MEDICINE
Payer: MEDICARE

## 2022-06-09 ENCOUNTER — APPOINTMENT (OUTPATIENT)
Dept: PHYSICAL THERAPY | Facility: REHABILITATION | Age: 87
End: 2022-06-09
Attending: FAMILY MEDICINE
Payer: MEDICARE

## 2022-06-14 ENCOUNTER — PHYSICAL THERAPY (OUTPATIENT)
Dept: PHYSICAL THERAPY | Facility: REHABILITATION | Age: 87
End: 2022-06-14
Attending: FAMILY MEDICINE
Payer: MEDICARE

## 2022-06-14 DIAGNOSIS — Z91.81 HISTORY OF FALL: ICD-10-CM

## 2022-06-14 DIAGNOSIS — S42.291S: ICD-10-CM

## 2022-06-14 PROCEDURE — 97110 THERAPEUTIC EXERCISES: CPT

## 2022-06-14 PROCEDURE — 97530 THERAPEUTIC ACTIVITIES: CPT

## 2022-06-14 NOTE — OP THERAPY DAILY TREATMENT
Outpatient Physical Therapy  DAILY TREATMENT     [facility] [address] [facility], [address]  [place] [address]  Phone:  [phone]    Date: 06/14/2022    Patient: Grisel Saha  YOB: 1935  MRN: [id number]     Time Calculation    Start time: 1415  Stop time: 1500 Time Calculation (min): 45 minutes   Chief Complaint: No chief complaint on file.  Visit #: 8  SUBJECTIVE:   Pt indicates improvement in walking/getting out of chair. Shoulder feels the same. Not too worried about its function.     Goal  -Being independent and walking short community distances without help and without an AD    OBJECTIVE:   Current objective measures:     Bicep curl R 23 reps, 20 reps 4#  Rows standing R 19, L 21 6#    Deferred below  Shoulder Comments   BBS 40/56  5 rep STS 14 sec no hands  Gait; poor hip extension/short strides especially without her SPC  3MWT 6.5 sec, quick pace 5.8 sec     STS 15 reps total; very hard per pt (hard at 12)    PROM shoulder R/L  +*, 170  ER 0*+, 80     AROM shoulder R/L  FF 90* no change with support, 150         Therapeutic Exercises (CPT 28579):     1. UPOC 7/22/22      Therapeutic Exercise Summary: Step ups 2x8    STS 2x10  Mini lunge UE support 2x10  DL calf raises 2x20    Mini push ups on counter 2x15  Rows 6# x20ea  Bicep curls 4# x20ea        Therapeutic Treatments and Modalities:     1. Therapeutic Activities (CPT 45146), floor transfer edu/practice , needs cues for LE placement and hand placment    Time-based treatments/modalities:  Physical Therapy Timed Treatment Charges  Therapeutic activity minutes (CPT 03685): 10 minutes  Therapeutic exercise minutes (CPT 68419): 35 minutes  ASSESSMENT:   Response to treatment: PT finds pt has difficulty with floor transfers; ability to use power in LE to get off the floor is very difficult. Due to this along with cont dysfunction in balance and UE function cont to recommend skilled PT  care.    PLAN/RECOMMENDATIONS:   Plan for treatment: therapy treatment to continue next visit.  Planned interventions for next visit: continue with current treatment.

## 2022-06-21 NOTE — OP THERAPY DAILY TREATMENT
Outpatient Physical Therapy  DAILY TREATMENT     [facility] Therapy [address] [facility], [address]  [place] [address]  Phone:  [phone]    Date: 06/22/2022    Patient: Grisel Saha  YOB: 1935  MRN: [id number]     Time Calculation    Start time: 1356  Stop time: 1435 Time Calculation (min): 39 minutes   Chief Complaint: No chief complaint on file.  Visit #: 9  SUBJECTIVE:   Pt indicates improvement in endurance; had to walk in [email or web address] without issue. She notes more confidence with balance and ease for transfers. Unsure about getting down lower or off the ground still/some fear of falling.    Goal  -Being independent and walking short community distances without help and without an AD    OBJECTIVE:   Current objective measures:     None today  Bicep curl R 23 reps, 20 reps 4#  Rows standing R 19, L 21 6#    BBS 40/56  5 rep STS 14 sec no hands  Gait; poor hip extension/short strides especially without her SPC  3MWT 6.5 sec, quick pace 5.8 sec     STS 15 reps total; very hard per pt (hard at 12)    PROM shoulder R/L  +*, 170  ER 0*+, 80     AROM shoulder R/L  FF 90* no change with support, 150         Therapeutic Exercises (CPT 36997):     1. UPOC 7/22/22      Therapeutic Exercise Summary: Step ups 2x8 NT    STS 2x10  Mini lunge UE support 3x1'  DL calf raises 3x1'  Mini push ups on counter 3x1'    50% lunge with PT assist with gait belt 2x10 (7/10 RPE)    NT below  Rows 6# x20ea  Bicep curls 4# x20ea        Therapeutic Treatments and Modalities:     1. Neuromuscular Re-education (CPT 92138), tandem, semi tandem and NBOS foam    Time-based treatments/modalities:  Physical Therapy Timed Treatment Charges  Neuromusc re-ed, balance, coor, post minutes (CPT 21039): 8 minutes  Therapeutic exercise minutes (CPT 46868): 31 minutes  ASSESSMENT:   Response to treatment: PT finds cont deficits in ability to ascend from floor; cont to work on transfer strength. Able to get to  7/10 RPE today with said measures. Progress as able.    PLAN/RECOMMENDATIONS:   Plan for treatment: therapy treatment to continue next visit.  Planned interventions for next visit: continue with current treatment.

## 2022-06-22 ENCOUNTER — PHYSICAL THERAPY (OUTPATIENT)
Dept: PHYSICAL THERAPY | Facility: REHABILITATION | Age: 87
End: 2022-06-22
Attending: FAMILY MEDICINE
Payer: MEDICARE

## 2022-06-22 DIAGNOSIS — R26.89 BALANCE PROBLEM: ICD-10-CM

## 2022-06-22 DIAGNOSIS — S42.291S: ICD-10-CM

## 2022-06-22 DIAGNOSIS — Z91.81 HISTORY OF FALL: ICD-10-CM

## 2022-06-22 PROCEDURE — 97110 THERAPEUTIC EXERCISES: CPT

## 2022-06-22 PROCEDURE — 97112 NEUROMUSCULAR REEDUCATION: CPT

## 2022-07-06 ENCOUNTER — APPOINTMENT (OUTPATIENT)
Dept: PHYSICAL THERAPY | Facility: REHABILITATION | Age: 87
End: 2022-07-06
Attending: FAMILY MEDICINE
Payer: MEDICARE

## 2022-08-19 ENCOUNTER — OFFICE VISIT (OUTPATIENT)
Dept: MEDICAL GROUP | Facility: PHYSICIAN GROUP | Age: 87
End: 2022-08-19
Payer: MEDICARE

## 2022-08-19 VITALS
HEIGHT: 59 IN | OXYGEN SATURATION: 96 % | BODY MASS INDEX: 26.61 KG/M2 | DIASTOLIC BLOOD PRESSURE: 60 MMHG | WEIGHT: 132 LBS | SYSTOLIC BLOOD PRESSURE: 128 MMHG | HEART RATE: 86 BPM | TEMPERATURE: 99 F

## 2022-08-19 DIAGNOSIS — M79.644 PAIN IN FINGER OF BOTH HANDS: ICD-10-CM

## 2022-08-19 DIAGNOSIS — F43.0 ACUTE STRESS REACTION: ICD-10-CM

## 2022-08-19 DIAGNOSIS — M79.645 PAIN IN FINGER OF BOTH HANDS: ICD-10-CM

## 2022-08-19 PROCEDURE — 99214 OFFICE O/P EST MOD 30 MIN: CPT | Performed by: FAMILY MEDICINE

## 2022-08-19 RX ORDER — MELOXICAM 7.5 MG/1
7.5 TABLET ORAL DAILY
Qty: 30 TABLET | Refills: 1 | Status: SHIPPED | OUTPATIENT
Start: 2022-08-19 | End: 2022-10-08

## 2022-08-19 ASSESSMENT — FIBROSIS 4 INDEX: FIB4 SCORE: 1.856306626372731534

## 2022-08-19 NOTE — ASSESSMENT & PLAN NOTE
Chronic issue  Worsening finger pain 2/2 OA  She loves to play the piano and this is very important to her and would like further evaluation.

## 2022-08-19 NOTE — PROGRESS NOTES
"Subjective:     Chief Complaint   Patient presents with    Pain     Hands , Arthritis       HPI:   Grisel presents today to discuss the following.    Pain in finger of both hands  Chronic issue  Worsening finger pain 2/2 OA  She loves to play the piano and this is very important to her and would like further evaluation.     Acute stress reaction  New problem  Has had grief with the sudden bad news of her husbands illness    Past Medical History:   Diagnosis Date    HTN (hypertension)        Current Outpatient Medications Ordered in Epic   Medication Sig Dispense Refill    meloxicam (MOBIC) 7.5 MG Tab Take 1 Tablet by mouth every day. 30 Tablet 1    sertraline (ZOLOFT) 50 MG Tab Take 1 Tablet by mouth every day. 90 Tablet 1    losartan (COZAAR) 100 MG Tab TAKE 1 TABLET BY MOUTH EVERY DAY. TAKE 1 TABLET BY MOUTH DAILY 30 Tablet 3     Current Facility-Administered Medications Ordered in Epic   Medication Dose Route Frequency Provider Last Rate Last Admin    triamcinolone acetonide (KENALOG-40) injection 80 mg  80 mg Intra-articular  Sebastian Brewster M.D.   80 mg at 11/12/21 0703    triamcinolone acetonide (KENALOG-40) injection 80 mg  80 mg Intra-articular  Sebastian Brewster M.D.   80 mg at 11/12/21 0703       Allergies:  Patient has no known allergies.    Health Maintenance: Completed    ROS:  Gen: no fevers/chills, no changes in weight  Eyes: no changes in vision  Pulm: no sob, no cough  CV: no chest pain, no palpitations  GI: no nausea/vomiting, no diarrhea      Objective:     Exam:  /60 (BP Location: Left arm, Patient Position: Sitting, BP Cuff Size: Adult)   Pulse 86   Temp 37.2 °C (99 °F) (Temporal)   Ht 1.499 m (4' 11\")   Wt 59.9 kg (132 lb)   SpO2 96%   BMI 26.66 kg/m²  Body mass index is 26.66 kg/m².      Constitutional: Alert, no distress, well-groomed.  Skin: Warm, dry, good turgor, no rashes in visible areas.  Eye: Equal, round and reactive, conjunctiva clear, lids normal.  ENMT: Lips without " lesions, good dentition, moist mucous membranes.  Neck: Trachea midline, no masses, no thyromegaly.  Respiratory: Unlabored respiratory effort, no cough.  MSK: Normal gait, moves all extremities.  Neuro: Grossly non-focal.   Psych: Alert and oriented x3, normal affect and mood.        Assessment & Plan:     87 y.o. female with the following -     1. Pain in finger of both hands  Chronic, worsening condition.  Secondary to osteoarthritis.  Debilitating.  She finds hope in playing the piano.  We will do a trial of meloxicam.  Will refer to sports medicine for further analysis.  - Referral to Sports Medicine  - meloxicam (MOBIC) 7.5 MG Tab; Take 1 Tablet by mouth every day.  Dispense: 30 Tablet; Refill: 1    2. Acute stress reaction  New problem.  Received very life-threatening news about her  in the recent past.  This has caused a lot of distress.  She is amenable to a trial of Zoloft today with reevaluation in 4 weeks.  - sertraline (ZOLOFT) 50 MG Tab; Take 1 Tablet by mouth every day.  Dispense: 90 Tablet; Refill: 1      Return in about 4 weeks (around 9/16/2022).    Please note that this dictation was created using voice recognition software. I have made every reasonable attempt to correct obvious errors, but I expect that there are errors of grammar and possibly content that I did not discover before finalizing the note.

## 2022-08-26 ENCOUNTER — OFFICE VISIT (OUTPATIENT)
Dept: SPORTS MEDICINE | Facility: CLINIC | Age: 87
End: 2022-08-26
Payer: MEDICARE

## 2022-08-26 ENCOUNTER — APPOINTMENT (OUTPATIENT)
Dept: RADIOLOGY | Facility: IMAGING CENTER | Age: 87
End: 2022-08-26
Attending: FAMILY MEDICINE
Payer: MEDICARE

## 2022-08-26 VITALS
WEIGHT: 132 LBS | DIASTOLIC BLOOD PRESSURE: 70 MMHG | BODY MASS INDEX: 26.61 KG/M2 | HEIGHT: 59 IN | OXYGEN SATURATION: 94 % | RESPIRATION RATE: 16 BRPM | HEART RATE: 104 BPM | SYSTOLIC BLOOD PRESSURE: 118 MMHG | TEMPERATURE: 98.5 F

## 2022-08-26 DIAGNOSIS — M19.042 OSTEOARTHRITIS OF BOTH HANDS, UNSPECIFIED OSTEOARTHRITIS TYPE: ICD-10-CM

## 2022-08-26 DIAGNOSIS — M79.641 BILATERAL HAND PAIN: ICD-10-CM

## 2022-08-26 DIAGNOSIS — M79.642 BILATERAL HAND PAIN: ICD-10-CM

## 2022-08-26 DIAGNOSIS — M19.041 OSTEOARTHRITIS OF BOTH HANDS, UNSPECIFIED OSTEOARTHRITIS TYPE: ICD-10-CM

## 2022-08-26 PROCEDURE — 73130 X-RAY EXAM OF HAND: CPT | Mod: TC,LT | Performed by: FAMILY MEDICINE

## 2022-08-26 PROCEDURE — 99203 OFFICE O/P NEW LOW 30 MIN: CPT | Performed by: FAMILY MEDICINE

## 2022-08-26 PROCEDURE — 73130 X-RAY EXAM OF HAND: CPT | Mod: TC,RT | Performed by: FAMILY MEDICINE

## 2022-08-26 ASSESSMENT — ENCOUNTER SYMPTOMS
DIZZINESS: 0
VOMITING: 0
CHILLS: 0
FEVER: 0
NAUSEA: 0
SHORTNESS OF BREATH: 0

## 2022-08-26 ASSESSMENT — FIBROSIS 4 INDEX: FIB4 SCORE: 1.856306626372731534

## 2022-08-26 NOTE — Clinical Note
Yong Harley, Thank you for referring Arjun to our sports medicine clinic. Shari lady... She does have pretty advanced arthritis of the hands.  We recommended some topical treatment and referred her for some occupational/hand therapy. Hope you are well! L

## 2022-08-26 NOTE — PROGRESS NOTES
"Chief Complaint   Patient presents with    Hand Pain     Referral from PCP/ Bilateral hand pain        Subjective     Referred by Cricket Miller M.D.  for evaluation of BILATERAL hand pain  Trouble playing the piano due to pain  \"All of her fingers\"  Pain for the past few months  Getting worse  Achy  No radiation  No night symptoms  Not taking meds for pain     Retired  Likes playing the piano    Review of Systems   Constitutional:  Negative for chills and fever.   Respiratory:  Negative for shortness of breath.    Cardiovascular:  Negative for chest pain.   Gastrointestinal:  Negative for nausea and vomiting.   Neurological:  Negative for dizziness.     PMH:  has a past medical history of HTN (hypertension).  MEDS:   Current Outpatient Medications:     meloxicam (MOBIC) 7.5 MG Tab, Take 1 Tablet by mouth every day., Disp: 30 Tablet, Rfl: 1    sertraline (ZOLOFT) 50 MG Tab, Take 1 Tablet by mouth every day., Disp: 90 Tablet, Rfl: 1    losartan (COZAAR) 100 MG Tab, TAKE 1 TABLET BY MOUTH EVERY DAY. TAKE 1 TABLET BY MOUTH DAILY, Disp: 30 Tablet, Rfl: 3    Current Facility-Administered Medications:     triamcinolone acetonide (KENALOG-40) injection 80 mg, 80 mg, Intra-articular, , Sebastian Brewster M.D., 80 mg at 11/12/21 0703    triamcinolone acetonide (KENALOG-40) injection 80 mg, 80 mg, Intra-articular, , Sebastian Brewster M.D., 80 mg at 11/12/21 0703  ALLERGIES: No Known Allergies  SURGHX: No past surgical history on file.  SOCHX:  reports that she has quit smoking. She has never used smokeless tobacco. She reports current alcohol use. She reports that she does not currently use drugs.  FH: Family history was reviewed, no pertinent findings to report    Objective   /70 (BP Location: Left arm, Patient Position: Sitting, BP Cuff Size: Adult)   Pulse (!) 104   Temp 36.9 °C (98.5 °F) (Temporal)   Resp 16   Ht 1.499 m (4' 11\")   Wt 59.9 kg (132 lb)   SpO2 94%   BMI 26.66 kg/m²     Hand " "exam    NAD  Alert and oriented    BILATERAL WRIST exam  Range of motion intact  No tenderness along scaphoid, TFCC insertion, distal radius or distal ulna  Tinel's testing is NEGATIVE  The hand is otherwise neurovascularly intact    BILATERAL hand exam  POSITIVE swelling and deformity of the RIGHT middle and index fingers of the RIGHT hand with swelling at the PIP joints and DIP joints  Range of motion of all MCP, DIP and PIP joints markedly decreased  Pinky opposition NORMAL  Grind test is NEGATIVE  Collateral ligament testing is NORMAL    1. Osteoarthritis of both hands, unspecified osteoarthritis type  DX-HAND 3+ RIGHT    DX-HAND 3+ LEFT    Referral to Occupational Therapy        Trouble playing the piano due to pain  \"All of her fingers\"  Pain for the past few months    Referral for occupational therapy  Recommend Arnica gel or Voltaren gel    Follow-up as needed                8/26/2022 9:14 AM     HISTORY/REASON FOR EXAM:  Atraumatic Pain/Swelling/Deformity.        TECHNIQUE/EXAM DESCRIPTION AND NUMBER OF VIEWS:  3 views of the  RIGHT hand.     COMPARISON: Same day contralateral hand radiographs     FINDINGS:     MINERALIZATION: Decreased.     INJURY: No acute fracture or gross malalignment is seen.     JOINTS: Diffuse loss of interphalangeal joint space, particularly involving the index finger interphalangeal joints and the long finger proximal interphalangeal joint. Possible erosions about the index finger distal interphalangeal joint with a gullwing   appearance..  Degenerative changes of the basal joints of the thumb           IMPRESSION:     1.  No acute osseous abnormality.  2.  Findings again suspicious for erosive arthropathy.  3.  Degenerative changes of basal joints of the thumb.           Exam Ended: 08/26/22  9:31 AM Last Resulted: 08/26/22  9:46 AM                         8/26/2022 9:14 AM     HISTORY/REASON FOR EXAM:  Atraumatic Pain/Swelling/Deformity.        TECHNIQUE/EXAM DESCRIPTION AND " NUMBER OF VIEWS:  3 views of the  LEFT hand.     COMPARISON: None     FINDINGS:     MINERALIZATION: Decreased.     INJURY: No acute fracture or gross malalignment is seen.     JOINTS: There is loss of interphalangeal joint space throughout. There appear to be small erosions or subchondral cysts surrounding the fifth distal interphalangeal joint as well as the basilar joint of the thumb..           IMPRESSION:     1.  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  Osteopenia           Exam Ended: 08/26/22  9:31 AM Last Resulted: 08/26/22  9:38 AM              Interpreted in the office today with the patient    Thank you Cricket Miller M.D. for allowing me to participate in caring for your patient.

## 2022-08-26 NOTE — PATIENT INSTRUCTIONS
ARNICARE Gel, available at all major pharmacies (usually by the icy hot)    OR Voltaren Gel

## 2022-10-08 ENCOUNTER — OFFICE VISIT (OUTPATIENT)
Dept: URGENT CARE | Facility: CLINIC | Age: 87
End: 2022-10-08
Payer: MEDICARE

## 2022-10-08 VITALS
HEART RATE: 79 BPM | TEMPERATURE: 98.3 F | HEIGHT: 59 IN | WEIGHT: 134.4 LBS | BODY MASS INDEX: 27.09 KG/M2 | OXYGEN SATURATION: 98 % | SYSTOLIC BLOOD PRESSURE: 122 MMHG | DIASTOLIC BLOOD PRESSURE: 66 MMHG

## 2022-10-08 DIAGNOSIS — L03.031 PARONYCHIA OF SECOND TOE OF RIGHT FOOT: ICD-10-CM

## 2022-10-08 PROCEDURE — 99213 OFFICE O/P EST LOW 20 MIN: CPT | Performed by: FAMILY MEDICINE

## 2022-10-08 RX ORDER — CEPHALEXIN 250 MG/1
250 CAPSULE ORAL 3 TIMES DAILY
Qty: 15 CAPSULE | Refills: 0 | Status: SHIPPED | OUTPATIENT
Start: 2022-10-08 | End: 2022-10-13

## 2022-10-08 ASSESSMENT — FIBROSIS 4 INDEX: FIB4 SCORE: 1.856306626372731534

## 2022-10-08 ASSESSMENT — ENCOUNTER SYMPTOMS: FEVER: 0

## 2022-10-08 ASSESSMENT — PAIN SCALES - GENERAL: PAINLEVEL: 2=MINIMAL-SLIGHT

## 2022-10-08 NOTE — PROGRESS NOTES
"Subjective:     Grisel Saha is a 87 y.o. female who presents for Nail Problem (Right foot big toe infection)    HPI  Pt presents for evaluation of an acute problem  Pt with right 2nd toe pain   has known ingrown toenails and follows with a podiatrist  Toe has become more swollen and red  Starting to become more painful  Has been worse the last 2 days  No bleeding or drainage  No injuries that she can recall    Review of Systems   Constitutional:  Negative for fever.   Skin:  Positive for rash.     PMH:  has a past medical history of HTN (hypertension).  MEDS:   Current Outpatient Medications:     cephALEXin (KEFLEX) 250 MG Cap, Take 1 Capsule by mouth 3 times a day for 5 days., Disp: 15 Capsule, Rfl: 0    losartan (COZAAR) 100 MG Tab, TAKE 1 TABLET BY MOUTH EVERY DAY. TAKE 1 TABLET BY MOUTH DAILY, Disp: 30 Tablet, Rfl: 3    Current Facility-Administered Medications:     triamcinolone acetonide (KENALOG-40) injection 80 mg, 80 mg, Intra-articular, , Sebastian Brewster M.D., 80 mg at 11/12/21 0703    triamcinolone acetonide (KENALOG-40) injection 80 mg, 80 mg, Intra-articular, , Sebastian Brewster M.D., 80 mg at 11/12/21 0703  ALLERGIES: No Known Allergies  SURGHX: History reviewed. No pertinent surgical history.  SOCHX:  reports that she has quit smoking. She has never used smokeless tobacco. She reports current alcohol use. She reports that she does not currently use drugs.     Objective:   /66 (BP Location: Left arm, Patient Position: Sitting, BP Cuff Size: Adult)   Pulse 79   Temp 36.8 °C (98.3 °F) (Temporal)   Ht 1.499 m (4' 11\")   Wt 61 kg (134 lb 6.4 oz)   SpO2 98%   BMI 27.15 kg/m²     Physical Exam  Constitutional:       General: She is not in acute distress.     Appearance: She is well-developed. She is not diaphoretic.   Pulmonary:      Effort: Pulmonary effort is normal.   Neurological:      Mental Status: She is alert.   Right foot with bunion, onychomycosis in all toes, and flaking of the " skin of all toes.  Second toe with mild swelling and redness at the tip surrounding the nail with mild tenderness    Assessment/Plan:   Assessment    1. Paronychia of second toe of right foot  - cephALEXin (KEFLEX) 250 MG Cap; Take 1 Capsule by mouth 3 times a day for 5 days.  Dispense: 15 Capsule; Refill: 0    Patient with paronychia.  Recommended doing antiseptic soaks with Betadine first.  Suspect that this approach will be enough, however did give prescription for Keflex to take if not fully resolving with other measures.  Has follow-up with podiatrist next month.

## 2022-10-21 ENCOUNTER — APPOINTMENT (OUTPATIENT)
Dept: RADIOLOGY | Facility: MEDICAL CENTER | Age: 87
End: 2022-10-21
Attending: EMERGENCY MEDICINE
Payer: MEDICARE

## 2022-10-21 ENCOUNTER — HOSPITAL ENCOUNTER (EMERGENCY)
Facility: MEDICAL CENTER | Age: 87
End: 2022-10-21
Attending: EMERGENCY MEDICINE
Payer: MEDICARE

## 2022-10-21 VITALS
TEMPERATURE: 98.5 F | OXYGEN SATURATION: 94 % | WEIGHT: 140 LBS | HEART RATE: 72 BPM | DIASTOLIC BLOOD PRESSURE: 67 MMHG | SYSTOLIC BLOOD PRESSURE: 148 MMHG | HEIGHT: 59 IN | RESPIRATION RATE: 18 BRPM | BODY MASS INDEX: 28.22 KG/M2
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
## 2022-10-21 DIAGNOSIS — S00.81XA ABRASION OF FACE, INITIAL ENCOUNTER: ICD-10-CM

## 2022-10-21 DIAGNOSIS — S02.2XXA CLOSED FRACTURE OF NASAL BONE, INITIAL ENCOUNTER: Primary | ICD-10-CM

## 2022-10-21 PROCEDURE — 73560 X-RAY EXAM OF KNEE 1 OR 2: CPT | Mod: LT

## 2022-10-21 PROCEDURE — 99284 EMERGENCY DEPT VISIT MOD MDM: CPT | Mod: 25

## 2022-10-21 PROCEDURE — 70450 CT HEAD/BRAIN W/O DYE: CPT

## 2022-10-21 PROCEDURE — 70486 CT MAXILLOFACIAL W/O DYE: CPT

## 2022-10-21 PROCEDURE — 73080 X-RAY EXAM OF ELBOW: CPT | Mod: LT

## 2022-10-21 ASSESSMENT — FIBROSIS 4 INDEX: FIB4 SCORE: 1.856306626372731534

## 2022-10-22 NOTE — DISCHARGE INSTRUCTIONS
Thankfully there were no other injuries noted other than a small nasal fracture.  Your elbow and knee x-rays were negative for any fractures.  You can keep your arm in a sling and she can follow-up with orthopedics for further evaluation treatment.  You can take Tylenol or Motrin to help with pain.  If you have worsening symptoms, please return to the ED.  Thank you for coming in today.

## 2022-10-22 NOTE — ED PROVIDER NOTES
ED Provider Note    Scribed for Arjun Calvo by Luis Antonio Virgen. 10/21/2022  7:54 PM    Primary care provider: Cricket Miller M.D.  Means of arrival: EMS  History obtained from: Patient  History limited by: None    CHIEF COMPLAINT  Chief Complaint   Patient presents with    T-5000 GLF     TBI ALERT. Patient was walking down the exit ramp at the [employer] and sustained a MGLF, hitting her head on the sidewalk. Now complains of facial, left knee and left arm pain.      HPI  Grisel Saha is a 87 y.o. female who presents to the Emergency Department for evaluation after a head injury that occurred earlier today. Per EMS, patient was walking [place] [employer] and sustained a MGLF, hitting her head on the sidewalk. She sustained a cut on her face. EMS states patient's left leg possibly looked slighter shorter than the other leg. States patient reports pain to her left knee and left arm. EMS states they did not see any deformities. EMS states patient is not on blood thinners.    Quality:aching  Duration: onset today  Severity: moderate  Associated sx: left knee pain, left arm pain, facial pain    REVIEW OF SYSTEMS  As above, all other systems reviewed and are negative.   See HPI for further details.     PAST MEDICAL HISTORY   has a past medical history of HTN (hypertension).  SURGICAL HISTORY  patient denies any surgical history  SOCIAL HISTORY  Social History     Tobacco Use    Smoking status: Former    Smokeless tobacco: Never   Vaping Use    Vaping Use: Never used   Substance Use Topics    Alcohol use: Not Currently     Alcohol/week: 12.6 oz     Types: 21 Shots of liquor per week     Comment: 2-3 ounces of liquor daily    Drug use: Not Currently      Social History     Substance and Sexual Activity   Drug Use Not Currently     FAMILY HISTORY  Family History   Problem Relation Age of Onset    Stroke Mother     Cancer Father         lung     CURRENT MEDICATIONS  Home Medications       Reviewed  "by Samina Funes R.N. (Registered Nurse) on 10/21/22 at 2008  Med List Status: Partial     Medication Last Dose Status   losartan (COZAAR) 100 MG Tab  Active   triamcinolone acetonide (KENALOG-40) injection 80 mg  Active   triamcinolone acetonide (KENALOG-40) injection 80 mg  Active                  ALLERGIES  No Known Allergies    PHYSICAL EXAM    VITAL SIGNS:   Vitals:    10/21/22 2003 10/21/22 2006 10/21/22 2016 10/21/22 2100   BP:  (!) 180/62 (!) 179/65 (!) 148/67   Pulse:  75  72   Resp:  20  18   Temp:  36.9 °C (98.5 °F)  36.9 °C (98.5 °F)   TempSrc:  Temporal  Temporal   SpO2:  94%  94%   Weight: 63.5 kg (140 lb)      Height: 1.499 m (4' 11\")        Vitals: My interpretation: hypertensive, not tachycardic, afebrile, not hypoxic    Reinterpretation of vitals: Improved    PE:   Constitutional: Well developed, Well nourished, No acute distress, Non-toxic appearance.   HENT: Normocephalic, mild tenderness to the bridge of the nose, some very mild abrasions, no neck tenderness, no nasal septal hematoma, Bilateral external ears normal, Oropharynx is clear mucous membranes are moist. No oral exudates or nasal discharge.   Eyes: Pupils are equal round and reactive, EOMI, Conjunctiva normal, No discharge.   Neck: Normal range of motion, No tenderness, Supple, No stridor. No meningismus.  Lymphatic: No lymphadenopathy noted.   Cardiovascular: Regular rate and rhythm without murmur rub or gallop.  Thorax & Lungs: Clear breath sounds bilaterally without wheezes, rhonchi or rales. There is no chest wall tenderness.   Abdomen: Soft non-tender non-distended. There is no rebound or guarding. No organomegaly is appreciated. Bowel sounds are normal.  Skin: Normal without rash.   Back: No CVA or spinal tenderness.   Extremities: Intact distal pulses, No edema, No tenderness, No cyanosis, No clubbing. Capillary refill is less than 2 seconds.  Musculoskeletal: Good range of motion in all major joints. No tenderness to " palpation or major deformities noted.   Neurologic: Alert & oriented x 3, Normal motor function, Normal sensory function, No focal deficits noted. Reflexes are normal.  Psychiatric: Affect normal, Judgment normal, Mood normal. There is no suicidal ideation or patient reported hallucinations.     DIAGNOSTIC STUDIES / PROCEDURES    RADIOLOGY  DX-ELBOW-COMPLETE 3+ LEFT   Final Result      No radiographic evidence of acute traumatic injury.      DX-KNEE 2- LEFT   Final Result      1.  Small suprapatellar joint effusion without underlying acute osseous abnormality.   2.  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   3.  Atherosclerosis.      CT-HEAD W/O   Final Result      1.  No evidence of acute territorial infarct, intracranial hemorrhage or mass lesion.   2.  Mild diffuse cerebral substance loss.   3.  Mild microangiopathic ischemic change versus demyelination or gliosis.         CT-MAXILLOFACIAL W/O PLUS RECONS   Final Result      Acute, nondisplaced right nasal bone fracture.        The radiologist's interpretation of all radiological studies have been reviewed by me.    COURSE & MEDICAL DECISION MAKING  Nursing notes, VS, PMSFHx, labs, imaging, EKG reviewed in chart.    MDM: 7:54 PM Grisel Saha is a 87 y.o. female who presented with evaluation for TBI, patient fell, mechanical in nature.  States that her nose broke the fall.  Arrives with some mild swelling and tenderness to the nasal bridge otherwise unremarkable and atraumatic exam, no neck tenderness.  Mild tenderness in the left elbow and left knee but x-rays were done and were negative.  Patient was able to ambulate.  Placed in a sling for the left elbow for comfort and will follow-up with orthopedics for further evaluation treatment.  Daughter at bedside is comfortable with taking patient back to her skilled nursing facility.  Patient verbalized understand strict return precautions outpatient follow-up plan and  is amenable.    FINAL IMPRESSION  1. Closed fracture of nasal bone, initial encounter Acute   2. Fall from ground level Acute   3. Abrasion of face, initial encounter Acute       ILuis Antonio (Scribe), am scribing for, and in the presence of, Arjun Calvo.    Electronically signed by: Luis Antonio Virgen (Scribe), 10/21/2022    I, Arjun Calvo personally performed the services described in this documentation, as scribed by Luis Antonio Virgen in my presence, and it is both accurate and complete.    The note accurately reflects work and decisions made by me.  Arjun Calvo  10/21/2022  9:08 PM

## 2022-10-22 NOTE — ED NOTES
Patient discharged per order. Oral and written discharge instructions reviewed with patient and daughter.. IV removed. All belongings accounted for and taken with patient. Questions answered, and patient agrees with discharge plan. Patient escorted to lobby. Patient will follow up with ortho as needed.

## 2022-10-22 NOTE — ED TRIAGE NOTES
".  Chief Complaint   Patient presents with    T-5000 GLF     TBI ALERT. Patient was walking down the exit ramp at the [facility] and sustained a MGLF, hitting her head on the sidewalk. Now complains of facial, left knee and left arm pain.      [place] [facility] [place]. Head CT done upon arrival. Small abrasion noted to left cheek .     .BP (!) 180/62   Pulse 75   Temp 36.9 °C (98.5 °F) (Temporal)   Resp 20   Ht 1.499 m (4' 11\")   Wt 63.5 kg (140 lb)   SpO2 94%     "

## 2022-10-25 ENCOUNTER — OFFICE VISIT (OUTPATIENT)
Dept: MEDICAL GROUP | Facility: PHYSICIAN GROUP | Age: 87
End: 2022-10-25
Payer: MEDICARE

## 2022-10-25 VITALS
HEIGHT: 59 IN | TEMPERATURE: 97.1 F | HEART RATE: 67 BPM | SYSTOLIC BLOOD PRESSURE: 140 MMHG | OXYGEN SATURATION: 98 % | BODY MASS INDEX: 28.22 KG/M2 | DIASTOLIC BLOOD PRESSURE: 70 MMHG | WEIGHT: 140 LBS

## 2022-10-25 DIAGNOSIS — S02.2XXD CLOSED FRACTURE OF NASAL BONE WITH ROUTINE HEALING: ICD-10-CM

## 2022-10-25 DIAGNOSIS — R26.89 BALANCE PROBLEM: ICD-10-CM

## 2022-10-25 PROCEDURE — 99214 OFFICE O/P EST MOD 30 MIN: CPT | Performed by: FAMILY MEDICINE

## 2022-10-25 RX ORDER — MELOXICAM 7.5 MG/1
7.5 TABLET ORAL DAILY
Qty: 30 TABLET | Refills: 0 | Status: SHIPPED
Start: 2022-10-25 | End: 2022-10-27

## 2022-10-25 ASSESSMENT — FIBROSIS 4 INDEX: FIB4 SCORE: 1.856306626372731534

## 2022-10-25 NOTE — PROGRESS NOTES
"Subjective:     Chief Complaint   Patient presents with    Fall     Follow up     Referral Needed     Physical therapy       HPI:   Grisel presents today to discuss the following.    Closed fracture of nasal bone with routine healing  New problem.  The patient [place] uneven ground and sustained a fall with trauma to the head and facies.  Was routed to the emergency room and noted to have a fracture to the nasal bone.  Remaining imaging was noncontributory.    Normally she lives at assisted living facility ([facility])  Pain is rated 4/10  She is breathing okay        Past Medical History:   Diagnosis Date    HTN (hypertension)        Current Outpatient Medications Ordered in Epic   Medication Sig Dispense Refill    Lidocaine 1.8 % Patch Apply to affected limb once daily 30 Patch 3    meloxicam (MOBIC) 7.5 MG Tab Take 1 Tablet by mouth every day. 30 Tablet 0    losartan (COZAAR) 100 MG Tab TAKE 1 TABLET BY MOUTH EVERY DAY. TAKE 1 TABLET BY MOUTH DAILY 30 Tablet 3     Current Facility-Administered Medications Ordered in Epic   Medication Dose Route Frequency Provider Last Rate Last Admin    triamcinolone acetonide (KENALOG-40) injection 80 mg  80 mg Intra-articular  Sebastian Brewster M.D.   80 mg at 11/12/21 0703    triamcinolone acetonide (KENALOG-40) injection 80 mg  80 mg Intra-articular  Sebastian Brewster M.D.   80 mg at 11/12/21 0703       Allergies:  Patient has no known allergies.    Health Maintenance: Completed    ROS:  Gen: no fevers/chills, no changes in weight  Eyes: no changes in vision  Pulm: no sob, no cough  CV: no chest pain, no palpitations  GI: no nausea/vomiting, no diarrhea      Objective:     Exam:  BP (!) 140/70 (BP Location: Right arm, Patient Position: Sitting, BP Cuff Size: Small adult)   Pulse 67   Temp 36.2 °C (97.1 °F) (Temporal)   Ht 1.499 m (4' 11\")   Wt 63.5 kg (140 lb)   SpO2 98%   BMI 28.28 kg/m²  Body mass index is 28.28 kg/m².      Constitutional: " Alert, no distress, well-groomed.  Skin: Warm, dry, good turgor, no rashes in visible areas.  Eye: Equal, round and reactive, conjunctiva clear, lids normal.  ENMT: Lips without lesions, good dentition, moist mucous membranes.  Neck: Trachea midline, no masses, no thyromegaly.  Respiratory: Unlabored respiratory effort, no cough.  MSK: Normal gait, moves all extremities.  Neuro: Grossly non-focal.   Psych: Alert and oriented x3, normal affect and mood.        Assessment & Plan:     87 y.o. female with the following -     1. Closed fracture of nasal bone with routine healing  New problem.  The patient sustained a ground-level mechanical fall with head trauma with resultant nasal bone fracture.  Presents for follow-up today.  Emergency room imaging was otherwise negative.  She denies any major pain to her nose and is breathing okay.  There is no obviously formation to her nasal bridge.  At this time we will continue to monitor this.  Patient declines ENT referral.  Continue with pain control to the remaining parts of her body including lidocaine patches and meloxicam.    2. Balance problem  Chronic, stable condition.  Continue with home health physical therapy.  - Referral to Home Health      No follow-ups on file.          Please note that this dictation was created using voice recognition software. I have made every reasonable attempt to correct obvious errors, but I expect that there are errors of grammar and possibly content that I did not discover before finalizing the note.

## 2022-10-25 NOTE — ASSESSMENT & PLAN NOTE
New problem.  The patient was walking down the ramp at the [facility] hit uneven ground and sustained a fall with trauma to the head and facies.  Was routed to the emergency room and noted to have a fracture to the nasal bone.  Remaining imaging was noncontributory.    Normally she lives at assisted living facility ([facility])  Pain is rated 4/10  She is breathing okay

## 2022-10-26 ENCOUNTER — HOME HEALTH ADMISSION (OUTPATIENT)
Dept: HOME HEALTH SERVICES | Facility: HOME HEALTHCARE | Age: 87
End: 2022-10-26
Payer: MEDICARE

## 2022-10-27 ENCOUNTER — HOME CARE VISIT (OUTPATIENT)
Dept: HOME HEALTH SERVICES | Facility: HOME HEALTHCARE | Age: 87
End: 2022-10-27
Payer: MEDICARE

## 2022-10-27 VITALS
SYSTOLIC BLOOD PRESSURE: 166 MMHG | HEART RATE: 62 BPM | BODY MASS INDEX: 28.21 KG/M2 | DIASTOLIC BLOOD PRESSURE: 62 MMHG | RESPIRATION RATE: 14 BRPM | WEIGHT: 134.38 LBS | HEIGHT: 58 IN | OXYGEN SATURATION: 94 % | TEMPERATURE: 98.7 F

## 2022-10-27 PROCEDURE — G0151 HHCP-SERV OF PT,EA 15 MIN: HCPCS

## 2022-10-27 PROCEDURE — 665001 SOC-HOME HEALTH

## 2022-10-27 RX ORDER — LOSARTAN POTASSIUM 100 MG/1
100 TABLET ORAL DAILY
Qty: 90 TABLET | Refills: 3 | Status: SHIPPED | OUTPATIENT
Start: 2022-10-27 | End: 2022-10-31 | Stop reason: SDUPTHER

## 2022-10-27 RX ORDER — LOSARTAN POTASSIUM 100 MG/1
100 TABLET ORAL DAILY
Qty: 30 TABLET | Refills: 3 | Status: CANCELLED | OUTPATIENT
Start: 2022-10-27

## 2022-10-27 SDOH — ECONOMIC STABILITY: FOOD INSECURITY: MEALS PER DAY: 3

## 2022-10-27 ASSESSMENT — PATIENT HEALTH QUESTIONNAIRE - PHQ9
1. LITTLE INTEREST OR PLEASURE IN DOING THINGS: 00
2. FEELING DOWN, DEPRESSED, IRRITABLE, OR HOPELESS: 01
5. POOR APPETITE OR OVEREATING: 0 - NOT AT ALL
SUM OF ALL RESPONSES TO PHQ QUESTIONS 1-9: 1
CLINICAL INTERPRETATION OF PHQ2 SCORE: 1

## 2022-10-27 ASSESSMENT — ENCOUNTER SYMPTOMS
LOWEST PAIN SEVERITY IN PAST 24 HOURS: 0/10
CHANGE IN APPETITE: UNCHANGED
BOWEL PATTERN NORMAL: 1
PAIN LOCATION - RELIEVING FACTORS: NOT LIFTING
PERSON REPORTING PAIN: PATIENT
SHORTNESS OF BREATH: 1
APPETITE LEVEL: GOOD
LAST BOWEL MOVEMENT: 66408
PAIN LOCATION - PAIN DURATION: 1 WEEK
PAIN LOCATION - EXACERBATING FACTORS: LIFTING ARM
SUBJECTIVE PAIN PROGRESSION: GRADUALLY IMPROVING
PAIN LOCATION: LEFT ARM
STOOL FREQUENCY: LESS THAN DAILY
NAUSEA: DENIES
PAIN LOCATION - PAIN SEVERITY: 3/10
PAIN LOCATION - PAIN FREQUENCY: INTERMITTENT
VOMITING: DENIES
PAIN SEVERITY GOAL: 0/10
DYSPNEA ACTIVITY LEVEL: AFTER AMBULATING MORE THAN 20 FT
PAIN LOCATION - PAIN QUALITY: ACHY
HYPERTENSION: 1
PAIN: 1
PAIN: LEFT UPPER ARM
HIGHEST PAIN SEVERITY IN PAST 24 HOURS: 5/10

## 2022-10-27 ASSESSMENT — FIBROSIS 4 INDEX: FIB4 SCORE: 1.856306626372731534

## 2022-10-27 ASSESSMENT — ACTIVITIES OF DAILY LIVING (ADL)
AMBULATION ASSISTANCE: SUPERVISION
AMBULATION ASSISTANCE ON FLAT SURFACES: 1
CURRENT_FUNCTION: SUPERVISION
OASIS_M1830: 03
PHYSICAL TRANSFERS ASSESSED: 1
TRANSPORTATION COMMENTS: PT REQUIRES ASSIST AND ASSISTIVE DEVICE TO LEAVE HOME; FALL RISK
AMBULATION ASSISTANCE: 1

## 2022-10-31 ENCOUNTER — DOCUMENTATION (OUTPATIENT)
Dept: MEDICAL GROUP | Facility: PHYSICIAN GROUP | Age: 87
End: 2022-10-31
Payer: MEDICARE

## 2022-10-31 RX ORDER — LOSARTAN POTASSIUM 100 MG/1
100 TABLET ORAL DAILY
Qty: 90 TABLET | Refills: 3 | Status: SHIPPED | OUTPATIENT
Start: 2022-10-31 | End: 2022-11-18 | Stop reason: SDUPTHER

## 2022-10-31 NOTE — PROGRESS NOTES
Medication chart review for [facility] services    Received referral from [facility].   Medications reviewed  compared with discharge summary if available.    Current medication list per [facility]     Current Outpatient Medications:     Ibuprofen, 200 mg, Oral, PRN    losartan, 100 mg, Oral, DAILY (Patient taking differently: 100 mg, Oral, DAILY, TAKE 1 TABLET BY MOUTH DAILY Patient does not have this medication)    Lidocaine, Apply to affected limb once daily    Current Facility-Administered Medications:     triamcinolone acetonide    triamcinolone acetonide    Location of hospital, and discharge summary date, if applicable:       No Known Allergies    Labs     Lab Results   Component Value Date/Time    SODIUM 128 (L) 01/27/2022 02:46 PM    POTASSIUM 4.4 01/27/2022 02:46 PM    CHLORIDE 94 (L) 01/27/2022 02:46 PM    CO2 23 01/27/2022 02:46 PM    GLUCOSE 121 (H) 01/27/2022 02:46 PM    BUN 12 01/27/2022 02:46 PM    CREATININE 0.62 01/27/2022 02:46 PM     Lab Results   Component Value Date/Time    ALKPHOSPHAT 89 01/08/2022 11:51 PM    ASTSGOT 17 01/08/2022 11:51 PM    ALTSGPT 12 01/08/2022 11:51 PM    TBILIRUBIN 1.1 01/08/2022 11:51 PM    INR 1.01 01/08/2022 08:55 AM    ALBUMIN 3.4 01/08/2022 11:51 PM        Assessment for clinically significant drug interactions, drug omissions/additions, duplicative therapies.            CC   Cricket Miller M.D.  [address]  [facility] [address]  Fax: [phone]    [facility]  Phone [phone] fax [phone]    This note was created using voice recognition software (Dragon). The accuracy of the dictation is limited by the abilities of the software. I have reviewed the note prior to signing, however some errors in grammar and context are still possible. If you have any questions related to this note please do not hesitate to contact our office.

## 2022-11-01 ENCOUNTER — HOME CARE VISIT (OUTPATIENT)
Dept: HOME HEALTH SERVICES | Facility: HOME HEALTHCARE | Age: 87
End: 2022-11-01
Payer: MEDICARE

## 2022-11-01 VITALS
RESPIRATION RATE: 16 BRPM | SYSTOLIC BLOOD PRESSURE: 140 MMHG | TEMPERATURE: 98 F | HEART RATE: 77 BPM | DIASTOLIC BLOOD PRESSURE: 60 MMHG | OXYGEN SATURATION: 97 %

## 2022-11-01 PROCEDURE — G0157 HHC PT ASSISTANT EA 15: HCPCS | Mod: CQ

## 2022-11-01 ASSESSMENT — ENCOUNTER SYMPTOMS
PERSON REPORTING PAIN: PATIENT
SUBJECTIVE PAIN PROGRESSION: UNCHANGED
LOWEST PAIN SEVERITY IN PAST 24 HOURS: 0/10
PAIN SEVERITY GOAL: 0/10
PAIN LOCATION: GENERALIZED
HIGHEST PAIN SEVERITY IN PAST 24 HOURS: 3/10
PAIN: 1

## 2022-11-01 NOTE — Clinical Note
Done. Order placed for next week. Thanks!    ----- Message -----  From: Hermelinda Brooks, PTA  Sent: 11/1/2022   6:12 PM PDT  To: Santy Sanchez, PT      Can we get an OT order she is reporting that most of her pain and weakness is to BUE

## 2022-11-02 ENCOUNTER — HOME CARE VISIT (OUTPATIENT)
Dept: HOME HEALTH SERVICES | Facility: HOME HEALTHCARE | Age: 87
End: 2022-11-02
Payer: MEDICARE

## 2022-11-02 NOTE — CASE COMMUNICATION
Grisel tends to have a shorter stride with LLE with use of SPC. Worked on educating her on stride and she was able to self correct and began to make corrections as needed SHe is eager to do as much as she can. Will cont with POC to increase her functional mob and ind to prevent further decline in function. S/B Santy Sanchez PT

## 2022-11-03 ENCOUNTER — HOME CARE VISIT (OUTPATIENT)
Dept: HOME HEALTH SERVICES | Facility: HOME HEALTHCARE | Age: 87
End: 2022-11-03
Payer: MEDICARE

## 2022-11-03 VITALS
RESPIRATION RATE: 16 BRPM | HEART RATE: 74 BPM | SYSTOLIC BLOOD PRESSURE: 138 MMHG | OXYGEN SATURATION: 96 % | TEMPERATURE: 97.7 F | DIASTOLIC BLOOD PRESSURE: 60 MMHG

## 2022-11-03 PROCEDURE — G0152 HHCP-SERV OF OT,EA 15 MIN: HCPCS

## 2022-11-03 ASSESSMENT — ACTIVITIES OF DAILY LIVING (ADL)
AMBULATION ASSISTANCE: 1
AMBULATION ASSISTANCE: SUPERVISION
PHYSICAL TRANSFERS ASSESSED: 1
PHYSICAL_TRANSFERS_DEVICES: MOD I/SUP
BATHING ASSESSED: 1
GROOMING_CURRENT_FUNCTION: INDEPENDENT
BATHING_CURRENT_FUNCTION: MODERATE ASSIST
CURRENT_FUNCTION: SUPERVISION
FEEDING ASSESSED: 1
TOILETING: INDEPENDENT
FEEDING: INDEPENDENT
DRESSING_LB_CURRENT_FUNCTION: MINIMUM ASSIST
GROOMING EQUIPMENT USED: STANDING AT SINK
GROOMING ASSESSED: 1
DRESSING_UB_CURRENT_FUNCTION: INDEPENDENT
TOILETING: 1
BATHING_CURRENT_FUNCTION: MINIMUM ASSIST

## 2022-11-03 ASSESSMENT — ENCOUNTER SYMPTOMS
PAIN LOCATION - PAIN FREQUENCY: FREQUENT
PAIN LOCATION: RIGHT SHOULDER
PAIN LOCATION - PAIN QUALITY: ACHEY
PAIN LOCATION - RELIEVING FACTORS: IBUPROFEN
PAIN LOCATION - PAIN SEVERITY: 2/10
PAIN LOCATION: LEFT SHOULDER
PAIN LOCATION - PAIN FREQUENCY: FREQUENT
PAIN SEVERITY GOAL: 0/10
HIGHEST PAIN SEVERITY IN PAST 24 HOURS: 3/10
SUBJECTIVE PAIN PROGRESSION: UNCHANGED
PAIN LOCATION - PAIN QUALITY: ACHEY
LOWEST PAIN SEVERITY IN PAST 24 HOURS: 0/10
PAIN LOCATION - PAIN SEVERITY: 2/10

## 2022-11-03 NOTE — CASE COMMUNICATION
Quality Review Completed for 10/27 SOC OASIS by HARSH Beck RN on 11/3/2022:  Edits completed by HARSH Beck RN:  1.  is no to DM, no supportive diagnosis found for DM  2. Narrative reports supervision level of assist with ambulation and transfers, changed [id number] D, E, F, G, I to supervision

## 2022-11-04 ENCOUNTER — HOME CARE VISIT (OUTPATIENT)
Dept: HOME HEALTH SERVICES | Facility: HOME HEALTHCARE | Age: 87
End: 2022-11-04
Payer: MEDICARE

## 2022-11-04 VITALS
HEART RATE: 81 BPM | RESPIRATION RATE: 16 BRPM | TEMPERATURE: 98.2 F | OXYGEN SATURATION: 94 % | SYSTOLIC BLOOD PRESSURE: 118 MMHG | DIASTOLIC BLOOD PRESSURE: 58 MMHG

## 2022-11-04 PROCEDURE — G0180 MD CERTIFICATION HHA PATIENT: HCPCS | Performed by: FAMILY MEDICINE

## 2022-11-04 PROCEDURE — G0157 HHC PT ASSISTANT EA 15: HCPCS | Mod: CQ

## 2022-11-04 SDOH — ECONOMIC STABILITY: HOUSING INSECURITY: HOME SAFETY: PT RESIDES IN ALF W/ ASSIST AVAILABLE 24/7.

## 2022-11-04 ASSESSMENT — ENCOUNTER SYMPTOMS
PAIN SEVERITY GOAL: 0/10
SUBJECTIVE PAIN PROGRESSION: UNCHANGED
HIGHEST PAIN SEVERITY IN PAST 24 HOURS: 2/10
LOWEST PAIN SEVERITY IN PAST 24 HOURS: 0/10
PAIN: 1
PERSON REPORTING PAIN: PATIENT

## 2022-11-04 NOTE — CASE COMMUNICATION
I agree with changes.  ----- Message -----  From: Jo Beck R.N.  Sent: 11/3/2022   8:21 AM PDT  To: Yen Cisneros PT      Quality Review Completed for 10/27 SOC OASIS by HARSH Beck, RN on 11/3/2022:  Edits completed by HARSH Beck RN:  1.  is no to DM, no supportive diagnosis found for DM  2. Narrative reports supervision level of assist with ambulation and transfers, changed [id number] D, E, F, G, I to supervision

## 2022-11-06 NOTE — CASE COMMUNICATION
Grisel cont to be very eager to get better and try and make her corrections with amb. She shonna ther ex today and working with 4wrw and spc for amb. Will cont with POC to increase her functional mob and ind to prevent further decline in IND. S/B Santy Sanchez PT

## 2022-11-07 ENCOUNTER — HOME CARE VISIT (OUTPATIENT)
Dept: HOME HEALTH SERVICES | Facility: HOME HEALTHCARE | Age: 87
End: 2022-11-07
Payer: MEDICARE

## 2022-11-07 VITALS
HEART RATE: 92 BPM | TEMPERATURE: 98 F | SYSTOLIC BLOOD PRESSURE: 114 MMHG | DIASTOLIC BLOOD PRESSURE: 60 MMHG | RESPIRATION RATE: 16 BRPM | OXYGEN SATURATION: 94 %

## 2022-11-07 PROCEDURE — G0152 HHCP-SERV OF OT,EA 15 MIN: HCPCS

## 2022-11-07 ASSESSMENT — ENCOUNTER SYMPTOMS
PAIN: 1
PAIN LOCATION - PAIN SEVERITY: 2/10
PAIN LOCATION - PAIN SEVERITY: 2/10
PAIN LOCATION - PAIN QUALITY: ACHEY
PAIN LOCATION: RIGHT HAND
PAIN LOCATION - PAIN DURATION: CHRONIC
PAIN SEVERITY GOAL: 0/10
HIGHEST PAIN SEVERITY IN PAST 24 HOURS: 3/10
LOWEST PAIN SEVERITY IN PAST 24 HOURS: 0/10
SUBJECTIVE PAIN PROGRESSION: UNCHANGED
PAIN LOCATION: LEFT HAND
PAIN LOCATION - PAIN FREQUENCY: CONSTANT

## 2022-11-09 ENCOUNTER — HOME CARE VISIT (OUTPATIENT)
Dept: HOME HEALTH SERVICES | Facility: HOME HEALTHCARE | Age: 87
End: 2022-11-09
Payer: MEDICARE

## 2022-11-09 VITALS
DIASTOLIC BLOOD PRESSURE: 60 MMHG | SYSTOLIC BLOOD PRESSURE: 130 MMHG | RESPIRATION RATE: 16 BRPM | OXYGEN SATURATION: 95 % | TEMPERATURE: 97.9 F | HEART RATE: 83 BPM

## 2022-11-09 PROCEDURE — G0157 HHC PT ASSISTANT EA 15: HCPCS | Mod: CQ

## 2022-11-09 ASSESSMENT — ENCOUNTER SYMPTOMS
PERSON REPORTING PAIN: PATIENT
SUBJECTIVE PAIN PROGRESSION: UNCHANGED
PAIN SEVERITY GOAL: 1/10
PAIN LOCATION: LEFT SHOULDER
PAIN LOCATION: RIGHT SHOULDER
HIGHEST PAIN SEVERITY IN PAST 24 HOURS: 4/10
PAIN: 1
LOWEST PAIN SEVERITY IN PAST 24 HOURS: 1/10

## 2022-11-09 NOTE — CASE COMMUNICATION
Grisel has cont to progress and is cont to report that she has a good carry over with HEP. She has been able to increase her stride and foot clearance with community amb.. She cont to express understanding of heel to toe tech. Will cont with POC to increase her functional mob and ind to prevent further decline in function S/B Santy Sanchez PT

## 2022-11-10 ENCOUNTER — DOCUMENTATION (OUTPATIENT)
Dept: HEALTH INFORMATION MANAGEMENT | Facility: OTHER | Age: 87
End: 2022-11-10
Payer: MEDICARE

## 2022-11-10 ENCOUNTER — HOME CARE VISIT (OUTPATIENT)
Dept: HOME HEALTH SERVICES | Facility: HOME HEALTHCARE | Age: 87
End: 2022-11-10
Payer: MEDICARE

## 2022-11-11 ENCOUNTER — HOME CARE VISIT (OUTPATIENT)
Dept: HOME HEALTH SERVICES | Facility: HOME HEALTHCARE | Age: 87
End: 2022-11-11
Payer: MEDICARE

## 2022-11-11 PROCEDURE — G0157 HHC PT ASSISTANT EA 15: HCPCS | Mod: CQ

## 2022-11-13 VITALS
HEART RATE: 70 BPM | DIASTOLIC BLOOD PRESSURE: 60 MMHG | OXYGEN SATURATION: 98 % | RESPIRATION RATE: 18 BRPM | TEMPERATURE: 98.3 F | SYSTOLIC BLOOD PRESSURE: 140 MMHG

## 2022-11-13 ASSESSMENT — ENCOUNTER SYMPTOMS
LOWEST PAIN SEVERITY IN PAST 24 HOURS: 0/10
SUBJECTIVE PAIN PROGRESSION: GRADUALLY IMPROVING
PAIN LOCATION: RIGHT SHOULDER
PAIN LOCATION: LEFT SHOULDER
PAIN SEVERITY GOAL: 0/10
PERSON REPORTING PAIN: PATIENT
HIGHEST PAIN SEVERITY IN PAST 24 HOURS: 2/10
PAIN: 1

## 2022-11-13 NOTE — CASE COMMUNICATION
Grisel has cont to progress and is is reporting that she is working with her 4wrw daily for walking program and using her SPC to go to [facility]. She is reporting that she feels her bal is better. Began to discuss DC plans in one week and she agress as she cont to do her HEP ind. Will cont with POC to increase her functional mob and ind to prevent further decline in function. S/B Santy Sanchez PT

## 2022-11-15 ENCOUNTER — HOME CARE VISIT (OUTPATIENT)
Dept: HOME HEALTH SERVICES | Facility: HOME HEALTHCARE | Age: 87
End: 2022-11-15
Payer: MEDICARE

## 2022-11-16 ENCOUNTER — HOME CARE VISIT (OUTPATIENT)
Dept: HOME HEALTH SERVICES | Facility: HOME HEALTHCARE | Age: 87
End: 2022-11-16
Payer: MEDICARE

## 2022-11-16 VITALS
DIASTOLIC BLOOD PRESSURE: 68 MMHG | SYSTOLIC BLOOD PRESSURE: 142 MMHG | OXYGEN SATURATION: 96 % | RESPIRATION RATE: 16 BRPM | HEART RATE: 75 BPM | TEMPERATURE: 97.6 F

## 2022-11-16 PROCEDURE — G0152 HHCP-SERV OF OT,EA 15 MIN: HCPCS

## 2022-11-16 ASSESSMENT — ENCOUNTER SYMPTOMS
PAIN LOCATION - PAIN SEVERITY: 2/10
PAIN LOCATION: RIGHT SHOULDER
PAIN LOCATION - PAIN DURATION: CHRONIC
PAIN LOCATION - PAIN FREQUENCY: WITH ACTIVITY
PAIN SEVERITY GOAL: 0/10
LOWEST PAIN SEVERITY IN PAST 24 HOURS: 0/10
HIGHEST PAIN SEVERITY IN PAST 24 HOURS: 3/10
PERSON REPORTING PAIN: PATIENT
SUBJECTIVE PAIN PROGRESSION: WAXING AND WANING
PAIN: 1
PAIN LOCATION - PAIN QUALITY: ACHEY

## 2022-11-17 ENCOUNTER — HOME CARE VISIT (OUTPATIENT)
Dept: HOME HEALTH SERVICES | Facility: HOME HEALTHCARE | Age: 87
End: 2022-11-17
Payer: MEDICARE

## 2022-11-17 SDOH — ECONOMIC STABILITY: FOOD INSECURITY: WITHIN THE PAST 12 MONTHS, THE FOOD YOU BOUGHT JUST DIDN'T LAST AND YOU DIDN'T HAVE MONEY TO GET MORE.: NEVER TRUE

## 2022-11-17 SDOH — HEALTH STABILITY: PHYSICAL HEALTH: ON AVERAGE, HOW MANY MINUTES DO YOU ENGAGE IN EXERCISE AT THIS LEVEL?: 30 MIN

## 2022-11-17 SDOH — HEALTH STABILITY: PHYSICAL HEALTH: ON AVERAGE, HOW MANY DAYS PER WEEK DO YOU ENGAGE IN MODERATE TO STRENUOUS EXERCISE (LIKE A BRISK WALK)?: 7 DAYS

## 2022-11-17 SDOH — ECONOMIC STABILITY: INCOME INSECURITY: IN THE LAST 12 MONTHS, WAS THERE A TIME WHEN YOU WERE NOT ABLE TO PAY THE MORTGAGE OR RENT ON TIME?: NO

## 2022-11-17 SDOH — ECONOMIC STABILITY: HOUSING INSECURITY
IN THE LAST 12 MONTHS, WAS THERE A TIME WHEN YOU DID NOT HAVE A STEADY PLACE TO SLEEP OR SLEPT IN A SHELTER (INCLUDING NOW)?: NO

## 2022-11-17 SDOH — ECONOMIC STABILITY: TRANSPORTATION INSECURITY
IN THE PAST 12 MONTHS, HAS LACK OF RELIABLE TRANSPORTATION KEPT YOU FROM MEDICAL APPOINTMENTS, MEETINGS, WORK OR FROM GETTING THINGS NEEDED FOR DAILY LIVING?: NO

## 2022-11-17 SDOH — ECONOMIC STABILITY: HOUSING INSECURITY: IN THE LAST 12 MONTHS, HOW MANY PLACES HAVE YOU LIVED?: 2

## 2022-11-17 SDOH — ECONOMIC STABILITY: FOOD INSECURITY: WITHIN THE PAST 12 MONTHS, YOU WORRIED THAT YOUR FOOD WOULD RUN OUT BEFORE YOU GOT MONEY TO BUY MORE.: NEVER TRUE

## 2022-11-17 SDOH — ECONOMIC STABILITY: TRANSPORTATION INSECURITY
IN THE PAST 12 MONTHS, HAS LACK OF TRANSPORTATION KEPT YOU FROM MEETINGS, WORK, OR FROM GETTING THINGS NEEDED FOR DAILY LIVING?: NO

## 2022-11-17 SDOH — HEALTH STABILITY: MENTAL HEALTH
STRESS IS WHEN SOMEONE FEELS TENSE, NERVOUS, ANXIOUS, OR CAN'T SLEEP AT NIGHT BECAUSE THEIR MIND IS TROUBLED. HOW STRESSED ARE YOU?: NOT AT ALL

## 2022-11-17 SDOH — ECONOMIC STABILITY: INCOME INSECURITY: HOW HARD IS IT FOR YOU TO PAY FOR THE VERY BASICS LIKE FOOD, HOUSING, MEDICAL CARE, AND HEATING?: NOT HARD AT ALL

## 2022-11-17 SDOH — ECONOMIC STABILITY: TRANSPORTATION INSECURITY
IN THE PAST 12 MONTHS, HAS THE LACK OF TRANSPORTATION KEPT YOU FROM MEDICAL APPOINTMENTS OR FROM GETTING MEDICATIONS?: NO

## 2022-11-17 ASSESSMENT — SOCIAL DETERMINANTS OF HEALTH (SDOH)
DO YOU BELONG TO ANY CLUBS OR ORGANIZATIONS SUCH AS CHURCH GROUPS UNIONS, FRATERNAL OR ATHLETIC GROUPS, OR SCHOOL GROUPS?: NO
WITHIN THE PAST 12 MONTHS, YOU WORRIED THAT YOUR FOOD WOULD RUN OUT BEFORE YOU GOT THE MONEY TO BUY MORE: NEVER TRUE
HOW OFTEN DO YOU ATTENT MEETINGS OF THE CLUB OR ORGANIZATION YOU BELONG TO?: NEVER
HOW HARD IS IT FOR YOU TO PAY FOR THE VERY BASICS LIKE FOOD, HOUSING, MEDICAL CARE, AND HEATING?: NOT HARD AT ALL
HOW MANY DRINKS CONTAINING ALCOHOL DO YOU HAVE ON A TYPICAL DAY WHEN YOU ARE DRINKING: 1 OR 2
HOW OFTEN DO YOU ATTEND CHURCH OR RELIGIOUS SERVICES?: 1 TO 4 TIMES PER YEAR
HOW OFTEN DO YOU HAVE SIX OR MORE DRINKS ON ONE OCCASION: NEVER
HOW OFTEN DO YOU ATTEND CHURCH OR RELIGIOUS SERVICES?: 1 TO 4 TIMES PER YEAR
IN A TYPICAL WEEK, HOW MANY TIMES DO YOU TALK ON THE PHONE WITH FAMILY, FRIENDS, OR NEIGHBORS?: MORE THAN THREE TIMES A WEEK
IN A TYPICAL WEEK, HOW MANY TIMES DO YOU TALK ON THE PHONE WITH FAMILY, FRIENDS, OR NEIGHBORS?: MORE THAN THREE TIMES A WEEK
HOW OFTEN DO YOU GET TOGETHER WITH FRIENDS OR RELATIVES?: MORE THAN THREE TIMES A WEEK
HOW OFTEN DO YOU ATTENT MEETINGS OF THE CLUB OR ORGANIZATION YOU BELONG TO?: NEVER
HOW OFTEN DO YOU HAVE A DRINK CONTAINING ALCOHOL: 2-4 TIMES A MONTH
DO YOU BELONG TO ANY CLUBS OR ORGANIZATIONS SUCH AS CHURCH GROUPS UNIONS, FRATERNAL OR ATHLETIC GROUPS, OR SCHOOL GROUPS?: NO
HOW OFTEN DO YOU GET TOGETHER WITH FRIENDS OR RELATIVES?: MORE THAN THREE TIMES A WEEK

## 2022-11-17 ASSESSMENT — LIFESTYLE VARIABLES
HOW OFTEN DO YOU HAVE A DRINK CONTAINING ALCOHOL: 2-4 TIMES A MONTH
AUDIT-C TOTAL SCORE: 2
HOW MANY STANDARD DRINKS CONTAINING ALCOHOL DO YOU HAVE ON A TYPICAL DAY: 1 OR 2
SKIP TO QUESTIONS 9-10: 1
HOW OFTEN DO YOU HAVE SIX OR MORE DRINKS ON ONE OCCASION: NEVER

## 2022-11-18 ENCOUNTER — OFFICE VISIT (OUTPATIENT)
Dept: MEDICAL GROUP | Facility: PHYSICIAN GROUP | Age: 87
End: 2022-11-18
Payer: MEDICARE

## 2022-11-18 VITALS
BODY MASS INDEX: 26.41 KG/M2 | HEIGHT: 59 IN | WEIGHT: 131 LBS | SYSTOLIC BLOOD PRESSURE: 156 MMHG | OXYGEN SATURATION: 96 % | DIASTOLIC BLOOD PRESSURE: 74 MMHG | TEMPERATURE: 98.3 F | HEART RATE: 71 BPM

## 2022-11-18 DIAGNOSIS — I10 ESSENTIAL HYPERTENSION: ICD-10-CM

## 2022-11-18 DIAGNOSIS — M25.511 CHRONIC PAIN OF BOTH SHOULDERS: ICD-10-CM

## 2022-11-18 DIAGNOSIS — G89.29 CHRONIC PAIN OF BOTH SHOULDERS: ICD-10-CM

## 2022-11-18 DIAGNOSIS — M25.512 CHRONIC PAIN OF BOTH SHOULDERS: ICD-10-CM

## 2022-11-18 DIAGNOSIS — M79.641 PAIN IN BOTH HANDS: ICD-10-CM

## 2022-11-18 DIAGNOSIS — M79.642 PAIN IN BOTH HANDS: ICD-10-CM

## 2022-11-18 PROCEDURE — 99214 OFFICE O/P EST MOD 30 MIN: CPT | Performed by: FAMILY MEDICINE

## 2022-11-18 RX ORDER — LOSARTAN POTASSIUM 100 MG/1
100 TABLET ORAL DAILY
Qty: 90 TABLET | Refills: 3 | Status: ON HOLD | OUTPATIENT
Start: 2022-11-18 | End: 2023-09-28

## 2022-11-18 ASSESSMENT — FIBROSIS 4 INDEX: FIB4 SCORE: 1.856306626372731534

## 2022-11-18 NOTE — PROGRESS NOTES
"Subjective:     Chief Complaint   Patient presents with    Annual Exam     Med refill marcelina       HPI:   Grisel presents today to discuss the following.    Essential hypertension  Chronic issue  On losartan 100mg    A little elevated this morning  Has not taken meds this morning    Needs refill of losartan    Chronic pain of both shoulders  Pt has had chronic shoulder pain and ache and hand pain and would like PT    Past Medical History:   Diagnosis Date    HTN (hypertension)        Current Outpatient Medications Ordered in Epic   Medication Sig Dispense Refill    losartan (COZAAR) 100 MG Tab Take 1 Tablet by mouth every day. TAKE 1 TABLET BY MOUTH DAILY Patient does not have this medication 90 Tablet 3    Ibuprofen 200 MG Cap Take 1 Capsule by mouth as needed (mild pain).       Current Facility-Administered Medications Ordered in Epic   Medication Dose Route Frequency Provider Last Rate Last Admin    triamcinolone acetonide (KENALOG-40) injection 80 mg  80 mg Intra-articular  Sebastian Brewster M.D.   80 mg at 11/12/21 0703    triamcinolone acetonide (KENALOG-40) injection 80 mg  80 mg Intra-articular  Sebastian Brewster M.D.   80 mg at 11/12/21 0703       Allergies:  Patient has no known allergies.    Health Maintenance: Completed    ROS:  Gen: no fevers/chills, no changes in weight  Eyes: no changes in vision  Pulm: no sob, no cough  CV: no chest pain, no palpitations  GI: no nausea/vomiting, no diarrhea      Objective:     Exam:  BP (!) 156/74 (BP Location: Left arm, Patient Position: Sitting, BP Cuff Size: Adult)   Pulse 71   Temp 36.8 °C (98.3 °F) (Temporal)   Ht 1.499 m (4' 11\")   Wt 59.4 kg (131 lb)   SpO2 96%   BMI 26.46 kg/m²  Body mass index is 26.46 kg/m².      Constitutional: Alert, no distress, well-groomed.  Skin: Warm, dry, good turgor, no rashes in visible areas.  Eye: Equal, round and reactive, conjunctiva clear, lids normal.  ENMT: Lips without lesions, good dentition, moist mucous " membranes.  Neck: Trachea midline, no masses, no thyromegaly.  Respiratory: Unlabored respiratory effort, no cough.  MSK: Normal gait, moves all extremities.  Neuro: Grossly non-focal.   Psych: Alert and oriented x3, normal affect and mood.        Assessment & Plan:     87 y.o. female with the following -     1. Essential hypertension  The patient is doing well at this time and continues to take losartan 100 mg daily.  I provided a refill today.  I do recommend she monitors blood pressure at home more cautiously and return in 4 weeks.  I will also connect her with home health skilled nursing.  - losartan (COZAAR) 100 MG Tab; Take 1 Tablet by mouth every day. TAKE 1 TABLET BY MOUTH DAILY Patient does not have this medication  Dispense: 90 Tablet; Refill: 3    2. Chronic pain of both shoulders  3. Pain in both hands  Chronic, unchanged condition.  Having aches and pains to the shoulders and hands.  I will refer to home health physical therapy/Occupational Therapy.  - Referral to Home Health      Return in about 4 weeks (around 12/16/2022).          Please note that this dictation was created using voice recognition software. I have made every reasonable attempt to correct obvious errors, but I expect that there are errors of grammar and possibly content that I did not discover before finalizing the note.

## 2022-11-18 NOTE — ASSESSMENT & PLAN NOTE
Chronic issue  On losartan 100mg    A little elevated this morning  Has not taken meds this morning    Needs refill of losartan

## 2022-11-21 ENCOUNTER — HOME CARE VISIT (OUTPATIENT)
Dept: HOME HEALTH SERVICES | Facility: HOME HEALTHCARE | Age: 87
End: 2022-11-21
Payer: MEDICARE

## 2022-11-21 VITALS
RESPIRATION RATE: 16 BRPM | TEMPERATURE: 97.8 F | DIASTOLIC BLOOD PRESSURE: 66 MMHG | HEART RATE: 78 BPM | OXYGEN SATURATION: 98 % | SYSTOLIC BLOOD PRESSURE: 118 MMHG

## 2022-11-21 PROCEDURE — G0151 HHCP-SERV OF PT,EA 15 MIN: HCPCS

## 2022-11-21 ASSESSMENT — ENCOUNTER SYMPTOMS
SUBJECTIVE PAIN PROGRESSION: GRADUALLY IMPROVING
PAIN LOCATION - EXACERBATING FACTORS: ACTIVITY
PERSON REPORTING PAIN: PATIENT
PAIN LOCATION - RELIEVING FACTORS: DISTRACTION, POSITION CHANGE
PAIN LOCATION - PAIN SEVERITY: 2/10
PAIN SEVERITY GOAL: 0/10
PAIN LOCATION - PAIN FREQUENCY: WITH ACTIVITY
PAIN: 1
PAIN LOCATION - PAIN DURATION: VARIES
PAIN LOCATION: RIGHT SHOULDER
LOWEST PAIN SEVERITY IN PAST 24 HOURS: 0/10
PAIN LOCATION - PAIN QUALITY: ACHY, THROBBING
HIGHEST PAIN SEVERITY IN PAST 24 HOURS: 2/10

## 2022-11-25 ENCOUNTER — HOME CARE VISIT (OUTPATIENT)
Dept: HOME HEALTH SERVICES | Facility: HOME HEALTHCARE | Age: 87
End: 2022-11-25
Payer: MEDICARE

## 2022-11-25 VITALS
OXYGEN SATURATION: 98 % | SYSTOLIC BLOOD PRESSURE: 128 MMHG | DIASTOLIC BLOOD PRESSURE: 66 MMHG | HEART RATE: 77 BPM | TEMPERATURE: 97.5 F | RESPIRATION RATE: 16 BRPM

## 2022-11-25 PROCEDURE — G0151 HHCP-SERV OF PT,EA 15 MIN: HCPCS

## 2022-11-25 ASSESSMENT — ENCOUNTER SYMPTOMS
PAIN LOCATION - PAIN FREQUENCY: INTERMITTENT
PAIN SEVERITY GOAL: 0/10
PAIN LOCATION - RELIEVING FACTORS: DISTRACTION, POSITION CHANGE
PAIN LOCATION - PAIN QUALITY: ACHY, THROBBING
PAIN LOCATION - EXACERBATING FACTORS: TO MUCH ACTIVITY
PAIN LOCATION - PAIN SEVERITY: 2/10
HIGHEST PAIN SEVERITY IN PAST 24 HOURS: 2/10
PAIN LOCATION - PAIN DURATION: VARIES
PAIN LOCATION: RIGHT SHOULDER
LOWEST PAIN SEVERITY IN PAST 24 HOURS: 0/10
SUBJECTIVE PAIN PROGRESSION: GRADUALLY IMPROVING
PAIN: 1
PERSON REPORTING PAIN: PATIENT

## 2022-11-25 ASSESSMENT — ACTIVITIES OF DAILY LIVING (ADL)
AMBULATION ASSISTANCE ON UNEVEN SURFACES: 1
AMBULATION ASSISTANCE ON FLAT SURFACES: 1
OASIS_M1830: 02
AMBULATION_DISTANCE/DURATION_TOLERATED: >500 FEET
HOME_HEALTH_OASIS: 01

## 2022-11-25 ASSESSMENT — PATIENT HEALTH QUESTIONNAIRE - PHQ9: CLINICAL INTERPRETATION OF PHQ2 SCORE: 0

## 2022-11-28 ENCOUNTER — HOME CARE VISIT (OUTPATIENT)
Dept: HOME HEALTH SERVICES | Facility: HOME HEALTHCARE | Age: 87
End: 2022-11-28
Payer: MEDICARE

## 2022-11-28 NOTE — CASE COMMUNICATION
Quality Review for 11.25.22 GA OASIS performed on by HEATHER Herman RN on 11.28.2022:     Edits completed by HEATHER Herman RN:  1. Changed  to 2 per [id number] M response of 4

## 2022-11-28 NOTE — CASE COMMUNICATION
I agree with these changes.     ----- Message -----  From: Chiara Herman R.N.  Sent: 11/28/2022   7:38 AM PST  To: aSnty Sanchez PT      Quality Review for 11.25.22 DC OASIS performed on by HEATHER Herman RN on 11.28.2022:     Edits completed by HEATHER Herman RN:  1. Changed  to 2 per [id number] M response of 4

## 2022-12-19 ENCOUNTER — OFFICE VISIT (OUTPATIENT)
Dept: MEDICAL GROUP | Facility: PHYSICIAN GROUP | Age: 87
End: 2022-12-19
Payer: MEDICARE

## 2022-12-19 VITALS
HEART RATE: 67 BPM | WEIGHT: 134.6 LBS | BODY MASS INDEX: 27.13 KG/M2 | DIASTOLIC BLOOD PRESSURE: 68 MMHG | TEMPERATURE: 97.9 F | SYSTOLIC BLOOD PRESSURE: 140 MMHG | HEIGHT: 59 IN | OXYGEN SATURATION: 97 %

## 2022-12-19 DIAGNOSIS — R22.41 LUMP OF RIGHT THIGH: ICD-10-CM

## 2022-12-19 DIAGNOSIS — J34.89 LESION OF NOSE: ICD-10-CM

## 2022-12-19 DIAGNOSIS — I10 ESSENTIAL HYPERTENSION: Chronic | ICD-10-CM

## 2022-12-19 PROCEDURE — 99214 OFFICE O/P EST MOD 30 MIN: CPT | Performed by: FAMILY MEDICINE

## 2022-12-19 ASSESSMENT — FIBROSIS 4 INDEX: FIB4 SCORE: 1.856306626372731534

## 2022-12-19 NOTE — PROGRESS NOTES
"Subjective:     Chief Complaint   Patient presents with    Follow-Up    Lump     Possible fluid lump on Right hip       HPI:   Grisel presents today to discuss the following.    Lesion of nose  New problem  Noted to have a lesion on her nose for the past 4 weeks    Lump of right thigh  Chronic issue  Has had a right thigh lump for many years  Slowly growing  Has never seen any drainage  Doesn't have symptoms     Essential hypertension  Chronic issue  On losartan 100mg daily  Took it this morning    BP today is 140/68 today    Past Medical History:   Diagnosis Date    HTN (hypertension)        Current Outpatient Medications Ordered in Epic   Medication Sig Dispense Refill    losartan (COZAAR) 100 MG Tab Take 1 Tablet by mouth every day. TAKE 1 TABLET BY MOUTH DAILY Patient does not have this medication 90 Tablet 3    Ibuprofen 200 MG Cap Take 1 Capsule by mouth as needed (mild pain).       Current Facility-Administered Medications Ordered in Epic   Medication Dose Route Frequency Provider Last Rate Last Admin    triamcinolone acetonide (KENALOG-40) injection 80 mg  80 mg Intra-articular  Sebastian Brewster M.D.   80 mg at 11/12/21 0703    triamcinolone acetonide (KENALOG-40) injection 80 mg  80 mg Intra-articular  Sebastian Brewster M.D.   80 mg at 11/12/21 0703       Allergies:  Patient has no known allergies.    Health Maintenance: Completed    ROS:  Gen: no fevers/chills, no changes in weight  Eyes: no changes in vision  Pulm: no sob, no cough  CV: no chest pain, no palpitations  GI: no nausea/vomiting, no diarrhea      Objective:     Exam:  BP (!) 140/68 (BP Location: Left arm, Patient Position: Sitting)   Pulse 67   Temp 36.6 °C (97.9 °F) (Temporal)   Ht 1.499 m (4' 11\")   Wt 61.1 kg (134 lb 9.6 oz)   SpO2 97%   BMI 27.19 kg/m²  Body mass index is 27.19 kg/m².        Constitutional: Alert, no distress, well-groomed.  Skin: Warm, dry, good turgor, no rashes in visible areas.  Eye: Equal, round and reactive, " conjunctiva clear, lids normal.  ENMT: Lips without lesions, good dentition, moist mucous membranes.  Neck: Trachea midline, no masses, no thyromegaly.  Respiratory: Unlabored respiratory effort, no cough.  MSK: Normal gait, moves all extremities.  Neuro: Grossly non-focal.   Psych: Alert and oriented x3, normal affect and mood.        Assessment & Plan:     87 y.o. female with the following -     1. Lesion of nose  New problem.  Suspicious lesion on her nose tip.  Will refer to dermatology for biopsy and further evaluation.  - Referral to Dermatology    2. Lump of right thigh  Chronic condition.  Unknown etiology.  She has a clearly defined bulging lump on her right upper thigh.  In a differential I consider lipoma versus epidermoid cyst.  I will proceed with ultrasonography for further evaluation.  - US-EXTREMITY NON VASCULAR UNILATERAL RIGHT; Future    3. Essential hypertension  Chronic, stable condition.  Continue with losartan.  Continue to monitor blood pressure at home.      Return in about 3 months (around 3/19/2023).          Please note that this dictation was created using voice recognition software. I have made every reasonable attempt to correct obvious errors, but I expect that there are errors of grammar and possibly content that I did not discover before finalizing the note.

## 2022-12-19 NOTE — ASSESSMENT & PLAN NOTE
Chronic issue  Has had a right thigh lump for many years  Slowly growing  Has never seen any drainage  Doesn't have symptoms

## 2023-01-16 ENCOUNTER — HOSPITAL ENCOUNTER (OUTPATIENT)
Dept: RADIOLOGY | Facility: MEDICAL CENTER | Age: 88
End: 2023-01-16
Attending: FAMILY MEDICINE
Payer: MEDICARE

## 2023-01-16 DIAGNOSIS — R22.41 LUMP OF RIGHT THIGH: ICD-10-CM

## 2023-01-16 PROCEDURE — 76882 US LMTD JT/FCL EVL NVASC XTR: CPT | Mod: RT

## 2023-03-20 ENCOUNTER — APPOINTMENT (OUTPATIENT)
Dept: MEDICAL GROUP | Facility: PHYSICIAN GROUP | Age: 88
End: 2023-03-20
Payer: MEDICARE

## 2023-04-10 ENCOUNTER — OFFICE VISIT (OUTPATIENT)
Dept: MEDICAL GROUP | Facility: PHYSICIAN GROUP | Age: 88
End: 2023-04-10
Payer: MEDICARE

## 2023-04-10 VITALS
BODY MASS INDEX: 29.19 KG/M2 | HEART RATE: 90 BPM | HEIGHT: 59 IN | WEIGHT: 144.8 LBS | OXYGEN SATURATION: 92 % | DIASTOLIC BLOOD PRESSURE: 62 MMHG | RESPIRATION RATE: 16 BRPM | SYSTOLIC BLOOD PRESSURE: 138 MMHG | TEMPERATURE: 98.4 F

## 2023-04-10 DIAGNOSIS — M25.562 CHRONIC PAIN OF LEFT KNEE: ICD-10-CM

## 2023-04-10 DIAGNOSIS — M79.89 LEG SWELLING: Chronic | ICD-10-CM

## 2023-04-10 DIAGNOSIS — J34.89 LESION OF NOSE: ICD-10-CM

## 2023-04-10 DIAGNOSIS — G89.29 CHRONIC PAIN OF LEFT KNEE: ICD-10-CM

## 2023-04-10 PROCEDURE — 99214 OFFICE O/P EST MOD 30 MIN: CPT | Performed by: FAMILY MEDICINE

## 2023-04-10 RX ORDER — HYDROCODONE BITARTRATE AND ACETAMINOPHEN 5; 325 MG/1; MG/1
TABLET ORAL
COMMUNITY
Start: 2023-01-30 | End: 2023-04-10

## 2023-04-10 RX ORDER — CLINDAMYCIN HYDROCHLORIDE 300 MG/1
CAPSULE ORAL
COMMUNITY
Start: 2023-01-30 | End: 2023-04-10

## 2023-04-10 ASSESSMENT — FIBROSIS 4 INDEX: FIB4 SCORE: 1.856306626372731534

## 2023-04-10 NOTE — PROGRESS NOTES
"Subjective:     Chief Complaint   Patient presents with    Leg Swelling     Left leg,knee pain        HPI:   Grisel presents today to discuss the following.    Leg swelling  Acute issue  Having 10 day left LE edema      Lesion of nose  Chronic issue  Worsening  Localized to the bridge of the nose    Past Medical History:   Diagnosis Date    HTN (hypertension)        Current Outpatient Medications Ordered in Epic   Medication Sig Dispense Refill    losartan (COZAAR) 100 MG Tab Take 1 Tablet by mouth every day. TAKE 1 TABLET BY MOUTH DAILY Patient does not have this medication 90 Tablet 3    Ibuprofen 200 MG Cap Take 1 Capsule by mouth as needed (mild pain).       Current Facility-Administered Medications Ordered in Epic   Medication Dose Route Frequency Provider Last Rate Last Admin    triamcinolone acetonide (KENALOG-40) injection 80 mg  80 mg Intra-articular  Sebastian Brewster M.D.   80 mg at 11/12/21 0703    triamcinolone acetonide (KENALOG-40) injection 80 mg  80 mg Intra-articular  Sebastian Brewster M.D.   80 mg at 11/12/21 0703       Allergies:  Patient has no known allergies.    Health Maintenance: Completed    ROS:  Gen: no fevers/chills, no changes in weight  Eyes: no changes in vision  Pulm: no sob, no cough  CV: no chest pain, no palpitations  GI: no nausea/vomiting, no diarrhea      Objective:     Exam:  /62 (BP Location: Left arm, Patient Position: Sitting, BP Cuff Size: Adult)   Pulse 90   Temp 36.9 °C (98.4 °F) (Temporal)   Resp 16   Ht 1.499 m (4' 11\")   Wt 65.7 kg (144 lb 12.8 oz)   SpO2 92%   BMI 29.25 kg/m²  Body mass index is 29.25 kg/m².    Gen: Alert and oriented, No apparent distress.  Eyes: PERRLA  Lungs: Normal effort, CTA bilaterally, no wheezes, rhonchi, or rales  CV: Regular rate and rhythm. No murmurs, rubs, or gallops.  Ext: No clubbing, cyanosis, edema.  Skin: no rash, lesions or ulcers  Neuro: Moves all extremities.  Section of the left leg there is evidence of 1+ pitting " edema.  Negative Homans' sign.  Psych: AAOx3          Assessment & Plan:     87 y.o. female with the following -     1. Leg swelling  New problem.  Unknown etiology and prognosis.  I recommend the use of compression stockings and also ultrasonography to evaluate for DVT.  - US-EXTREMITY VENOUS LOWER UNILAT LEFT; Future    2. Chronic pain of left knee  Chronic condition.  Likely secondary to osteoarthritis versus patellofemoral syndrome.  I will refer to physical therapy.  - Referral to Physical Therapy    3. Lesion of nose  Lesion at the bridge of the nose.  Referral to dermatology today.  - Referral to Dermatology      No follow-ups on file.          Please note that this dictation was created using voice recognition software. I have made every reasonable attempt to correct obvious errors, but I expect that there are errors of grammar and possibly content that I did not discover before finalizing the note.

## 2023-04-18 ENCOUNTER — HOSPITAL ENCOUNTER (OUTPATIENT)
Dept: RADIOLOGY | Facility: MEDICAL CENTER | Age: 88
End: 2023-04-18
Attending: FAMILY MEDICINE
Payer: MEDICARE

## 2023-04-18 DIAGNOSIS — M79.89 LEG SWELLING: ICD-10-CM

## 2023-04-18 PROCEDURE — 93971 EXTREMITY STUDY: CPT | Mod: LT

## 2023-06-01 ENCOUNTER — PATIENT MESSAGE (OUTPATIENT)
Dept: HEALTH INFORMATION MANAGEMENT | Facility: OTHER | Age: 88
End: 2023-06-01

## 2023-06-01 ENCOUNTER — DOCUMENTATION (OUTPATIENT)
Dept: HEALTH INFORMATION MANAGEMENT | Facility: OTHER | Age: 88
End: 2023-06-01
Payer: MEDICARE

## 2023-06-01 NOTE — OP THERAPY EVALUATION
Outpatient Physical Therapy  INITIAL EVALUATION    [facility]  [address], [address]  [place] [address]  Phone:  [phone]    Date of Evaluation: 2023    Patient: Grisel Saha  YOB: 1935  MRN: [id number]     Referring Provider: Cricket Miller M.D.  [address]  [place] [address]   Referring Diagnosis Chronic pain of left knee [M25.562, G89.29]     Time Calculation  Start time: 1100  Stop time: 1145 Time Calculation (min): 45 minutes         Chief Complaint: No chief complaint on file.    Visit Diagnoses     ICD-10-CM   1. Left knee pain, unspecified chronicity  M25.562       Date of onset of impairment: 2023    Subjective:   History of Present Illness:     Mechanism of injury:  Onset of left knee pain with +clicking with walking  3-4 months ago onset. No feelings of instability.    No fall or known trauma or cause.  No previous episodes.  2 years of balance impairment with fall history fractured humerus-no surgery.  Feels unsteady with walking  Mobility:  uses SPC for balance with walking, Independent with ADLS ,  Exercise:  walking hallways in the hallways at [facility]. Exercise class majority time the class is sitting.    X ray  1.  Small suprapatellar joint effusion without underlying acute osseous abnormality.  2.  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    Pain:     Current pain ratin    At best pain ratin    At worst pain ratin    Location:  Left anterior P-F joint    Quality:  Sharp    Alleviating factors: with walking.  Social Support:     Lives in:  Community-based residential facility  Diagnostic Tests:     X-ray: abnormal    Treatments:     None    Patient Goals:     Patient goals for therapy:  Increased motion, return to sport/leisure activities, increased strength, improved balance and decreased pain      Past Medical History:   Diagnosis Date    HTN  (hypertension)      No past surgical history on file.  Social History     Tobacco Use    Smoking status: Former    Smokeless tobacco: Never   Vaping Use    Vaping Use: Never used   Substance Use Topics    Alcohol use: Not Currently     Alcohol/week: 12.6 oz     Types: 21 Shots of liquor per week     Comment: 2-3 ounces of liquor daily     Family and Occupational History     Socioeconomic History    Marital status:      Spouse name: Not on file    Number of children: Not on file    Years of education: Not on file    Highest education level: Professional school degree (e.g., MD, DDS, DVM, ZEENAT)   Occupational History    Not on file       Objective     Observations     Additional Observation Details  + edema bilateral LE ankles  Tibial varum right       Tenderness   Left Knee   Tenderness in the patellar tendon.     Active Range of Motion   Left Knee   Flexion: 110 degrees with pain  Extension: -5 degrees     Right Knee   Flexion: 110 degrees with pain  Extension: -8 degrees     Additional Active Range of Motion Details  Left medial/lateral tibiofemoral jointline and anterior P-F joint left  Right :  mild anterior knee    Patellar Mobility     Additional Patellar Mobility Details  Crepitus left P-F joint     Strength:      Left Knee   Extension: 4  Quadriceps contraction: good    Right Knee   Extension: 3+  Quadriceps contraction: good    Tests     Additional Tests Details  5 x sit to stand: 17 sec  30 sec sit to stand: 9 reps, pain left anterior knee        Therapeutic Exercises (CPT 09422):     1. sit to stand, 2 x 30 sec dailyfor HEP      Time-based treatments/modalities:    Physical Therapy Timed Treatment Charges  Therapeutic exercise minutes (CPT 43486): 8 minutes      Assessment, Response and Plan:   Assessment details:  Patient presents symptoms consistent with left knee OA and patellar tendinosis with tenderness left patellar tendon, and medial and lateral joint line .  Patient demonstrates impaired  bilateral knee AROM  5-110 degrees left and 8-110 degrees with 4- (right) 4/5 left Quad control.  Symptoms reproduced with squat and with EROM flexion and with walking.  Patient demonstrates a high fall risk with 5x sit to stand of 17 sec.  Recommend continued PT with emphasis on functional strength and fall prevention.   Barriers to therapy:  None  Prognosis: good    Goals:   Short Term Goals:   Patient able to walk community distances with >25% decreased knee pain with or without assistive device  Patient able to 5 x sit to stand <15 sec   Short term goal time span:  2-4 weeks      Long Term Goals:    Patient able to walk up to 1/4 mile with or without assistive device with >25%decreased knee pain  Patient able to 5 x sit to stand <14 sec    Plan:   Therapy options:  Physical therapy treatment to continue  Planned therapy interventions:  Neuromuscular Re-education (CPT 32978), Manual Therapy (CPT 47689), Gait Training (CPT 59996), E Stim Unattended (CPT 66049) and Therapeutic Exercise (CPT 33824)  Frequency:  2x week  Duration in weeks:  8  Discussed with:  Patient  Plan details:  1-2 x week x 8 weeks      Functional Assessment Used        Referring provider co-signature:  I have reviewed this plan of care and my co-signature certifies the need for services.    Certification Period: 06/05/2023 to  07/31/23    Physician Signature: ________________________________ Date: ______________

## 2023-06-05 ENCOUNTER — PHYSICAL THERAPY (OUTPATIENT)
Dept: PHYSICAL THERAPY | Facility: REHABILITATION | Age: 88
End: 2023-06-05
Attending: FAMILY MEDICINE
Payer: MEDICARE

## 2023-06-05 DIAGNOSIS — M25.562 LEFT KNEE PAIN, UNSPECIFIED CHRONICITY: ICD-10-CM

## 2023-06-05 PROCEDURE — 97162 PT EVAL MOD COMPLEX 30 MIN: CPT

## 2023-06-05 PROCEDURE — 97110 THERAPEUTIC EXERCISES: CPT

## 2023-06-05 ASSESSMENT — ENCOUNTER SYMPTOMS
PAIN SCALE: 0
PAIN SCALE AT LOWEST: 0
PAIN SCALE AT HIGHEST: 4
QUALITY: SHARP

## 2023-06-12 ENCOUNTER — PHYSICAL THERAPY (OUTPATIENT)
Dept: PHYSICAL THERAPY | Facility: REHABILITATION | Age: 88
End: 2023-06-12
Attending: FAMILY MEDICINE
Payer: MEDICARE

## 2023-06-12 DIAGNOSIS — M25.562 LEFT KNEE PAIN, UNSPECIFIED CHRONICITY: ICD-10-CM

## 2023-06-12 PROCEDURE — 97110 THERAPEUTIC EXERCISES: CPT

## 2023-06-12 PROCEDURE — 97140 MANUAL THERAPY 1/> REGIONS: CPT

## 2023-06-12 NOTE — OP THERAPY DAILY TREATMENT
Outpatient Physical Therapy  DAILY TREATMENT     [facility] [address] [facility], [address]  [place] [address]  Phone:  [phone]    Date: 06/12/2023    Patient: Grisel Saha  YOB: 1935  MRN: [id number]     Time Calculation    Start time: 1100  Stop time: 1145 Time Calculation (min): 45 minutes         Chief Complaint: left knee problem  Visit #: 2    SUBJECTIVE:  Intermittent and variable left anterior, infrapatellar knee pain -no change, moderate edema bilateral LE    OBJECTIVE:  Current objective measures: Left patellar tendon painful with palpation, restricted left P-F glide medialys          Therapeutic Exercises (CPT 32250):     1. sit to stand, 2 x 30 sec dailyfor HEP, 10,10    2. shuttle L1 5 min DL, 5 min    3. stepper 1 min, increase//worse left inferiro knee pain    4. long arc quad orange tband, 3 x10    5. step ups 6 inch, 2 x 10 reps leading with left then right foot with single rail support      Therapeutic Exercise Summary: Access Code: [email or web address]  URL: [email or web address]  Date: 06/12/2023  Prepared by: Payton Ramirez    Exercises  - Sitting Knee Extension with Resistance  - 1 x daily - 7 x weekly - 3 sets - 10 reps  - Seated Quad Set  - 1 x daily - 7 x weekly - 1 sets - 10 reps      Time-based treatments/modalities:       ASSESSMENT:   Response to treatment: symptoms consistent with left patellar tendinopathy.  Initiated quad strength and repeated knee extension in sitting with good outcome.  Progress to standing loaded squat and step down if patient can tolerate next session     PLAN/RECOMMENDATIONS:   Plan for treatment: therapy treatment to continue next visit.  Planned interventions for next visit: continue with current treatment.

## 2023-06-19 ENCOUNTER — PHYSICAL THERAPY (OUTPATIENT)
Dept: PHYSICAL THERAPY | Facility: REHABILITATION | Age: 88
End: 2023-06-19
Attending: FAMILY MEDICINE
Payer: MEDICARE

## 2023-06-19 DIAGNOSIS — M25.562 LEFT KNEE PAIN, UNSPECIFIED CHRONICITY: ICD-10-CM

## 2023-06-19 PROCEDURE — 97110 THERAPEUTIC EXERCISES: CPT

## 2023-06-19 NOTE — OP THERAPY DAILY TREATMENT
Outpatient Physical Therapy  DAILY TREATMENT     [facility] [place] [address] [facility], [address]  [place] [address]  Phone:  [phone]    Date: 06/19/2023    Patient: Grisel Saha  YOB: 1935  MRN: [id number]     Time Calculation    Start time: 1105  Stop time: 1145 Time Calculation (min): 40 minutes         Chief Complaint: left knee pain  Visit #: 3    SUBJECTIVE:  Hasn't done hep because  is very ill    OBJECTIVE:  Current objective measures:            Therapeutic Exercises (CPT 03666):     1. sit to stand, 2 x 30sec  5lb weight 7/7    2. shuttle L1 5 min DL, 2 x 20    4. long arc quad 5lb, 3 x10b    5. step ups 6 inch, 2 x 10 reps leading with left then right foot with single rail support, with 2 hand sup      Therapeutic Exercise Summary: Access Code: [email or web address]  URL: [email or web address]  Date: 06/12/2023  Prepared by: Payton Ramirez    Exercises  - Sitting Knee Extension with Resistance  - 1 x daily - 7 x weekly - 3 sets - 10 reps  - Seated Quad Set  - 1 x daily - 7 x weekly - 1 sets - 10 reps      Time-based treatments/modalities:    Physical Therapy Timed Treatment Charges  Therapeutic exercise minutes (CPT 94812): 40 minutes  ASSESSMENT:   Response to treatment: no significant change in left knee pain.  Patient unable to tolerate stepper but did well with closed chain LE strength. Continue with current POC  PLAN/RECOMMENDATIONS:   Plan for treatment: therapy treatment to continue next visit.  Planned interventions for next visit: continue with current treatment.

## 2023-06-20 ENCOUNTER — OFFICE VISIT (OUTPATIENT)
Dept: DERMATOLOGY | Facility: IMAGING CENTER | Age: 88
End: 2023-06-20
Payer: MEDICARE

## 2023-06-20 DIAGNOSIS — L57.0 ACTINIC KERATOSIS: ICD-10-CM

## 2023-06-20 PROCEDURE — 17003 DESTRUCT PREMALG LES 2-14: CPT | Performed by: NURSE PRACTITIONER

## 2023-06-20 PROCEDURE — 17000 DESTRUCT PREMALG LESION: CPT | Performed by: NURSE PRACTITIONER

## 2023-06-20 NOTE — PROGRESS NOTES
DERMATOLOGY NOTE  NEW VISIT       Chief complaint: Establish Care and Skin Lesion     HPI/location: Nose  Time present: approx 6mo  Painful lesion: Yes to the touch  Itching lesion: No  Enlarging lesion: Yes  Anything make it better or worse? Pain to the touch    HPI/location: Left cheek  Time present: Approx 5mo  Painful lesion: No  Itching lesion: No  Enlarging lesion: Yes    History of skin cancer: No  History of precancers/actinic keratoses: No  History of biopsies:No  History of blistering/severe sunburns:No  Family history of skin cancer:No  Family history of atypical moles:No      No Known Allergies     MEDICATIONS:  Medications relevant to specialty reviewed.     REVIEW OF SYSTEMS:   Positive for skin (see HPI)  Negative for fevers and chills       EXAM:  There were no vitals taken for this visit.  Constitutional: Well-developed, well-nourished, and in no distress.     A focused skin exam was performed including the affected areas of the face. Notable findings on exam today listed below and/or in assessment/plan.     Hypertrophic ill-defined erythematous gritty/scaly papules over the nose and L temple      IMPRESSION / PLAN:    1. Actinic keratosis  - [facility] education/counseling   CRYOTHERAPY:  Risks (including, but not limited to: skin discoloration, redness, blister, blood blister, recurrence, need for further treatment, infection, scar) and benefits of cryotherapy discussed. Patient verbally agreed to proceed with treatment. 1 cryotherapy freeze thaw cycles of 10 seconds were applied to 2 lesions on areas as noted on exam with cryac. Patient tolerated procedure well. Aftercare instructions given--no specific care needed unless irritated during healing process, can apply Vaseline with small band-aid if needed.        Discussed risks associated with LN2,  Patient verbalized understanding and agrees with plan regarding the above          Please note that this dictation was created using voice recognition  software. I have made every reasonable attempt to correct obvious errors, but I expect that there are errors of grammar and possibly content that I did not discover before finalizing the note.      Return to clinic in: Return for PRN. and as needed for any new or changing skin lesions.

## 2023-07-03 ENCOUNTER — APPOINTMENT (OUTPATIENT)
Dept: PHYSICAL THERAPY | Facility: REHABILITATION | Age: 88
End: 2023-07-03
Attending: FAMILY MEDICINE
Payer: MEDICARE

## 2023-07-10 ENCOUNTER — OFFICE VISIT (OUTPATIENT)
Dept: MEDICAL GROUP | Facility: PHYSICIAN GROUP | Age: 88
End: 2023-07-10
Payer: MEDICARE

## 2023-07-10 ENCOUNTER — HOSPITAL ENCOUNTER (OUTPATIENT)
Dept: LAB | Facility: MEDICAL CENTER | Age: 88
End: 2023-07-10
Attending: FAMILY MEDICINE
Payer: MEDICARE

## 2023-07-10 VITALS
WEIGHT: 152.6 LBS | TEMPERATURE: 98.4 F | OXYGEN SATURATION: 96 % | SYSTOLIC BLOOD PRESSURE: 132 MMHG | BODY MASS INDEX: 30.76 KG/M2 | DIASTOLIC BLOOD PRESSURE: 60 MMHG | HEART RATE: 72 BPM | HEIGHT: 59 IN

## 2023-07-10 DIAGNOSIS — R73.03 PREDIABETES: ICD-10-CM

## 2023-07-10 DIAGNOSIS — J84.9 INTERSTITIAL LUNG DISEASE (HCC): ICD-10-CM

## 2023-07-10 DIAGNOSIS — R22.41 LUMP OF RIGHT THIGH: ICD-10-CM

## 2023-07-10 PROCEDURE — 3075F SYST BP GE 130 - 139MM HG: CPT | Performed by: FAMILY MEDICINE

## 2023-07-10 PROCEDURE — 3078F DIAST BP <80 MM HG: CPT | Performed by: FAMILY MEDICINE

## 2023-07-10 PROCEDURE — 99214 OFFICE O/P EST MOD 30 MIN: CPT | Performed by: FAMILY MEDICINE

## 2023-07-10 ASSESSMENT — PATIENT HEALTH QUESTIONNAIRE - PHQ9: CLINICAL INTERPRETATION OF PHQ2 SCORE: 0

## 2023-07-10 ASSESSMENT — FIBROSIS 4 INDEX: FIB4 SCORE: 1.88

## 2023-07-10 NOTE — OP THERAPY DAILY TREATMENT
Outpatient Physical Therapy  DAILY TREATMENT     [facility] [address], [address]  [place] [address]  Phone:  [phone]    Date: 07/11/2023    Patient: Grisel Saha  YOB: 1935  MRN: [id number]     Time Calculation    Start time: 1100  Stop time: 1145 Time Calculation (min): 45 minutes         Chief Complaint: knee problem  Visit #: 10    SUBJECTIVE:  No knee pain x 5 days, wants to work on balance    OBJECTIVE:  Current objective measures: 30 sec sit to stand 6 reps          Therapeutic Exercises (CPT 28759):     1. sit to stand, 2/30 sec sit to stand 6/6    2. shuttle 2cords, 2 x 10    4. long arc quad 10lbankle weight, 3 x10b    5. step ups 6 inch, 2 x 10 reps leading with left then right foot with single rail support, with 2 hand sup    6. heel raises 5lb abkle weights, 2 x 1 min    7. resisted knee flexion 5lb weights, 2 x 1 min    8. marching 5lb ankle weights, 2 x 1 min    9. sit to stand on airex foam with fww in place for safety, x10      Therapeutic Exercise Summary: Access Code: [email or web address]  URL: [email or web address]  Date: 06/12/2023  Prepared by: Payton Ramirez    Exercises  - Sitting Knee Extension with Resistance  - 1 x daily - 7 x weekly - 3 sets - 10 reps  - Seated Quad Set  - 1 x daily - 7 x weekly - 1 sets - 10 reps      Time-based treatments/modalities:    Physical Therapy Timed Treatment Charges  Neuromusc re-ed, balance, coor, post minutes (CPT 61745): 10 minutes  Therapeutic exercise minutes (CPT 97771): 30 minutes    ASSESSMENT:   Response to treatment: good tolerance to LE functional strength and load progression.  Patietn was fatigued after session but was asymptomatic.  Focus on balance next session    PLAN/RECOMMENDATIONS:   Plan for treatment: therapy treatment to continue next visit.  Planned interventions for next visit: continue with current treatment.

## 2023-07-10 NOTE — ASSESSMENT & PLAN NOTE
Chronic issue.  The patient has had a right thigh lump for many years.  She has noted some slight growth.  Has never noted any drainage.  Asymptomatic otherwise.  Last evaluated December 2022.  I proceeded with an ultrasonography evaluation January 2023.  Results were most consistent with a lipoma.  Discussed the possibility of further imaging but patient decided to hold off     However returns today endorsing size increase once again.  Did not discuss with dermatology.

## 2023-07-10 NOTE — PROGRESS NOTES
"Subjective:     Chief Complaint   Patient presents with    Follow-Up    Bump     Bubble on thigh getting bigger        HPI:   Grisel presents today to discuss the following.    Lump of right thigh  Chronic issue.  The patient has had a right thigh lump for many years.  She has noted some slight growth.  Has never noted any drainage.  Asymptomatic otherwise.  Last evaluated December 2022.  I proceeded with an ultrasonography evaluation January 2023.  Results were most consistent with a lipoma.  Discussed the possibility of further imaging but patient decided to hold off     However returns today endorsing size increase once again.  Did not discuss with dermatology.    Past Medical History:   Diagnosis Date    HTN (hypertension)        Current Outpatient Medications Ordered in Epic   Medication Sig Dispense Refill    Ibuprofen 200 MG Cap Take 1 Capsule by mouth as needed (mild pain).      losartan (COZAAR) 100 MG Tab Take 1 Tablet by mouth every day. TAKE 1 TABLET BY MOUTH DAILY Patient does not have this medication 90 Tablet 3     Current Facility-Administered Medications Ordered in Epic   Medication Dose Route Frequency Provider Last Rate Last Admin    triamcinolone acetonide (KENALOG-40) injection 80 mg  80 mg Intra-articular  Sebastian Brewster M.D.   80 mg at 11/12/21 0703    triamcinolone acetonide (KENALOG-40) injection 80 mg  80 mg Intra-articular  Sebastian Brewster M.D.   80 mg at 11/12/21 0703       Allergies:  Patient has no known allergies.    Health Maintenance: Completed    ROS:  Gen: no fevers/chills, no changes in weight  Eyes: no changes in vision  Pulm: no sob, no cough  CV: no chest pain, no palpitations  GI: no nausea/vomiting, no diarrhea      Objective:     Exam:  /60 (BP Location: Left arm, Patient Position: Sitting, BP Cuff Size: Adult)   Pulse 72   Temp 36.9 °C (98.4 °F) (Temporal)   Ht 1.499 m (4' 11\")   Wt 69.2 kg (152 lb 9.6 oz)   SpO2 96%   BMI 30.82 kg/m²  Body mass index is 30.82 " kg/m².      Constitutional: Alert, no distress, well-groomed.  Skin: Inspection of the right upper thigh extremity shows evidence of a movable lump with clear defined borders.  Slightly fluctuant in nature.  Eye: Equal, round and reactive, conjunctiva clear, lids normal.  ENMT: Lips without lesions, good dentition, moist mucous membranes.  Neck: Trachea midline, no masses, no thyromegaly.  Respiratory: Unlabored respiratory effort, no cough.  MSK: Normal gait, moves all extremities.  Neuro: Grossly non-focal.   Psych: Alert and oriented x3, normal affect and mood.        Assessment & Plan:     88 y.o. female with the following -     1. Lump of right thigh  Chronic, changing condition.  The patient has a longstanding history of a lump to the right inner thigh for many years.  Most recent ultrasound January of this year demonstrated findings most consistent with lipoma.  However given the fact that this is growing via shared decision making I will proceed with an MRI of the area for further assessment and treatment.  We will tailor treatment based on results.  - MR-LOWER EXTREMITY-NO JOINT-W/O RIGHT; Future  - Basic Metabolic Panel; Future    2. Interstitial lung disease (HCC)  Chronic, stable condition.  Continue to monitor.    3. Prediabetes  Chronic condition.  Stable.  Repeat blood test today.  - Hemoglobin A1c; Future  - CBC WITHOUT DIFFERENTIAL; Future      No follow-ups on file.    HCC Gap Form    Diagnosis to address: J84.9 - Interstitial lung disease (HCC)  Assessment and plan: Chronic, stable. Continue with current defined treatment plan: stable w/o inhaler. Follow-up at least annually.  Last edited 07/10/23 11:03 PDT by Cricket Miller M.D.           Please note that this dictation was created using voice recognition software. I have made every reasonable attempt to correct obvious errors, but I expect that there are errors of grammar and possibly content that I did not discover before finalizing the  note.

## 2023-07-11 ENCOUNTER — PHYSICAL THERAPY (OUTPATIENT)
Dept: PHYSICAL THERAPY | Facility: REHABILITATION | Age: 88
End: 2023-07-11
Attending: FAMILY MEDICINE
Payer: MEDICARE

## 2023-07-11 DIAGNOSIS — M25.562 LEFT KNEE PAIN, UNSPECIFIED CHRONICITY: ICD-10-CM

## 2023-07-11 PROCEDURE — 97110 THERAPEUTIC EXERCISES: CPT

## 2023-07-11 PROCEDURE — 97112 NEUROMUSCULAR REEDUCATION: CPT

## 2023-07-17 ENCOUNTER — APPOINTMENT (OUTPATIENT)
Dept: PHYSICAL THERAPY | Facility: REHABILITATION | Age: 88
End: 2023-07-17
Attending: FAMILY MEDICINE
Payer: MEDICARE

## 2023-07-17 NOTE — OP THERAPY DAILY TREATMENT
Outpatient Physical Therapy  DAILY TREATMENT     [facility] Outpatient Physical Therapy [address], [address]  [place] [address]  Phone:  [phone]    Date: 07/17/2023    Patient: Grisel Saha  YOB: 1935  MRN: [id number]     Time Calculation                   Chief Complaint: No chief complaint on file.    Visit #: 11    SUBJECTIVE:  ***    OBJECTIVE:  Current objective measures: ***          Therapeutic Exercises (CPT 01925):     1. sit to stand, 2/30 sec sit to stand 6/6    2. shuttle 2cords, 2 x 10    4. long arc quad 10lbankle weight, 3 x10b    5. step ups 6 inch, 2 x 10 reps leading with left then right foot with single rail support, with 2 hand sup    6. heel raises 5lb abkle weights, 2 x 1 min    7. resisted knee flexion 5lb weights, 2 x 1 min    8. marching 5lb ankle weights, 2 x 1 min    9. sit to stand on airex foam with fww in place for safety, x10      Therapeutic Exercise Summary: Access Code: [email or web address]  URL: [email or web address]  Date: 06/12/2023  Prepared by: Payton Ramirez    Exercises  - Sitting Knee Extension with Resistance  - 1 x daily - 7 x weekly - 3 sets - 10 reps  - Seated Quad Set  - 1 x daily - 7 x weekly - 1 sets - 10 reps      Time-based treatments/modalities:         ASSESSMENT:   Response to treatment: ***    PLAN/RECOMMENDATIONS:   Plan for treatment: therapy treatment to continue next visit.  Planned interventions for next visit: continue with current treatment.

## 2023-07-24 ENCOUNTER — PHYSICAL THERAPY (OUTPATIENT)
Dept: PHYSICAL THERAPY | Facility: REHABILITATION | Age: 88
End: 2023-07-24
Attending: FAMILY MEDICINE
Payer: MEDICARE

## 2023-07-24 DIAGNOSIS — M25.562 LEFT KNEE PAIN, UNSPECIFIED CHRONICITY: ICD-10-CM

## 2023-07-24 PROCEDURE — 97112 NEUROMUSCULAR REEDUCATION: CPT

## 2023-07-24 PROCEDURE — 97110 THERAPEUTIC EXERCISES: CPT

## 2023-07-24 NOTE — OP THERAPY DAILY TREATMENT
Outpatient Physical Therapy  DAILY TREATMENT     [facility] Outpatient Physical Therapy [address], [address]  [place] [address]  Phone:  [phone]    Date: 07/24/2023    Patient: Grisel Saha  YOB: 1935  MRN: [id number]     Time Calculation    Start time: 1100  Stop time: 1138 Time Calculation (min): 38 minutes         Chief Complaint: knee problem  Visit #: 5    SUBJECTIVE:  Knee is significantly better //high anxiety and fear of falling    OBJECTIVE:  Current objective measures: increased bilateral ankle edema///80          Therapeutic Exercises (CPT 99908):     1. sit to stand, 2/30 sec sit to stand 6/6    2. shuttle 3cords, 3 x 1 min Bilateral    4. long arc quad 5lb ankle weight, 3 x10b    5. step ups 6 inch, 2 x 10 reps leading with left then right foot with single rail support, with 1-2 hand sup    6. heel raises 5lb abkle weights, 2 x 1 min    7. resisted knee flexion 5lb weights, 2 x 1 min, NT    9. sit to stand on airex foam with fww in place for safety, x10      Therapeutic Exercise Summary: Access Code: [email or web address]  URL: [email or web address]  Date: 06/12/2023  Prepared by: Payton Ramirez    Exercises  - Sitting Knee Extension with Resistance  - 1 x daily - 7 x weekly - 3 sets - 10 reps  - Seated Quad Set  - 1 x daily - 7 x weekly - 1 sets - 10 reps    Therapeutic Treatments and Modalities:     1. Neuromuscular Re-education (CPT 95475), standimg on foam 3 x 30 sec @SBA, foam with head turns 2 x 30 sec, 360 turns both directs x 2 each @SBA, toe tabs with no support and with 1 hand support x1min each    Time-based treatments/modalities:    Physical Therapy Timed Treatment Charges  Neuromusc re-ed, balance, coor, post minutes (CPT 57597): 8 minutes  Therapeutic exercise minutes (CPT 63797): 30 minutes  ASSESSMENT:   Response to treatment: Increased lightheadiness with sit to stand//BP was elevated and patient states that she did not take BP meds today.  Patient has  high fear and anxiety of falling which limited amount of balance challenges .  Patient will complete one more follow up and then prefers to discharge    PLAN/RECOMMENDATIONS:   Plan for treatment: therapy treatment to continue next visit.  Planned interventions for next visit: continue with current treatment.

## 2023-07-25 ENCOUNTER — HOSPITAL ENCOUNTER (OUTPATIENT)
Dept: LAB | Facility: MEDICAL CENTER | Age: 88
End: 2023-07-25
Attending: FAMILY MEDICINE
Payer: MEDICARE

## 2023-07-25 DIAGNOSIS — R22.41 LUMP OF RIGHT THIGH: ICD-10-CM

## 2023-07-25 DIAGNOSIS — R73.03 PREDIABETES: ICD-10-CM

## 2023-07-25 LAB
ANION GAP SERPL CALC-SCNC: 14 MMOL/L (ref 7–16)
BUN SERPL-MCNC: 20 MG/DL (ref 8–22)
CALCIUM SERPL-MCNC: 8.9 MG/DL (ref 8.5–10.5)
CHLORIDE SERPL-SCNC: 96 MMOL/L (ref 96–112)
CO2 SERPL-SCNC: 20 MMOL/L (ref 20–33)
CREAT SERPL-MCNC: 1.03 MG/DL (ref 0.5–1.4)
ERYTHROCYTE [DISTWIDTH] IN BLOOD BY AUTOMATED COUNT: 41.3 FL (ref 35.9–50)
EST. AVERAGE GLUCOSE BLD GHB EST-MCNC: 137 MG/DL
GFR SERPLBLD CREATININE-BSD FMLA CKD-EPI: 52 ML/MIN/1.73 M 2
GLUCOSE SERPL-MCNC: 149 MG/DL (ref 65–99)
HBA1C MFR BLD: 6.4 % (ref 4–5.6)
HCT VFR BLD AUTO: 39.3 % (ref 37–47)
HGB BLD-MCNC: 13.3 G/DL (ref 12–16)
MCH RBC QN AUTO: 30.9 PG (ref 27–33)
MCHC RBC AUTO-ENTMCNC: 33.8 G/DL (ref 32.2–35.5)
MCV RBC AUTO: 91.4 FL (ref 81.4–97.8)
PLATELET # BLD AUTO: 313 K/UL (ref 164–446)
PMV BLD AUTO: 10 FL (ref 9–12.9)
POTASSIUM SERPL-SCNC: 4.4 MMOL/L (ref 3.6–5.5)
RBC # BLD AUTO: 4.3 M/UL (ref 4.2–5.4)
SODIUM SERPL-SCNC: 130 MMOL/L (ref 135–145)
WBC # BLD AUTO: 10.6 K/UL (ref 4.8–10.8)

## 2023-07-25 PROCEDURE — 83036 HEMOGLOBIN GLYCOSYLATED A1C: CPT

## 2023-07-25 PROCEDURE — 85027 COMPLETE CBC AUTOMATED: CPT

## 2023-07-25 PROCEDURE — 80048 BASIC METABOLIC PNL TOTAL CA: CPT

## 2023-07-25 PROCEDURE — 36415 COLL VENOUS BLD VENIPUNCTURE: CPT

## 2023-07-31 ENCOUNTER — PHYSICAL THERAPY (OUTPATIENT)
Dept: PHYSICAL THERAPY | Facility: REHABILITATION | Age: 88
End: 2023-07-31
Attending: FAMILY MEDICINE
Payer: MEDICARE

## 2023-07-31 DIAGNOSIS — M25.562 LEFT KNEE PAIN, UNSPECIFIED CHRONICITY: ICD-10-CM

## 2023-07-31 PROCEDURE — 97110 THERAPEUTIC EXERCISES: CPT

## 2023-07-31 PROCEDURE — 97112 NEUROMUSCULAR REEDUCATION: CPT

## 2023-07-31 NOTE — OP THERAPY DAILY TREATMENT
Outpatient Physical Therapy  DAILY TREATMENT     [facility] Outpatient Physical Therapy [address] [facility], [address]  [place] [address]  Phone:  [phone]    Date: 07/31/2023    Patient: Grisel Saha  YOB: 1935  MRN: [id number]     Time Calculation    Start time: 1100  Stop time: 1145 Time Calculation (min): 45 minutes         Chief Complaint: knee and balance problem  Visit #: 6    SUBJECTIVE:  Knees are feeling good, feeling stronger, going to get a pedometer, participating in group exercise at the [facility]    OBJECTIVE:  Current objective measures: bilateral ankle edema          Therapeutic Exercises (CPT 42013):     1. sit to stand, 2/30 sec sit to stand 6/6    2. shuttle 3cords, 3 x 1 min Bilateral    3. marches sitting with 5lb weight, 2 x 1 min    4. long arc quad 5lb ankle weight, 3 x 1 min    5. step ups 6 inch, 2 x 10 reps leading with left then right foot with single rail support, with 1-2 hand sup    6. heel raises 5lb abkle weights, 2 x 1 min    7. resisted knee flexion 5lb weights, 2 x 1 min, NT    9. sit to stand on airex foam with fww in place for safety, x10      Therapeutic Exercise Summary: Access Code: [email or web address]  URL: [email or web address]  Date: 06/12/2023  Prepared by: Payton Ramirez    Exercises  - Sitting Knee Extension with Resistance  - 1 x daily - 7 x weekly - 3 sets - 10 reps  - Seated Quad Set  - 1 x daily - 7 x weekly - 1 sets - 10 reps    Therapeutic Treatments and Modalities:     1. Neuromuscular Re-education (CPT 13753), standimg on foam 3 x 30 sec @SBA, foam with head turns 2 x 30 sec, 360 turns both directs x 2 each @SBA    Time-based treatments/modalities:    Physical Therapy Timed Treatment Charges  Neuromusc re-ed, balance, coor, post minutes (CPT 78894): 10 minutes  Therapeutic exercise minutes (CPT 79860): 30 minutes  ASSESSMENT:   Response to treatment: Patient demonstrated increased anxiety and fear of falling today.  Bilateral knee pain with  loading in weightbearing.  Continue to progress balance and strength as tolerated.     PLAN/RECOMMENDATIONS:   Plan for treatment: therapy treatment to continue next visit.  Planned interventions for next visit: continue with current treatment.

## 2023-09-04 ENCOUNTER — OFFICE VISIT (OUTPATIENT)
Dept: URGENT CARE | Facility: CLINIC | Age: 88
End: 2023-09-04
Payer: MEDICARE

## 2023-09-04 VITALS
OXYGEN SATURATION: 97 % | DIASTOLIC BLOOD PRESSURE: 80 MMHG | BODY MASS INDEX: 30.64 KG/M2 | HEIGHT: 59 IN | TEMPERATURE: 98.2 F | SYSTOLIC BLOOD PRESSURE: 180 MMHG | RESPIRATION RATE: 16 BRPM | WEIGHT: 152 LBS | HEART RATE: 71 BPM

## 2023-09-04 DIAGNOSIS — B02.9 HERPES ZOSTER WITHOUT COMPLICATION: ICD-10-CM

## 2023-09-04 PROCEDURE — 3077F SYST BP >= 140 MM HG: CPT | Performed by: FAMILY MEDICINE

## 2023-09-04 PROCEDURE — 3079F DIAST BP 80-89 MM HG: CPT | Performed by: FAMILY MEDICINE

## 2023-09-04 PROCEDURE — 99213 OFFICE O/P EST LOW 20 MIN: CPT | Performed by: FAMILY MEDICINE

## 2023-09-04 RX ORDER — VALACYCLOVIR HYDROCHLORIDE 1 G/1
1000 TABLET, FILM COATED ORAL 2 TIMES DAILY
Qty: 14 TABLET | Refills: 0 | Status: SHIPPED | OUTPATIENT
Start: 2023-09-04 | End: 2023-09-08 | Stop reason: SDUPTHER

## 2023-09-04 ASSESSMENT — ENCOUNTER SYMPTOMS
EYE REDNESS: 0
NAUSEA: 0
EYE DISCHARGE: 0
WEIGHT LOSS: 0
VOMITING: 0
MYALGIAS: 0

## 2023-09-04 ASSESSMENT — FIBROSIS 4 INDEX: FIB4 SCORE: 1.38

## 2023-09-04 NOTE — PROGRESS NOTES
"Subjective     Grisel Saha is a 88 y.o. female who presents with Rash (Woke up this am with rash on right side and mid back. )            Onset this morning moderately painful blistering rash right mid back and upper abdomen.  Remote PMH chickenpox as a child.  No itching.  No clear trigger.  No other aggravating or alleviating factors.        Review of Systems   Constitutional:  Negative for malaise/fatigue and weight loss.   Eyes:  Negative for discharge and redness.   Gastrointestinal:  Negative for nausea and vomiting.   Musculoskeletal:  Negative for joint pain and myalgias.              Objective     BP (!) 180/80 (BP Location: Left arm, Patient Position: Sitting, BP Cuff Size: Adult)   Pulse 71   Temp 36.8 °C (98.2 °F) (Temporal)   Resp 16   Ht 1.499 m (4' 11\")   Wt 68.9 kg (152 lb)   SpO2 97%   BMI 30.70 kg/m²      Physical Exam  Constitutional:       Appearance: Normal appearance.   Skin:     Findings: Rash (Vesicular right lower thoracic dermatome consistent with zoster.  No evidence of secondary bacterial infection) present.   Neurological:      Mental Status: She is alert.                             Assessment & Plan        1. Herpes zoster without complication    - valacyclovir (VALTREX) 1 GM Tab; Take 1 Tablet by mouth 2 times a day for 7 days.  Dispense: 14 Tablet; Refill: 0     Differential diagnosis, natural history, supportive care, and indications for immediate follow-up were discussed.     Valtrex renally dosed based on creatinine clearance calculated at 31             "

## 2023-09-07 ENCOUNTER — APPOINTMENT (OUTPATIENT)
Dept: MEDICAL GROUP | Facility: PHYSICIAN GROUP | Age: 88
End: 2023-09-07
Payer: MEDICARE

## 2023-09-07 ENCOUNTER — OFFICE VISIT (OUTPATIENT)
Dept: URGENT CARE | Facility: CLINIC | Age: 88
End: 2023-09-07
Payer: MEDICARE

## 2023-09-07 VITALS
SYSTOLIC BLOOD PRESSURE: 180 MMHG | BODY MASS INDEX: 30.64 KG/M2 | TEMPERATURE: 99.1 F | RESPIRATION RATE: 14 BRPM | DIASTOLIC BLOOD PRESSURE: 90 MMHG | WEIGHT: 152 LBS | HEIGHT: 59 IN | OXYGEN SATURATION: 95 % | HEART RATE: 91 BPM

## 2023-09-07 DIAGNOSIS — R11.0 NAUSEA WITHOUT VOMITING: ICD-10-CM

## 2023-09-07 DIAGNOSIS — L03.312 CELLULITIS OF BACK: Primary | ICD-10-CM

## 2023-09-07 PROCEDURE — 99213 OFFICE O/P EST LOW 20 MIN: CPT | Mod: 25

## 2023-09-07 PROCEDURE — 3080F DIAST BP >= 90 MM HG: CPT

## 2023-09-07 PROCEDURE — 3077F SYST BP >= 140 MM HG: CPT

## 2023-09-07 RX ORDER — ONDANSETRON 4 MG/1
4 TABLET, ORALLY DISINTEGRATING ORAL ONCE
Status: COMPLETED | OUTPATIENT
Start: 2023-09-07 | End: 2023-09-07

## 2023-09-07 RX ORDER — CEPHALEXIN 500 MG/1
500 CAPSULE ORAL 4 TIMES DAILY
Qty: 28 CAPSULE | Refills: 0 | Status: ON HOLD
Start: 2023-09-07 | End: 2023-09-22

## 2023-09-07 RX ORDER — ONDANSETRON 4 MG/1
4 TABLET, ORALLY DISINTEGRATING ORAL 2 TIMES DAILY PRN
Qty: 14 TABLET | Refills: 0 | Status: SHIPPED | OUTPATIENT
Start: 2023-09-07 | End: 2023-09-08 | Stop reason: SDUPTHER

## 2023-09-07 RX ADMIN — ONDANSETRON 4 MG: 4 TABLET, ORALLY DISINTEGRATING ORAL at 11:18

## 2023-09-07 ASSESSMENT — FIBROSIS 4 INDEX: FIB4 SCORE: 1.38

## 2023-09-07 NOTE — PROGRESS NOTES
Subjective:   Grisel Saha is a 88 y.o. female who presents for Medication Reaction (Valacyclovir making pt nauseas, rash is worsening )      HPI:Grisel comes in today c/o nausea that she thinks is due to the Valtrex, and worsening redness, swelling, and pain of the shingles lesions on her right side. Onset was 09/04/2023 when she presented to urgent care with a vesicular rash on right lower back extending to her right abdomen, she was diagnosed with herpes zoster and started on Valtrex.  Patient describes symptoms as constant and worsening. They describe the pain as burning, and getting worse. Aggravating factors include touching the area. Relieving factors include none. Treatments tried at home include ibuprofen with fair result. They describe their symptoms as severe.      Review of Systems   Constitutional:  Positive for malaise/fatigue. Negative for chills and fever.   HENT:  Negative for congestion.    Eyes:  Negative for blurred vision, double vision, pain, discharge and redness.   Respiratory:  Negative for cough, sputum production and shortness of breath.    Cardiovascular:  Negative for chest pain and palpitations.   Gastrointestinal:  Positive for nausea. Negative for vomiting.   Skin:  Positive for rash.        Positive for increased redness, swelling, and pain of the lesions on her right lower back flank and abdomen   All other systems reviewed and are negative.      Medications: Ibuprofen Caps  losartan Tabs  triamcinolone acetonide  valacyclovir Tabs    Allergies: Patient has no known allergies.    Problem List: does not have any pertinent problems on file.    Surgical History:  No past surgical history on file.    Past Social Hx:   reports that she has quit smoking. She has never used smokeless tobacco. She reports that she does not currently use alcohol after a past usage of about 8.4 oz of alcohol per week. She reports that she does not currently use drugs.     Past Family Hx:   family history  "includes Cancer in her father; Stroke in her mother.     Problem list, medications, and allergies reviewed by myself today in Epic.     Objective:     BP (!) 180/90 (BP Location: Right arm, Patient Position: Sitting, BP Cuff Size: Adult)   Pulse 91   Temp 37.3 °C (99.1 °F) (Temporal)   Resp 14   Ht 1.499 m (4' 11\")   Wt 68.9 kg (152 lb)   SpO2 95%   BMI 30.70 kg/m²     During this visit, appropriate PPE was worn, and hand hygiene was performed.    Physical Exam  Vitals reviewed.   Constitutional:       General: She is not in acute distress.     Appearance: Normal appearance. She is not ill-appearing or toxic-appearing.   HENT:      Head: Normocephalic and atraumatic.      Right Ear: External ear normal.      Left Ear: External ear normal.      Nose: No congestion or rhinorrhea.      Mouth/Throat:      Pharynx: Oropharynx is clear.   Eyes:      General:         Right eye: No discharge.         Left eye: No discharge.      Conjunctiva/sclera: Conjunctivae normal.      Pupils: Pupils are equal, round, and reactive to light.   Cardiovascular:      Rate and Rhythm: Normal rate and regular rhythm.      Heart sounds: Normal heart sounds. No murmur heard.     No friction rub. No gallop.   Pulmonary:      Breath sounds: Normal breath sounds. No wheezing, rhonchi or rales.   Abdominal:      General: There is no distension.   Musculoskeletal:         General: Normal range of motion.      Cervical back: Normal range of motion. No rigidity.      Right lower leg: No edema.      Left lower leg: No edema.   Lymphadenopathy:      Cervical: No cervical adenopathy.   Skin:     General: Skin is warm and dry.      Coloration: Skin is not jaundiced.      Comments: Clusters of herpes zoster lesions in various stages evident on her right lower back, flank extending to right abdomen.  There is an area of erythema, edema, warmth to touch, painful to palpation, consistent with the developing cellulitis   Neurological:      Mental " Status: She is alert and oriented to person, place, and time. Mental status is at baseline.   Psychiatric:         Mood and Affect: Mood normal.         Behavior: Behavior normal.               Assessment/Plan:     Diagnosis and associated orders:     No diagnosis found.   Comments/MDM:     1. Cellulitis of back  Patient was diagnosed with herpes zoster on 09/04/2023 was started on Valtrex.  She has apparently developed a secondary cellulitis in the region of the herpes lesions on her right lower back flank and abdomen.  She complains that the lesions are painful.  We discussed pain control measures with Tylenol alternated with ibuprofen.  Use the lowest effective dose.  We will start her on antibiotics.  Daughter states that she does have an appointment with her primary doctor tomorrow.  We also discussed applying ice or cool moist compresses to the area to help with pain relief.    - cephALEXin (KEFLEX) 500 MG Cap; Take 1 Capsule by mouth 4 times a day for 7 days.  Dispense: 28 Capsule; Refill: 0    2. Nausea without vomiting  She has developed nausea after taking the Valtrex.  I did give her a dose of the dispersible Zofran 4 mg in clinic today and she did state that it gave her immediate relief.  We will send Zofran to her pharmacy, instructed her to use it twice a day with the Valtrex.  We did discuss escalating signs of infection, when to return to the urgent care versus going to the ER.  Patient and her daughter state they have good understanding of instructions and agree with this plan of care.  - ondansetron (Zofran ODT) dispertab 4 mg         Pt is clinically stable at today's acute urgent care visit.  No acute distress noted. Appropriate for outpatient management at this time.       Discussed DDx, management options (risks,benefits, and alternatives to planned treatment), natural progression and supportive care.  Patient states they have good understanding and the treatment plan was agreed upon. Questions  were encouraged and answered   Return to urgent care prn if new or worsening sx or if there is no improvement in condition prn.    Advised the patient to follow-up with the primary care physician for recheck, reevaluation, and consideration of further management.  I instructed patient to get a pharmacy consult when picking up any prescribed medications.  Strict ER precautions discussed for any increased redness, swelling, pain, localized warmth to the area of her right lower back, flank and abdomen despite antibiotics, any fever, chills, nausea or vomiting, lethargy, and altered level of consciousness, uncontrolled pain.  Patient states they understand all instructions.     I personally reviewed prior external notes and test results pertinent to today's visit.  I have independently reviewed and interpreted all diagnostics ordered during this urgent care acute visit.        Please note that this dictation was created using voice recognition software. I have made a reasonable attempt to correct obvious errors, but I expect that there are errors of grammar and possibly content that I did not discover before finalizing the note.    This note was electronically signed by DAHLIA Magdaleno

## 2023-09-08 ENCOUNTER — OFFICE VISIT (OUTPATIENT)
Dept: MEDICAL GROUP | Facility: PHYSICIAN GROUP | Age: 88
End: 2023-09-08
Payer: MEDICARE

## 2023-09-08 VITALS
HEIGHT: 59 IN | DIASTOLIC BLOOD PRESSURE: 72 MMHG | SYSTOLIC BLOOD PRESSURE: 122 MMHG | BODY MASS INDEX: 30.96 KG/M2 | OXYGEN SATURATION: 93 % | WEIGHT: 153.6 LBS | TEMPERATURE: 98 F | HEART RATE: 71 BPM

## 2023-09-08 DIAGNOSIS — R11.0 NAUSEA WITHOUT VOMITING: ICD-10-CM

## 2023-09-08 DIAGNOSIS — B02.8 HERPES ZOSTER WITH COMPLICATION: ICD-10-CM

## 2023-09-08 PROCEDURE — 3074F SYST BP LT 130 MM HG: CPT | Performed by: FAMILY MEDICINE

## 2023-09-08 PROCEDURE — 3078F DIAST BP <80 MM HG: CPT | Performed by: FAMILY MEDICINE

## 2023-09-08 PROCEDURE — 99213 OFFICE O/P EST LOW 20 MIN: CPT | Performed by: FAMILY MEDICINE

## 2023-09-08 RX ORDER — VALACYCLOVIR HYDROCHLORIDE 1 G/1
1000 TABLET, FILM COATED ORAL 2 TIMES DAILY
Qty: 6 TABLET | Refills: 0 | Status: SHIPPED | OUTPATIENT
Start: 2023-09-08 | End: 2023-09-11

## 2023-09-08 RX ORDER — ONDANSETRON 4 MG/1
4 TABLET, ORALLY DISINTEGRATING ORAL 2 TIMES DAILY PRN
Qty: 14 TABLET | Refills: 0 | Status: ON HOLD | OUTPATIENT
Start: 2023-09-08 | End: 2023-09-22

## 2023-09-08 ASSESSMENT — FIBROSIS 4 INDEX: FIB4 SCORE: 1.38

## 2023-09-08 NOTE — LETTER
September 8, 2023        Grisel Saha  [address]  [place] [address]      Patient is to complete a total of 10 days of valacyclovir and 7 days of keflex. Patient in pocession of these medicines. Please supervise completion.     If you have any questions or concerns, please don't hesitate to call.        Sincerely,        Cricket Miller M.D.    Electronically Signed

## 2023-09-08 NOTE — PROGRESS NOTES
"Subjective:     Chief Complaint   Patient presents with    Herpes Zoster     painful    Medication Refill     Zofran        HPI:   Grisel presents today to discuss the following.    Herpes zoster with complication  New problem  Started 5 days ago  Still painful  Has been on 2 days of valacyclovir     She developed cellulitis   Improving on keflex in less than 12 hrs      Past Medical History:   Diagnosis Date    HTN (hypertension)        Current Outpatient Medications Ordered in Epic   Medication Sig Dispense Refill    valacyclovir (VALTREX) 1 GM Tab Take 1 Tablet by mouth 2 times a day for 3 days. 6 Tablet 0    ondansetron (ZOFRAN ODT) 4 MG TABLET DISPERSIBLE Take 1 Tablet by mouth 2 times a day as needed for Nausea/Vomiting (Take 2 x day with valtrex) for up to 7 days. 14 Tablet 0    cephALEXin (KEFLEX) 500 MG Cap Take 1 Capsule by mouth 4 times a day for 7 days. 28 Capsule 0    losartan (COZAAR) 100 MG Tab Take 1 Tablet by mouth every day. TAKE 1 TABLET BY MOUTH DAILY Patient does not have this medication 90 Tablet 3    Ibuprofen 200 MG Cap Take 1 Capsule by mouth as needed (mild pain).       Current Facility-Administered Medications Ordered in Epic   Medication Dose Route Frequency Provider Last Rate Last Admin    triamcinolone acetonide (KENALOG-40) injection 80 mg  80 mg Intra-articular  Sebastian Brewster M.D.   80 mg at 11/12/21 0703    triamcinolone acetonide (KENALOG-40) injection 80 mg  80 mg Intra-articular  Sebastian Brewster M.D.   80 mg at 11/12/21 0703       Allergies:  Patient has no known allergies.    Health Maintenance: Completed    ROS:  Gen: no fevers/chills, no changes in weight  Eyes: no changes in vision  Pulm: no sob, no cough  CV: no chest pain, no palpitations  GI: no nausea/vomiting, no diarrhea      Objective:     Exam:  /72 (BP Location: Left arm, Patient Position: Sitting, BP Cuff Size: Adult)   Pulse 71   Temp 36.7 °C (98 °F) (Temporal)   Ht 1.499 m (4' 11\")   Wt 69.7 kg (153 lb " 9.6 oz)   SpO2 93%   BMI 31.02 kg/m²  Body mass index is 31.02 kg/m².      Constitutional: Alert, no distress, well-groomed.  Skin: Inspection of the right torso shows evidence of a vesicular rash with surrounding erythema improved compared to yesterday's photo.    Eye: Equal, round and reactive, conjunctiva clear, lids normal.  ENMT: Lips without lesions, good dentition, moist mucous membranes.  Neck: Trachea midline, no masses, no thyromegaly.  Respiratory: Unlabored respiratory effort, no cough.  MSK: Normal gait, moves all extremities.  Neuro: Grossly non-focal.   Psych: Alert and oriented x3, normal affect and mood.        Assessment & Plan:     88 y.o. female with the following -     1. Herpes zoster with complication  Established problem.  Stable.  Complicated with cellulitis.  Doing better now on Keflex.  I would like her to complete regimen and also extend valacyclovir to a total of 10 days.  Return precautions recommended if symptoms worsen or do not improve.  - valacyclovir (VALTREX) 1 GM Tab; Take 1 Tablet by mouth 2 times a day for 3 days.  Dispense: 6 Tablet; Refill: 0    2. Nausea without vomiting  New issue.  Refilling Zofran today.  - ondansetron (ZOFRAN ODT) 4 MG TABLET DISPERSIBLE; Take 1 Tablet by mouth 2 times a day as needed for Nausea/Vomiting (Take 2 x day with valtrex) for up to 7 days.  Dispense: 14 Tablet; Refill: 0        No follow-ups on file.    HCC Gap Form    Last edited 09/08/23 09:18 PDT by Cricket Miller M.D.           Please note that this dictation was created using voice recognition software. I have made every reasonable attempt to correct obvious errors, but I expect that there are errors of grammar and possibly content that I did not discover before finalizing the note.

## 2023-09-08 NOTE — ASSESSMENT & PLAN NOTE
New problem  Started 5 days ago  Still painful  Has been on 2 days of valacyclovir     She developed cellulitis   Improving on keflex in less than 12 hrs

## 2023-09-09 ASSESSMENT — ENCOUNTER SYMPTOMS
BLURRED VISION: 0
EYE DISCHARGE: 0
FEVER: 0
NAUSEA: 1
SHORTNESS OF BREATH: 0
SPUTUM PRODUCTION: 0
COUGH: 0
VOMITING: 0
CHILLS: 0
EYE REDNESS: 0
PALPITATIONS: 0
DOUBLE VISION: 0
EYE PAIN: 0

## 2023-09-13 ENCOUNTER — APPOINTMENT (OUTPATIENT)
Dept: RADIOLOGY | Facility: MEDICAL CENTER | Age: 88
DRG: 641 | End: 2023-09-13
Attending: EMERGENCY MEDICINE
Payer: MEDICARE

## 2023-09-13 ENCOUNTER — HOSPITAL ENCOUNTER (INPATIENT)
Facility: MEDICAL CENTER | Age: 88
LOS: 9 days | DRG: 641 | End: 2023-09-22
Attending: EMERGENCY MEDICINE | Admitting: HOSPITALIST
Payer: MEDICARE

## 2023-09-13 DIAGNOSIS — E87.6 HYPOKALEMIA: ICD-10-CM

## 2023-09-13 DIAGNOSIS — Z91.81 HISTORY OF FALL: ICD-10-CM

## 2023-09-13 DIAGNOSIS — S42.291S: Chronic | ICD-10-CM

## 2023-09-13 DIAGNOSIS — B02.9 HERPES ZOSTER WITHOUT COMPLICATION: ICD-10-CM

## 2023-09-13 DIAGNOSIS — S81.802D WOUND OF LEFT LOWER EXTREMITY, SUBSEQUENT ENCOUNTER: ICD-10-CM

## 2023-09-13 DIAGNOSIS — J81.0 ACUTE PULMONARY EDEMA (HCC): ICD-10-CM

## 2023-09-13 DIAGNOSIS — K59.01 SLOW TRANSIT CONSTIPATION: Chronic | ICD-10-CM

## 2023-09-13 DIAGNOSIS — S42.291A: ICD-10-CM

## 2023-09-13 DIAGNOSIS — S02.2XXD CLOSED FRACTURE OF NASAL BONE WITH ROUTINE HEALING: ICD-10-CM

## 2023-09-13 DIAGNOSIS — M25.512 CHRONIC PAIN OF BOTH SHOULDERS: ICD-10-CM

## 2023-09-13 DIAGNOSIS — B02.8 HERPES ZOSTER WITH OTHER COMPLICATION: ICD-10-CM

## 2023-09-13 DIAGNOSIS — G47.00 INSOMNIA, UNSPECIFIED TYPE: ICD-10-CM

## 2023-09-13 DIAGNOSIS — E87.1 HYPONATREMIA: ICD-10-CM

## 2023-09-13 DIAGNOSIS — R11.0 NAUSEA: ICD-10-CM

## 2023-09-13 DIAGNOSIS — I10 ESSENTIAL HYPERTENSION: Chronic | ICD-10-CM

## 2023-09-13 DIAGNOSIS — M25.511 CHRONIC PAIN OF BOTH SHOULDERS: ICD-10-CM

## 2023-09-13 DIAGNOSIS — G89.29 CHRONIC PAIN OF BOTH SHOULDERS: ICD-10-CM

## 2023-09-13 DIAGNOSIS — K30 INDIGESTION: ICD-10-CM

## 2023-09-13 DIAGNOSIS — R42 DIZZINESS: ICD-10-CM

## 2023-09-13 DIAGNOSIS — E86.0 DEHYDRATION: ICD-10-CM

## 2023-09-13 LAB
ALBUMIN SERPL BCP-MCNC: 4.1 G/DL (ref 3.2–4.9)
ALBUMIN/GLOB SERPL: 1.6 G/DL
ALP SERPL-CCNC: 90 U/L (ref 30–99)
ALT SERPL-CCNC: 17 U/L (ref 2–50)
ANION GAP SERPL CALC-SCNC: 13 MMOL/L (ref 7–16)
AST SERPL-CCNC: 24 U/L (ref 12–45)
BASOPHILS # BLD AUTO: 0.6 % (ref 0–1.8)
BASOPHILS # BLD: 0.06 K/UL (ref 0–0.12)
BILIRUB SERPL-MCNC: 1.1 MG/DL (ref 0.1–1.5)
BUN SERPL-MCNC: 13 MG/DL (ref 8–22)
CALCIUM ALBUM COR SERPL-MCNC: 8.7 MG/DL (ref 8.5–10.5)
CALCIUM SERPL-MCNC: 8.8 MG/DL (ref 8.5–10.5)
CHLORIDE SERPL-SCNC: 81 MMOL/L (ref 96–112)
CO2 SERPL-SCNC: 20 MMOL/L (ref 20–33)
CREAT SERPL-MCNC: 0.66 MG/DL (ref 0.5–1.4)
EKG IMPRESSION: NORMAL
EOSINOPHIL # BLD AUTO: 0.06 K/UL (ref 0–0.51)
EOSINOPHIL NFR BLD: 0.6 % (ref 0–6.9)
ERYTHROCYTE [DISTWIDTH] IN BLOOD BY AUTOMATED COUNT: 34.6 FL (ref 35.9–50)
GFR SERPLBLD CREATININE-BSD FMLA CKD-EPI: 84 ML/MIN/1.73 M 2
GLOBULIN SER CALC-MCNC: 2.5 G/DL (ref 1.9–3.5)
GLUCOSE SERPL-MCNC: 160 MG/DL (ref 65–99)
HCT VFR BLD AUTO: 37.2 % (ref 37–47)
HGB BLD-MCNC: 13.8 G/DL (ref 12–16)
IMM GRANULOCYTES # BLD AUTO: 0.06 K/UL (ref 0–0.11)
IMM GRANULOCYTES NFR BLD AUTO: 0.6 % (ref 0–0.9)
LYMPHOCYTES # BLD AUTO: 1.82 K/UL (ref 1–4.8)
LYMPHOCYTES NFR BLD: 17.7 % (ref 22–41)
MCH RBC QN AUTO: 30.9 PG (ref 27–33)
MCHC RBC AUTO-ENTMCNC: 37.1 G/DL (ref 32.2–35.5)
MCV RBC AUTO: 83.4 FL (ref 81.4–97.8)
MONOCYTES # BLD AUTO: 0.94 K/UL (ref 0–0.85)
MONOCYTES NFR BLD AUTO: 9.2 % (ref 0–13.4)
NEUTROPHILS # BLD AUTO: 7.33 K/UL (ref 1.82–7.42)
NEUTROPHILS NFR BLD: 71.3 % (ref 44–72)
NRBC # BLD AUTO: 0 K/UL
NRBC BLD-RTO: 0 /100 WBC (ref 0–0.2)
PLATELET # BLD AUTO: 293 K/UL (ref 164–446)
PMV BLD AUTO: 9.1 FL (ref 9–12.9)
POTASSIUM SERPL-SCNC: 3.3 MMOL/L (ref 3.6–5.5)
PROCALCITONIN SERPL-MCNC: <0.05 NG/ML
PROT SERPL-MCNC: 6.6 G/DL (ref 6–8.2)
RBC # BLD AUTO: 4.46 M/UL (ref 4.2–5.4)
SODIUM SERPL-SCNC: 114 MMOL/L (ref 135–145)
TROPONIN T SERPL-MCNC: 17 NG/L (ref 6–19)
WBC # BLD AUTO: 10.3 K/UL (ref 4.8–10.8)

## 2023-09-13 PROCEDURE — 96374 THER/PROPH/DIAG INJ IV PUSH: CPT

## 2023-09-13 PROCEDURE — 83930 ASSAY OF BLOOD OSMOLALITY: CPT

## 2023-09-13 PROCEDURE — 71045 X-RAY EXAM CHEST 1 VIEW: CPT

## 2023-09-13 PROCEDURE — 84145 PROCALCITONIN (PCT): CPT

## 2023-09-13 PROCEDURE — 96375 TX/PRO/DX INJ NEW DRUG ADDON: CPT

## 2023-09-13 PROCEDURE — 99291 CRITICAL CARE FIRST HOUR: CPT | Performed by: HOSPITALIST

## 2023-09-13 PROCEDURE — 36415 COLL VENOUS BLD VENIPUNCTURE: CPT

## 2023-09-13 PROCEDURE — 70450 CT HEAD/BRAIN W/O DYE: CPT

## 2023-09-13 PROCEDURE — 96361 HYDRATE IV INFUSION ADD-ON: CPT

## 2023-09-13 PROCEDURE — 93005 ELECTROCARDIOGRAM TRACING: CPT

## 2023-09-13 PROCEDURE — 82533 TOTAL CORTISOL: CPT

## 2023-09-13 PROCEDURE — 99291 CRITICAL CARE FIRST HOUR: CPT

## 2023-09-13 PROCEDURE — 700111 HCHG RX REV CODE 636 W/ 250 OVERRIDE (IP): Mod: JZ | Performed by: EMERGENCY MEDICINE

## 2023-09-13 PROCEDURE — 770000 HCHG ROOM/CARE - INTERMEDIATE ICU *

## 2023-09-13 PROCEDURE — 84443 ASSAY THYROID STIM HORMONE: CPT

## 2023-09-13 PROCEDURE — 84484 ASSAY OF TROPONIN QUANT: CPT

## 2023-09-13 PROCEDURE — 85025 COMPLETE CBC W/AUTO DIFF WBC: CPT

## 2023-09-13 PROCEDURE — 80053 COMPREHEN METABOLIC PANEL: CPT

## 2023-09-13 PROCEDURE — 700105 HCHG RX REV CODE 258: Performed by: EMERGENCY MEDICINE

## 2023-09-13 PROCEDURE — 93005 ELECTROCARDIOGRAM TRACING: CPT | Performed by: EMERGENCY MEDICINE

## 2023-09-13 RX ORDER — ONDANSETRON 2 MG/ML
4 INJECTION INTRAMUSCULAR; INTRAVENOUS ONCE
Status: COMPLETED | OUTPATIENT
Start: 2023-09-13 | End: 2023-09-13

## 2023-09-13 RX ORDER — AMOXICILLIN 250 MG
2 CAPSULE ORAL 2 TIMES DAILY
Status: DISCONTINUED | OUTPATIENT
Start: 2023-09-14 | End: 2023-09-22 | Stop reason: HOSPADM

## 2023-09-13 RX ORDER — ONDANSETRON 4 MG/1
4 TABLET, ORALLY DISINTEGRATING ORAL EVERY 4 HOURS PRN
Status: DISCONTINUED | OUTPATIENT
Start: 2023-09-13 | End: 2023-09-22 | Stop reason: HOSPADM

## 2023-09-13 RX ORDER — SODIUM CHLORIDE, SODIUM LACTATE, POTASSIUM CHLORIDE, CALCIUM CHLORIDE 600; 310; 30; 20 MG/100ML; MG/100ML; MG/100ML; MG/100ML
1000 INJECTION, SOLUTION INTRAVENOUS ONCE
Status: COMPLETED | OUTPATIENT
Start: 2023-09-13 | End: 2023-09-13

## 2023-09-13 RX ORDER — POLYETHYLENE GLYCOL 3350 17 G/17G
1 POWDER, FOR SOLUTION ORAL
Status: DISCONTINUED | OUTPATIENT
Start: 2023-09-13 | End: 2023-09-22 | Stop reason: HOSPADM

## 2023-09-13 RX ORDER — ACETAMINOPHEN 325 MG/1
650 TABLET ORAL EVERY 6 HOURS PRN
Status: DISCONTINUED | OUTPATIENT
Start: 2023-09-13 | End: 2023-09-22 | Stop reason: HOSPADM

## 2023-09-13 RX ORDER — MORPHINE SULFATE 4 MG/ML
4 INJECTION INTRAVENOUS ONCE
Status: COMPLETED | OUTPATIENT
Start: 2023-09-13 | End: 2023-09-13

## 2023-09-13 RX ORDER — HYDRALAZINE HYDROCHLORIDE 20 MG/ML
10 INJECTION INTRAMUSCULAR; INTRAVENOUS
Status: COMPLETED | OUTPATIENT
Start: 2023-09-13 | End: 2023-09-14

## 2023-09-13 RX ORDER — BISACODYL 10 MG
10 SUPPOSITORY, RECTAL RECTAL
Status: DISCONTINUED | OUTPATIENT
Start: 2023-09-13 | End: 2023-09-22 | Stop reason: HOSPADM

## 2023-09-13 RX ORDER — HEPARIN SODIUM 5000 [USP'U]/ML
5000 INJECTION, SOLUTION INTRAVENOUS; SUBCUTANEOUS EVERY 8 HOURS
Status: DISCONTINUED | OUTPATIENT
Start: 2023-09-14 | End: 2023-09-17

## 2023-09-13 RX ORDER — ONDANSETRON 2 MG/ML
4 INJECTION INTRAMUSCULAR; INTRAVENOUS EVERY 4 HOURS PRN
Status: DISCONTINUED | OUTPATIENT
Start: 2023-09-13 | End: 2023-09-22 | Stop reason: HOSPADM

## 2023-09-13 RX ADMIN — MORPHINE SULFATE 4 MG: 4 INJECTION INTRAVENOUS at 23:00

## 2023-09-13 RX ADMIN — SODIUM CHLORIDE, POTASSIUM CHLORIDE, SODIUM LACTATE AND CALCIUM CHLORIDE 1000 ML: 600; 310; 30; 20 INJECTION, SOLUTION INTRAVENOUS at 22:29

## 2023-09-13 RX ADMIN — HYDRALAZINE HYDROCHLORIDE 10 MG: 20 INJECTION, SOLUTION INTRAMUSCULAR; INTRAVENOUS at 22:14

## 2023-09-13 RX ADMIN — ONDANSETRON 4 MG: 2 INJECTION INTRAMUSCULAR; INTRAVENOUS at 22:26

## 2023-09-13 RX ADMIN — HYDRALAZINE HYDROCHLORIDE 10 MG: 20 INJECTION, SOLUTION INTRAMUSCULAR; INTRAVENOUS at 22:28

## 2023-09-13 ASSESSMENT — FIBROSIS 4 INDEX: FIB4 SCORE: 1.38

## 2023-09-14 ENCOUNTER — APPOINTMENT (OUTPATIENT)
Dept: RADIOLOGY | Facility: MEDICAL CENTER | Age: 88
DRG: 641 | End: 2023-09-14
Attending: HOSPITALIST
Payer: MEDICARE

## 2023-09-14 PROBLEM — R79.89 ELEVATED BRAIN NATRIURETIC PEPTIDE (BNP) LEVEL: Status: ACTIVE | Noted: 2023-09-14

## 2023-09-14 PROBLEM — J81.1 PULMONARY EDEMA: Status: ACTIVE | Noted: 2023-09-14

## 2023-09-14 LAB
ANION GAP SERPL CALC-SCNC: 10 MMOL/L (ref 7–16)
APPEARANCE UR: CLEAR
BACTERIA #/AREA URNS HPF: NEGATIVE /HPF
BILIRUB UR QL STRIP.AUTO: NEGATIVE
BUN SERPL-MCNC: 15 MG/DL (ref 8–22)
CALCIUM SERPL-MCNC: 7.9 MG/DL (ref 8.5–10.5)
CHLORIDE SERPL-SCNC: 83 MMOL/L (ref 96–112)
CO2 SERPL-SCNC: 22 MMOL/L (ref 20–33)
COLOR UR: YELLOW
CORTIS SERPL-MCNC: 20.4 UG/DL (ref 0–23)
CREAT SERPL-MCNC: 0.87 MG/DL (ref 0.5–1.4)
EPI CELLS #/AREA URNS HPF: NORMAL /HPF
ERYTHROCYTE [DISTWIDTH] IN BLOOD BY AUTOMATED COUNT: 35.3 FL (ref 35.9–50)
FLUAV RNA SPEC QL NAA+PROBE: NEGATIVE
FLUBV RNA SPEC QL NAA+PROBE: NEGATIVE
GFR SERPLBLD CREATININE-BSD FMLA CKD-EPI: 64 ML/MIN/1.73 M 2
GLUCOSE BLD STRIP.AUTO-MCNC: 135 MG/DL (ref 65–99)
GLUCOSE BLD STRIP.AUTO-MCNC: 165 MG/DL (ref 65–99)
GLUCOSE SERPL-MCNC: 128 MG/DL (ref 65–99)
GLUCOSE UR STRIP.AUTO-MCNC: NEGATIVE MG/DL
HCT VFR BLD AUTO: 35.4 % (ref 37–47)
HGB BLD-MCNC: 13.2 G/DL (ref 12–16)
HYALINE CASTS #/AREA URNS LPF: NORMAL /LPF
KETONES UR STRIP.AUTO-MCNC: ABNORMAL MG/DL
LEUKOCYTE ESTERASE UR QL STRIP.AUTO: NEGATIVE
MCH RBC QN AUTO: 31.4 PG (ref 27–33)
MCHC RBC AUTO-ENTMCNC: 37.3 G/DL (ref 32.2–35.5)
MCV RBC AUTO: 84.3 FL (ref 81.4–97.8)
MICRO URNS: ABNORMAL
NITRITE UR QL STRIP.AUTO: NEGATIVE
NT-PROBNP SERPL IA-MCNC: 1663 PG/ML (ref 0–125)
OSMOLALITY SERPL: 245 MOSM/KG H2O (ref 278–298)
OSMOLALITY UR: 495 MOSM/KG H2O (ref 300–900)
PH UR STRIP.AUTO: 6.5 [PH] (ref 5–8)
PLATELET # BLD AUTO: 313 K/UL (ref 164–446)
PMV BLD AUTO: 9.5 FL (ref 9–12.9)
POTASSIUM SERPL-SCNC: 3.9 MMOL/L (ref 3.6–5.5)
PROT UR QL STRIP: 300 MG/DL
RBC # BLD AUTO: 4.2 M/UL (ref 4.2–5.4)
RBC # URNS HPF: NORMAL /HPF
RBC UR QL AUTO: NEGATIVE
RSV RNA SPEC QL NAA+PROBE: NEGATIVE
SARS-COV-2 RNA RESP QL NAA+PROBE: NOTDETECTED
SODIUM SERPL-SCNC: 115 MMOL/L (ref 135–145)
SODIUM SERPL-SCNC: 115 MMOL/L (ref 135–145)
SODIUM SERPL-SCNC: 117 MMOL/L (ref 135–145)
SODIUM SERPL-SCNC: 118 MMOL/L (ref 135–145)
SODIUM SERPL-SCNC: 119 MMOL/L (ref 135–145)
SODIUM SERPL-SCNC: 120 MMOL/L (ref 135–145)
SP GR UR STRIP.AUTO: >=1.045
SPECIMEN SOURCE: NORMAL
TSH SERPL DL<=0.005 MIU/L-ACNC: 1.53 UIU/ML (ref 0.38–5.33)
UROBILINOGEN UR STRIP.AUTO-MCNC: 0.2 MG/DL
WBC # BLD AUTO: 9.6 K/UL (ref 4.8–10.8)
WBC #/AREA URNS HPF: NORMAL /HPF

## 2023-09-14 PROCEDURE — 83935 ASSAY OF URINE OSMOLALITY: CPT

## 2023-09-14 PROCEDURE — 97167 OT EVAL HIGH COMPLEX 60 MIN: CPT

## 2023-09-14 PROCEDURE — 97163 PT EVAL HIGH COMPLEX 45 MIN: CPT

## 2023-09-14 PROCEDURE — 82962 GLUCOSE BLOOD TEST: CPT

## 2023-09-14 PROCEDURE — 770000 HCHG ROOM/CARE - INTERMEDIATE ICU *

## 2023-09-14 PROCEDURE — C9113 INJ PANTOPRAZOLE SODIUM, VIA: HCPCS | Performed by: STUDENT IN AN ORGANIZED HEALTH CARE EDUCATION/TRAINING PROGRAM

## 2023-09-14 PROCEDURE — C9803 HOPD COVID-19 SPEC COLLECT: HCPCS | Performed by: HOSPITALIST

## 2023-09-14 PROCEDURE — 84295 ASSAY OF SERUM SODIUM: CPT | Mod: 91

## 2023-09-14 PROCEDURE — 700111 HCHG RX REV CODE 636 W/ 250 OVERRIDE (IP): Performed by: STUDENT IN AN ORGANIZED HEALTH CARE EDUCATION/TRAINING PROGRAM

## 2023-09-14 PROCEDURE — A9270 NON-COVERED ITEM OR SERVICE: HCPCS | Performed by: STUDENT IN AN ORGANIZED HEALTH CARE EDUCATION/TRAINING PROGRAM

## 2023-09-14 PROCEDURE — 0241U HCHG SARS-COV-2 COVID-19 NFCT DS RESP RNA 4 TRGT MIC: CPT

## 2023-09-14 PROCEDURE — 700111 HCHG RX REV CODE 636 W/ 250 OVERRIDE (IP): Performed by: EMERGENCY MEDICINE

## 2023-09-14 PROCEDURE — 71260 CT THORAX DX C+: CPT

## 2023-09-14 PROCEDURE — 81001 URINALYSIS AUTO W/SCOPE: CPT

## 2023-09-14 PROCEDURE — 97602 WOUND(S) CARE NON-SELECTIVE: CPT

## 2023-09-14 PROCEDURE — 700111 HCHG RX REV CODE 636 W/ 250 OVERRIDE (IP): Performed by: HOSPITALIST

## 2023-09-14 PROCEDURE — 80048 BASIC METABOLIC PNL TOTAL CA: CPT

## 2023-09-14 PROCEDURE — 97535 SELF CARE MNGMENT TRAINING: CPT

## 2023-09-14 PROCEDURE — 700105 HCHG RX REV CODE 258: Performed by: HOSPITALIST

## 2023-09-14 PROCEDURE — 700102 HCHG RX REV CODE 250 W/ 637 OVERRIDE(OP): Performed by: HOSPITALIST

## 2023-09-14 PROCEDURE — 83880 ASSAY OF NATRIURETIC PEPTIDE: CPT

## 2023-09-14 PROCEDURE — A9270 NON-COVERED ITEM OR SERVICE: HCPCS | Performed by: HOSPITALIST

## 2023-09-14 PROCEDURE — 99222 1ST HOSP IP/OBS MODERATE 55: CPT | Performed by: STUDENT IN AN ORGANIZED HEALTH CARE EDUCATION/TRAINING PROGRAM

## 2023-09-14 PROCEDURE — 700117 HCHG RX CONTRAST REV CODE 255: Performed by: HOSPITALIST

## 2023-09-14 PROCEDURE — 700102 HCHG RX REV CODE 250 W/ 637 OVERRIDE(OP): Performed by: STUDENT IN AN ORGANIZED HEALTH CARE EDUCATION/TRAINING PROGRAM

## 2023-09-14 PROCEDURE — 85027 COMPLETE CBC AUTOMATED: CPT

## 2023-09-14 RX ORDER — POTASSIUM CHLORIDE 7.45 MG/ML
10 INJECTION INTRAVENOUS ONCE
Status: COMPLETED | OUTPATIENT
Start: 2023-09-14 | End: 2023-09-14

## 2023-09-14 RX ORDER — VALACYCLOVIR HYDROCHLORIDE 1 G/1
1000 TABLET, FILM COATED ORAL 2 TIMES DAILY
COMMUNITY
End: 2023-10-03

## 2023-09-14 RX ORDER — OXYCODONE HYDROCHLORIDE 5 MG/1
2.5 TABLET ORAL
Status: DISCONTINUED | OUTPATIENT
Start: 2023-09-14 | End: 2023-09-15

## 2023-09-14 RX ORDER — HYDROMORPHONE HYDROCHLORIDE 1 MG/ML
0.25 INJECTION, SOLUTION INTRAMUSCULAR; INTRAVENOUS; SUBCUTANEOUS
Status: DISCONTINUED | OUTPATIENT
Start: 2023-09-14 | End: 2023-09-15

## 2023-09-14 RX ORDER — DEXTROSE MONOHYDRATE 25 G/50ML
25 INJECTION, SOLUTION INTRAVENOUS
Status: DISCONTINUED | OUTPATIENT
Start: 2023-09-14 | End: 2023-09-16

## 2023-09-14 RX ORDER — SODIUM CHLORIDE 9 MG/ML
INJECTION, SOLUTION INTRAVENOUS CONTINUOUS
Status: DISCONTINUED | OUTPATIENT
Start: 2023-09-14 | End: 2023-09-16

## 2023-09-14 RX ORDER — INSULIN LISPRO 100 [IU]/ML
1-6 INJECTION, SOLUTION INTRAVENOUS; SUBCUTANEOUS
Status: DISCONTINUED | OUTPATIENT
Start: 2023-09-14 | End: 2023-09-16

## 2023-09-14 RX ORDER — SUCRALFATE ORAL 1 G/10ML
1 SUSPENSION ORAL EVERY 6 HOURS
Status: DISCONTINUED | OUTPATIENT
Start: 2023-09-14 | End: 2023-09-22 | Stop reason: HOSPADM

## 2023-09-14 RX ORDER — LOSARTAN POTASSIUM 50 MG/1
100 TABLET ORAL DAILY
Status: DISCONTINUED | OUTPATIENT
Start: 2023-09-14 | End: 2023-09-22 | Stop reason: HOSPADM

## 2023-09-14 RX ORDER — OXYCODONE HYDROCHLORIDE 5 MG/1
5 TABLET ORAL
Status: DISCONTINUED | OUTPATIENT
Start: 2023-09-14 | End: 2023-09-15

## 2023-09-14 RX ORDER — PANTOPRAZOLE SODIUM 40 MG/10ML
40 INJECTION, POWDER, LYOPHILIZED, FOR SOLUTION INTRAVENOUS DAILY
Status: DISCONTINUED | OUTPATIENT
Start: 2023-09-14 | End: 2023-09-15

## 2023-09-14 RX ADMIN — ACETAMINOPHEN 650 MG: 325 TABLET, FILM COATED ORAL at 10:35

## 2023-09-14 RX ADMIN — HEPARIN SODIUM 5000 UNITS: 5000 INJECTION, SOLUTION INTRAVENOUS; SUBCUTANEOUS at 13:35

## 2023-09-14 RX ADMIN — ONDANSETRON 4 MG: 2 INJECTION INTRAMUSCULAR; INTRAVENOUS at 22:25

## 2023-09-14 RX ADMIN — SUCRALFATE 1 G: 1 SUSPENSION ORAL at 23:47

## 2023-09-14 RX ADMIN — OXYCODONE HYDROCHLORIDE 2.5 MG: 5 TABLET ORAL at 22:36

## 2023-09-14 RX ADMIN — HEPARIN SODIUM 5000 UNITS: 5000 INJECTION, SOLUTION INTRAVENOUS; SUBCUTANEOUS at 01:21

## 2023-09-14 RX ADMIN — ONDANSETRON 4 MG: 2 INJECTION INTRAMUSCULAR; INTRAVENOUS at 06:23

## 2023-09-14 RX ADMIN — OXYCODONE HYDROCHLORIDE 5 MG: 5 TABLET ORAL at 13:35

## 2023-09-14 RX ADMIN — SUCRALFATE 1 G: 1 SUSPENSION ORAL at 16:40

## 2023-09-14 RX ADMIN — IOHEXOL 100 ML: 350 INJECTION, SOLUTION INTRAVENOUS at 00:45

## 2023-09-14 RX ADMIN — LOSARTAN POTASSIUM 100 MG: 50 TABLET, FILM COATED ORAL at 13:34

## 2023-09-14 RX ADMIN — ONDANSETRON 4 MG: 2 INJECTION INTRAMUSCULAR; INTRAVENOUS at 10:22

## 2023-09-14 RX ADMIN — SUCRALFATE 1 G: 1 SUSPENSION ORAL at 13:34

## 2023-09-14 RX ADMIN — SODIUM CHLORIDE: 9 INJECTION, SOLUTION INTRAVENOUS at 03:05

## 2023-09-14 RX ADMIN — HEPARIN SODIUM 5000 UNITS: 5000 INJECTION, SOLUTION INTRAVENOUS; SUBCUTANEOUS at 21:30

## 2023-09-14 RX ADMIN — PANTOPRAZOLE SODIUM 40 MG: 40 INJECTION, POWDER, FOR SOLUTION INTRAVENOUS at 13:34

## 2023-09-14 RX ADMIN — HYDRALAZINE HYDROCHLORIDE 10 MG: 20 INJECTION, SOLUTION INTRAMUSCULAR; INTRAVENOUS at 09:57

## 2023-09-14 RX ADMIN — POTASSIUM CHLORIDE 10 MEQ: 7.46 INJECTION, SOLUTION INTRAVENOUS at 03:06

## 2023-09-14 RX ADMIN — SENNOSIDES AND DOCUSATE SODIUM 2 TABLET: 50; 8.6 TABLET ORAL at 16:42

## 2023-09-14 RX ADMIN — HEPARIN SODIUM 5000 UNITS: 5000 INJECTION, SOLUTION INTRAVENOUS; SUBCUTANEOUS at 06:23

## 2023-09-14 ASSESSMENT — LIFESTYLE VARIABLES
HAVE YOU EVER FELT YOU SHOULD CUT DOWN ON YOUR DRINKING: YES
ON A TYPICAL DAY WHEN YOU DRINK ALCOHOL HOW MANY DRINKS DO YOU HAVE: 0
ALCOHOL_USE: YES
TOTAL SCORE: 1
TOTAL SCORE: 1
AVERAGE NUMBER OF DAYS PER WEEK YOU HAVE A DRINK CONTAINING ALCOHOL: 0
EVER HAD A DRINK FIRST THING IN THE MORNING TO STEADY YOUR NERVES TO GET RID OF A HANGOVER: NO
EVER FELT BAD OR GUILTY ABOUT YOUR DRINKING: NO
TOTAL SCORE: 1
HAVE PEOPLE ANNOYED YOU BY CRITICIZING YOUR DRINKING: NO
HOW MANY TIMES IN THE PAST YEAR HAVE YOU HAD 5 OR MORE DRINKS IN A DAY: 0
DOES PATIENT WANT TO STOP DRINKING: NO
CONSUMPTION TOTAL: NEGATIVE

## 2023-09-14 ASSESSMENT — ENCOUNTER SYMPTOMS
INSOMNIA: 0
BLURRED VISION: 0
HEADACHES: 0
DEPRESSION: 0
DOUBLE VISION: 0
MYALGIAS: 0
VOMITING: 1
SHORTNESS OF BREATH: 0
FEVER: 0
ABDOMINAL PAIN: 1
WEAKNESS: 1
DIZZINESS: 0
BRUISES/BLEEDS EASILY: 0
NECK PAIN: 0
NAUSEA: 1
PALPITATIONS: 0
COUGH: 0
SORE THROAT: 0

## 2023-09-14 ASSESSMENT — PAIN DESCRIPTION - PAIN TYPE
TYPE: ACUTE PAIN
TYPE: ACUTE PAIN
TYPE: ACUTE PAIN;CHRONIC PAIN
TYPE: ACUTE PAIN;CHRONIC PAIN
TYPE: ACUTE PAIN
TYPE: ACUTE PAIN

## 2023-09-14 ASSESSMENT — COGNITIVE AND FUNCTIONAL STATUS - GENERAL
TURNING FROM BACK TO SIDE WHILE IN FLAT BAD: A LOT
MOVING TO AND FROM BED TO CHAIR: A LOT
EATING MEALS: A LITTLE
MOVING FROM LYING ON BACK TO SITTING ON SIDE OF FLAT BED: A LOT
STANDING UP FROM CHAIR USING ARMS: A LOT
DRESSING REGULAR UPPER BODY CLOTHING: A LITTLE
DRESSING REGULAR LOWER BODY CLOTHING: A LITTLE
SUGGESTED CMS G CODE MODIFIER DAILY ACTIVITY: CK
HELP NEEDED FOR BATHING: A LITTLE
CLIMB 3 TO 5 STEPS WITH RAILING: A LOT
TOILETING: A LOT
WALKING IN HOSPITAL ROOM: A LOT
STANDING UP FROM CHAIR USING ARMS: A LOT
DRESSING REGULAR UPPER BODY CLOTHING: A LITTLE
WALKING IN HOSPITAL ROOM: A LOT
SUGGESTED CMS G CODE MODIFIER DAILY ACTIVITY: CK
TURNING FROM BACK TO SIDE WHILE IN FLAT BAD: A LOT
SUGGESTED CMS G CODE MODIFIER MOBILITY: CL
MOVING FROM LYING ON BACK TO SITTING ON SIDE OF FLAT BED: A LOT
SUGGESTED CMS G CODE MODIFIER MOBILITY: CL
CLIMB 3 TO 5 STEPS WITH RAILING: TOTAL
DAILY ACTIVITIY SCORE: 17
HELP NEEDED FOR BATHING: A LOT
DAILY ACTIVITIY SCORE: 16
PERSONAL GROOMING: A LITTLE
MOVING TO AND FROM BED TO CHAIR: UNABLE
DRESSING REGULAR LOWER BODY CLOTHING: A LOT
PERSONAL GROOMING: A LITTLE
MOBILITY SCORE: 10
MOBILITY SCORE: 12
TOILETING: A LOT

## 2023-09-14 ASSESSMENT — FIBROSIS 4 INDEX: FIB4 SCORE: 1.75

## 2023-09-14 ASSESSMENT — ACTIVITIES OF DAILY LIVING (ADL): TOILETING: INDEPENDENT

## 2023-09-14 ASSESSMENT — PATIENT HEALTH QUESTIONNAIRE - PHQ9
SUM OF ALL RESPONSES TO PHQ9 QUESTIONS 1 AND 2: 0
2. FEELING DOWN, DEPRESSED, IRRITABLE, OR HOPELESS: NOT AT ALL
1. LITTLE INTEREST OR PLEASURE IN DOING THINGS: NOT AT ALL

## 2023-09-14 ASSESSMENT — GAIT ASSESSMENTS: GAIT LEVEL OF ASSIST: UNABLE TO PARTICIPATE

## 2023-09-14 NOTE — PROGRESS NOTES
4 Eyes Skin Assessment Completed by Dee, RN and DAVID Carcamo.    Head WDL  Ears WDL  Nose WDL  Mouth WDL  Neck WDL  Breast/Chest WDL  Shoulder Blades WDL  Spine Redness  (R) Arm/Elbow/Hand WDL  (L) Arm/Elbow/Hand WDL  Abdomen Redness and Rash  Groin WDL  Scrotum/Coccyx/Buttocks WDL  (R) Leg WDL  (L) Leg WDL  (R) Heel/Foot/Toe WDL  (L) Heel/Foot/Toe Redness and Blanching          Devices In Places ECG, Blood Pressure Cuff, Pulse Ox, and SCD's      Interventions In Place Heel Mepilex, Sacral Mepilex, Pillows, Q2 Turns, Low Air Loss Mattress, and Heels Loaded W/Pillows    Possible Skin Injury Yes    Pictures Uploaded Into Epic Yes  Wound Consult Placed Yes  RN Wound Prevention Protocol Ordered Yes

## 2023-09-14 NOTE — DISCHARGE PLANNING
Care Transition Team Assessment    Emergency Contact  Raven Saha (dtr) [phone]  Araceli Saha (dtr) [phone]    CM RN met with pt at bedside. Pt lives at assisted living facility, [facility]. Pt stating meals are provided and medication management is available if needed. Pt uses cane at BL. Pt does have 4 wheel walker if needed.     Pts PCP Cricket Miller M.D.     Information Source  Information Given By: Patient  Who is responsible for making decisions for patient? : Patient    Readmission Evaluation  Is this a readmission?: No    Elopement Risk  Legal Hold: No  Ambulatory or Self Mobile in Wheelchair: No-Not an Elopement Risk  Elopement Risk: Not at Risk for Elopement    Interdisciplinary Discharge Planning  Lives with - Patient's Self Care Capacity: Attendant / Paid Care Giver  Patient or legal guardian wants to designate a caregiver: No  Support Systems: Family Member(s)  Housing / Facility: Assisted Living Residence  Name [facility] Care Facility: [facility]  Durable Medical Equipment: Not Applicable    Discharge Preparedness  What is your plan after discharge?: Home with help  What are your discharge supports?: Child  Prior Functional Level: Ambulatory  Difficulity with ADLs: None  Difficulity with IADLs: None    Functional Assesment  Prior Functional Level: Ambulatory    Finances  Financial Barriers to Discharge: No  Prescription Coverage: Yes    Vision / Hearing Impairment  Vision Impairment : Yes  Right Eye Vision: Wears Glasses  Left Eye Vision: Wears Glasses  Hearing Impairment : No         Advance Directive  Advance Directive?: DPOA for Health Care    Domestic Abuse  Have you ever been the victim of abuse or violence?: No  Physical Abuse or Sexual Abuse: No  Verbal Abuse or Emotional Abuse: No  Possible Abuse/Neglect Reported to:: Not Applicable         Discharge Risks or Barriers  Discharge risks or barriers?: No  Patient risk factors: Vulnerable adult    Anticipated Discharge  Information  Discharge Disposition: Discharged to home/self care (01)  Discharge Address: [address] [place]  Discharge Contact Phone Number: [phone]

## 2023-09-14 NOTE — ED NOTES
Report to RICU, RN. Pt transported to [email or web address] 116 in stable condition with all belongings.

## 2023-09-14 NOTE — ED NOTES
Bedside report from DAVID Rhoades. Assumed care of patient and she is resting, call light available and within reach. Bed locked and in lowest position. Pt connected to cardiac monitoring, automatic BP and pulse oximeter. No oxygen requirements at this time. Appropriate fall precautions in place. XRAY at bedside. ERP at bedside.

## 2023-09-14 NOTE — CARE PLAN
Submitted xtampza er to [facility] [facility] [facility] rx The patient is Watcher - Medium risk of patient condition declining or worsening    Shift Goals  Clinical Goals: Stable VS, safety  Patient Goals: Sleep  Family Goals: Update on POC    Progress made toward(s) clinical / shift goals:      Problem: Knowledge Deficit - Standard  Goal: Patient and family/care givers will demonstrate understanding of plan of care, disease process/condition, diagnostic tests and medications  Description: Target End Date:  1-3 days or as soon as patient condition allows    Document in Patient Education    1.  Patient and family/caregiver oriented to unit, equipment, visitation policy and means for communicating concern  2.  Complete/review Learning Assessment  3.  Assess knowledge level of disease process/condition, treatment plan, diagnostic tests and medications  4.  Explain disease process/condition, treatment plan, diagnostic tests and medications  Outcome: Progressing     Problem: Fall Risk  Goal: Patient will remain free from falls  Description: Target End Date:  Prior to discharge or change in level of care    Document interventions on the Courtney Adair Fall Risk Assessment    1.  Assess for fall risk factors  2.  Implement fall precautions  Outcome: Progressing

## 2023-09-14 NOTE — WOUND TEAM
[facility]  Inpatient Services  Initial Wound and Skin Care Evaluation    Admission Date: 9/13/2023     Last order of IP CONSULT TO WOUND CARE was found on 9/14/2023 from Hospital Encounter on 9/13/2023     HPI, PMH, SH: Reviewed    History reviewed. No pertinent surgical history.  Social History     Tobacco Use    Smoking status: Former    Smokeless tobacco: Never   Substance Use Topics    Alcohol use: Not Currently     Alcohol/week: 8.4 oz     Types: 14 Glasses of wine per week     Comment: 2-3 ounces of liquor daily     Chief Complaint   Patient presents with    Dizziness    N/V     Diagnosis: Hyponatremia [E87.1]    Unit where seen by Wound Team: R116/00     WOUND CONSULT RELATED TO:  right flank shingles rash    WOUND TEAM PLAN OF CARE - Frequency of Follow-up:   Nursing to follow dressing orders written for wound care. Contact wound team if area fails to progress, deteriorates or with any questions/concerns if something comes up before next scheduled follow up (See below as to whether wound is following and frequency of wound follow up)   Weekly - 9/21/23    WOUND HISTORY:   Grisel Saha is a 88 y.o. female, with history of HTN, recently diagnosed with shingles to her right mid abdomen/back 7 days ago, started on antiviral therapy and ever since with significant poor appetite, she presented to the emergency department accompanied by her daughter on 9/13/2023 with complains of generalized fatigue, body aches, nausea/dry heaving and abdominal distention.  Additionally, feeling somnolent more than usual.  At baseline she is active, leaves that residential home.  Daughter states that she had hyponatremia prior but not sodium severely low.  Patient takes losartan for blood pressure control.     ER COURSE:  -Significantly elevated blood pressure at 233/102 initially, intermittently tachycardic.  -Laboratory showing severe hyponatremia with initial sodium of 114.  Creatinine is normal at 0.66 with  BUN 13.  -Patient received 500 mL LR bolus in ED in addition to morphine, 4 mg and 4 mg of Zofran.  -CT of the head was negative for acute intracranial abnormalities.  -Patient received total of 20 mg IV hydralazine in the emergency department, most recent blood pressure is 102/54  -ERP discussed with intensivist who is recommending IMCU admission therefore I was called for admission.       WOUND ASSESSMENT/LDA  Wound 09/14/23 Other (comment) Back Lateral Right (Active)   Date First Assessed/Time First Assessed: 09/14/23 0535   Present on Original Admission: Yes  Wound Approximate Age at First Assessment (Weeks): 1 weeks  Primary Wound Type: (c) Other (comment)  Location: Back  Wound Orientation: Lateral  Laterality: R...      Assessments 9/14/2023 10:00 AM   Wound Image     Site Assessment Red;Dry;Crusted;Scabbed;Painful;Fragile   Periwound Assessment Blanchable erythema;Rash;Satellite lesions;Scabbed;Fragile;Painful   Margins Attached edges;Defined edges   Closure Secondary intention   Drainage Amount None   Treatments Cleansed;Nonselective debridement;Site care;Offloading   Wound Cleansing Normal Saline Irrigation   Periwound Protectant No-sting Skin Prep   Dressing Status Clean;Dry;Intact   Dressing Changed New   Dressing Cleansing/Solutions Not Applicable   Dressing Options Hydrocolloid Thin   Dressing Change/Treatment Frequency Every 72 hrs, and As Needed   NEXT Dressing Change/Treatment Date 09/17/23   NEXT Weekly Photo (Inpatient Only) 09/20/23   Wound Team Following Weekly   Non-staged Wound Description Full thickness   Wound Length (cm) 5.5 cm   Wound Width (cm) 15 cm   Wound Depth (cm) 0.1 cm   Wound Surface Area (cm^2) 82.5 cm^2   Wound Volume (cm^3) 8.25 cm^3   Shape scattered lesions   Wound Odor None   Pulses N/A   WOUND NURSE ONLY - Time Spent with Patient (mins) 30        Vascular:    LUI:   No results found.    Lab Values:    Lab Results   Component Value Date/Time    WBC 9.6 09/14/2023 04:55 AM     RBC 4.20 09/14/2023 04:55 AM    HEMOGLOBIN 13.2 09/14/2023 04:55 AM    HEMATOCRIT 35.4 (L) 09/14/2023 04:55 AM    HBA1C 6.4 (H) 07/25/2023 01:07 PM         Culture Results show:  No results found for this or any previous visit (from the past 720 hour(s)).    Pain Level/Medicated:  None, Tolerated without pain medication       INTERVENTIONS BY WOUND TEAM:  Chart and images reviewed. Discussed with bedside RN. All areas of concern (based on picture review, LDA review and discussion with bedside RN) have been thoroughly assessed. Documentation of areas based on significant findings. This RN in to assess patient. Performed standard wound care which includes appropriate positioning, dressing removal and non-selective debridement. Pictures and measurements obtained weekly if/when required.    Wound:  RIGHT FLANK  Preparation for Dressing removal: Open to air  Cleansed/Non-selectively Debrided with:  Normal Saline and Gauze  Rukhsana wound: Cleansed with Normal Saline and Gauze, Prepped with No Sting  Primary Dressing:  hydrocolloid thin    Advanced Wound Care Discharge Planning  Number of Clinicians necessary to complete wound care: 1  Is patient requiring IV pain medications for dressing changes:  No   Length of time for dressing change 30   min. (This does not include chart review, pre-medication time, set up, clean up or time spent charting.)    Interdisciplinary consultation: Patient, Bedside RN (Blake)  EVALUATION / RATIONALE FOR TREATMENT:     Date:  09/14/23  Wound Status:  Initial evaluation    Patient with scabbed shingles rash to right flank with lesions in various stages of healing, no drainage at this time. Hydrocolloid placed to allow rapid epithelialization at this phase of wound healing, maintain moist wound bed, to lower pH for wound healing and to facilitate autolytic debridement.          Goals: Steady decrease in wound area and depth weekly.    NURSING PLAN OF CARE ORDERS:  Dressing changes: See Dressing  Care orders  Skin care: See Skin Care orders    NUTRITION RECOMMENDATIONS   Wound Team Recommendations:  N/A    DIET ORDERS (From admission to next 24h)       Start     Ordered    09/13/23 2357  Diet Order Diet: Cardiac; Fluid modifications: (optional): 1500 ml Fluid Restriction  ALL MEALS        Question Answer Comment   Diet: Cardiac    Fluid modifications: (optional) 1500 ml Fluid Restriction        09/13/23 2357                    PREVENTATIVE INTERVENTIONS:    Q shift Sergio - performed per nursing policy  Q shift pressure point assessments - performed per nursing policy    Surface/Positioning  Low Airloss - Currently in Place    Offloading/Redistribution  Positioned so that right flank is not touching mattress surface - Currently in Place    Anticipated discharge plans:  TBD        Vac Discharge Needs:  Vac Discharge plan is purely a recommendation from wound team and not a requirement for discharge unless otherwise stated by physician.  Not Applicable Pt not on a wound vac

## 2023-09-14 NOTE — ASSESSMENT & PLAN NOTE
"-[facility] admission for severe hyponatremia and initial sodium of 114.  Nephrology following initially received IV fluids  Sodium improved to 126 but now downtrending.    Started salt tablets  Continue to monitor sodium levels  Continue free fluid restriction and reviewed with the patient getting most hydration from electrolyte rich fluids.\"    Recent Labs     09/19/23  0000 09/19/23  0651 09/20/23  0039 09/20/23  0618 09/21/23  0010 09/21/23  0620 09/21/23  1144 09/21/23  1823   SODIUM 127*   < > 129*   < > 127* 128* 127* 126*   POTASSIUM 4.3  --  3.8  --  3.5*  --   --   --    CHLORIDE 97  --  96  --  93*  --   --   --    CO2 21  --  23  --  22  --   --   --    GLUCOSE 130*  --  121*  --  125*  --   --   --    BUN 18  --  15  --  16  --   --   --    CREATININE 0.92  --  0.91  --  0.95  --   --   --     < > = values in this interval not displayed.         Na improved to  mild-moderate range  Renal team prefers Na 130  Low dose diuretic Lasix, make sure U/O>I/O (net negative)  On Lasix monitoe blood pressure, renal function and K+ (3.5, ordered repletion)  Intake/Output Summary (Last 24 hours) at 9/21/2023 2310  Last data filed at 9/21/2023 2200  Gross per 24 hour   Intake 630 ml   Output 700 ml   Net -70 ml       Plan for rehab       "

## 2023-09-14 NOTE — H&P
Hospital Medicine History & Physical Note    Date of Service  9/13/2023    Primary Care Physician  Cricket Miller M.D.    Consultants  ERP discussed case with Intensivist, Dr. Cintron and recommended IMCU admission    Code Status  Full Code: I discussed CODE STATUS with the patient on admission.    Chief Complaint  Chief Complaint   Patient presents with    Dizziness    N/V       History of Presenting Illness  Grisel Saha is a 88 y.o. female, with history of HTN, recently diagnosed with shingles to her right mid abdomen/back 7 days ago, started on antiviral therapy and ever since with significant poor appetite, she presented to the emergency department accompanied by her daughter on 9/13/2023 with complains of generalized fatigue, body aches, nausea/dry heaving and abdominal distention.  Additionally, feeling somnolent more than usual.  At baseline she is active, leaves that [facility] home.  Daughter states that she had hyponatremia prior but not sodium severely low.  Patient takes losartan for blood pressure control.    ER COURSE:  -Significantly elevated blood pressure at 233/102 initially, intermittently tachycardic.  -Laboratory showing severe hyponatremia with initial sodium of 114.  Creatinine is normal at 0.66 with BUN 13.  -Patient received 500 mL LR bolus in ED in addition to morphine, 4 mg and 4 mg of Zofran.  -CT of the head was negative for acute intracranial abnormalities.  -Patient received total of 20 mg IV hydralazine in the emergency department, most recent blood pressure is 102/54  -ERP discussed with intensivist who is recommending IMCU admission therefore I was called for admission.    I discussed the plan of care with patient, family, and ERP Dr. Valverde .    Review of Systems  Review of Systems   Constitutional:  Positive for malaise/fatigue. Negative for fever.   HENT:  Negative for congestion and sore throat.    Eyes:  Negative for blurred vision and double vision.   Respiratory:   Negative for cough and shortness of breath.    Cardiovascular:  Negative for chest pain and palpitations.   Gastrointestinal:  Positive for abdominal pain, nausea and vomiting.   Genitourinary:  Negative for dysuria and urgency.   Musculoskeletal:  Negative for myalgias and neck pain.   Skin:  Negative for itching and rash.   Neurological:  Positive for weakness (generalized). Negative for dizziness and headaches.   Endo/Heme/Allergies:  Does not bruise/bleed easily.   Psychiatric/Behavioral:  Negative for depression. The patient does not have insomnia.        Past Medical History   has a past medical history of HTN (hypertension).    Surgical History   x4    Family History  family history includes Cancer in her father; Stroke in her mother.   Family history reviewed with patient. There is no family history that is pertinent to the chief complaint.     Social History   reports that she has quit smoking. She has never used smokeless tobacco. She reports that she does not currently use alcohol after a past usage of about 8.4 oz of alcohol per week. She reports that she does not currently use drugs.    Allergies  No Known Allergies    Medications  Prior to Admission Medications   Prescriptions Last Dose Informant Patient Reported? Taking?   Ibuprofen 200 MG Cap   Yes No   Sig: Take 1 Capsule by mouth as needed (mild pain).   cephALEXin (KEFLEX) 500 MG Cap   No No   Sig: Take 1 Capsule by mouth 4 times a day for 7 days.   losartan (COZAAR) 100 MG Tab   No No   Sig: Take 1 Tablet by mouth every day. TAKE 1 TABLET BY MOUTH DAILY Patient does not have this medication   ondansetron (ZOFRAN ODT) 4 MG TABLET DISPERSIBLE   No No   Sig: Take 1 Tablet by mouth 2 times a day as needed for Nausea/Vomiting (Take 2 x day with valtrex) for up to 7 days.      Facility-Administered Medications Last Administration Doses Remaining   triamcinolone acetonide (KENALOG-40) injection 80 mg 2021  7:03 AM    triamcinolone  acetonide (KENALOG-40) injection 80 mg 11/12/2021  7:03 AM           Physical Exam  Temp:  [36.6 °C (97.9 °F)] 36.6 °C (97.9 °F)  Pulse:  [72-91] 91  Resp:  [16-17] 17  BP: (178-233)/() 178/76  SpO2:  [93 %-95 %] 94 %  Blood Pressure : (!) 178/76   Temperature: 36.6 °C (97.9 °F)   Pulse: 91   Respiration: 17   Pulse Oximetry: 94 %       Physical Exam  Constitutional:       Appearance: Normal appearance.      Comments: Somnolent but appropriately answering all my questions.   HENT:      Head: Normocephalic and atraumatic.      Mouth/Throat:      Mouth: Mucous membranes are moist.      Pharynx: Oropharynx is clear.   Eyes:      Extraocular Movements: Extraocular movements intact.      Pupils: Pupils are equal, round, and reactive to light.   Cardiovascular:      Rate and Rhythm: Normal rate and regular rhythm.      Heart sounds: Normal heart sounds.   Pulmonary:      Effort: Pulmonary effort is normal.      Breath sounds: Normal breath sounds.   Abdominal:      General: Abdomen is flat. Bowel sounds are normal. There is distension (Mild).      Palpations: Abdomen is soft.      Tenderness: There is abdominal tenderness (Mild diffuse pain to palpation.). There is no guarding or rebound.   Musculoskeletal:      Cervical back: Normal range of motion and neck supple.   Skin:     General: Skin is warm and dry.   Neurological:      General: No focal deficit present.      Mental Status: She is alert and oriented to person, place, and time.   Psychiatric:         Mood and Affect: Mood normal.         Behavior: Behavior normal.         Laboratory:  Recent Labs     09/13/23 2155   WBC 10.3   RBC 4.46   HEMOGLOBIN 13.8   HEMATOCRIT 37.2   MCV 83.4   MCH 30.9   MCHC 37.1*   RDW 34.6*   PLATELETCT 293   MPV 9.1     Recent Labs     09/13/23 2155   SODIUM 114*   POTASSIUM 3.3*   CHLORIDE 81*   CO2 20   GLUCOSE 160*   BUN 13   CREATININE 0.66   CALCIUM 8.8     Recent Labs     09/13/23 2155   ALTSGPT 17   ASTSGOT 24  "  ALKPHOSPHAT 90   TBILIRUBIN 1.1   GLUCOSE 160*         No results for input(s): \"NTPROBNP\" in the last 72 hours.      Recent Labs     09/13/23  2155   TROPONINT 17       Imaging:  CT-HEAD W/O   Final Result         1.  No acute intracranial abnormality is identified, there are nonspecific white matter changes, commonly associated with small vessel ischemic disease.  Associated mild cerebral atrophy is noted.   2.  Atherosclerosis.         DX-CHEST-PORTABLE (1 VIEW)   Final Result         1.  Pulmonary edema and/or infiltrates.   2.  Right humeral head and metaphyseal fracture, age indeterminant, correlate with history.   3.  Cardiomegaly   4.  Atherosclerosis          X-Ray:  I have personally reviewed the images and compared with prior images. and My impression is: No acute findings on CT scan of the head.  Chest x-ray showed pulmonary edema and/or infiltrates.  EKG:  I have personally reviewed the images and compared with prior images. and My impression is: Normal sinus rhythm at 72 bpm.  No acute ST elevation.    Assessment/Plan:  Justification for Admission Status  I anticipate this patient will require at least two midnights for appropriate medical management, necessitating inpatient admission because 88-year-old female, coming with severe hyponatremia, still unclear etiology with a component of dehydration.  Initial sodium 114 therefore patient being admitted to IMCU        * Hyponatremia- (present on admission)  Assessment & Plan  -IMCU admission for severe hyponatremia and initial sodium of 114.  -Case was discussed with intensivist,  initially and approved for IMCU admission.  -Symptomatic with nausea and fatigue but no seizure.  -Etiology is still unclear.  Patient with decreased p.o. intake after initiating shingles therapy and dehydration likely contributing to it however BUN is normal and normal creatinine function with severe underlying hyponatremia that could have additional " etiology.  -Patient is having ongoing abdominal distention and abdominal pain now for about a month, her physical exam showed abdominal distention and mild diffuse abdominal pain and I am adding CT scan for additional diagnoses this was not done recently.    -Patient received initially 500 mL of LR bolus.  She will likely need additional IV fluids but will add a stat sodium check now prior to ordering fluids.  -Serial sodium checks every 4 hours.  -Hyponatremia studies added and pending  -Sodium goal in 24 hours is no more than 122 for a total of 8mmol increase.      Hypokalemia  Assessment & Plan  -Potassium is 3.3 on admission.  Added IV potassium replacement as she is nauseated.    Pulmonary edema  Assessment & Plan  -Patient with visualized pulmonary edema versus infiltrate on chest x-ray.  -She is not fluid overloaded.  I am adding proBNP, COVID test, viral panel, D-dimer, procalcitonin and CRP    Essential hypertension- (present on admission)  Assessment & Plan  -Patient takes losartan at home.  -She had severely elevated blood pressure on arrival, 233/102.  She received total of 20 mg of IV hydralazine and systolic blood pressure now is 105.  I am holding antihypertensive medications for now.          The patient remains critically ill.  Critical care time =63  minutes in directly providing and coordinating critical care and extensive data review.  No time overlap and excludes procedures.  VTE prophylaxis: SCDs/TEDs

## 2023-09-14 NOTE — CONSULTS
Intermediate Care Unit Admission Note    I spoke with Dr. Valverde from the EM service regarding this patient.  I reviewed the chart, current vitals, procedures performed and hospital course.      HPI:  Very pleasant 80-year-old female coming with nausea vomiting poor intake, low urine output over the last couple days.  Recalcitrant nausea.  Denies fevers chills shortness of breath or diarrhea.  Recent diagnosis of right abdominal wall zoster on Keflex and acyclovir.    Found to have acute hyponatremia, family at the bedside denies neurologic symptoms just fatigue and feeling lightheadedness.    Patient is well-appearing with no neurologic findings no indication for HTS    History would suggest hypovolemia, poor intake, low solute intake, likely some combination of hyperosmolality and hypovolemia.  Given to have 500 of LR so far in the ER no urine yet.    Would recommend an additional 500 cc of normal saline, check urine osmolality to dictate further care.    Needs careful observation for correction of sodium.    -Urine osmolality, serum osmolality and TSH ordered      I think this patient is appropriate for admission to the IMCU at this time.   While a critical care consultation has not been requested, we are immediately available if the patient requires critical care in the future.

## 2023-09-14 NOTE — PROGRESS NOTES
Received in bed sleeping aaox4, COVARRUBIAS while in bed with gen weakness noted, c/o nausea and back pain, POC discussed, serial serum NA ordered, needs attended.

## 2023-09-14 NOTE — CONSULTS
"[facility] -  CONSULTATION NOTE               Author: Jessica Aguilar M.D. Date & Time: 9/14/2023  2:22 PM       REASON FOR CONSULTATION:   Hyponatremia    CHIEF COMPLAINT:   \"I was dizzy\"    HISTORY OF PRESENT ILLNESS:    89 yo F PMH HTN who presented to ED with CC as above.  She was dx with shingles about 7 days PTA and was started on oral acyclovir by PCP.  Since then she has developed poor appetite and nausea with dry heaving.  As a result of this generalized fatigue, body aches, myalgias have developed also.  No chronic hx of hyponatremia but has been dx with it in past but never critically low.  Labs in ED showed SNa ~ 114 with normal renal fxn and elevated SBP ~ 233.  WOrk up in ED included a CT which showed changes c/w cirrhosis.  BNP elevated ~ 1600.  She was given 500 mL bolus of LR and admitted to ICU for further evaluation.  SNa slowly creeping up ~ 117 on most recent check.  She otherwise denies any F/C/CP/SOB.  No melena, hematochezia, hematemesis.  No HA, visual changes, or abdominal pain.    REVIEW OF SYSTEMS:    10 point ROS was performed and is as per HPI or otherwise negative    PAST MEDICAL HISTORY:   Past Medical History:   Diagnosis Date    HTN (hypertension)        PAST SURGICAL HISTORY:   History reviewed. No pertinent surgical history.    FAMILY HISTORY:   Family History   Problem Relation Age of Onset    Stroke Mother     Cancer Father         lung       SOCIAL HISTORY:   Social History     Tobacco Use   Smoking Status Former   Smokeless Tobacco Never     Social History     Substance and Sexual Activity   Alcohol Use Not Currently    Alcohol/week: 8.4 oz    Types: 14 Glasses of wine per week    Comment: 2-3 ounces of liquor daily     Social History     Substance and Sexual Activity   Drug Use Not Currently       HOME MEDICATIONS:   Reviewed and documented in chart    LABORATORY STUDIES:   Recent Labs     09/13/23  2155 09/14/23  0018 09/14/23  0455 09/14/23  0856 " "09/14/23  1256   SODIUM 114*   < > 115* 118* 117*   POTASSIUM 3.3*  --  3.9  --   --    CHLORIDE 81*  --  83*  --   --    CO2 20  --  22  --   --    GLUCOSE 160*  --  128*  --   --    BUN 13  --  15  --   --    CREATININE 0.66  --  0.87  --   --    CALCIUM 8.8  --  7.9*  --   --     < > = values in this interval not displayed.       ALLERGIES:  Patient has no known allergies.    VS:  BP (!) 172/74   Pulse 86   Temp 36.1 °C (97 °F) (Temporal)   Resp (!) 21   Ht 1.499 m (4' 11\")   Wt 64.6 kg (142 lb 6.7 oz)   SpO2 94%   BMI 28.76 kg/m²     Physical Exam  Nursing note reviewed.   Constitutional:       Appearance: She is ill-appearing.   Eyes:      General: No scleral icterus.  Cardiovascular:      Comments: No edema  Pulmonary:      Effort: Pulmonary effort is normal.   Abdominal:      General: There is no distension.   Musculoskeletal:         General: No deformity.   Skin:     Findings: No rash.   Neurological:      Mental Status: She is alert.   Psychiatric:         Behavior: Behavior is cooperative.       FLUID BALANCE:  In: 279.9 [I.V.:172.8]  Out: 300     IMAGING:  All imaging reviewed from admission to present day    IMPRESSION:  # Hyponatremia  - Etiology unclear  - Diff Dx: SIADH vs hypovolemic hypoNa vs chronic liver disease  - SIADH favored with infection, antiviral meds hx  - Oral acyclovir less likely to cause nephrotoxicity vs IV  - Goal to get SNa ~ 118-120 for today  - +NSAID use as well  # Dizziness  - Could be manifestation of HTN  # HTN  - Goal BP < 140/90  - Not at goal on admit  # Shingles  - On acyclovir as OP  # Cirrhosis  - Noted on imaging, no hx of it likely first dx  - Could be playing a role in her past hypoNa episode    PLAN:  - Continue IVF at current rate  - SNa Q4, keep level ~ `118-120, if drops below change to PRN bolus of hypertonic  - Will allow to bring to ~ 122-124 tomorrow  - Daily labs  - If autocorrects too fast add D5W to bring SNa down  - Hold off on TSH/cortisol, low " yield at this time    Thank you for the consultation!

## 2023-09-14 NOTE — PROGRESS NOTES
Yoko from Lab called with critical result of Sodium of 117 at 1408. Critical lab result read back to Yoko.   Dr. Mckeon notified of critical lab result at 1411.  Critical lab result read back by  1411.

## 2023-09-14 NOTE — ED TRIAGE NOTES
"88 y.o.  Female      Chief Complaint   Patient presents with    Dizziness    N/V       Pt BIB EMS from assisted living facility with above complaint x 2 days and increasing today. Pt with history of HTN on medication.   Per report Pt receiving antibiotic for the last 2 weeks (+) shingles.     EMS given pt Zofran IV and Fluids  mls. PIV established.     Pt alert and oriented back to room [place] 26. Pt placed into gown and placed on the monitor.  EKG done.  Chart up for ERP to see.      BP (!) 233/102   Pulse 78   Temp 36.6 °C (97.9 °F) (Temporal)   Resp 16   Ht 1.499 m (4' 11\")   Wt 56 kg (123 lb 7.3 oz)   SpO2 94%   BMI 24.94 kg/m²       "

## 2023-09-14 NOTE — ED NOTES
Lab called with critical result of sodium 114 at 2254. Critical lab result read back to lab.   Dr. Valverde notified of critical lab result at 2258.  Critical lab result read back by Dr. Valverde.

## 2023-09-14 NOTE — CARE PLAN
Problem: Knowledge Deficit - Standard  Goal: Patient and family/care givers will demonstrate understanding of plan of care, disease process/condition, diagnostic tests and medications  Outcome: Progressing  Note: Serum NA, PT/OT     Problem: Fall Risk  Goal: Patient will remain free from falls  Outcome: Progressing   The patient is Watcher - Medium risk of patient condition declining or worsening    Shift Goals  Clinical Goals: Stable VS, safety  Patient Goals: Sleep  Family Goals: Update on POC    Progress made toward(s) clinical / shift goals:  increasing NA level, pain control    Patient is not progressing towards the following goals:

## 2023-09-14 NOTE — ASSESSMENT & PLAN NOTE
"Improved control continue carvedilol and losartan\"    Vitals:    09/21/23 1930   BP: 134/58   Pulse: 67   Resp: 18   Temp: 36.5 °C (97.7 °F)   SpO2: 92%     Stable.    "

## 2023-09-14 NOTE — ED PROVIDER NOTES
ED Provider Note    CHIEF COMPLAINT  Chief Complaint   Patient presents with    Dizziness    N/V     EXTERNAL RECORDS REVIEWED  Outpatient Notes history of herpes zoster, treated on 9/8 with valacyclovir.  Cellulitis treated with Keflex at that time.    HPI/ROS  LIMITATION TO HISTORY   Select: : None  OUTSIDE HISTORIAN(S):  Family at bedside    Grisel Saha is a 88 y.o. female with a history of hypertension who presents to the emergency room for evaluation of upper abdominal discomfort, nauseousness and some lightheadedness.  Patient states that she was treated for a zoster infection on her stomach with concurrent cellulitis several days ago and has been treating this with antibiotics and valacyclovir.  Her daughter confirms that right after taking these medications she began having upper abdominal discomfort, persistent nauseousness without vomiting or diarrheal illness.  No fevers and is just not been feeling like herself with decreased oral intake.  She has been somewhat lightheaded but has not had any syncope or trauma or falls.  She denies any other exertional discomfort, chest pain or shortness of breath.  No other acute constitutional complaints at this time.  Is noted in triage that she is hypertensive.  She normally takes her blood pressure medications in the morning and daughter has concerns regarding compliance with her longstanding medications..    PAST MEDICAL HISTORY   has a past medical history of HTN (hypertension).    SURGICAL HISTORY  patient denies any surgical history    FAMILY HISTORY  Family History   Problem Relation Age of Onset    Stroke Mother     Cancer Father         lung     SOCIAL HISTORY  Social History     Tobacco Use    Smoking status: Former    Smokeless tobacco: Never   Vaping Use    Vaping Use: Never used   Substance and Sexual Activity    Alcohol use: Not Currently     Alcohol/week: 8.4 oz     Types: 14 Glasses of wine per week     Comment: 2-3 ounces of liquor daily    Drug  "use: Not Currently    Sexual activity: Not Currently     CURRENT MEDICATIONS  Home Medications    **Home medications have not yet been reviewed for this encounter**       ALLERGIES  No Known Allergies    PHYSICAL EXAM  VITAL SIGNS: BP (!) 178/76   Pulse 91   Temp 36.6 °C (97.9 °F) (Temporal)   Resp 17   Ht 1.499 m (4' 11\")   Wt 56 kg (123 lb 7.3 oz)   SpO2 94%   BMI 24.94 kg/m²    Genl: F sitting in chair comfortably, speaking clearly, appears in mild distress   Head: NC/AT   ENT: Mucous membranes dry, posterior pharynx clear, uvula midline, nares patent bilaterally   Eyes: Normal sclera, pupils equal round reactive to light  Neck: Supple, FROM, no LAD appreciated   Pulmonary: Lungs are clear to auscultation bilaterally  Chest: No TTP  CV:  RRR, no murmur appreciated, pulses 2+ in both upper and lower extremities,  Abdomen: soft, epigastric discomfort, no true Morton sign, she has healing cutaneous lesions on the right mid and lower abdomen.  No significant induration.  ND; no rebound/guarding, no masses palpated, no HSM   : no CVA or suprapubic tenderness   Musculoskeletal: Pain free ROM of the neck. Moving upper and lower extremities in spontaneous and coordinated fashion  Neuro: A&Ox4 (person, place, time, situation), speech fluent, gait not assessed, no focal deficits appreciated,   Skin: No rash or lesions.  No pallor or jaundice.  No cyanosis.  Warm and dry.     DIAGNOSTIC STUDIES / PROCEDURES  EKG  I have independently interpreted this EKG  Results for orders placed or performed during the hospital encounter of 23   EKG   Result Value Ref Range    Report       [facility] Emergency Dept.    Test Date:  2023  Pt Name:    HERBERT BOONE                  Department: ER  MRN:        [id number]                      Room:       [facility]  Gender:     Female                       Technician: [id number]  :        [id number]                   Requested By:ER TRIAGE PROTOCOL  Order #:    " [id number]                    Reading MD:    Measurements  Intervals                                Axis  Rate:       72                           P:          60  WI:         171                          QRS:        52  QRSD:       120                          T:          61  QT:         416  QTc:        456    Interpretive Statements  Sinus rhythm  Nonspecific intraventricular conduction delay  Nonspecific T abnormalities, anterior leads  Compared to ECG 01/08/2022 09:28:14  Intraventricular conduction delay now present  Atrial premature complex(es) no longer present  T-wave abnormality still present       LABS  Labs Reviewed   CBC WITH DIFFERENTIAL - Abnormal; Notable for the following components:       Result Value    MCHC 37.1 (*)     RDW 34.6 (*)     Lymphocytes 17.70 (*)     Monos (Absolute) 0.94 (*)     All other components within normal limits    Narrative:     Biotin intake of greater than 5 mg per day may interfere with  troponin levels, causing false low values.   COMP METABOLIC PANEL - Abnormal; Notable for the following components:    Sodium 114 (*)     Potassium 3.3 (*)     Chloride 81 (*)     Glucose 160 (*)     All other components within normal limits    Narrative:     Biotin intake of greater than 5 mg per day may interfere with  troponin levels, causing false low values.   TROPONIN    Narrative:     Biotin intake of greater than 5 mg per day may interfere with  troponin levels, causing false low values.   PROCALCITONIN    Narrative:     Biotin intake of greater than 5 mg per day may interfere with  troponin levels, causing false low values.   ESTIMATED GFR    Narrative:     Biotin intake of greater than 5 mg per day may interfere with  troponin levels, causing false low values.   URINALYSIS   PROCALCITONIN   OSMOLALITY URINE   TSH WITH REFLEX TO FT4   CORTISOL   OSMOLALITY SERUM     RADIOLOGY  I have independently interpreted the diagnostic imaging associated with this visit and am waiting the  final reading from the radiologist.   My preliminary interpretation is as follows: Pulmonary edema, cardiomegaly, remote humeral head fracture.  No intracranial  Radiologist interpretation:   CT-HEAD W/O   Final Result         1.  No acute intracranial abnormality is identified, there are nonspecific white matter changes, commonly associated with small vessel ischemic disease.  Associated mild cerebral atrophy is noted.   2.  Atherosclerosis.         DX-CHEST-PORTABLE (1 VIEW)   Final Result         1.  Pulmonary edema and/or infiltrates.   2.  Right humeral head and metaphyseal fracture, age indeterminant, correlate with history.   3.  Cardiomegaly   4.  Atherosclerosis        COURSE & MEDICAL DECISION MAKING    ED Observation Status? No; Patient does not meet criteria for ED Observation.     INITIAL ASSESSMENT, COURSE AND PLAN  Stroke  Hypertensive emergency  TIA  Medication side effect  Gastritis  Peptic ulcer disease/GERD/esophagitis  Dehydration  Electrolyte abnormality  Hypoglycemia    Care Narrative: Presents emergency room for symptoms as described above.  The patient is alert, oriented but has had some evolving weakness and discomfort and family members are concerned about the timing of this starting right after she initiated antiviral and antibiotic medications and her persistent nausea since.  She has not had falls, she does not have evidence of increased intracranial pressure however with her overall symptomology and her hypertension I have obtained a CT scan and laboratory assessment for broad differential as noted above.  Serial examinations are performed, she is given a small fluid bolus while awaiting the results of her electrolytes.  She does not have developing fevers, very minimal discomfort to suggest possible urinary tract infection and as the patient is hypovolemic at somewhat delayed in getting a urinalysis.    Initial lab work came back with stable H&H, no leukocytosis, normal LFTs and  creatinine with normal procalcitonin.  Troponin is barely detectable with no evidence of HORACE and no ischemia on EKG.  I was then notified by the lab that the patient's electrolytes were abnormal with a sodium of 114 and a potassium of 3.3.  She has no abnormalities of anion gap or bicarb.  Magnesium currently pending.  Additional labs including urine osmole's and TSH as ordered.  Procalcitonin is undetectable.  CT scan showed no evidence of intracranial mass, no evidence of active bleed.  She has no other evolving findings on neurological assessment.  She required pain medications and antiemetics and after discussion regarding her electrolyte abnormalities I consulted the and medical intensivist who has come to the bedside and evaluated the patient.  He believes patient will likely benefit from further fluid resuscitation because of her hypovolemic state.  Believe the patient can be admitted to medicine team on [facility] status.    Discussed with the hospitalist regarding admission to [facility] status and they are agreeable to excepting the patient.  Patient is admitted and guarded condition.    HYDRATION: Based on the patient's presentation of Dehydration and Inability to take oral fluids the patient was given IV fluids. IV Hydration was used because oral hydration was not adequate alone. Upon recheck following hydration, the patient was improved.    CRITICAL CARE:  I saw and evaluated this patient. I personally provided 45 minutes of critical care time to the patient excluding billable procedures and directly and personally provided the following treatment and critical care management:  Critical Care Interventions  Multispecialty coordination, Multiple bedside assessments, coordination of care with family and other historical sources, Continuous hemodynamic and respiratory monitoring, Serial neurologic exams, and Fluid resuscitation    FINAL DIAGNOSIS  1. Dehydration    2. Dizziness    3. Nausea    4. Herpes zoster with other  complication    5. Hyponatremia    6. Hypokalemia      Electronically signed by: Andreas Valverde M.D., 9/13/2023 9:58 PM

## 2023-09-14 NOTE — ED NOTES
Med Rec complete per patients family at bedside  Allergies reviewed  Preferred [facility]: [facility] [address]    Patient has 2 days of Keflex left in bottle, patient's family reports she was prescribed for cellulitis, states she may have stopped due to symptoms improving

## 2023-09-14 NOTE — ASSESSMENT & PLAN NOTE
"Hypomagnesemia    I ordered IV magnesium sulfate and oral potassium supplement  I ordered repeat labs\"    K stable.  "

## 2023-09-15 ENCOUNTER — HOSPITAL ENCOUNTER (INPATIENT)
Facility: REHABILITATION | Age: 88
End: 2023-09-15
Attending: PHYSICAL MEDICINE & REHABILITATION | Admitting: PHYSICAL MEDICINE & REHABILITATION
Payer: MEDICARE

## 2023-09-15 LAB
ANION GAP SERPL CALC-SCNC: 11 MMOL/L (ref 7–16)
BASOPHILS # BLD AUTO: 0.6 % (ref 0–1.8)
BASOPHILS # BLD: 0.05 K/UL (ref 0–0.12)
BUN SERPL-MCNC: 16 MG/DL (ref 8–22)
CALCIUM SERPL-MCNC: 7.5 MG/DL (ref 8.5–10.5)
CHLORIDE SERPL-SCNC: 89 MMOL/L (ref 96–112)
CO2 SERPL-SCNC: 20 MMOL/L (ref 20–33)
CREAT SERPL-MCNC: 0.83 MG/DL (ref 0.5–1.4)
EOSINOPHIL # BLD AUTO: 0.06 K/UL (ref 0–0.51)
EOSINOPHIL NFR BLD: 0.7 % (ref 0–6.9)
ERYTHROCYTE [DISTWIDTH] IN BLOOD BY AUTOMATED COUNT: 36.5 FL (ref 35.9–50)
GFR SERPLBLD CREATININE-BSD FMLA CKD-EPI: 68 ML/MIN/1.73 M 2
GLUCOSE BLD STRIP.AUTO-MCNC: 124 MG/DL (ref 65–99)
GLUCOSE BLD STRIP.AUTO-MCNC: 146 MG/DL (ref 65–99)
GLUCOSE BLD STRIP.AUTO-MCNC: 156 MG/DL (ref 65–99)
GLUCOSE SERPL-MCNC: 147 MG/DL (ref 65–99)
HCT VFR BLD AUTO: 32.9 % (ref 37–47)
HGB BLD-MCNC: 12.3 G/DL (ref 12–16)
IMM GRANULOCYTES # BLD AUTO: 0.1 K/UL (ref 0–0.11)
IMM GRANULOCYTES NFR BLD AUTO: 1.2 % (ref 0–0.9)
LYMPHOCYTES # BLD AUTO: 1.34 K/UL (ref 1–4.8)
LYMPHOCYTES NFR BLD: 15.4 % (ref 22–41)
MCH RBC QN AUTO: 31.5 PG (ref 27–33)
MCHC RBC AUTO-ENTMCNC: 37.4 G/DL (ref 32.2–35.5)
MCV RBC AUTO: 84.1 FL (ref 81.4–97.8)
MONOCYTES # BLD AUTO: 0.96 K/UL (ref 0–0.85)
MONOCYTES NFR BLD AUTO: 11 % (ref 0–13.4)
NEUTROPHILS # BLD AUTO: 6.18 K/UL (ref 1.82–7.42)
NEUTROPHILS NFR BLD: 71.1 % (ref 44–72)
NRBC # BLD AUTO: 0 K/UL
NRBC BLD-RTO: 0 /100 WBC (ref 0–0.2)
PLATELET # BLD AUTO: 296 K/UL (ref 164–446)
PMV BLD AUTO: 9.4 FL (ref 9–12.9)
POTASSIUM SERPL-SCNC: 3.3 MMOL/L (ref 3.6–5.5)
RBC # BLD AUTO: 3.91 M/UL (ref 4.2–5.4)
SODIUM SERPL-SCNC: 120 MMOL/L (ref 135–145)
SODIUM SERPL-SCNC: 120 MMOL/L (ref 135–145)
SODIUM SERPL-SCNC: 121 MMOL/L (ref 135–145)
SODIUM SERPL-SCNC: 121 MMOL/L (ref 135–145)
SODIUM SERPL-SCNC: 122 MMOL/L (ref 135–145)
SODIUM SERPL-SCNC: 123 MMOL/L (ref 135–145)
WBC # BLD AUTO: 8.7 K/UL (ref 4.8–10.8)

## 2023-09-15 PROCEDURE — 700111 HCHG RX REV CODE 636 W/ 250 OVERRIDE (IP): Performed by: STUDENT IN AN ORGANIZED HEALTH CARE EDUCATION/TRAINING PROGRAM

## 2023-09-15 PROCEDURE — 700111 HCHG RX REV CODE 636 W/ 250 OVERRIDE (IP): Performed by: HOSPITALIST

## 2023-09-15 PROCEDURE — A9270 NON-COVERED ITEM OR SERVICE: HCPCS | Performed by: STUDENT IN AN ORGANIZED HEALTH CARE EDUCATION/TRAINING PROGRAM

## 2023-09-15 PROCEDURE — 770000 HCHG ROOM/CARE - INTERMEDIATE ICU *

## 2023-09-15 PROCEDURE — 700102 HCHG RX REV CODE 250 W/ 637 OVERRIDE(OP): Performed by: STUDENT IN AN ORGANIZED HEALTH CARE EDUCATION/TRAINING PROGRAM

## 2023-09-15 PROCEDURE — 82962 GLUCOSE BLOOD TEST: CPT

## 2023-09-15 PROCEDURE — 99233 SBSQ HOSP IP/OBS HIGH 50: CPT | Performed by: STUDENT IN AN ORGANIZED HEALTH CARE EDUCATION/TRAINING PROGRAM

## 2023-09-15 PROCEDURE — 99223 1ST HOSP IP/OBS HIGH 75: CPT | Performed by: PHYSICAL MEDICINE & REHABILITATION

## 2023-09-15 PROCEDURE — 700105 HCHG RX REV CODE 258: Performed by: HOSPITALIST

## 2023-09-15 PROCEDURE — 85025 COMPLETE CBC W/AUTO DIFF WBC: CPT

## 2023-09-15 PROCEDURE — A9270 NON-COVERED ITEM OR SERVICE: HCPCS | Performed by: HOSPITALIST

## 2023-09-15 PROCEDURE — 700102 HCHG RX REV CODE 250 W/ 637 OVERRIDE(OP): Performed by: HOSPITALIST

## 2023-09-15 PROCEDURE — 84295 ASSAY OF SERUM SODIUM: CPT

## 2023-09-15 PROCEDURE — 80048 BASIC METABOLIC PNL TOTAL CA: CPT

## 2023-09-15 PROCEDURE — C9113 INJ PANTOPRAZOLE SODIUM, VIA: HCPCS | Performed by: STUDENT IN AN ORGANIZED HEALTH CARE EDUCATION/TRAINING PROGRAM

## 2023-09-15 RX ORDER — OXYCODONE HYDROCHLORIDE 5 MG/1
5 TABLET ORAL
Status: DISCONTINUED | OUTPATIENT
Start: 2023-09-15 | End: 2023-09-22 | Stop reason: HOSPADM

## 2023-09-15 RX ORDER — HYDROMORPHONE HYDROCHLORIDE 1 MG/ML
0.5 INJECTION, SOLUTION INTRAMUSCULAR; INTRAVENOUS; SUBCUTANEOUS
Status: DISCONTINUED | OUTPATIENT
Start: 2023-09-15 | End: 2023-09-22 | Stop reason: HOSPADM

## 2023-09-15 RX ORDER — CARVEDILOL 6.25 MG/1
3.12 TABLET ORAL 2 TIMES DAILY WITH MEALS
Status: DISCONTINUED | OUTPATIENT
Start: 2023-09-15 | End: 2023-09-17

## 2023-09-15 RX ORDER — CARVEDILOL 12.5 MG/1
6.25 TABLET ORAL 2 TIMES DAILY WITH MEALS
Status: DISCONTINUED | OUTPATIENT
Start: 2023-09-15 | End: 2023-09-15

## 2023-09-15 RX ORDER — HYDRALAZINE HYDROCHLORIDE 20 MG/ML
20 INJECTION INTRAMUSCULAR; INTRAVENOUS EVERY 4 HOURS PRN
Status: DISCONTINUED | OUTPATIENT
Start: 2023-09-15 | End: 2023-09-22 | Stop reason: HOSPADM

## 2023-09-15 RX ORDER — POTASSIUM CHLORIDE 20 MEQ/1
60 TABLET, EXTENDED RELEASE ORAL ONCE
Status: COMPLETED | OUTPATIENT
Start: 2023-09-15 | End: 2023-09-15

## 2023-09-15 RX ORDER — OXYCODONE HYDROCHLORIDE 10 MG/1
10 TABLET ORAL
Status: DISCONTINUED | OUTPATIENT
Start: 2023-09-15 | End: 2023-09-22 | Stop reason: HOSPADM

## 2023-09-15 RX ORDER — LORAZEPAM 2 MG/ML
0.5 INJECTION INTRAMUSCULAR EVERY 6 HOURS PRN
Status: DISCONTINUED | OUTPATIENT
Start: 2023-09-15 | End: 2023-09-22 | Stop reason: HOSPADM

## 2023-09-15 RX ORDER — OMEPRAZOLE 20 MG/1
20 CAPSULE, DELAYED RELEASE ORAL DAILY
Status: DISCONTINUED | OUTPATIENT
Start: 2023-09-16 | End: 2023-09-22 | Stop reason: HOSPADM

## 2023-09-15 RX ADMIN — CARVEDILOL 3.12 MG: 6.25 TABLET, FILM COATED ORAL at 17:30

## 2023-09-15 RX ADMIN — LORAZEPAM 0.5 MG: 2 INJECTION INTRAMUSCULAR; INTRAVENOUS at 11:06

## 2023-09-15 RX ADMIN — LOSARTAN POTASSIUM 100 MG: 50 TABLET, FILM COATED ORAL at 05:25

## 2023-09-15 RX ADMIN — SODIUM CHLORIDE: 9 INJECTION, SOLUTION INTRAVENOUS at 21:33

## 2023-09-15 RX ADMIN — POTASSIUM CHLORIDE 60 MEQ: 20 TABLET, EXTENDED RELEASE ORAL at 12:45

## 2023-09-15 RX ADMIN — SUCRALFATE 1 G: 1 SUSPENSION ORAL at 18:00

## 2023-09-15 RX ADMIN — OXYCODONE HYDROCHLORIDE 5 MG: 5 TABLET ORAL at 03:55

## 2023-09-15 RX ADMIN — SENNOSIDES AND DOCUSATE SODIUM 2 TABLET: 50; 8.6 TABLET ORAL at 18:00

## 2023-09-15 RX ADMIN — HYDROMORPHONE HYDROCHLORIDE 0.25 MG: 1 INJECTION, SOLUTION INTRAMUSCULAR; INTRAVENOUS; SUBCUTANEOUS at 09:06

## 2023-09-15 RX ADMIN — HEPARIN SODIUM 5000 UNITS: 5000 INJECTION, SOLUTION INTRAVENOUS; SUBCUTANEOUS at 21:35

## 2023-09-15 RX ADMIN — HEPARIN SODIUM 5000 UNITS: 5000 INJECTION, SOLUTION INTRAVENOUS; SUBCUTANEOUS at 14:00

## 2023-09-15 RX ADMIN — HYDROMORPHONE HYDROCHLORIDE 0.25 MG: 1 INJECTION, SOLUTION INTRAMUSCULAR; INTRAVENOUS; SUBCUTANEOUS at 00:41

## 2023-09-15 RX ADMIN — INSULIN LISPRO 1 UNITS: 100 INJECTION, SOLUTION INTRAVENOUS; SUBCUTANEOUS at 21:39

## 2023-09-15 RX ADMIN — HYDRALAZINE HYDROCHLORIDE 20 MG: 20 INJECTION, SOLUTION INTRAMUSCULAR; INTRAVENOUS at 04:21

## 2023-09-15 RX ADMIN — ONDANSETRON 4 MG: 2 INJECTION INTRAMUSCULAR; INTRAVENOUS at 03:55

## 2023-09-15 RX ADMIN — OXYCODONE HYDROCHLORIDE 5 MG: 5 TABLET ORAL at 08:01

## 2023-09-15 RX ADMIN — HEPARIN SODIUM 5000 UNITS: 5000 INJECTION, SOLUTION INTRAVENOUS; SUBCUTANEOUS at 05:25

## 2023-09-15 RX ADMIN — PANTOPRAZOLE SODIUM 40 MG: 40 INJECTION, POWDER, FOR SOLUTION INTRAVENOUS at 05:25

## 2023-09-15 RX ADMIN — SUCRALFATE 1 G: 1 SUSPENSION ORAL at 11:06

## 2023-09-15 RX ADMIN — ONDANSETRON 4 MG: 2 INJECTION INTRAMUSCULAR; INTRAVENOUS at 08:00

## 2023-09-15 RX ADMIN — CARVEDILOL 6.25 MG: 12.5 TABLET, FILM COATED ORAL at 08:01

## 2023-09-15 RX ADMIN — SODIUM CHLORIDE: 9 INJECTION, SOLUTION INTRAVENOUS at 00:24

## 2023-09-15 ASSESSMENT — PAIN DESCRIPTION - PAIN TYPE
TYPE: ACUTE PAIN

## 2023-09-15 ASSESSMENT — FIBROSIS 4 INDEX: FIB4 SCORE: 1.73

## 2023-09-15 NOTE — DISCHARGE PLANNING
Case Management Discharge Planning    Admission Date: 9/13/2023  GMLOS: 2.6  ALOS: 2    6-Clicks ADL Score: 16  6-Clicks Mobility Score: 10  PT and/or OT Eval ordered: Yes  PT/OT: Recommend post-acute placement   SLP evaluations pending  Post-acute Referrals Ordered: Yes - PMR  Post-acute Choice Obtained: Yes - [facility]  Has referral(s) been sent to post-acute provider:  Yes    Anticipated Discharge Dispo: Discharge Disposition: Disch to  rehab facility or distinct part unit (62)  Discharge Address: [address] [place] [address] dr. Carmine DAVID  Discharge Contact Phone Number: [phone]    Per chart review pt resides at [facility] in [place], [place]    Family support:  Raven Saha Daughter [phone]   Maria A Charles Daughter   [phone]   Maria A Chappell Daughter   [phone]     Current Insurance on file: HTH/SCP    DME Needed: pending hospital course    Action(s) Taken: chart reviewed    Escalations Completed: None    Medically Clear: No    Next Steps: f/u with pt and medical team to discuss dc needs and barriers.    Barriers to Discharge: Medical clearance /Specialty Clearance    Is the patient up for discharge tomorrow: No    RNCM spoke to pt bedside to acquire choice. As of current, PT/OT recommendations are for post acute placement. RNCM explained to pt that those recommendations could change as her hospital course becomes defined.     Pt choiced:   1) [facility]

## 2023-09-15 NOTE — CONSULTS
Hospital Medicine Consultation    Date of Service  9/14/2023    Referring Physician  Rex HUTCHISON    Consulting Physician  Robert Mckeon M.D.    Reason for Consultation  Transfer of care    History of Presenting Illness  88 y.o. female who presented 9/13/2023 with fatigue malaise  Grisel Saha is a 88 y.o. female, with history of HTN, recently diagnosed with shingles to her right mid abdomen/back 7 days ago, started on antiviral therapy and ever since with significant poor appetite, she presented to the emergency department accompanied by her daughter on 9/13/2023 with complains of generalized fatigue, body aches, nausea/dry heaving and abdominal distention.  Additionally, feeling somnolent more than usual.  At baseline she is active, leaves that intermediate home.  Daughter states that she had hyponatremia prior but not sodium severely low.  Patient takes losartan for blood pressure control.     ER COURSE:  -Significantly elevated blood pressure at 233/102 initially, intermittently tachycardic.  -Laboratory showing severe hyponatremia with initial sodium of 114.  Creatinine is normal at 0.66 with BUN 13.  -Patient received 500 mL LR bolus in ED in addition to morphine, 4 mg and 4 mg of Zofran.  -CT of the head was negative for acute intracranial abnormalities.  -Patient received total of 20 mg IV hydralazine in the emergency department, most recent blood pressure is 102/54  -ERP discussed with intensivist who is recommending IMCU admission therefore I was called for admission.    Upon my evaluation at bedside patient is interactive offers no acute complaints at this time.  Patient endorses overall improvement in symptomatology.  Informed patient I will place formal consultation with nephrology service for continue to monitor of sodium levels.  All questions answered in detail.  Review of Systems  Review of Systems   Unable to perform ROS: Acuity of condition       Past Medical History   has a past medical  history of HTN (hypertension).    Surgical History   has no past surgical history on file.    Family History  family history includes Cancer in her father; Stroke in her mother.    Social History   reports that she has quit smoking. She has never used smokeless tobacco. She reports that she does not currently use alcohol after a past usage of about 8.4 oz of alcohol per week. She reports that she does not currently use drugs.    Medications  Prior to Admission Medications   Prescriptions Last Dose Informant Patient Reported? Taking?   Ibuprofen 200 MG Cap UNK at UNK Family Member Yes No   Sig: Take 1 Capsule by mouth as needed (mild pain).   cephALEXin (KEFLEX) 500 MG Cap 9/11/2023 at PM Family Member No No   Sig: Take 1 Capsule by mouth 4 times a day for 7 days.   losartan (COZAAR) 100 MG Tab 9/13/2023 at AM Family Member No No   Sig: Take 1 Tablet by mouth every day. TAKE 1 TABLET BY MOUTH DAILY Patient does not have this medication   ondansetron (ZOFRAN ODT) 4 MG TABLET DISPERSIBLE 9/13/2023 at PRN Family Member No No   Sig: Take 1 Tablet by mouth 2 times a day as needed for Nausea/Vomiting (Take 2 x day with valtrex) for up to 7 days.   valacyclovir (VALTREX) 1 GM Tab 9/13/2023 at COMPLETED Family Member Yes Yes   Sig: Take 1,000 mg by mouth 2 times a day. 7 day course started 9/7/23      Facility-Administered Medications Last Administration Doses Remaining   triamcinolone acetonide (KENALOG-40) injection 80 mg 11/12/2021  7:03 AM    triamcinolone acetonide (KENALOG-40) injection 80 mg 11/12/2021  7:03 AM           Allergies  No Known Allergies    Physical Exam  Temp:  [35.7 °C (96.2 °F)-36.6 °C (97.9 °F)] 36.1 °C (96.9 °F)  Pulse:  [] 71  Resp:  [15-24] 15  BP: (100-233)/() 103/53  SpO2:  [91 %-95 %] 92 %    Physical Exam  Nursing note reviewed.   Constitutional:       Appearance: She is ill-appearing.   Eyes:      General: No scleral icterus.  Cardiovascular:      Comments: No edema  Pulmonary:       Effort: Pulmonary effort is normal.   Abdominal:      General: There is no distension.   Musculoskeletal:         General: No deformity.   Skin:     Findings: No rash.   Neurological:      Mental Status: She is alert.   Psychiatric:         Behavior: Behavior is cooperative.         Fluids  Date 09/14/23 0700 - 09/15/23 0659   Shift 1521-1453 [id number] 8223-6270 24 Hour Total   INTAKE   Shift Total       OUTPUT   Urine 100   100   Shift Total 100   100   Weight (kg) 64.6 64.6 64.6 64.6       Laboratory  Recent Labs     09/13/23 2155 09/14/23 0455   WBC 10.3 9.6   RBC 4.46 4.20   HEMOGLOBIN 13.8 13.2   HEMATOCRIT 37.2 35.4*   MCV 83.4 84.3   MCH 30.9 31.4   MCHC 37.1* 37.3*   RDW 34.6* 35.3*   PLATELETCT 293 313   MPV 9.1 9.5     Recent Labs     09/13/23 2155 09/14/23  0018 09/14/23  0455 09/14/23  0856 09/14/23  1256 09/14/23  1654   SODIUM 114*   < > 115* 118* 117* 120*   POTASSIUM 3.3*  --  3.9  --   --   --    CHLORIDE 81*  --  83*  --   --   --    CO2 20  --  22  --   --   --    GLUCOSE 160*  --  128*  --   --   --    BUN 13  --  15  --   --   --    CREATININE 0.66  --  0.87  --   --   --    CALCIUM 8.8  --  7.9*  --   --   --     < > = values in this interval not displayed.                     Imaging  CT-CHEST,ABDOMEN,PELVIS WITH   Final Result         1.  Diverticulosis   2.  Hepatomegaly   3.  Irregular hepatic contour compatible changes of cirrhosis   4.  Atherosclerosis and atherosclerotic coronary artery disease      CT-HEAD W/O   Final Result         1.  No acute intracranial abnormality is identified, there are nonspecific white matter changes, commonly associated with small vessel ischemic disease.  Associated mild cerebral atrophy is noted.   2.  Atherosclerosis.         DX-CHEST-PORTABLE (1 VIEW)   Final Result         1.  Pulmonary edema and/or infiltrates.   2.  Right humeral head and metaphyseal fracture, age indeterminant, correlate with history.   3.  Cardiomegaly   4.  Atherosclerosis           Assessment/Plan  * Hyponatremia- (present on admission)  Assessment & Plan  -IMCU admission for severe hyponatremia and initial sodium of 114.  -Case was discussed with intensivist,  initially and approved for IMCU admission.  -Symptomatic with nausea and fatigue but no seizure.  -Etiology is still unclear.  Patient with decreased p.o. intake after initiating shingles therapy and dehydration likely contributing to it however BUN is normal and normal creatinine function with severe underlying hyponatremia that could have additional etiology.  -Patient is having ongoing abdominal distention and abdominal pain now for about a month, her physical exam showed abdominal distention and mild diffuse abdominal pain and I am adding CT scan for additional diagnoses this was not done recently.    -Patient received initially 500 mL of LR bolus.  She will likely need additional IV fluids but will add a stat sodium check now prior to ordering fluids.  -Serial sodium checks every 4 hours.  -Hyponatremia studies added and pending  -Sodium goal in 24 hours is no more than 122 for a total of 8mmol increase.  9/14/2023:  Place formal consultation with nephrology service to continue to monitor and replete sodium levels.  Recommendations are appreciated.  Otherwise continue present medical management as per nephrology recommendations.      Elevated brain natriuretic peptide (BNP) level  Assessment & Plan  Patient does not appear to be volume overloaded at present.  Monitor clinically.  Patient did have multiple episodes of nausea vomiting and poor oral intake.  Likely contributing to increased heart rate and subsequent demand resulting in increased BNP.  No indication diuresis at present    Pulmonary edema  Assessment & Plan  -Patient with visualized pulmonary edema versus infiltrate on chest x-ray.  -She is not fluid overloaded.  I am adding proBNP, COVID test, viral panel, D-dimer, procalcitonin and CRP  Continue to  monitor clinically patient required minimal amounts of oxygen    Hypokalemia  Assessment & Plan  -Potassium is 3.3 on admission.  Added IV potassium replacement as she is nauseated.    Continue to replete potassium to maintain level approximately 4.    Essential hypertension- (present on admission)  Assessment & Plan  -Patient takes losartan at home.  -She had severely elevated blood pressure on arrival, 233/102.  She received total of 20 mg of IV hydralazine and systolic blood pressure now is 105.  I am holding antihypertensive medications for now.  9/14/2023:  Have restarted patient's home dose of losartan      Greater than 61 minutes spent prepping to see patient (e.g. review of tests) obtaining and/or reviewing separately obtained history. Performing a medically appropriate examination and/ evaluation.  Counseling and educating the patient/family/caregiver.  Ordering medications, tests, or procedures.  Referring and communicating with other health care professionals.  Documenting clinical information in EPIC.  Independently interpreting results and communicating results to patient/family/caregiver.  Care coordination.  Please note that this dictation was created using voice recognition software. I have made every reasonable attempt to correct obvious errors, but I expect that there are errors of grammar and possibly context that I did not discover before finalizing the note.

## 2023-09-15 NOTE — PROGRESS NOTES
"[facility] -  PROGRESS NOTE               Author: Jessica Aguilar M.D. Date & Time: 9/15/2023  10:35 AM     HPI:  89 yo F PMH HTN who presented to ED with CC as above.  She was dx with shingles about 7 days PTA and was started on oral acyclovir by PCP.  Since then she has developed poor appetite and nausea with dry heaving.  As a result of this generalized fatigue, body aches, myalgias have developed also.  No chronic hx of hyponatremia but has been dx with it in past but never critically low.  Labs in ED showed SNa ~ 114 with normal renal fxn and elevated SBP ~ 233.  WOrk up in ED included a CT which showed changes c/w cirrhosis.  BNP elevated ~ 1600.  She was given 500 mL bolus of LR and admitted to ICU for further evaluation.  SNa slowly creeping up ~ 117 on most recent check.  She otherwise denies any F/C/CP/SOB.  No melena, hematochezia, hematemesis.  No HA, visual changes, or abdominal pain.    DAILY NEPHROLOGY SUMMARY:  9/14: Consult done  9/15: NAEO, no complaints; SNa ~ 120 this AM, feels a bit better    REVIEW OF SYSTEMS:    10 point ROS reviewed and is as per HPI/daily summary or otherwise negative    PMH/PSH/SH/FH:   Reviewed and unchanged since admission note    CURRENT MEDICATIONS:   Reviewed from admission to present day    VS:  /62   Pulse 68   Temp 36.2 °C (97.2 °F) (Temporal)   Resp 17   Ht 1.499 m (4' 11\")   Wt 64.6 kg (142 lb 6.7 oz)   SpO2 98%   BMI 28.76 kg/m²     Physical Exam  Nursing note reviewed.   Constitutional:       Appearance: She is ill-appearing.      Interventions: Nasal cannula in place.   Eyes:      General: No scleral icterus.  Cardiovascular:      Comments: No edema  Pulmonary:      Effort: Pulmonary effort is normal.   Abdominal:      General: There is no distension.   Musculoskeletal:         General: No deformity.   Skin:     Findings: No rash.   Neurological:      Mental Status: She is alert.   Psychiatric:         Behavior: Behavior " is cooperative.       Fluids:  In: 2335.2 [P.O.:100; I.V.:2235.2]  Out: 350     LABS:  Recent Labs     09/13/23  2155 09/14/23  0018 09/14/23  0455 09/14/23  0856 09/15/23  0131 09/15/23  0617 09/15/23  0825   SODIUM 114*   < > 115*   < > 120* 121* 120*   POTASSIUM 3.3*  --  3.9  --   --   --  3.3*   CHLORIDE 81*  --  83*  --   --   --  89*   CO2 20  --  22  --   --   --  20   GLUCOSE 160*  --  128*  --   --   --  147*   BUN 13  --  15  --   --   --  16   CREATININE 0.66  --  0.87  --   --   --  0.83   CALCIUM 8.8  --  7.9*  --   --   --  7.5*    < > = values in this interval not displayed.       IMAGING:   All imaging reviewed from admission to present day    IMPRESSION:  # Hyponatremia  - Etiology unclear  - Diff Dx: SIADH vs hypovolemic hypoNa vs chronic liver disease  - SIADH favored with infection, antiviral meds hx  - Oral acyclovir less likely to cause nephrotoxicity vs IV  - Goal to get SNa ~ 125 for today 9/15  - +NSAID use as well as OP  # Dizziness  - Could be manifestation of HTN  # HTN  - Goal BP < 140/90  - Not at goal on admit  # Shingles  - On acyclovir as OP  # Cirrhosis  - Noted on imaging, no hx of it likely first dx  - Could be playing a role in her past hypoNa episode     PLAN:  - Continue IVF at current rate  - SNa Q4, keep level ~ 125 for today  - Daily labs  - If autocorrects too fast add D5W to bring SNa down  - Encourage high solute meals/drinks, avoid/minimize free water

## 2023-09-15 NOTE — THERAPY
Physical Therapy   Initial Evaluation     Patient Name: Grisel Saha  Age:  88 y.o., Sex:  female  Medical Record #: [id number]  Today's Date: 9/14/2023     Precautions  Precautions: Fall Risk    Assessment  Patient is an 88 y.o. female with hx of HTN and shingles dx 7 days ago, now admitted with severe hyponatremia, pulmonary edema, and hyponatremia. PT eval complete, pt currently presents below her functional baseline due to impaired strength, mobility, activity tolerance, and mobility. Pt lives at an [facility], but notes she is typically independent with her mobility; pt is now at [facility] for mobility today and limited by fatigue. Pt will benefit from post acute placement at this time, unless [facility] is able to provide appropriate assist for pt at [facility] as well as provide therapy to help pt return to [facility]. Will follow while admitted for skilled PT intervention.     Plan    Physical Therapy Initial Treatment Plan   Treatment Plan : Bed Mobility, Gait Training, Orthotics Training , Self Care / Home Evaluation, Stair Training, Therapeutic Activities, Therapeutic Exercise  Treatment Frequency: 4 Times per Week  Duration: Until Therapy Goals Met    DC Equipment Recommendations: Unable to determine at this time  Discharge Recommendations: Recommend post-acute placement for additional physical therapy services prior to discharge home         Vitals   O2 (LPM) 0   O2 Delivery Device None - Room Air   Pain 0 - 10 Group   Therapist Pain Assessment   (no c/o pain)   Prior Living Situation   Prior Services Intermittent Physical Support for ADL Per Service   Housing / Facility Assisted Living Residence   Equipment Owned Front-Wheel Walker;Single Point Cane   Lives with - Patient's Self Care Capacity Attendant / Paid Care Giver   Comments reports having assist with bathing, meals, cleaning and laundry   Prior Level of Functional Mobility   Bed Mobility Independent   Transfer Status Independent   Ambulation Independent   Ambulation  Distance limited community distances   Assistive Devices Used Single Point Cane  (FWW for community distances)   Cognition    Cognition / Consciousness WDL   Comments pleasant and participatory, receptive to education   Active ROM Lower Body    Active ROM Lower Body  WDL   Strength Lower Body   Lower Body Strength  X   Comments RLE grossly 3+/5, LLE grossly 4+/5   Lower Body Muscle Tone   Lower Body Muscle Tone  WDL   Coordination Lower Body    Coordination Lower Body  WDL   Balance Assessment   Sitting Balance (Static) Fair -   Sitting Balance (Dynamic) Fair -   Standing Balance (Static) Poor +   Standing Balance (Dynamic) Poor +   Weight Shift Sitting Fair   Weight Shift Standing Poor   Comments HHA   Bed Mobility    Supine to Sit Maximal Assist   Scooting Maximal Assist   Gait Analysis   Gait Level Of Assist Unable to Participate   Level of Assist with Stairs Unable to Participate   Functional Mobility   Sit to Stand Moderate Assist   Bed, Chair, Wheelchair Transfer Moderate Assist   Transfer Method Stand Step   Mobility eob>chair   ICU Target Mobility Level   ICU Mobility - Targeted Level Level 3B   How much difficulty does the patient currently have...   Turning over in bed (including adjusting bedclothes, sheets and blankets)? 2   Sitting down on and standing up from a chair with arms (e.g., wheelchair, bedside commode, etc.) 2   Moving from lying on back to sitting on the side of the bed? 1   How much help from another person does the patient currently need...   Moving to and from a bed to a chair (including a wheelchair)? 2   Need to walk in a hospital room? 2   Climbing 3-5 steps with a railing? 1   6 clicks Mobility Score 10   Activity Tolerance   Sitting in Chair 5 min/post session   Sitting Edge of Bed 5 min   Standing >1 min   Comments limited by weakness   Short Term Goals    Short Term Goal # 1 pt will move supine<>eob with spv in 6 tx for bed mobility.   Short Term Goal # 2 pt will complete spt with  fww and spv in 6 tx for functional mobility.   Short Term Goal # 3 pt will ambulate 150 ft with fww and spv in 6 tx for household distances.   Education Group   Education Provided Role of Physical Therapist   Role of Physical Therapist Patient Response Patient;Acceptance;Explanation;Verbal Demonstration   Physical Therapy Initial Treatment Plan    Treatment Plan  Bed Mobility;Gait Training;Orthotics Training ;Self Care / Home Evaluation;Stair Training;Therapeutic Activities;Therapeutic Exercise   Treatment Frequency 4 Times per Week   Duration Until Therapy Goals Met   Problem List    Problems Impaired Bed Mobility;Impaired Transfers;Impaired Ambulation;Functional Strength Deficit;Impaired Balance;Decreased Activity Tolerance   Anticipated Discharge Equipment and Recommendations   DC Equipment Recommendations Unable to determine at this time   Discharge Recommendations Recommend post-acute placement for additional physical therapy services prior to discharge home   Interdisciplinary Plan of Care Collaboration   IDT Collaboration with  Nursing;Occupational Therapist   Patient Position at End of Therapy Seated;Call Light within Reach;Tray Table within Reach;Phone within Reach   Collaboration Comments RN updated   Session Information   Date / Session Number  9/14- 1 (1/4, 9/20)

## 2023-09-15 NOTE — HOSPITAL COURSE
88 y.o. female who presented 9/13/2023 with fatigue malaise  Grisel Saha is a 88 y.o. female, with history of HTN, recently diagnosed with shingles to her right mid abdomen/back 7 days ago, started on antiviral therapy and ever since with significant poor appetite, she presented to the emergency department accompanied by her daughter on 9/13/2023 with complains of generalized fatigue, body aches, nausea/dry heaving and abdominal distention.  Additionally, feeling somnolent more than usual.  At baseline she is active, leaves that [facility] home.  Daughter states that she had hyponatremia prior but not sodium severely low.  Patient takes losartan for blood pressure control.     The patient was admitted to IMCU evaluated by nephrology initially started on IV fluids with improvement in her sodium levels.  She was transferred to neurology on 9/16/2023

## 2023-09-15 NOTE — PROGRESS NOTES
Hospital Medicine Daily Progress Note    Date of Service  9/15/2023    Chief Complaint  Grisel Saha is a 88 y.o. female admitted 9/13/2023 with hyponatremia and generalized malaise    Hospital Course  88 y.o. female who presented 9/13/2023 with fatigue malaise  Grisel Saha is a 88 y.o. female, with history of HTN, recently diagnosed with shingles to her right mid abdomen/back 7 days ago, started on antiviral therapy and ever since with significant poor appetite, she presented to the emergency department accompanied by her daughter on 9/13/2023 with complains of generalized fatigue, body aches, nausea/dry heaving and abdominal distention.  Additionally, feeling somnolent more than usual.  At baseline she is active, leaves that senior care home.  Daughter states that she had hyponatremia prior but not sodium severely low.  Patient takes losartan for blood pressure control.     ER COURSE:  -Significantly elevated blood pressure at 233/102 initially, intermittently tachycardic.  -Laboratory showing severe hyponatremia with initial sodium of 114.  Creatinine is normal at 0.66 with BUN 13.  -Patient received 500 mL LR bolus in ED in addition to morphine, 4 mg and 4 mg of Zofran.  -CT of the head was negative for acute intracranial abnormalities.  -Patient received total of 20 mg IV hydralazine in the emergency department, most recent blood pressure is 102/54  -ERP discussed with intensivist who is recommending IMCU admission therefore I was called for admission.     Upon my evaluation at bedside patient is interactive offers no acute complaints at this time.  Patient endorses overall improvement in symptomatology.  Informed patient I will place formal consultation with nephrology service for continue to monitor of sodium levels.  All questions answered in detail.    Interval Problem Update  9/15/2023:  Patient seen and evaluated bedside patient had just gotten Ativan 30 minutes prior to my arrival patient  somewhat somnolent.  Blood pressure within acceptable limits at present.  Have added Ativan 0.5 mg IV as needed every 6 hours for nausea vomiting Zofran has not been working for her.  I have adjusted patient's pain medications as appropriate given her herpes zoster infection continue to follow with nephrology continue to gradually replete sodium.    I have discussed this patient's plan of care and discharge plan at IDT rounds today with Case Management, Nursing, Nursing leadership, and other members of the IDT team.    Consultants/Specialty  nephrology    Code Status  Full Code    Disposition  The patient is not medically cleared for discharge to home or a post-acute facility.  Anticipate discharge to: an inpatient rehabilitation hospital    I have placed the appropriate orders for post-discharge needs.    Review of Systems  Review of Systems   Unable to perform ROS: Age        Physical Exam  Temp:  [36.1 °C (96.9 °F)-36.4 °C (97.5 °F)] 36.4 °C (97.5 °F)  Pulse:  [57-85] 57  Resp:  [13-35] 14  BP: ()/(49-77) 109/54  SpO2:  [91 %-99 %] 94 %    Physical Exam  Vitals and nursing note reviewed.   Constitutional:       Appearance: She is ill-appearing.   Eyes:      General: No scleral icterus.  Cardiovascular:      Comments: No edema  Pulmonary:      Effort: Pulmonary effort is normal.   Abdominal:      General: There is no distension.   Musculoskeletal:         General: No deformity.   Skin:     Findings: No rash.   Neurological:      Mental Status: She is alert.   Psychiatric:         Behavior: Behavior is cooperative.         Fluids    Intake/Output Summary (Last 24 hours) at 9/15/2023 1659  Last data filed at 9/15/2023 1600  Gross per 24 hour   Intake 3235.6 ml   Output 600 ml   Net 2635.6 ml       Laboratory  Recent Labs     09/13/23  2155 09/14/23  0455 09/15/23  0131   WBC 10.3 9.6 8.7   RBC 4.46 4.20 3.91*   HEMOGLOBIN 13.8 13.2 12.3   HEMATOCRIT 37.2 35.4* 32.9*   MCV 83.4 84.3 84.1   MCH 30.9 31.4 31.5    MCHC 37.1* 37.3* 37.4*   RDW 34.6* 35.3* 36.5   PLATELETCT 293 313 296   MPV 9.1 9.5 9.4     Recent Labs     09/13/23  2155 09/14/23  0018 09/14/23  0455 09/14/23  0856 09/15/23  0617 09/15/23  0825 09/15/23  1423   SODIUM 114*   < > 115*   < > 121* 120* 122*   POTASSIUM 3.3*  --  3.9  --   --  3.3*  --    CHLORIDE 81*  --  83*  --   --  89*  --    CO2 20  --  22  --   --  20  --    GLUCOSE 160*  --  128*  --   --  147*  --    BUN 13  --  15  --   --  16  --    CREATININE 0.66  --  0.87  --   --  0.83  --    CALCIUM 8.8  --  7.9*  --   --  7.5*  --     < > = values in this interval not displayed.                   Imaging  CT-CHEST,ABDOMEN,PELVIS WITH   Final Result         1.  Diverticulosis   2.  Hepatomegaly   3.  Irregular hepatic contour compatible changes of cirrhosis   4.  Atherosclerosis and atherosclerotic coronary artery disease      CT-HEAD W/O   Final Result         1.  No acute intracranial abnormality is identified, there are nonspecific white matter changes, commonly associated with small vessel ischemic disease.  Associated mild cerebral atrophy is noted.   2.  Atherosclerosis.         DX-CHEST-PORTABLE (1 VIEW)   Final Result         1.  Pulmonary edema and/or infiltrates.   2.  Right humeral head and metaphyseal fracture, age indeterminant, correlate with history.   3.  Cardiomegaly   4.  Atherosclerosis           Assessment/Plan  * Hyponatremia- (present on admission)  Assessment & Plan  -IMCU admission for severe hyponatremia and initial sodium of 114.  -Case was discussed with intensivist,  initially and approved for IMCU admission.  -Symptomatic with nausea and fatigue but no seizure.  -Etiology is still unclear.  Patient with decreased p.o. intake after initiating shingles therapy and dehydration likely contributing to it however BUN is normal and normal creatinine function with severe underlying hyponatremia that could have additional etiology.  -Patient is having ongoing  abdominal distention and abdominal pain now for about a month, her physical exam showed abdominal distention and mild diffuse abdominal pain and I am adding CT scan for additional diagnoses this was not done recently.    -Patient received initially 500 mL of LR bolus.  She will likely need additional IV fluids but will add a stat sodium check now prior to ordering fluids.  -Serial sodium checks every 4 hours.  -Hyponatremia studies added and pending  -Sodium goal in 24 hours is no more than 122 for a total of 8mmol increase.  9/14/2023:  Place formal consultation with nephrology service to continue to monitor and replete sodium levels.  Recommendations are appreciated.  Otherwise continue present medical management as per nephrology recommendations.  9/15/2023:  Continue present medical management appreciate nephrology recommendations patient's sodium is slowly correcting.  Otherwise no acute events overnight patient does have mild complaints of nausea vomiting.  Have given patient Ativan 0.5 mg every 6 hours as needed for nausea Zofran has not been working for her.  Furthermore I have adjusted patient's pain medications for herpes zoster which she is to be providing some relief.  Need to monitor closely on telemetry.      Elevated brain natriuretic peptide (BNP) level  Assessment & Plan  Patient does not appear to be volume overloaded at present.  Monitor clinically.  Patient did have multiple episodes of nausea vomiting and poor oral intake.  Likely contributing to increased heart rate and subsequent demand resulting in increased BNP.  No indication diuresis at present    Pulmonary edema  Assessment & Plan  -Patient with visualized pulmonary edema versus infiltrate on chest x-ray.  -She is not fluid overloaded.  I am adding proBNP, COVID test, viral panel, D-dimer, procalcitonin and CRP  Continue to monitor clinically patient required minimal amounts of oxygen    Hypokalemia  Assessment & Plan  -Potassium is 3.3  on admission.  Added IV potassium replacement as she is nauseated.    Continue to replete potassium to maintain level approximately 4.    Essential hypertension- (present on admission)  Assessment & Plan  -Patient takes losartan at home.  -She had severely elevated blood pressure on arrival, 233/102.  She received total of 20 mg of IV hydralazine and systolic blood pressure now is 105.  I am holding antihypertensive medications for now.  9/14/2023:  Have restarted patient's home dose of losartan  9/15/2023:  Patient's blood pressure did have elevated episodes over the course the evening of 2 as high as 180s I have started patient on carvedilol 6.25 initially however after initial doses of carvedilol 6.25 blood pressure did decrease significantly.  I have decreased his dose to 3.125.  Patient does seem to be sensitive to beta blocking medication.       Greater than 51 minutes spent prepping to see patient (e.g. review of tests) obtaining and/or reviewing separately obtained history. Performing a medically appropriate examination and/ evaluation.  Counseling and educating the patient/family/caregiver.  Ordering medications, tests, or procedures.  Referring and communicating with other health care professionals.  Documenting clinical information in EPIC.  Independently interpreting results and communicating results to patient/family/caregiver.  Care coordination.  Please note that this dictation was created using voice recognition software. I have made every reasonable attempt to correct obvious errors, but I expect that there are errors of grammar and possibly context that I did not discover before finalizing the note.    VTE prophylaxis:   SCDs/TEDs   heparin ppx      I have performed a physical exam and reviewed and updated ROS and Plan today (9/15/2023). In review of yesterday's note (9/14/2023), there are no changes except as documented above.

## 2023-09-15 NOTE — DISCHARGE PLANNING
Medical debility with ongoing medical management as well as therapy need. Anticipate post acute services to facilitate a successful transition to [facility]. HTH/SCP provider.

## 2023-09-15 NOTE — DISCHARGE PLANNING
Received Choice form at 2458  Agency/Facility Name: [facility]  Referral sent per Choice form @ [phone]

## 2023-09-15 NOTE — ASSESSMENT & PLAN NOTE
Patient does not appear to be volume overloaded at present.  Monitor clinically.  Patient did have multiple episodes of nausea vomiting and poor oral intake.  Likely contributing to increased heart rate and subsequent demand resulting in increased BNP.  No indication diuresis at present

## 2023-09-15 NOTE — PROGRESS NOTES
Page on call Nephrology if NA level goes over 120 on next draw per Dr. Aguilar, nursing communication order placed.

## 2023-09-15 NOTE — PROGRESS NOTES
"Attempted to arrange transportation to [place] [id number] with RN assistance.  Transportation notified RN there will be \"no transportation to assist for the rest of the day/night\", & \"there will be no transportation available to the ICUs from Wednesday- Sunday for the following 8 weeks\".  She then cancelled transporation request.  Charge RN informed of delay & clarification requested from management.  "

## 2023-09-15 NOTE — CARE PLAN
The patient is Watcher - Medium risk of patient condition declining or worsening    Shift Goals  Clinical Goals: q4hr Na  Patient Goals: Rest  Family Goals: BRIAN    Progress made toward(s) clinical / shift goals:    Problem: Knowledge Deficit - Standard  Goal: Patient and family/care givers will demonstrate understanding of plan of care, disease process/condition, diagnostic tests and medications  Outcome: Progressing     Problem: Fall Risk  Goal: Patient will remain free from falls  Outcome: Progressing     Problem: Pain - Standard  Goal: Alleviation of pain or a reduction in pain to the patient’s comfort goal  Outcome: Progressing

## 2023-09-15 NOTE — DISCHARGE PLANNING
[facility] Transitional Care Coordination    Referral from: Dr. Mckeon    Insurance Provider on Facesheet: [facility]    Potential Rehab Diagnosis: Debility    Chart review indicates patient may have on going medical management and may have therapy needs to possibly meet inpatient rehab facility criteria with the goal of returning to community.    D/C support will need to be verified: Daughter    Physiatry consultation forwarded per protocol.      Thank you for the referral.

## 2023-09-15 NOTE — CONSULTS
Physical Medicine and Rehabilitation Consultation              Date of initial consultation: 9/15/2023  Requesting provider: ordered by Robert Mckeon M.D. at 09/15/23 1317  Consulting provider: Marti Velazquez D.O.  Reason for consultation: assess for acute inpatient rehab appropriateness  LOS: 2 Day(s)    Chief complaint: Dizziness and Nausea     HPI: The patient is a 88 y.o.  female with a past medical history of HTN and recent shingles, ;  who presented on 9/13/2023  9:39 PM with dizziness and nausea. Per documentation, patient had recent shingles and was treated with antiviral therapy. Since then she had poor appetite and dizziness. Upon presentation to the ED, patient was noted to be HTN up to 233/102, Hyponatremic to 114. CT head was obtained that was negative for acute intracranial abnormalities.  Patient's Na has been slowly corrected to 120.  Continues to require Na checks q 4 hrs. Hospital course has been notable for evidence of pulmonary edema seen on CT chest. 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     Patient seen and examined at bedside.  Patient is very fatigued.  Patient reports she is so tired because her sleep has been interrupted so frequently.  Patient denies headache, lightheadedness, reports her nausea has improved.  Denies pain.  Reports her family was here earlier but left.  Reports [facility]    Social Hx:  Patient lives at assisted living , no EFRAIN  0 EFRAIN  At prior level of function MOD I with SPC, required assistance with IADLs     Tobacco: Former smoker   Alcohol: occasional   Drugs: Denies     THERAPY:  Restrictions: Fall Risk   PT: Functional mobility   9/14 Mod A sit to stand, Mo d A transfers     OT: ADLs  9/14  Min A upper body dressing, Max  A lower body dressing     SLP:   None     IMAGING:  CT-HEAD W/O   Final Result           1.  No acute intracranial abnormality is  identified, there are nonspecific white matter changes, commonly associated with small vessel ischemic disease.  Associated mild cerebral atrophy is noted.   2.  Atherosclerosis.           DX-CHEST-PORTABLE (1 VIEW)   Final Result           1.  Pulmonary edema and/or infiltrates.   2.  Right humeral head and metaphyseal fracture, age indeterminant, correlate with history.   3.  Cardiomegaly   4.  Atherosclerosis       PROCEDURES:  None     PMH:  Past Medical History:   Diagnosis Date    HTN (hypertension)        PSH:  History reviewed. No pertinent surgical history.    FHX:  Family History   Problem Relation Age of Onset    Stroke Mother     Cancer Father         lung       Medications:  Current Facility-Administered Medications   Medication Dose    hydrALAZINE (Apresoline) injection 20 mg  20 mg    oxyCODONE immediate-release (Roxicodone) tablet 5 mg  5 mg    Or    oxyCODONE immediate release (Roxicodone) tablet 10 mg  10 mg    Or    HYDROmorphone (Dilaudid) injection 0.5 mg  0.5 mg    LORazepam (Ativan) injection 0.5 mg  0.5 mg    potassium chloride SA (Kdur) tablet 60 mEq  60 mEq    carvedilol (Coreg) tablet 3.125 mg  3.125 mg    NS infusion      sucralfate (Carafate) 1 GM/10ML suspension 1 g  1 g    pantoprazole (Protonix) injection 40 mg  40 mg    losartan (Cozaar) tablet 100 mg  100 mg    insulin lispro (HumaLOG,AdmeLOG) injection  1-6 Units    And    dextrose 50% (D50W) injection 25 g  25 g    Pharmacy Consult Request ...Pain Management Review 1 Each  1 Each    acetaminophen (Tylenol) tablet 650 mg  650 mg    ondansetron (Zofran) syringe/vial injection 4 mg  4 mg    ondansetron (Zofran ODT) dispertab 4 mg  4 mg    senna-docusate (Pericolace Or Senokot S) 8.6-50 MG per tablet 2 Tablet  2 Tablet    And    polyethylene glycol/lytes (Miralax) PACKET 1 Packet  1 Packet    And    magnesium hydroxide (Milk Of Magnesia) suspension 30 mL  30 mL    And    bisacodyl (Dulcolax) suppository 10 mg  10 mg    heparin  "injection 5,000 Units  5,000 Units       Allergies:  No Known Allergies      Physical Exam:  Vitals: /52   Pulse 63   Temp 36.4 °C (97.5 °F) (Temporal)   Resp 20   Ht 1.499 m (4' 11\")   Wt 64.6 kg (142 lb 6.7 oz)   SpO2 98%   Gen: NAD, laying comfortably in bed, sleeping but awakens to be examined, does fall asleep during review of systems  Head:  NC/AT  Eyes/ Nose/ Mouth: PERRLA, moist mucous membranes  Cardio: RRR, good distal perfusion, warm extremities  Pulm: normal respiratory effort, no cyanosis, on room air  Abd: Soft NTND, negative borborygmi   Ext: No peripheral edema. No calf tenderness. No clubbing.    Mental status:  A&Ox4 (person, place, date, situation) answers questions appropriately follows commands  Speech: fluent, no aphasia or dysarthria    CRANIAL NERVES:  2,3: visual acuity grossly intact, PERRL  3,4,6: EOMI bilaterally, no nystagmus or diplopia  5: sensation intact to light touch bilaterally and symmetric  7: no facial asymmetry  8: hearing grossly intact      Motor:      Upper Extremity  Myotome R L   Shoulder flexion C5 5/5 5/5   Elbow flexion C5 5/5 5/5   Wrist extension C6 5/5 5/5   Elbow extension C7 5/5 5/5   Finger flexion C8 5/5 5/5   Finger abduction T1 5/5 5/5     Lower Extremity Myotome R L   Hip flexion L2 5/5 5/5   Knee extension L3 5/5 5/5   Ankle dorsiflexion L4 5/5 5/5   Toe extension L5 5/5 5/5   Ankle plantarflexion S1 5/5 5/5       Sensory:   intact to light touch through out      No clonus at bilateral ankles  Negative Atkinson b/l     Tone: no spasticity noted,    Coordination:     intact fine motor with fingers bilaterally      Labs: Reviewed and significant for   Recent Labs     09/13/23 2155 09/14/23  0455 09/15/23  0131   RBC 4.46 4.20 3.91*   HEMOGLOBIN 13.8 13.2 12.3   HEMATOCRIT 37.2 35.4* 32.9*   PLATELETCT 293 313 296     Recent Labs     09/13/23 2155 09/14/23  0018 09/14/23  0455 09/14/23  0856 09/15/23  0131 09/15/23  0617 09/15/23  0825   SODIUM " 114*   < > 115*   < > 120* 121* 120*   POTASSIUM 3.3*  --  3.9  --   --   --  3.3*   CHLORIDE 81*  --  83*  --   --   --  89*   CO2 20  --  22  --   --   --  20   GLUCOSE 160*  --  128*  --   --   --  147*   BUN 13  --  15  --   --   --  16   CREATININE 0.66  --  0.87  --   --   --  0.83   CALCIUM 8.8  --  7.9*  --   --   --  7.5*    < > = values in this interval not displayed.     Recent Results (from the past 24 hour(s))   POCT glucose device results    Collection Time: 09/14/23  4:37 PM   Result Value Ref Range    POC Glucose, Blood 165 (H) 65 - 99 mg/dL   SODIUM SERUM (NA)    Collection Time: 09/14/23  4:54 PM   Result Value Ref Range    Sodium 120 (L) 135 - 145 mmol/L   POCT glucose device results    Collection Time: 09/14/23  9:27 PM   Result Value Ref Range    POC Glucose, Blood 135 (H) 65 - 99 mg/dL   SODIUM SERUM (NA)    Collection Time: 09/14/23  9:36 PM   Result Value Ref Range    Sodium 119 (LL) 135 - 145 mmol/L   SODIUM SERUM (NA)    Collection Time: 09/15/23  1:31 AM   Result Value Ref Range    Sodium 120 (L) 135 - 145 mmol/L   CBC WITH DIFFERENTIAL    Collection Time: 09/15/23  1:31 AM   Result Value Ref Range    WBC 8.7 4.8 - 10.8 K/uL    RBC 3.91 (L) 4.20 - 5.40 M/uL    Hemoglobin 12.3 12.0 - 16.0 g/dL    Hematocrit 32.9 (L) 37.0 - 47.0 %    MCV 84.1 81.4 - 97.8 fL    MCH 31.5 27.0 - 33.0 pg    MCHC 37.4 (H) 32.2 - 35.5 g/dL    RDW 36.5 35.9 - 50.0 fL    Platelet Count 296 164 - 446 K/uL    MPV 9.4 9.0 - 12.9 fL    Neutrophils-Polys 71.10 44.00 - 72.00 %    Lymphocytes 15.40 (L) 22.00 - 41.00 %    Monocytes 11.00 0.00 - 13.40 %    Eosinophils 0.70 0.00 - 6.90 %    Basophils 0.60 0.00 - 1.80 %    Immature Granulocytes 1.20 (H) 0.00 - 0.90 %    Nucleated RBC 0.00 0.00 - 0.20 /100 WBC    Neutrophils (Absolute) 6.18 1.82 - 7.42 K/uL    Lymphs (Absolute) 1.34 1.00 - 4.80 K/uL    Monos (Absolute) 0.96 (H) 0.00 - 0.85 K/uL    Eos (Absolute) 0.06 0.00 - 0.51 K/uL    Baso (Absolute) 0.05 0.00 - 0.12 K/uL     Immature Granulocytes (abs) 0.10 0.00 - 0.11 K/uL    NRBC (Absolute) 0.00 K/uL   POCT glucose device results    Collection Time: 09/15/23  6:15 AM   Result Value Ref Range    POC Glucose, Blood 146 (H) 65 - 99 mg/dL   SODIUM SERUM (NA)    Collection Time: 09/15/23  6:17 AM   Result Value Ref Range    Sodium 121 (L) 135 - 145 mmol/L   Basic Metabolic Panel    Collection Time: 09/15/23  8:25 AM   Result Value Ref Range    Sodium 120 (L) 135 - 145 mmol/L    Potassium 3.3 (L) 3.6 - 5.5 mmol/L    Chloride 89 (L) 96 - 112 mmol/L    Co2 20 20 - 33 mmol/L    Glucose 147 (H) 65 - 99 mg/dL    Bun 16 8 - 22 mg/dL    Creatinine 0.83 0.50 - 1.40 mg/dL    Calcium 7.5 (L) 8.5 - 10.5 mg/dL    Anion Gap 11.0 7.0 - 16.0   ESTIMATED GFR    Collection Time: 09/15/23  8:25 AM   Result Value Ref Range    GFR (CKD-EPI) 68 >60 mL/min/1.73 m 2         ASSESSMENT:  Patient is a 88 y.o. female admitted with severe hyponatremia     [facility] Code / Diagnosis to Support: 0016 - Debility (Non-Cardiac, Non-Pulmonary)    Rehabilitation: Impaired ADLs and mobility  Patient is anticipated to be a good  candidate for inpatient rehab based on needs for PT, OT, see dispo details below.     Barriers to transfer include: Insurance authorization, TCCs to verify disposition, medical clearance and bed availability     Additional Recommendations:  Debility due to Severe hyponatremia   -admitted with dizziness, nasuea, and poor PO intake, etiology may be SIADH  - Na at time of admission 114, has improved to 120   - q4hr Na checks   - nephrology consulted, continue IVFs, with goal for Na 125 in next 24hrs   - hyponatremia will need to be improved instable to be considered for inpatient rehab   - continue with PT/OT     Pulmonary Edema   - CT chest, abdo, pelvis, showed pulmonary edema   - not clinically fluid overloaded, no indications for diuresis     HTN   - reportedly on losartan at home   - admitted with /102  - now on losartan + carvedilol     Hx of  Shingles   - recent history of shingles, treated with valtrex      Dispo:  - patient is currently functioning below their level of baseline, recommend post acute rehab  - recommend IRF level therapy with 3hr of therapy 5 days per week  - piror to acceptance to IRF, will need resolution of Hyponatremia and confirmation of DC support at [facility]   - [facility] to assist with insurance auth and DC support         Medical Complexity:  Debility due to Severe hyponatremia   Pulmonary Edema   HTN   Hx of Shingles   Impaired mobility and ADLs       DVT PPX: heparin     Thank you for allowing us to participate in the care of this patient.     Patient was seen for 81 minutes on unit/floor of which > 50% of time was spent on counseling and coordination of care regarding the above, including prognosis, risk reduction, benefits of treatment, and options for next stage of care.    Marti Velazquez D.O.   Physical Medicine and Rehabilitation     Please note that this dictation was created using voice recognition software. I have made every reasonable attempt to correct obvious errors, but there may be errors of grammar and possibly content that I did not discover before finalizing the note.

## 2023-09-15 NOTE — THERAPY
"Occupational Therapy   Initial Evaluation     Patient Name: Grisel Saha  Age:  88 y.o., Sex:  female  Medical Record #: [id number]  Today's Date: 9/14/2023     Precautions: Fall Risk    Assessment    Patient is 88 y.o. female admitted with abdominal distention, dizziness and nausea/vomiting, found to have significantly elevated BP and severe hyponatremia. Pt presents to OT eval below her baseline of functional independence currently requiring assist for bed mobility, functional transfers, and self-care ADLs. Acute OT to follow while admitted.     Plan    Occupational Therapy Initial Treatment Plan   Treatment Interventions: Self Care / Activities of Daily Living, Adaptive Equipment, Therapeutic Exercises, Therapeutic Activity  Treatment Frequency: 3 Times per Week  Duration: Until Therapy Goals Met    DC Equipment Recommendations: Unable to determine at this time  Discharge Recommendations: Recommend post-acute placement for additional occupational therapy services prior to discharge home     Subjective    \"I always have to keep my mind busy, I'm grossly over-educated!\" (Pt is a retired  and )     Objective     09/14/23 0931   Prior Living Situation   Prior Services Intermittent Physical Support for ADL Per Service   Housing / Facility Assisted Living Residence  ([facility])   Equipment Owned Front-Wheel Walker;Grab Bar(s) In Tub / Shower;Grab Bar(s) By Toilet;Tub / Shower Seat   Lives with - Patient's Self Care Capacity Attendant / Paid Care Giver   Prior Level of ADL Function   Self Feeding Independent   Grooming / Hygiene Independent   Bathing Requires Assist   Dressing Independent   Toileting Independent   Prior Level of IADL Function   Medication Management Requires Assist   Laundry Requires Assist   Kitchen Mobility Requires Assist   Finances Requires Assist   Home Management Requires Assist   Shopping Requires Assist   Prior Level Of Mobility Independent With Device in Home   Driving / " Transportation Relatives / Others Provide Transportation   Occupation (Pre-Hospital Vocational) Retired Due To Age  ( in [employer])   Precautions   Precautions Fall Risk   Pain 0 - 10 Group   Therapist Pain Assessment 0;Nurse Notified;Post Activity Pain Same as Prior to Activity   Active ROM Upper Body   Active ROM Upper Body  WDL   Strength Upper Body   Upper Body Strength  X   Gross Strength Generalized Weakness, Equal Bilaterally.    Balance Assessment   Sitting Balance (Static) Fair -   Sitting Balance (Dynamic) Fair -   Standing Balance (Static) Poor +   Standing Balance (Dynamic) Poor +   Weight Shift Sitting Fair   Weight Shift Standing Poor   Comments HHA   Bed Mobility    Supine to Sit Maximal Assist   Scooting Maximal Assist   ADL Assessment   Eating Supervision   Grooming Minimal Assist;Seated   Upper Body Dressing Minimal Assist   Lower Body Dressing Maximal Assist   How much help from another person does the patient currently need...   Putting on and taking off regular lower body clothing? 2   Bathing (including washing, rinsing, and drying)? 2   Toileting, which includes using a toilet, bedpan, or urinal? 2   Putting on and taking off regular upper body clothing? 3   Taking care of personal grooming such as brushing teeth? 3   Eating meals? 4   6 Clicks Daily Activity Score 16   Functional Mobility   Sit to Stand Moderate Assist   Bed, Chair, Wheelchair Transfer Moderate Assist   Transfer Method Stand Pivot   Mobility bed>chair   Activity Tolerance   Sitting in Chair 10+min   Sitting Edge of Bed 6min   Standing transfer only   Patient / Family Goals   Patient / Family Goal #1 regain independent PLOF   Short Term Goals   Short Term Goal # 1 pt will complete functional transfers with SPV   Short Term Goal # 2 pt will complete toileting ADL with SPV   Short Term Goal # 3 pt will tolerate >5min standing grooming at sink with SPV   Education Group   Education Provided Activities of Daily Living;Role of  Occupational Therapist   Role of Occupational Therapist Patient Response Patient;Acceptance;Explanation;Verbal Demonstration   ADL Patient Response Patient;Acceptance;Explanation;Action Demonstration;Verbal Demonstration   Occupational Therapy Initial Treatment Plan    Treatment Interventions Self Care / Activities of Daily Living;Adaptive Equipment;Therapeutic Exercises;Therapeutic Activity   Treatment Frequency 3 Times per Week   Duration Until Therapy Goals Met   Problem List   Problem List Decreased Active Daily Living Skills;Decreased Homemaking Skills;Decreased Functional Mobility;Decreased Activity Tolerance;Decreased Upper Extremity Strength Right;Decreased Upper Extremity Strength Left   Anticipated Discharge Equipment and Recommendations   DC Equipment Recommendations Unable to determine at this time   Discharge Recommendations Recommend post-acute placement for additional occupational therapy services prior to discharge home

## 2023-09-16 LAB
ANION GAP SERPL CALC-SCNC: 12 MMOL/L (ref 7–16)
BUN SERPL-MCNC: 18 MG/DL (ref 8–22)
CALCIUM SERPL-MCNC: 7.4 MG/DL (ref 8.5–10.5)
CHLORIDE SERPL-SCNC: 93 MMOL/L (ref 96–112)
CO2 SERPL-SCNC: 19 MMOL/L (ref 20–33)
CREAT SERPL-MCNC: 1 MG/DL (ref 0.5–1.4)
GFR SERPLBLD CREATININE-BSD FMLA CKD-EPI: 54 ML/MIN/1.73 M 2
GLUCOSE BLD STRIP.AUTO-MCNC: 91 MG/DL (ref 65–99)
GLUCOSE SERPL-MCNC: 118 MG/DL (ref 65–99)
POTASSIUM SERPL-SCNC: 3.4 MMOL/L (ref 3.6–5.5)
SODIUM SERPL-SCNC: 122 MMOL/L (ref 135–145)
SODIUM SERPL-SCNC: 124 MMOL/L (ref 135–145)
SODIUM SERPL-SCNC: 126 MMOL/L (ref 135–145)

## 2023-09-16 PROCEDURE — 84295 ASSAY OF SERUM SODIUM: CPT

## 2023-09-16 PROCEDURE — 700102 HCHG RX REV CODE 250 W/ 637 OVERRIDE(OP): Performed by: HOSPITALIST

## 2023-09-16 PROCEDURE — 700111 HCHG RX REV CODE 636 W/ 250 OVERRIDE (IP): Performed by: HOSPITALIST

## 2023-09-16 PROCEDURE — 700111 HCHG RX REV CODE 636 W/ 250 OVERRIDE (IP): Performed by: STUDENT IN AN ORGANIZED HEALTH CARE EDUCATION/TRAINING PROGRAM

## 2023-09-16 PROCEDURE — A9270 NON-COVERED ITEM OR SERVICE: HCPCS | Performed by: STUDENT IN AN ORGANIZED HEALTH CARE EDUCATION/TRAINING PROGRAM

## 2023-09-16 PROCEDURE — 82962 GLUCOSE BLOOD TEST: CPT

## 2023-09-16 PROCEDURE — 99233 SBSQ HOSP IP/OBS HIGH 50: CPT | Performed by: STUDENT IN AN ORGANIZED HEALTH CARE EDUCATION/TRAINING PROGRAM

## 2023-09-16 PROCEDURE — A9270 NON-COVERED ITEM OR SERVICE: HCPCS | Performed by: HOSPITALIST

## 2023-09-16 PROCEDURE — 80048 BASIC METABOLIC PNL TOTAL CA: CPT

## 2023-09-16 PROCEDURE — 770020 HCHG ROOM/CARE - TELE (206)

## 2023-09-16 PROCEDURE — 700102 HCHG RX REV CODE 250 W/ 637 OVERRIDE(OP): Performed by: STUDENT IN AN ORGANIZED HEALTH CARE EDUCATION/TRAINING PROGRAM

## 2023-09-16 RX ADMIN — HYDRALAZINE HYDROCHLORIDE 20 MG: 20 INJECTION, SOLUTION INTRAMUSCULAR; INTRAVENOUS at 11:10

## 2023-09-16 RX ADMIN — SUCRALFATE 1 G: 1 SUSPENSION ORAL at 11:10

## 2023-09-16 RX ADMIN — SENNOSIDES AND DOCUSATE SODIUM 2 TABLET: 50; 8.6 TABLET ORAL at 16:25

## 2023-09-16 RX ADMIN — ONDANSETRON 4 MG: 2 INJECTION INTRAMUSCULAR; INTRAVENOUS at 06:23

## 2023-09-16 RX ADMIN — OXYCODONE HYDROCHLORIDE 10 MG: 10 TABLET ORAL at 03:32

## 2023-09-16 RX ADMIN — LOSARTAN POTASSIUM 100 MG: 50 TABLET, FILM COATED ORAL at 06:08

## 2023-09-16 RX ADMIN — LORAZEPAM 0.5 MG: 2 INJECTION INTRAMUSCULAR; INTRAVENOUS at 02:02

## 2023-09-16 RX ADMIN — SUCRALFATE 1 G: 1 SUSPENSION ORAL at 06:07

## 2023-09-16 RX ADMIN — HEPARIN SODIUM 5000 UNITS: 5000 INJECTION, SOLUTION INTRAVENOUS; SUBCUTANEOUS at 06:07

## 2023-09-16 RX ADMIN — CARVEDILOL 3.12 MG: 6.25 TABLET, FILM COATED ORAL at 09:39

## 2023-09-16 RX ADMIN — HEPARIN SODIUM 5000 UNITS: 5000 INJECTION, SOLUTION INTRAVENOUS; SUBCUTANEOUS at 13:34

## 2023-09-16 RX ADMIN — SENNOSIDES AND DOCUSATE SODIUM 2 TABLET: 50; 8.6 TABLET ORAL at 06:08

## 2023-09-16 RX ADMIN — SUCRALFATE 1 G: 1 SUSPENSION ORAL at 16:25

## 2023-09-16 RX ADMIN — OMEPRAZOLE 20 MG: 20 CAPSULE, DELAYED RELEASE ORAL at 06:07

## 2023-09-16 RX ADMIN — SUCRALFATE 1 G: 1 SUSPENSION ORAL at 00:39

## 2023-09-16 RX ADMIN — OXYCODONE HYDROCHLORIDE 10 MG: 10 TABLET ORAL at 11:09

## 2023-09-16 RX ADMIN — HEPARIN SODIUM 5000 UNITS: 5000 INJECTION, SOLUTION INTRAVENOUS; SUBCUTANEOUS at 22:31

## 2023-09-16 ASSESSMENT — PAIN DESCRIPTION - PAIN TYPE
TYPE: ACUTE PAIN

## 2023-09-16 ASSESSMENT — PATIENT HEALTH QUESTIONNAIRE - PHQ9
1. LITTLE INTEREST OR PLEASURE IN DOING THINGS: NOT AT ALL
SUM OF ALL RESPONSES TO PHQ9 QUESTIONS 1 AND 2: 0
2. FEELING DOWN, DEPRESSED, IRRITABLE, OR HOPELESS: NOT AT ALL

## 2023-09-16 ASSESSMENT — FIBROSIS 4 INDEX: FIB4 SCORE: 1.73

## 2023-09-16 NOTE — PROGRESS NOTES
4 Eyes Skin Assessment Completed by DAVID Buitrago and DAVID Loaiza.    Head WDL  Ears WDL  Nose WDL  Mouth WDL  Neck WDL  Breast/Chest Redness  Shoulder Blades Redness  Spine Redness  (R) Arm/Elbow/Hand WDL  (L) Arm/Elbow/Hand WDL  Abdomen WDL  Groin WDL  Scrotum/Coccyx/Buttocks Redness and Blanching  (R) Leg WDL  (L) Leg WDL  (R) Heel/Foot/Toe Redness and Blanching  (L) Heel/Foot/Toe Redness and Blanching    Right Trunk to Back Shingles Rash - dressing in place      Devices In Places Tele Box, Blood Pressure Cuff, Pulse Ox, and Oxy Mask      Interventions In Place Gray Ear Foams, NC W/Ear Foams, Pillows, and Q2 Turns    Possible Skin Injury Yes    Pictures Uploaded Into Epic Yes  Wound Consult Placed Yes  RN Wound Prevention Protocol Ordered Yes

## 2023-09-16 NOTE — CARE PLAN
The patient is Watcher - Medium risk of patient condition declining or worsening    Shift Goals  Clinical Goals: Monitor Na levels and BP  Patient Goals: Rest, comfort  Family Goals: delvis    Progress made toward(s) clinical / shift goals:    Problem: Knowledge Deficit - Standard  Goal: Patient and family/care givers will demonstrate understanding of plan of care, disease process/condition, diagnostic tests and medications  Outcome: Progressing     Problem: Fall Risk  Goal: Patient will remain free from falls  Outcome: Progressing

## 2023-09-16 NOTE — PROGRESS NOTES
[facility] Medicine Daily Progress Note    Date of Service  9/16/2023    Chief Complaint  Grisel Saha is a 88 y.o. female admitted 9/13/2023 with hyponatremia and generalized malaise    Hospital Course  88 y.o. female who presented 9/13/2023 with fatigue malaise  Grisel Saha is a 88 y.o. female, with history of HTN, recently diagnosed with shingles to her right mid abdomen/back 7 days ago, started on antiviral therapy and ever since with significant poor appetite, she presented to the emergency department accompanied by her daughter on 9/13/2023 with complains of generalized fatigue, body aches, nausea/dry heaving and abdominal distention.  Additionally, feeling somnolent more than usual.  At baseline she is active, leaves that care home home.  Daughter states that she had hyponatremia prior but not sodium severely low.  Patient takes losartan for blood pressure control.     ER COURSE:  -Significantly elevated blood pressure at 233/102 initially, intermittently tachycardic.  -Laboratory showing severe hyponatremia with initial sodium of 114.  Creatinine is normal at 0.66 with BUN 13.  -Patient received 500 mL LR bolus in ED in addition to morphine, 4 mg and 4 mg of Zofran.  -CT of the head was negative for acute intracranial abnormalities.  -Patient received total of 20 mg IV hydralazine in the emergency department, most recent blood pressure is 102/54  -ERP discussed with intensivist who is recommending IMCU admission therefore I was called for admission.     Upon my evaluation at bedside patient is interactive offers no acute complaints at this time.  Patient endorses overall improvement in symptomatology.  Informed patient I will place formal consultation with nephrology service for continue to monitor of sodium levels.  All questions answered in detail.    Interval Problem Update  9/15/2023:  Patient seen and evaluated bedside patient had just gotten Ativan 30 minutes prior to my arrival patient  somewhat somnolent.  Blood pressure within acceptable limits at present.  Have added Ativan 0.5 mg IV as needed every 6 hours for nausea vomiting Zofran has not been working for her.  I have adjusted patient's pain medications as appropriate given her herpes zoster infection continue to follow with nephrology continue to gradually replete sodium.  9/16/2023:  Appreciate nephrology recommendations and support continue to monitor sodium closely.  Patient's condition and at present seemingly stabilized.  Unclear as to what patient's baseline truly is and how functional she was.  Patient apparently was living in distant living facility.  Patient's pain improved with regards to herpes zoster.  I have discussed this patient's plan of care and discharge plan at IDT rounds today with Case Management, Nursing, Nursing leadership, and other members of the IDT team.    Consultants/Specialty  nephrology    Code Status  Full Code    Disposition  The patient is not medically cleared for discharge to home or a post-acute facility.  Anticipate discharge to: skilled nursing facility    I have placed the appropriate orders for post-discharge needs.    Review of Systems  Review of Systems   Unable to perform ROS: Age        Physical Exam  Temp:  [36.8 °C (98.2 °F)-37.2 °C (98.9 °F)] 37.2 °C (98.9 °F)  Pulse:  [60-82] 61  Resp:  [17-22] 18  BP: (101-205)/(50-89) 119/56  SpO2:  [92 %-97 %] 94 %    Physical Exam  Vitals and nursing note reviewed.   Constitutional:       Appearance: She is ill-appearing.   Eyes:      General: No scleral icterus.  Cardiovascular:      Comments: No edema  Pulmonary:      Effort: Pulmonary effort is normal.   Abdominal:      General: There is no distension.   Musculoskeletal:         General: No deformity.   Skin:     Findings: No rash.   Neurological:      Mental Status: She is alert.   Psychiatric:         Behavior: Behavior is cooperative.         Fluids    Intake/Output Summary (Last 24 hours) at  9/16/2023 1554  Last data filed at 9/16/2023 0700  Gross per 24 hour   Intake 1318.46 ml   Output 350 ml   Net 968.46 ml       Laboratory  Recent Labs     09/13/23  2155 09/14/23  0455 09/15/23  0131   WBC 10.3 9.6 8.7   RBC 4.46 4.20 3.91*   HEMOGLOBIN 13.8 13.2 12.3   HEMATOCRIT 37.2 35.4* 32.9*   MCV 83.4 84.3 84.1   MCH 30.9 31.4 31.5   MCHC 37.1* 37.3* 37.4*   RDW 34.6* 35.3* 36.5   PLATELETCT 293 313 296   MPV 9.1 9.5 9.4     Recent Labs     09/14/23 0455 09/14/23  0856 09/15/23  0825 09/15/23  1423 09/15/23  2147 09/16/23  0206 09/16/23  0618   SODIUM 115*   < > 120*   < > 123* 124* 122*   POTASSIUM 3.9  --  3.3*  --   --  3.4*  --    CHLORIDE 83*  --  89*  --   --  93*  --    CO2 22  --  20  --   --  19*  --    GLUCOSE 128*  --  147*  --   --  118*  --    BUN 15  --  16  --   --  18  --    CREATININE 0.87  --  0.83  --   --  1.00  --    CALCIUM 7.9*  --  7.5*  --   --  7.4*  --     < > = values in this interval not displayed.                   Imaging  CT-CHEST,ABDOMEN,PELVIS WITH   Final Result         1.  Diverticulosis   2.  Hepatomegaly   3.  Irregular hepatic contour compatible changes of cirrhosis   4.  Atherosclerosis and atherosclerotic coronary artery disease      CT-HEAD W/O   Final Result         1.  No acute intracranial abnormality is identified, there are nonspecific white matter changes, commonly associated with small vessel ischemic disease.  Associated mild cerebral atrophy is noted.   2.  Atherosclerosis.         DX-CHEST-PORTABLE (1 VIEW)   Final Result         1.  Pulmonary edema and/or infiltrates.   2.  Right humeral head and metaphyseal fracture, age indeterminant, correlate with history.   3.  Cardiomegaly   4.  Atherosclerosis           Assessment/Plan  * Hyponatremia- (present on admission)  Assessment & Plan  -CU admission for severe hyponatremia and initial sodium of 114.  -Case was discussed with intensivist,  initially and approved for IMCU admission.  -Symptomatic  with nausea and fatigue but no seizure.  -Etiology is still unclear.  Patient with decreased p.o. intake after initiating shingles therapy and dehydration likely contributing to it however BUN is normal and normal creatinine function with severe underlying hyponatremia that could have additional etiology.  -Patient is having ongoing abdominal distention and abdominal pain now for about a month, her physical exam showed abdominal distention and mild diffuse abdominal pain and I am adding CT scan for additional diagnoses this was not done recently.    -Patient received initially 500 mL of LR bolus.  She will likely need additional IV fluids but will add a stat sodium check now prior to ordering fluids.  -Serial sodium checks every 4 hours.  -Hyponatremia studies added and pending  -Sodium goal in 24 hours is no more than 122 for a total of 8mmol increase.  9/14/2023:  Place formal consultation with nephrology service to continue to monitor and replete sodium levels.  Recommendations are appreciated.  Otherwise continue present medical management as per nephrology recommendations.  9/15/2023:  Continue present medical management appreciate nephrology recommendations patient's sodium is slowly correcting.  Otherwise no acute events overnight patient does have mild complaints of nausea vomiting.  Have given patient Ativan 0.5 mg every 6 hours as needed for nausea Zofran has not been working for her.  Furthermore I have adjusted patient's pain medications for herpes zoster which she is to be providing some relief.  Need to monitor closely on telemetry.  9/16/2023:  Continue to trend appreciate nephrology renewed support.  Repeat sodiums as ordered.  Continue to carefully correct.      Elevated brain natriuretic peptide (BNP) level  Assessment & Plan  Patient does not appear to be volume overloaded at present.  Monitor clinically.  Patient did have multiple episodes of nausea vomiting and poor oral intake.  Likely  contributing to increased heart rate and subsequent demand resulting in increased BNP.  No indication diuresis at present    Pulmonary edema  Assessment & Plan  -Patient with visualized pulmonary edema versus infiltrate on chest x-ray.  -She is not fluid overloaded.  I am adding proBNP, COVID test, viral panel, D-dimer, procalcitonin and CRP  Continue to monitor clinically patient required minimal amounts of oxygen    Hypokalemia  Assessment & Plan  -Potassium is 3.3 on admission.  Added IV potassium replacement as she is nauseated.    Continue to replete potassium to maintain level approximately 4.    Essential hypertension- (present on admission)  Assessment & Plan  -Patient takes losartan at home.  -She had severely elevated blood pressure on arrival, 233/102.  She received total of 20 mg of IV hydralazine and systolic blood pressure now is 105.  I am holding antihypertensive medications for now.  9/14/2023:  Have restarted patient's home dose of losartan  9/15/2023:  Patient's blood pressure did have elevated episodes over the course the evening of 2 as high as 180s I have started patient on carvedilol 6.25 initially however after initial doses of carvedilol 6.25 blood pressure did decrease significantly.  I have decreased his dose to 3.125.  Patient does seem to be sensitive to beta blocking medication.       Greater than 51 minutes spent prepping to see patient (e.g. review of tests) obtaining and/or reviewing separately obtained history. Performing a medically appropriate examination and/ evaluation.  Counseling and educating the patient/family/caregiver.  Ordering medications, tests, or procedures.  Referring and communicating with other health care professionals.  Documenting clinical information in EPIC.  Independently interpreting results and communicating results to patient/family/caregiver.  Care coordination.  Please note that this dictation was created using voice recognition software. I have made  every reasonable attempt to correct obvious errors, but I expect that there are errors of grammar and possibly context that I did not discover before finalizing the note.    VTE prophylaxis:   SCDs/TEDs   heparin ppx      I have performed a physical exam and reviewed and updated ROS and Plan today (9/16/2023). In review of yesterday's note (9/15/2023), there are no changes except as documented above.

## 2023-09-16 NOTE — CARE PLAN
The patient is Stable - Low risk of patient condition declining or worsening    Shift Goals  Clinical Goals: Monitor Na levels  Patient Goals: rest  Family Goals: delvis    Progress made toward(s) clinical / shift goals:    Problem: Fall Risk  Goal: Patient will remain free from falls  Outcome: Progressing       Patient is not progressing towards the following goals:

## 2023-09-16 NOTE — CARE PLAN
Problem: Fall Risk  Goal: Patient will remain free from falls  9/16/2023 0458 by Estrella Hicks R.N.  Outcome: Progressing  9/16/2023 0457 by Estrella Hicks R.EKATERINA.  Outcome: Progressing   The patient is Watcher - Medium risk of patient condition declining or worsening    Shift Goals  Clinical Goals: Monitor Na levels  Patient Goals: rest  Family Goals: delvis    Progress made toward(s) clinical / shift goals:      Patient is not progressing towards the following goals:

## 2023-09-16 NOTE — PROGRESS NOTES
"[facility] -  PROGRESS NOTE               Author: Poli Torres M.D. Date & Time: 9/16/2023  12:26 PM     HPI:  89 yo F PMH HTN who presented to ED with CC as above.  She was dx with shingles about 7 days PTA and was started on oral acyclovir by PCP.  Since then she has developed poor appetite and nausea with dry heaving.  As a result of this generalized fatigue, body aches, myalgias have developed also.  No chronic hx of hyponatremia but has been dx with it in past but never critically low.  Labs in ED showed SNa ~ 114 with normal renal fxn and elevated SBP ~ 233.  WOrk up in ED included a CT which showed changes c/w cirrhosis.  BNP elevated ~ 1600.  She was given 500 mL bolus of LR and admitted to ICU for further evaluation.  SNa slowly creeping up ~ 117 on most recent check.  She otherwise denies any F/C/CP/SOB.  No melena, hematochezia, hematemesis.  No HA, visual changes, or abdominal pain.    DAILY NEPHROLOGY SUMMARY:  9/14: Consult done  9/15: NAEO, no complaints; SNa ~ 120 this AM, feels a bit better  9/16: No acute events, serum sodium to 124 early AM and then back down to 122 at 6am, on NS at 100cc/hr    REVIEW OF SYSTEMS:    10 point ROS reviewed and is as per HPI/daily summary or otherwise negative    PMH/PSH/SH/FH:   Reviewed and unchanged since admission note    CURRENT MEDICATIONS:   Reviewed from admission to present day    VS:  /60   Pulse 73   Temp 37.2 °C (98.9 °F)   Resp 18   Ht 1.499 m (4' 11\")   Wt 70 kg (154 lb 5.2 oz)   SpO2 93%   BMI 31.17 kg/m²     Physical Exam  Nursing note reviewed.   Constitutional:       Appearance: She is ill-appearing.      Interventions: Nasal cannula in place.   Eyes:      General: No scleral icterus.  Cardiovascular:      Comments: Trace edema  Pulmonary:      Effort: Pulmonary effort is normal.   Abdominal:      General: There is no distension.   Musculoskeletal:         General: No deformity.   Skin:     Findings: No " rash.   Neurological:      Mental Status: She is alert.   Psychiatric:         Behavior: Behavior is cooperative.       Fluids:  In: 1218.7 [I.V.:1218.7]  Out: 350     LABS:  Recent Labs     09/14/23  0455 09/14/23  0856 09/15/23  0825 09/15/23  1423 09/15/23  2147 09/16/23  0206 09/16/23  0618   SODIUM 115*   < > 120*   < > 123* 124* 122*   POTASSIUM 3.9  --  3.3*  --   --  3.4*  --    CHLORIDE 83*  --  89*  --   --  93*  --    CO2 22  --  20  --   --  19*  --    GLUCOSE 128*  --  147*  --   --  118*  --    BUN 15  --  16  --   --  18  --    CREATININE 0.87  --  0.83  --   --  1.00  --    CALCIUM 7.9*  --  7.5*  --   --  7.4*  --     < > = values in this interval not displayed.         IMAGING:   All imaging reviewed from admission to present day    IMPRESSION:  # HORACE: cr from 0.6 to 1. Oliguria.  # Hyponatremia  - Etiology unclear  - Initially SIADH favored with infection, antiviral meds hx  - Oral acyclovir less likely to cause nephrotoxicity vs IV  - Goal to get SNa ~ 125 for today 9/15  - +NSAID use as well as OP  # Dizziness  - Could be manifestation of HTN  # HTN  - Goal BP < 140/90  - Not at goal on admit  # Shingles  - On acyclovir as OP  # Cirrhosis  - Noted on imaging, no hx of it likely first dx  - Could be playing a role in her past hypoNa episode     PLAN:  - Stop IVF, suspect now volume up  -may need dose of lasix tomorrow   - SNa Q6, goal to get serum sodium to ~130 by 9/17 am  -1L fluid restriction  -repeat urine osom and sodium  - Daily labs  - If autocorrects too fast add D5W to bring SNa down  - Encourage high solute meals/drinks, avoid/minimize free water    Thank you

## 2023-09-17 PROBLEM — B02.9 SHINGLES: Status: ACTIVE | Noted: 2023-09-17

## 2023-09-17 LAB
SODIUM SERPL-SCNC: 120 MMOL/L (ref 135–145)
SODIUM SERPL-SCNC: 124 MMOL/L (ref 135–145)
SODIUM SERPL-SCNC: 124 MMOL/L (ref 135–145)
SODIUM SERPL-SCNC: 126 MMOL/L (ref 135–145)

## 2023-09-17 PROCEDURE — 99233 SBSQ HOSP IP/OBS HIGH 50: CPT | Performed by: HOSPITALIST

## 2023-09-17 PROCEDURE — 700102 HCHG RX REV CODE 250 W/ 637 OVERRIDE(OP): Performed by: HOSPITALIST

## 2023-09-17 PROCEDURE — 700111 HCHG RX REV CODE 636 W/ 250 OVERRIDE (IP): Mod: JZ | Performed by: HOSPITALIST

## 2023-09-17 PROCEDURE — A9270 NON-COVERED ITEM OR SERVICE: HCPCS | Performed by: HOSPITALIST

## 2023-09-17 PROCEDURE — 700111 HCHG RX REV CODE 636 W/ 250 OVERRIDE (IP): Mod: JZ | Performed by: STUDENT IN AN ORGANIZED HEALTH CARE EDUCATION/TRAINING PROGRAM

## 2023-09-17 PROCEDURE — 36415 COLL VENOUS BLD VENIPUNCTURE: CPT

## 2023-09-17 PROCEDURE — 84295 ASSAY OF SERUM SODIUM: CPT

## 2023-09-17 PROCEDURE — 700102 HCHG RX REV CODE 250 W/ 637 OVERRIDE(OP): Performed by: STUDENT IN AN ORGANIZED HEALTH CARE EDUCATION/TRAINING PROGRAM

## 2023-09-17 PROCEDURE — A9270 NON-COVERED ITEM OR SERVICE: HCPCS | Performed by: STUDENT IN AN ORGANIZED HEALTH CARE EDUCATION/TRAINING PROGRAM

## 2023-09-17 PROCEDURE — 700111 HCHG RX REV CODE 636 W/ 250 OVERRIDE (IP): Performed by: STUDENT IN AN ORGANIZED HEALTH CARE EDUCATION/TRAINING PROGRAM

## 2023-09-17 PROCEDURE — 770020 HCHG ROOM/CARE - TELE (206)

## 2023-09-17 RX ORDER — FUROSEMIDE 10 MG/ML
40 INJECTION INTRAMUSCULAR; INTRAVENOUS ONCE
Status: COMPLETED | OUTPATIENT
Start: 2023-09-17 | End: 2023-09-17

## 2023-09-17 RX ORDER — POTASSIUM CHLORIDE 20 MEQ/1
20 TABLET, EXTENDED RELEASE ORAL DAILY
Status: DISCONTINUED | OUTPATIENT
Start: 2023-09-17 | End: 2023-09-18

## 2023-09-17 RX ORDER — ENOXAPARIN SODIUM 100 MG/ML
40 INJECTION SUBCUTANEOUS DAILY
Status: DISCONTINUED | OUTPATIENT
Start: 2023-09-17 | End: 2023-09-22 | Stop reason: HOSPADM

## 2023-09-17 RX ORDER — CARVEDILOL 6.25 MG/1
6.25 TABLET ORAL 2 TIMES DAILY WITH MEALS
Status: DISCONTINUED | OUTPATIENT
Start: 2023-09-17 | End: 2023-09-21

## 2023-09-17 RX ADMIN — ONDANSETRON 4 MG: 2 INJECTION INTRAMUSCULAR; INTRAVENOUS at 23:08

## 2023-09-17 RX ADMIN — SUCRALFATE 1 G: 1 SUSPENSION ORAL at 18:14

## 2023-09-17 RX ADMIN — LORAZEPAM 0.5 MG: 2 INJECTION INTRAMUSCULAR; INTRAVENOUS at 07:53

## 2023-09-17 RX ADMIN — ENOXAPARIN SODIUM 40 MG: 100 INJECTION SUBCUTANEOUS at 18:13

## 2023-09-17 RX ADMIN — SUCRALFATE 1 G: 1 SUSPENSION ORAL at 12:16

## 2023-09-17 RX ADMIN — POTASSIUM CHLORIDE 20 MEQ: 1500 TABLET, EXTENDED RELEASE ORAL at 18:13

## 2023-09-17 RX ADMIN — LOSARTAN POTASSIUM 100 MG: 50 TABLET, FILM COATED ORAL at 05:51

## 2023-09-17 RX ADMIN — OMEPRAZOLE 20 MG: 20 CAPSULE, DELAYED RELEASE ORAL at 05:48

## 2023-09-17 RX ADMIN — CARVEDILOL 6.25 MG: 6.25 TABLET, FILM COATED ORAL at 18:13

## 2023-09-17 RX ADMIN — SENNOSIDES AND DOCUSATE SODIUM 2 TABLET: 50; 8.6 TABLET ORAL at 05:48

## 2023-09-17 RX ADMIN — FUROSEMIDE 40 MG: 10 INJECTION INTRAMUSCULAR; INTRAVENOUS at 14:05

## 2023-09-17 RX ADMIN — HEPARIN SODIUM 5000 UNITS: 5000 INJECTION, SOLUTION INTRAVENOUS; SUBCUTANEOUS at 14:05

## 2023-09-17 RX ADMIN — ONDANSETRON 4 MG: 2 INJECTION INTRAMUSCULAR; INTRAVENOUS at 06:01

## 2023-09-17 RX ADMIN — SUCRALFATE 1 G: 1 SUSPENSION ORAL at 00:45

## 2023-09-17 RX ADMIN — HEPARIN SODIUM 5000 UNITS: 5000 INJECTION, SOLUTION INTRAVENOUS; SUBCUTANEOUS at 05:48

## 2023-09-17 RX ADMIN — CARVEDILOL 3.12 MG: 6.25 TABLET, FILM COATED ORAL at 09:46

## 2023-09-17 RX ADMIN — SUCRALFATE 1 G: 1 SUSPENSION ORAL at 23:08

## 2023-09-17 RX ADMIN — ACETAMINOPHEN 650 MG: 325 TABLET, FILM COATED ORAL at 09:45

## 2023-09-17 RX ADMIN — SUCRALFATE 1 G: 1 SUSPENSION ORAL at 05:48

## 2023-09-17 RX ADMIN — SENNOSIDES AND DOCUSATE SODIUM 2 TABLET: 50; 8.6 TABLET ORAL at 18:14

## 2023-09-17 ASSESSMENT — ENCOUNTER SYMPTOMS
CHILLS: 0
SHORTNESS OF BREATH: 0
FEVER: 0

## 2023-09-17 ASSESSMENT — PAIN DESCRIPTION - PAIN TYPE
TYPE: ACUTE PAIN
TYPE: ACUTE PAIN

## 2023-09-17 NOTE — PROGRESS NOTES
1930 Patent transferred to room S191 via bed on tele monitor. IV flushed and patent, patient c/o no pain at time, vitals stable see flow sheet.  Personal belongings  transferred with patient. No signs of distress noted during transfer.

## 2023-09-17 NOTE — PROGRESS NOTES
Monitor Summary: SR 60-63, ND .18, QRS .08, QT .34 with occasional PVC,frequent PAC per strip from monitor room

## 2023-09-17 NOTE — CARE PLAN
Problem: Knowledge Deficit - Standard  Goal: Patient and family/care givers will demonstrate understanding of plan of care, disease process/condition, diagnostic tests and medications  Outcome: Progressing     Problem: Fall Risk  Goal: Patient will remain free from falls  Outcome: Progressing   The patient is Stable - Low risk of patient condition declining or worsening    Shift Goals  Clinical Goals: monitor Na levels, control nausea  Patient Goals: rest, nausea and pain control  Family Goals: delvis    Progress made toward(s) clinical / shift goals:  patient a+o x 4, 5/10 rlq pain on initial assessment then 4/10 lower back pain around 0945 but denies rlq pain - acetaminophen admin a/o - will reassess within the hour, +nausea this am - iv ativan admin with good effect, patient eating breakfast presently, oxygen removed 2/2 97% on 2lpm via nc - now 92% on room air, no needs at this time, poc ongoing    Patient is not progressing towards the following goals: n/a    Fall precautions/hourly rounding maintained, call light within reach and functioning, all items within reach.  Patient encouraged to call for assistance, poc reviewed with patient, ?'s/concerns answered. Bed alarm active.

## 2023-09-17 NOTE — PROGRESS NOTES
4 Eyes Skin Assessment Completed by DAVID Jauregui and DAVID Teague.    Head WDL  Ears Redness and Blanching  Nose Redness  Mouth WDL  Neck WDL  Breast/Chest WDL  Shoulder Blades WDL  Spine WDL  (R) Arm/Elbow/Hand WDL  (L) Arm/Elbow/Hand WDL  Abdomen Redness and Bruising  Groin WDL  Scrotum/Coccyx/Buttocks Redness, Blanching, and Moisture Fissure  (R) Leg WDL  (L) Leg WDL  (R) Heel/Foot/Toe Redness, Blanching, and Boggy  (L) Heel/Foot/Toe Redness, Blanching, and Boggy          Devices In Places Tele Box, Pulse Ox, and Nasal Cannula, Purewick      Interventions In Place Gray Ear Foams, Heel Mepilex, Pillows, Elbow Mepilex, Q2 Turns, Barrier Cream, and Pressure Redistribution Mattress    Possible Skin Injury Yes    Pictures Uploaded Into Epic N/A  Wound Consult Placed N/A  RN Wound Prevention Protocol Ordered No

## 2023-09-17 NOTE — CARE PLAN
The patient is Stable - Low risk of patient condition declining or worsening    Shift Goals  Clinical Goals: Monitor Na levels  Patient Goals: rest  Family Goals: BRIAN    Progress made toward(s) clinical / shift goals:    Problem: Fall Risk  Goal: Patient will remain free from falls  Outcome: Progressing     Problem: Pain - Standard  Goal: Alleviation of pain or a reduction in pain to the patient’s comfort goal  Outcome: Progressing     Problem: Skin Integrity  Goal: Skin integrity is maintained or improved  Outcome: Progressing       Patient is not progressing towards the following goals:

## 2023-09-17 NOTE — PROGRESS NOTES
"[facility] -  PROGRESS NOTE               Author: Helene Humphrey M.D. Date & Time: 9/18/2023  11:05 AM     HPI:  87 yo F PMH HTN who presented to ED with CC as above.  She was dx with shingles about 7 days PTA and was started on oral acyclovir by PCP.  Since then she has developed poor appetite and nausea with dry heaving.  As a result of this generalized fatigue, body aches, myalgias have developed also.  No chronic hx of hyponatremia but has been dx with it in past but never critically low.  Labs in ED showed SNa ~ 114 with normal renal fxn and elevated SBP ~ 233.  WOrk up in ED included a CT which showed changes c/w cirrhosis.  BNP elevated ~ 1600.  She was given 500 mL bolus of LR and admitted to ICU for further evaluation.  SNa slowly creeping up ~ 117 on most recent check.  She otherwise denies any F/C/CP/SOB.  No melena, hematochezia, hematemesis.  No HA, visual changes, or abdominal pain.     DAILY NEPHROLOGY SUMMARY:  9/14: Consult done  9/15: NAEO, no complaints; SNa ~ 120 this AM, feels a bit better  9/16: No acute events, serum sodium to 124 early AM and then back down to 122 at 6am, on NS at 100cc/hr  9/17: , Bp elevated. UOP is not documented, patient is comfortable   9/18:  S Na initially improved with IVF and then dropped to 120 , IVF discontinued yesterday and given IV Lasix 40 mg once, reports feeling weak otherwise no other c/o, started on salt tabs this am       REVIEW OF SYSTEMS:    10 point ROS reviewed and is as per HPI/daily summary or otherwise negative    PMH/PSH/SH/FH:   Reviewed and unchanged since admission note    CURRENT MEDICATIONS:   Reviewed from admission to present day    VS:  BP (!) 125/35   Pulse 67   Temp 36.5 °C (97.7 °F) (Temporal)   Resp 17   Ht 1.499 m (4' 11\")   Wt 72.5 kg (159 lb 13.3 oz)   SpO2 94%   BMI 32.28 kg/m²     Physical Exam  Nursing note reviewed.   Constitutional:       Appearance: Frail     Interventions: Nasal " cannula in place.   Eyes:      General: No scleral icterus.  Cardiovascular:      Comments: No edema  Pulmonary:      Effort: Pulmonary effort is normal.   Abdominal:      General: There is no distension.   Musculoskeletal:         General: No deformity.   Skin:     Findings: No rash.   Neurological:      Mental Status: She is alert.   Psychiatric:         Behavior: Behavior is cooperative.   Fluids:  In: 480 [P.O.:480]  Out: 800     LABS:  Recent Labs     09/16/23  0206 09/16/23  0618 09/17/23  1752 09/18/23  0050 09/18/23  0657   SODIUM 124*   < > 124* 124* 124*   POTASSIUM 3.4*  --   --  3.0*  --    CHLORIDE 93*  --   --  92*  --    CO2 19*  --   --  18*  --    GLUCOSE 118*  --   --  120*  --    BUN 18  --   --  18  --    CREATININE 1.00  --   --  0.92  --    CALCIUM 7.4*  --   --  7.8*  --     < > = values in this interval not displayed.       IMAGING:   All imaging reviewed from admission to present day    IMPRESSION:  # HORACE: cr from 0.6 to 1. Oliguria.  # Hyponatremia, hypotonic  - Baseline Serum Na ~128-130   - Serum Na on admission 114 9/13   - TSH, cortisol  WNL  - No signs of overt hypervolemia on exam , appears to be euvolemic on exam   - Etiology unclear  - Initially SIADH favored with infection, antiviral meds   - U Osm 495 > S Osm on admission, urine lytes not available for review    - Oral acyclovir less likely to cause nephrotoxicity vs IV  - +NSAID use as well as OP  - ECHO 1/22 unremarkable   - CXR on admission  with pulmonary edema and/or infiltrates and     cardiomegaly  # HTN  - Goal BP < 140/90  - Not at goal on admit  # Shingles  - On acyclovir as OP  # Cirrhosis  - Noted on imaging, no hx of it likely first dx  - Could be playing a role in her past hypoNa episode  # Hypokalemia      PLAN:  - Need accurate I/O    - Replace K  - SNa Q8, goal increase serum sodium to ~128- 130 by 9/19 am  -1 .2 L fluid restriction  - Check BNP   - Hold off on further diuretics today   - Ct Nacl 1 g tid for now    - Trend serum bicarb   -Check urine lytes  - Daily labs  - If autocorrects too fast add D5W to bring SNa down    U Na 25 despite Rx with IV lasix yesterday   U K 37   U osm 445   Hyponatremia likely related to cirrhotic pathophysiology   Goal increase PNa by 4-6 mEq/L per 24h.   Would minimize salt tabs   Consider lasix 20 mg /d

## 2023-09-17 NOTE — PROGRESS NOTES
Hospital Medicine Daily Progress Note    Date of Service  9/17/2023    Chief Complaint  Grisel Saha is a 88 y.o. female admitted 9/13/2023 with hyponatremia and generalized malaise    Hospital Course  88 y.o. female who presented 9/13/2023 with fatigue malaise  Grisel Saha is a 88 y.o. female, with history of HTN, recently diagnosed with shingles to her right mid abdomen/back 7 days ago, started on antiviral therapy and ever since with significant poor appetite, she presented to the emergency department accompanied by her daughter on 9/13/2023 with complains of generalized fatigue, body aches, nausea/dry heaving and abdominal distention.  Additionally, feeling somnolent more than usual.  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  Daughter states that she had hyponatremia prior but not sodium severely low.  Patient takes losartan for blood pressure control.     The patient was admitted to [facility] evaluated by nephrology initially started on IV fluids with improvement in her sodium levels.  She was transferred to neurology on 9/16/2023    Interval Problem Update      Patient transferred from [facility] to neurology  I reviewed hospitalization course consulted notes and test results since admission  Patient is alert oriented x3 on 2 L of oxygen  I reviewed sodium levels 126-124-120  Treated with 1 dose of IV Lasix  Reviewed with patient and daughter increasing electrolyte rich solutions and restricting free water  Discussed with nursing staff mobilization and weaning off oxygen as tolerated  Total time spent reviewing coordinating medical care 52 minutes      I have discussed this patient's plan of care and discharge plan at IDT rounds today with Case Management, Nursing, Nursing leadership, and other members of the IDT team.    Consultants/Specialty  nephrology    Code Status  Full Code    Disposition  The patient is not medically cleared for discharge to home or a post-acute facility.      I have  placed the appropriate orders for post-discharge needs.    Review of Systems  Review of Systems   Constitutional:  Positive for malaise/fatigue. Negative for chills and fever.   Respiratory:  Negative for shortness of breath.    Cardiovascular:  Negative for chest pain.   All other systems reviewed and are negative.       Physical Exam  Temp:  [36.5 °C (97.7 °F)-36.9 °C (98.5 °F)] 36.5 °C (97.7 °F)  Pulse:  [60-85] 82  Resp:  [16-19] 17  BP: (116-161)/(45-74) 155/70  SpO2:  [93 %-98 %] 93 %    Physical Exam  Vitals and nursing note reviewed.   Constitutional:       General: She is not in acute distress.     Appearance: She is ill-appearing.   HENT:      Head: Normocephalic and atraumatic.      Nose: Nose normal. No rhinorrhea.      Mouth/Throat:      Pharynx: No oropharyngeal exudate or posterior oropharyngeal erythema.   Eyes:      General:         Right eye: No discharge.         Left eye: No discharge.   Cardiovascular:      Rate and Rhythm: Normal rate and regular rhythm.      Heart sounds: Normal heart sounds. No murmur heard.     No friction rub. No gallop.   Pulmonary:      Effort: Pulmonary effort is normal. No respiratory distress.      Breath sounds: No stridor. Decreased breath sounds present. No wheezing, rhonchi or rales.   Chest:      Chest wall: No tenderness.   Abdominal:      General: Bowel sounds are normal. There is no distension.      Palpations: Abdomen is soft. There is no mass.      Tenderness: There is no abdominal tenderness. There is no rebound.   Musculoskeletal:         General: No swelling or tenderness.      Cervical back: Neck supple. No rigidity.   Skin:     General: Skin is warm and dry.      Coloration: Skin is not cyanotic or jaundiced.      Nails: There is no clubbing.      Comments: Rash from shingles crusted with no vesicles no surrounding erythema no drainage   Neurological:      General: No focal deficit present.      Mental Status: She is alert and oriented to person, place,  and time.      Cranial Nerves: No cranial nerve deficit.      Motor: No weakness.   Psychiatric:         Mood and Affect: Mood normal.         Behavior: Behavior normal.         Fluids    Intake/Output Summary (Last 24 hours) at 9/17/2023 1427  Last data filed at 9/17/2023 1000  Gross per 24 hour   Intake 776.26 ml   Output 250 ml   Net 526.26 ml         Laboratory  Recent Labs     09/15/23  0131   WBC 8.7   RBC 3.91*   HEMOGLOBIN 12.3   HEMATOCRIT 32.9*   MCV 84.1   MCH 31.5   MCHC 37.4*   RDW 36.5   PLATELETCT 296   MPV 9.4       Recent Labs     09/15/23  0825 09/15/23  1423 09/16/23  0206 09/16/23  0618 09/17/23  0013 09/17/23  0556 09/17/23  1256   SODIUM 120*   < > 124*   < > 126* 124* 120*   POTASSIUM 3.3*  --  3.4*  --   --   --   --    CHLORIDE 89*  --  93*  --   --   --   --    CO2 20  --  19*  --   --   --   --    GLUCOSE 147*  --  118*  --   --   --   --    BUN 16  --  18  --   --   --   --    CREATININE 0.83  --  1.00  --   --   --   --    CALCIUM 7.5*  --  7.4*  --   --   --   --     < > = values in this interval not displayed.                     Imaging  CT-CHEST,ABDOMEN,PELVIS WITH   Final Result         1.  Diverticulosis   2.  Hepatomegaly   3.  Irregular hepatic contour compatible changes of cirrhosis   4.  Atherosclerosis and atherosclerotic coronary artery disease      CT-HEAD W/O   Final Result         1.  No acute intracranial abnormality is identified, there are nonspecific white matter changes, commonly associated with small vessel ischemic disease.  Associated mild cerebral atrophy is noted.   2.  Atherosclerosis.         DX-CHEST-PORTABLE (1 VIEW)   Final Result         1.  Pulmonary edema and/or infiltrates.   2.  Right humeral head and metaphyseal fracture, age indeterminant, correlate with history.   3.  Cardiomegaly   4.  Atherosclerosis           Assessment/Plan  * Hyponatremia- (present on admission)  Assessment & Plan  -CU admission for severe hyponatremia and initial sodium of  114.  Nephrology following initially received IV fluids  Sodium improved to 126 but now downtrending.  Start IV Lasix 40 mg   Continue to trend sodium and if not trending back up consider adding salt tablets  Continue free fluid restriction and reviewed with the patient getting most hydration from electrolyte rich fluids.  Continue to monitor sodium levels every 6 hours      Shingles  Assessment & Plan  Completed antiviral treatment  Continue skin care    Pulmonary edema  Assessment & Plan  Monitor with diuresis  Wean off oxygen as tolerated    Hypokalemia  Assessment & Plan  I ordered potassium repletion  I ordered repeat electrolytes including magnesium    Essential hypertension- (present on admission)  Assessment & Plan  Uncontrolled I increase carvedilol  Continue losartan  Monitor with Lasix             VTE prophylaxis:    enoxaparin ppx      I have performed a physical exam and reviewed and updated ROS and Plan today (9/17/2023). In review of yesterday's note (9/16/2023), there are no changes except as documented above.

## 2023-09-17 NOTE — PROGRESS NOTES
"[facility] -  PROGRESS NOTE               Author: Elizabeth Aguilar M.D. Date & Time: 9/17/2023  1:05 PM     HPI:  87 yo F PMH HTN who presented to ED with CC as above.  She was dx with shingles about 7 days PTA and was started on oral acyclovir by PCP.  Since then she has developed poor appetite and nausea with dry heaving.  As a result of this generalized fatigue, body aches, myalgias have developed also.  No chronic hx of hyponatremia but has been dx with it in past but never critically low.  Labs in ED showed SNa ~ 114 with normal renal fxn and elevated SBP ~ 233.  WOrk up in ED included a CT which showed changes c/w cirrhosis.  BNP elevated ~ 1600.  She was given 500 mL bolus of LR and admitted to ICU for further evaluation.  SNa slowly creeping up ~ 117 on most recent check.  She otherwise denies any F/C/CP/SOB.  No melena, hematochezia, hematemesis.  No HA, visual changes, or abdominal pain.     DAILY NEPHROLOGY SUMMARY:  9/14: Consult done  9/15: NAEO, no complaints; SNa ~ 120 this AM, feels a bit better  9/16: No acute events, serum sodium to 124 early AM and then back down to 122 at 6am, on NS at 100cc/hr  9/124: , Bp elevated. UOP is not documented, patient is comfortable     REVIEW OF SYSTEMS:    10 point ROS reviewed and is as per HPI/daily summary or otherwise negative    PMH/PSH/SH/FH:   Reviewed and unchanged since admission note    CURRENT MEDICATIONS:   Reviewed from admission to present day    VS:  BP (!) 155/70   Pulse 82   Temp 36.5 °C (97.7 °F) (Temporal)   Resp 17   Ht 1.499 m (4' 11\")   Wt 72.5 kg (159 lb 13.3 oz)   SpO2 93%   BMI 32.28 kg/m²     Physical Exam  Nursing note reviewed.   Constitutional:       Appearance: She is ill-appearing.      Interventions: Nasal cannula in place.   Eyes:      General: No scleral icterus.  Cardiovascular:      Comments: Trace edema  Pulmonary:      Effort: Pulmonary effort is normal.   Abdominal:      General: There is no " distension.   Musculoskeletal:         General: No deformity.   Skin:     Findings: No rash.   Neurological:      Mental Status: She is alert.   Psychiatric:         Behavior: Behavior is cooperative.   Fluids:  In: 1266.4 [I.V.:1266.4]  Out: 250     LABS:  Recent Labs     09/15/23  0825 09/15/23  1423 09/16/23  0206 09/16/23  0618 09/16/23  1625 09/17/23  0013 09/17/23  0556   SODIUM 120*   < > 124*   < > 126* 126* 124*   POTASSIUM 3.3*  --  3.4*  --   --   --   --    CHLORIDE 89*  --  93*  --   --   --   --    CO2 20  --  19*  --   --   --   --    GLUCOSE 147*  --  118*  --   --   --   --    BUN 16  --  18  --   --   --   --    CREATININE 0.83  --  1.00  --   --   --   --    CALCIUM 7.5*  --  7.4*  --   --   --   --     < > = values in this interval not displayed.       IMAGING:   All imaging reviewed from admission to present day    IMPRESSION:  # HORACE: cr from 0.6 to 1. Oliguria.  # Hyponatremia  - Etiology unclear  - Initially SIADH favored with infection, antiviral meds hx  - Oral acyclovir less likely to cause nephrotoxicity vs IV  - Goal to get SNa ~ 125 for today 9/15  - +NSAID use as well as OP  # Dizziness  - Could be manifestation of HTN  # HTN  - Goal BP < 140/90  - Not at goal on admit  # Shingles  - On acyclovir as OP  # Cirrhosis  - Noted on imaging, no hx of it likely first dx  - Could be playing a role in her past hypoNa episode     PLAN:  - Stop IVF, suspect now volume up  -lasix 40 mg IV x 1 dose   - SNa Q6, goal to get serum sodium to ~130 by 9/18 am  -1L fluid restriction  -repeat urine osom and sodium  - Daily labs  - If autocorrects too fast add D5W to bring SNa down  - Encourage high solute meals/drinks, avoid/minimize free water

## 2023-09-18 ENCOUNTER — DOCUMENTATION (OUTPATIENT)
Dept: HEALTH INFORMATION MANAGEMENT | Facility: OTHER | Age: 88
End: 2023-09-18
Payer: MEDICARE

## 2023-09-18 LAB
ANION GAP SERPL CALC-SCNC: 14 MMOL/L (ref 7–16)
BUN SERPL-MCNC: 18 MG/DL (ref 8–22)
CALCIUM SERPL-MCNC: 7.8 MG/DL (ref 8.5–10.5)
CHLORIDE SERPL-SCNC: 92 MMOL/L (ref 96–112)
CO2 SERPL-SCNC: 18 MMOL/L (ref 20–33)
CREAT SERPL-MCNC: 0.92 MG/DL (ref 0.5–1.4)
ERYTHROCYTE [DISTWIDTH] IN BLOOD BY AUTOMATED COUNT: 37.2 FL (ref 35.9–50)
GFR SERPLBLD CREATININE-BSD FMLA CKD-EPI: 60 ML/MIN/1.73 M 2
GLUCOSE SERPL-MCNC: 120 MG/DL (ref 65–99)
HCT VFR BLD AUTO: 29.8 % (ref 37–47)
HGB BLD-MCNC: 10.9 G/DL (ref 12–16)
MAGNESIUM SERPL-MCNC: 1.4 MG/DL (ref 1.5–2.5)
MCH RBC QN AUTO: 31 PG (ref 27–33)
MCHC RBC AUTO-ENTMCNC: 36.6 G/DL (ref 32.2–35.5)
MCV RBC AUTO: 84.7 FL (ref 81.4–97.8)
NT-PROBNP SERPL IA-MCNC: 607 PG/ML (ref 0–125)
OSMOLALITY UR: 445 MOSM/KG H2O (ref 300–900)
PHOSPHATE SERPL-MCNC: 3.3 MG/DL (ref 2.5–4.5)
PLATELET # BLD AUTO: 319 K/UL (ref 164–446)
PMV BLD AUTO: 9.7 FL (ref 9–12.9)
POTASSIUM SERPL-SCNC: 3 MMOL/L (ref 3.6–5.5)
POTASSIUM UR-SCNC: 37 MMOL/L
RBC # BLD AUTO: 3.52 M/UL (ref 4.2–5.4)
SODIUM SERPL-SCNC: 124 MMOL/L (ref 135–145)
SODIUM SERPL-SCNC: 124 MMOL/L (ref 135–145)
SODIUM SERPL-SCNC: 125 MMOL/L (ref 135–145)
SODIUM SERPL-SCNC: 125 MMOL/L (ref 135–145)
SODIUM UR-SCNC: 25 MMOL/L
WBC # BLD AUTO: 10.2 K/UL (ref 4.8–10.8)

## 2023-09-18 PROCEDURE — 700111 HCHG RX REV CODE 636 W/ 250 OVERRIDE (IP): Mod: JZ

## 2023-09-18 PROCEDURE — 700102 HCHG RX REV CODE 250 W/ 637 OVERRIDE(OP): Mod: JZ

## 2023-09-18 PROCEDURE — 84295 ASSAY OF SERUM SODIUM: CPT

## 2023-09-18 PROCEDURE — 83880 ASSAY OF NATRIURETIC PEPTIDE: CPT

## 2023-09-18 PROCEDURE — 83735 ASSAY OF MAGNESIUM: CPT

## 2023-09-18 PROCEDURE — 83935 ASSAY OF URINE OSMOLALITY: CPT

## 2023-09-18 PROCEDURE — 80048 BASIC METABOLIC PNL TOTAL CA: CPT

## 2023-09-18 PROCEDURE — 36415 COLL VENOUS BLD VENIPUNCTURE: CPT

## 2023-09-18 PROCEDURE — 770020 HCHG ROOM/CARE - TELE (206)

## 2023-09-18 PROCEDURE — 85027 COMPLETE CBC AUTOMATED: CPT

## 2023-09-18 PROCEDURE — 700111 HCHG RX REV CODE 636 W/ 250 OVERRIDE (IP): Performed by: STUDENT IN AN ORGANIZED HEALTH CARE EDUCATION/TRAINING PROGRAM

## 2023-09-18 PROCEDURE — 84100 ASSAY OF PHOSPHORUS: CPT

## 2023-09-18 PROCEDURE — A9270 NON-COVERED ITEM OR SERVICE: HCPCS | Performed by: STUDENT IN AN ORGANIZED HEALTH CARE EDUCATION/TRAINING PROGRAM

## 2023-09-18 PROCEDURE — A9270 NON-COVERED ITEM OR SERVICE: HCPCS | Performed by: HOSPITALIST

## 2023-09-18 PROCEDURE — 700111 HCHG RX REV CODE 636 W/ 250 OVERRIDE (IP): Mod: JZ | Performed by: HOSPITALIST

## 2023-09-18 PROCEDURE — 700102 HCHG RX REV CODE 250 W/ 637 OVERRIDE(OP): Performed by: HOSPITALIST

## 2023-09-18 PROCEDURE — 99232 SBSQ HOSP IP/OBS MODERATE 35: CPT | Performed by: HOSPITALIST

## 2023-09-18 PROCEDURE — A9270 NON-COVERED ITEM OR SERVICE: HCPCS | Mod: JZ

## 2023-09-18 PROCEDURE — 700102 HCHG RX REV CODE 250 W/ 637 OVERRIDE(OP): Performed by: STUDENT IN AN ORGANIZED HEALTH CARE EDUCATION/TRAINING PROGRAM

## 2023-09-18 PROCEDURE — 84133 ASSAY OF URINE POTASSIUM: CPT

## 2023-09-18 PROCEDURE — 97116 GAIT TRAINING THERAPY: CPT

## 2023-09-18 PROCEDURE — 84300 ASSAY OF URINE SODIUM: CPT

## 2023-09-18 RX ORDER — SODIUM BICARBONATE 650 MG/1
325 TABLET ORAL 2 TIMES DAILY
Status: DISCONTINUED | OUTPATIENT
Start: 2023-09-18 | End: 2023-09-18

## 2023-09-18 RX ORDER — SODIUM CHLORIDE 1 G/1
1 TABLET ORAL
Status: DISCONTINUED | OUTPATIENT
Start: 2023-09-18 | End: 2023-09-19

## 2023-09-18 RX ORDER — MAGNESIUM SULFATE HEPTAHYDRATE 40 MG/ML
2 INJECTION, SOLUTION INTRAVENOUS ONCE
Status: COMPLETED | OUTPATIENT
Start: 2023-09-18 | End: 2023-09-18

## 2023-09-18 RX ORDER — POTASSIUM CHLORIDE 20 MEQ/1
40 TABLET, EXTENDED RELEASE ORAL ONCE
Status: COMPLETED | OUTPATIENT
Start: 2023-09-18 | End: 2023-09-18

## 2023-09-18 RX ORDER — POTASSIUM CHLORIDE 20 MEQ/1
40 TABLET, EXTENDED RELEASE ORAL 2 TIMES DAILY
Status: COMPLETED | OUTPATIENT
Start: 2023-09-18 | End: 2023-09-18

## 2023-09-18 RX ADMIN — POTASSIUM CHLORIDE 20 MEQ: 1500 TABLET, EXTENDED RELEASE ORAL at 04:36

## 2023-09-18 RX ADMIN — POTASSIUM CHLORIDE 40 MEQ: 1500 TABLET, EXTENDED RELEASE ORAL at 17:34

## 2023-09-18 RX ADMIN — SUCRALFATE 1 G: 1 SUSPENSION ORAL at 11:25

## 2023-09-18 RX ADMIN — SUCRALFATE 1 G: 1 SUSPENSION ORAL at 04:35

## 2023-09-18 RX ADMIN — CARVEDILOL 6.25 MG: 6.25 TABLET, FILM COATED ORAL at 08:38

## 2023-09-18 RX ADMIN — HYDROMORPHONE HYDROCHLORIDE 0.5 MG: 1 INJECTION, SOLUTION INTRAMUSCULAR; INTRAVENOUS; SUBCUTANEOUS at 23:03

## 2023-09-18 RX ADMIN — SUCRALFATE 1 G: 1 SUSPENSION ORAL at 17:34

## 2023-09-18 RX ADMIN — SENNOSIDES AND DOCUSATE SODIUM 2 TABLET: 50; 8.6 TABLET ORAL at 04:37

## 2023-09-18 RX ADMIN — LOSARTAN POTASSIUM 100 MG: 50 TABLET, FILM COATED ORAL at 04:36

## 2023-09-18 RX ADMIN — SODIUM CHLORIDE 1 G: 1 TABLET ORAL at 11:25

## 2023-09-18 RX ADMIN — SODIUM CHLORIDE 1 G: 1 TABLET ORAL at 08:37

## 2023-09-18 RX ADMIN — OXYCODONE HYDROCHLORIDE 10 MG: 10 TABLET ORAL at 20:47

## 2023-09-18 RX ADMIN — CARVEDILOL 6.25 MG: 6.25 TABLET, FILM COATED ORAL at 17:33

## 2023-09-18 RX ADMIN — POTASSIUM CHLORIDE 40 MEQ: 1500 TABLET, EXTENDED RELEASE ORAL at 02:59

## 2023-09-18 RX ADMIN — OMEPRAZOLE 20 MG: 20 CAPSULE, DELAYED RELEASE ORAL at 04:36

## 2023-09-18 RX ADMIN — ENOXAPARIN SODIUM 40 MG: 100 INJECTION SUBCUTANEOUS at 17:34

## 2023-09-18 RX ADMIN — SODIUM CHLORIDE 1 G: 1 TABLET ORAL at 17:33

## 2023-09-18 RX ADMIN — MAGNESIUM HYDROXIDE 30 ML: 1200 LIQUID ORAL at 04:47

## 2023-09-18 RX ADMIN — MAGNESIUM SULFATE HEPTAHYDRATE 2 G: 2 INJECTION, SOLUTION INTRAVENOUS at 08:42

## 2023-09-18 RX ADMIN — MAGNESIUM SULFATE HEPTAHYDRATE 2 G: 2 INJECTION, SOLUTION INTRAVENOUS at 03:44

## 2023-09-18 ASSESSMENT — COGNITIVE AND FUNCTIONAL STATUS - GENERAL
MOVING TO AND FROM BED TO CHAIR: UNABLE
STANDING UP FROM CHAIR USING ARMS: A LITTLE
SUGGESTED CMS G CODE MODIFIER MOBILITY: CL
MOBILITY SCORE: 14
WALKING IN HOSPITAL ROOM: A LITTLE
TURNING FROM BACK TO SIDE WHILE IN FLAT BAD: UNABLE
CLIMB 3 TO 5 STEPS WITH RAILING: A LOT

## 2023-09-18 ASSESSMENT — ENCOUNTER SYMPTOMS
ABDOMINAL PAIN: 0
SHORTNESS OF BREATH: 0
NAUSEA: 1

## 2023-09-18 ASSESSMENT — GAIT ASSESSMENTS
DISTANCE (FEET): 4
DEVIATION: SHUFFLED GAIT;BRADYKINETIC
ASSISTIVE DEVICE: FRONT WHEEL WALKER
GAIT LEVEL OF ASSIST: CONTACT GUARD ASSIST

## 2023-09-18 ASSESSMENT — PAIN DESCRIPTION - PAIN TYPE: TYPE: ACUTE PAIN

## 2023-09-18 NOTE — PROGRESS NOTES
NOC HOSPITALIST CROSS COVER    Notified by RN of potassium level of 3.0 and a mag level of 1.4.      #Hypokalemia  -Replete with K-dur 40 mEq PO once in addition to previously ordered 20 mEq  -Mag sulfate 2 g IV once        ________________________________________________________________________________________  Electronically signed by:  HUGO Cornelius, AGACNP-BC

## 2023-09-18 NOTE — PROGRESS NOTES
Monitor summary: SR, HR 62-78, DE .14, QRS .06, QT .39 with F PVC, R BIG, R COUP,  R PAC per strip from monitor room

## 2023-09-18 NOTE — DISCHARGE PLANNING
HTH/SCP TCN chart review completed. Collaborated with BRADEN Rodriguez prior to meeting with the pt. The most current review of medical record, knowledge of pt's PLOF and social support, LACE+ score of 25, 6 clicks scores of 16 ADL's and 10 mobility were considered.      Pt seen at bedside. Introduced TCN program. Provided education regarding post acute levels of care. Discussed HTH/SCP plan benefits (Meds to Beds, medical uber and [facility] transitional care). Pt verbalizes understanding.     Patient states she lives at [facility] and was Modified independent with ADL's and required minimal assistance with bathing tasks.  She ambulated with a SPC indoors and a 4WW outdoors at baseline.  Discussed TCN program with patient and daughter Raven per patient request.  Patient states she wants to go back to [facility] and do HH services however per therapy notes patient is currently requiring Maximum assistance for bed mobility, moderate assistance with functional STS and transfers and unable to participate with gait.  [facility] will need to be contacted to see if patient can return at current level of function.  [facility] is following patient.     Choice proactively obtained for HH, faxed to [facility] and given to CM.  This TCN attempted to get SNF choice.  Patient and daughter want to wait to see if [facility] can take patient back at current level of function.  BRADEN Mcneil got IRF choice on 9/15/23.   This TCN discussed patients current level of function based therapy recs.  TCN to re-address SNF choice tomorrow.  TCN will continue to follow and collaborate with discharge planning team as additional post acute needs arise. Thank you.     Completed today:  PT/OT with recommendations for post-acute placement on 9/14/23 for additional physical therapy services prior to discharge home.    Choice obtained: HH on 9/17/23.  IRF obtained by BRADEN Mcneil on 9/15/23.     SCP with [facility] PCP.

## 2023-09-18 NOTE — PROGRESS NOTES
Assumed care of pt at 0700. Pt alert and oriented x4. Denies any pain or discomfort. Generalized weakness noted. Pt incontinent of urine; purewick set up to capture UA for nephrology. 1000mL fluid restriction maintained. Pt up to chair for meals. Bed low and locked, alarm set and call bell within reach. Hourly rounding continues.    Discussed need for precautions with Dr. Cleemnte Elaine given open areas of shingles. MD denies need at this time.

## 2023-09-18 NOTE — PROGRESS NOTES
Monitor Summary: SB/SR 51-85, ME .16, QRS .10, QT .45 with occasional PVC,rare PAC,trigem per strip from monitor room

## 2023-09-18 NOTE — DISCHARGE PLANNING
[facility]/SCP TCN chart review completed. Collaborated with BRADEN.  Current discharge considerations are longterm and HH. Noted per Kardex that patient able to 2 x 25 feet with HHA, which is significant progress from unable to mobilize with PT on 9/14.   Patient seen at bedside.  Patient reports strong preference for [facility] with HH, reports daughter is visiting today and can inform CM if [facility] can accommodate.  Patient reports that she would prefer not to go post acute placement for either SNF/IRF, but would prefer not to.  Educated patient that providing choice does not mean that choice is default, but provides patient options pending longterm unable to take her back with increased help, or IRF decline.  Patient verbalizes understanding, provided SNF choice for blanket referral, and will choose from accepting facilities.  Patient and TCN agree that this is backup plan for longterm and HH.      TCN will continue to follow and collaborate with discharge planning team as additional post acute needs arise. Thank you.    Completed:  PT/OT with recommendations for post-acute placement on 9/14/23 for additional physical therapy services prior to discharge home.    Choice obtained: IRF obtained by CM   , SNF obtained by TCN  Patient agreed to blanket referral, will choose post.   SCP with [facility] PCP.      *Update*  Patient seen at bedside with daughter present,  prefers to make choice for top 3 rather than blanket.  SNF choice obtained from pt/daughter. Daughter unsure if IRF vs. SNF as back up plan.

## 2023-09-18 NOTE — DISCHARGE PLANNING
Case Management Discharge Planning    Admission Date: 9/13/2023  GMLOS: 2.6  ALOS: 5    6-Clicks ADL Score: 16  6-Clicks Mobility Score: 14  PT and/or OT Eval ordered: Yes  Post-acute Referrals Ordered: Yes  Post-acute Choice Obtained: Yes  Has referral(s) been sent to post-acute provider:  Yes      Anticipated Discharge Dispo: Discharge Disposition: D/T to SNF with Medicare cert in anticipation of skilled care (03)  Discharge Address: [address] [place] [address] [address] [place]  Discharge Contact Phone Number: [phone]      Action(s) Taken:   SNF order placed per protocol.  Referrals sent to [facility] per choice form obtained by TCN Ty today.      Medically Clear: No    Next Steps: Follow up on referrals.    Barriers to Discharge: Medical clearance and Pending Placement

## 2023-09-18 NOTE — DISCHARGE PLANNING
Monitoring Na level.  Q6H Na draw is a barrier. Would appreciate updated TX evals once appropriate.

## 2023-09-18 NOTE — DOCUMENTATION QUERY
[facility]                                                                       Query Response Note      PATIENT:               HERBERT BOONE  ACCT #:                  [id number]  MRN:                     [id number]  :                      1935  ADMIT DATE:       2023 9:39 PM  DISCH DATE:          RESPONDING  PROVIDER #:        [id number]           QUERY TEXT:    Pulmonary edema is documented in the Medical Record. Can this diagnosis be further specified?      The patient's Clinical Indicators include:    88 y.o. F w/ hx HTN presents w/ hyponatremia, hypokalemia  CXR: Pulmonary edema and/or infiltrates.  H&P: Visualized pulmonary edema vs infiltrate on CXR. Not in fluid overload.   Procalcitonin: <0.05; Viral panel: NEG       NT -proBNP: 1663   HM PN: N/V likely contributing to increased heart rate/demand resulting in increased BNP.     NT -proBNP: 607    Treatment: Lasix 40 mg IV x 1; Coreg; Cozaar; O2; oximetry; lab/imaging  Risk Factors: HTN; advanced age; IVF; electrolyte repletion; tachycardia    Thank You,  Sabrina Zafar RN  Clinical    Connect via [employer]  Options provided:   -- Acute pulmonary edema - cardiac related, (please specify cardiac condition)   -- Acute Pulmonary Edema, Non-Cardiogenic   -- Chronic pulmonary edema - cardiac related, (please specify cardiac condition)   -- Chronic Pulmonary Edema, Non-Cardiogenic   -- Other explanation, (please specify other explanation)   -- Unable to determine      Query created by: Sabrina Zafar on 2023 3:43 PM    RESPONSE TEXT:    Chronic Pulmonary Edema, Non-Cardiogenic          Electronically signed by:  VALERIA VAUGHN MD 2023 3:49 PM

## 2023-09-18 NOTE — CARE PLAN
The patient is Stable - Low risk of patient condition declining or worsening    Shift Goals  Clinical Goals: monitor Na levels, control nausea  Patient Goals: rest, nausea and pain control  Family Goals: delvis    Progress made toward(s) clinical / shift goals:  Patient remains alert and oriented X  4. Neuro checks continued with no new complications observed. Patient continues sodium and electrolyte monitoring and replacement, as  ordered. Patient observed with decreased sodium, potassium and magnesium levels. Patient received oxygen at 1 L/min via nasal cannula.   Patient continues 1 L fluid restriction, as ordered. Patient assisted to ambulate to restroom.     Patient is not progressing towards the following goals: Patient has not had a bowel movement in 3 days.  Bowel regimen continues.      Problem: Knowledge Deficit - Standard  Goal: Patient and family/care givers will demonstrate understanding of plan of care, disease process/condition, diagnostic tests and medications  Outcome: Progressing     Problem: Fall Risk  Goal: Patient will remain free from falls  Outcome: Progressing     Problem: Pain - Standard  Goal: Alleviation of pain or a reduction in pain to the patient’s comfort goal  Outcome: Progressing     Problem: Skin Integrity  Goal: Skin integrity is maintained or improved  Outcome: Progressing     Problem: Neuro Status  Goal: Neuro status will remain stable or improve  Outcome: Progressing     Problem: Respiratory  Goal: Patient will achieve/maintain optimum respiratory ventilation and gas exchange  Outcome: Progressing     Problem: Urinary - Renal Perfusion  Goal: Ability to achieve and maintain adequate renal perfusion and functioning will improve  Outcome: Progressing

## 2023-09-18 NOTE — DISCHARGE PLANNING
Received Choice Form @: 1201  Agency/ Facility Name: [facility]  Referral Sent per Choice Form @: Not sent as there are no orders

## 2023-09-18 NOTE — CARE PLAN
The patient is Stable - Low risk of patient condition declining or worsening    Shift Goals  Clinical Goals: Monitor Na levels; improve mobility  Patient Goals: Rest  Family Goals: Get oob/work with PT    Progress made toward(s) clinical / shift goals:     Problem: Fall Risk  Goal: Patient will remain free from falls  Outcome: Progressing  Bed low and locked, alarm set, call bell within reach.     Problem: Skin Integrity  Goal: Skin integrity is maintained or improved  Outcome: Progressing  Q2h turning and positioning provided.       Patient is not progressing towards the following goals: N/A

## 2023-09-18 NOTE — THERAPY
Physical Therapy   Daily Treatment     Patient Name: Grisel Saha  Age:  88 y.o., Sex:  female  Medical Record #: [id number]  Today's Date: 9/18/2023          Assessment    Rec'd pt alert, in bed, pleasant and eager to work w/ PT.  Pt needs min assist today to sit eob, which is an improvement compared to last session, when she needed max assist.  Once sitting, she is able to maintain sitting balance w/o assist.  CGA to stand, again an improvement compared to needing mod assist.  She ambulated 4 ft w/ fww and cga.  Significant improvement noted today.    Plan    Treatment Plan Status: Continue Current Treatment Plan  Type of Treatment: Bed Mobility, Gait Training, Orthotics Training , Self Care / Home Evaluation, Stair Training, Therapeutic Activities, Therapeutic Exercise  Treatment Frequency: 3 Times per Week  Treatment Duration: Until Therapy Goals Met    DC Equipment Recommendations: Unable to determine at this time  Discharge Recommendations: Recommend post-acute placement for additional physical therapy services prior to discharge home (Post acute base on today's session, however anticipate that w/ increased time oob w/ nsg ie: for all meals, as well as PT, pt may indeed be able to d/c home w/ family support)        Objective       09/18/23 1149   Balance   Sitting Balance (Static) Fair   Sitting Balance (Dynamic) Fair   Standing Balance (Static) Fair -   Standing Balance (Dynamic) Fair -   Weight Shift Sitting Good   Weight Shift Standing Good   Skilled Intervention Verbal Cuing   Comments w/ fww   Bed Mobility    Supine to Sit Minimal Assist   Skilled Intervention Verbal Cuing;Tactile Cuing;Facilitation   Gait Analysis   Gait Level Of Assist Contact Guard Assist   Assistive Device Front Wheel Walker   Distance (Feet) 4   Deviation Shuffled Gait;Bradykinetic   Skilled Intervention Verbal Cuing;Tactile Cuing   Functional Mobility   Sit to Stand Contact Guard Assist   Bed, Chair, Wheelchair Transfer Contact  Guard Assist   Skilled Intervention Verbal Cuing;Tactile Cuing;Sequencing   Short Term Goals    Short Term Goal # 1 pt will move supine<>eob with spv in 6 tx for bed mobility.   Goal Outcome # 1 goal not met   Short Term Goal # 2 pt will complete spt with fww and spv in 6 tx for functional mobility.   Goal Outcome # 2 Goal not met   Short Term Goal # 3 pt will ambulate 150 ft with fww and spv in 6 tx for household distances.   Goal Outcome # 3 Goal not met   Physical Therapy Treatment Plan   Physical Therapy Treatment Plan Continue Current Treatment Plan   Anticipated Discharge Equipment and Recommendations   DC Equipment Recommendations Unable to determine at this time   Discharge Recommendations Recommend post-acute placement for additional physical therapy services prior to discharge home  (Post acute base on today's session, however anticipate that w/ increased time oob w/ nsg ie: for all meals, as well as PT, pt may indeed be able to d/c home w/ family support)

## 2023-09-19 LAB
ANION GAP SERPL CALC-SCNC: 9 MMOL/L (ref 7–16)
BASOPHILS # BLD AUTO: 0.9 % (ref 0–1.8)
BASOPHILS # BLD: 0.08 K/UL (ref 0–0.12)
BUN SERPL-MCNC: 18 MG/DL (ref 8–22)
CALCIUM SERPL-MCNC: 7.4 MG/DL (ref 8.5–10.5)
CHLORIDE SERPL-SCNC: 97 MMOL/L (ref 96–112)
CO2 SERPL-SCNC: 21 MMOL/L (ref 20–33)
CREAT SERPL-MCNC: 0.92 MG/DL (ref 0.5–1.4)
EOSINOPHIL # BLD AUTO: 0.26 K/UL (ref 0–0.51)
EOSINOPHIL NFR BLD: 3 % (ref 0–6.9)
ERYTHROCYTE [DISTWIDTH] IN BLOOD BY AUTOMATED COUNT: 40.4 FL (ref 35.9–50)
GFR SERPLBLD CREATININE-BSD FMLA CKD-EPI: 60 ML/MIN/1.73 M 2
GLUCOSE SERPL-MCNC: 130 MG/DL (ref 65–99)
HCT VFR BLD AUTO: 30.7 % (ref 37–47)
HGB BLD-MCNC: 10.7 G/DL (ref 12–16)
IMM GRANULOCYTES # BLD AUTO: 0.05 K/UL (ref 0–0.11)
IMM GRANULOCYTES NFR BLD AUTO: 0.6 % (ref 0–0.9)
LYMPHOCYTES # BLD AUTO: 1.81 K/UL (ref 1–4.8)
LYMPHOCYTES NFR BLD: 21.1 % (ref 22–41)
MAGNESIUM SERPL-MCNC: 2.2 MG/DL (ref 1.5–2.5)
MCH RBC QN AUTO: 31.4 PG (ref 27–33)
MCHC RBC AUTO-ENTMCNC: 34.9 G/DL (ref 32.2–35.5)
MCV RBC AUTO: 90 FL (ref 81.4–97.8)
MONOCYTES # BLD AUTO: 0.92 K/UL (ref 0–0.85)
MONOCYTES NFR BLD AUTO: 10.7 % (ref 0–13.4)
NEUTROPHILS # BLD AUTO: 5.45 K/UL (ref 1.82–7.42)
NEUTROPHILS NFR BLD: 63.7 % (ref 44–72)
NRBC # BLD AUTO: 0 K/UL
NRBC BLD-RTO: 0 /100 WBC (ref 0–0.2)
PLATELET # BLD AUTO: 303 K/UL (ref 164–446)
PMV BLD AUTO: 9.7 FL (ref 9–12.9)
POTASSIUM SERPL-SCNC: 4.3 MMOL/L (ref 3.6–5.5)
RBC # BLD AUTO: 3.41 M/UL (ref 4.2–5.4)
SODIUM SERPL-SCNC: 124 MMOL/L (ref 135–145)
SODIUM SERPL-SCNC: 127 MMOL/L (ref 135–145)
SODIUM SERPL-SCNC: 127 MMOL/L (ref 135–145)
SODIUM SERPL-SCNC: 128 MMOL/L (ref 135–145)
WBC # BLD AUTO: 8.6 K/UL (ref 4.8–10.8)

## 2023-09-19 PROCEDURE — 700111 HCHG RX REV CODE 636 W/ 250 OVERRIDE (IP): Mod: JZ | Performed by: HOSPITALIST

## 2023-09-19 PROCEDURE — 700102 HCHG RX REV CODE 250 W/ 637 OVERRIDE(OP): Performed by: INTERNAL MEDICINE

## 2023-09-19 PROCEDURE — 97530 THERAPEUTIC ACTIVITIES: CPT | Mod: CQ

## 2023-09-19 PROCEDURE — 97116 GAIT TRAINING THERAPY: CPT | Mod: CQ

## 2023-09-19 PROCEDURE — 84295 ASSAY OF SERUM SODIUM: CPT

## 2023-09-19 PROCEDURE — A9270 NON-COVERED ITEM OR SERVICE: HCPCS | Performed by: HOSPITALIST

## 2023-09-19 PROCEDURE — 700102 HCHG RX REV CODE 250 W/ 637 OVERRIDE(OP): Performed by: STUDENT IN AN ORGANIZED HEALTH CARE EDUCATION/TRAINING PROGRAM

## 2023-09-19 PROCEDURE — 700102 HCHG RX REV CODE 250 W/ 637 OVERRIDE(OP): Performed by: HOSPITALIST

## 2023-09-19 PROCEDURE — 97535 SELF CARE MNGMENT TRAINING: CPT

## 2023-09-19 PROCEDURE — 770020 HCHG ROOM/CARE - TELE (206)

## 2023-09-19 PROCEDURE — 700111 HCHG RX REV CODE 636 W/ 250 OVERRIDE (IP): Performed by: HOSPITALIST

## 2023-09-19 PROCEDURE — A9270 NON-COVERED ITEM OR SERVICE: HCPCS | Performed by: STUDENT IN AN ORGANIZED HEALTH CARE EDUCATION/TRAINING PROGRAM

## 2023-09-19 PROCEDURE — 99233 SBSQ HOSP IP/OBS HIGH 50: CPT | Performed by: INTERNAL MEDICINE

## 2023-09-19 PROCEDURE — 85025 COMPLETE CBC W/AUTO DIFF WBC: CPT

## 2023-09-19 PROCEDURE — 83735 ASSAY OF MAGNESIUM: CPT

## 2023-09-19 PROCEDURE — A9270 NON-COVERED ITEM OR SERVICE: HCPCS | Performed by: INTERNAL MEDICINE

## 2023-09-19 PROCEDURE — 80048 BASIC METABOLIC PNL TOTAL CA: CPT

## 2023-09-19 PROCEDURE — 36415 COLL VENOUS BLD VENIPUNCTURE: CPT

## 2023-09-19 RX ORDER — FUROSEMIDE 20 MG/1
20 TABLET ORAL
Status: DISCONTINUED | OUTPATIENT
Start: 2023-09-19 | End: 2023-09-20

## 2023-09-19 RX ADMIN — ENOXAPARIN SODIUM 40 MG: 100 INJECTION SUBCUTANEOUS at 16:34

## 2023-09-19 RX ADMIN — ONDANSETRON 4 MG: 4 TABLET, ORALLY DISINTEGRATING ORAL at 14:46

## 2023-09-19 RX ADMIN — OMEPRAZOLE 20 MG: 20 CAPSULE, DELAYED RELEASE ORAL at 04:48

## 2023-09-19 RX ADMIN — SUCRALFATE 1 G: 1 SUSPENSION ORAL at 08:58

## 2023-09-19 RX ADMIN — LOSARTAN POTASSIUM 100 MG: 50 TABLET, FILM COATED ORAL at 04:48

## 2023-09-19 RX ADMIN — OXYCODONE HYDROCHLORIDE 10 MG: 10 TABLET ORAL at 02:16

## 2023-09-19 RX ADMIN — SENNOSIDES AND DOCUSATE SODIUM 2 TABLET: 50; 8.6 TABLET ORAL at 04:48

## 2023-09-19 RX ADMIN — SUCRALFATE 1 G: 1 SUSPENSION ORAL at 16:33

## 2023-09-19 RX ADMIN — CARVEDILOL 6.25 MG: 6.25 TABLET, FILM COATED ORAL at 09:01

## 2023-09-19 RX ADMIN — SUCRALFATE 1 G: 1 SUSPENSION ORAL at 12:02

## 2023-09-19 RX ADMIN — ONDANSETRON 4 MG: 2 INJECTION INTRAMUSCULAR; INTRAVENOUS at 02:23

## 2023-09-19 RX ADMIN — CARVEDILOL 6.25 MG: 6.25 TABLET, FILM COATED ORAL at 16:34

## 2023-09-19 RX ADMIN — ONDANSETRON 4 MG: 4 TABLET, ORALLY DISINTEGRATING ORAL at 08:58

## 2023-09-19 RX ADMIN — FUROSEMIDE 20 MG: 20 TABLET ORAL at 12:02

## 2023-09-19 RX ADMIN — SUCRALFATE 1 G: 1 SUSPENSION ORAL at 01:29

## 2023-09-19 RX ADMIN — SODIUM CHLORIDE 1 G: 1 TABLET ORAL at 08:58

## 2023-09-19 ASSESSMENT — ENCOUNTER SYMPTOMS
NAUSEA: 1
SHORTNESS OF BREATH: 0
ABDOMINAL PAIN: 0

## 2023-09-19 ASSESSMENT — COGNITIVE AND FUNCTIONAL STATUS - GENERAL
SUGGESTED CMS G CODE MODIFIER MOBILITY: CL
HELP NEEDED FOR BATHING: A LITTLE
MOBILITY SCORE: 11
WALKING IN HOSPITAL ROOM: A LITTLE
SUGGESTED CMS G CODE MODIFIER DAILY ACTIVITY: CK
MOVING FROM LYING ON BACK TO SITTING ON SIDE OF FLAT BED: UNABLE
PERSONAL GROOMING: A LITTLE
DAILY ACTIVITIY SCORE: 18
STANDING UP FROM CHAIR USING ARMS: A LITTLE
MOVING TO AND FROM BED TO CHAIR: UNABLE
TURNING FROM BACK TO SIDE WHILE IN FLAT BAD: UNABLE
DRESSING REGULAR LOWER BODY CLOTHING: A LOT
TOILETING: A LITTLE
CLIMB 3 TO 5 STEPS WITH RAILING: A LOT
DRESSING REGULAR UPPER BODY CLOTHING: A LITTLE

## 2023-09-19 ASSESSMENT — GAIT ASSESSMENTS
GAIT LEVEL OF ASSIST: CONTACT GUARD ASSIST
DISTANCE (FEET): 100
DEVIATION: SHUFFLED GAIT
ASSISTIVE DEVICE: FRONT WHEEL WALKER

## 2023-09-19 ASSESSMENT — PAIN DESCRIPTION - PAIN TYPE
TYPE: ACUTE PAIN

## 2023-09-19 ASSESSMENT — FIBROSIS 4 INDEX: FIB4 SCORE: 1.61

## 2023-09-19 NOTE — CARE PLAN
The patient is Stable - Low risk of patient condition declining or worsening    Shift Goals  Clinical Goals: Accurate I&O, monitor sodium levels  Patient Goals: Rest  Family Goals: BRIAN    Progress made toward(s) clinical / shift goals:    Problem: Fall Risk  Goal: Patient will remain free from falls  Description: Target End Date:  Prior to discharge or change in level of care    Document interventions on the [facility] Fall Risk Assessment    1.  Assess for fall risk factors  2.  Implement fall precautions  Outcome: Progressing     Problem: Neuro Status  Goal: Neuro status will remain stable or improve  Description: Target End Date:  Prior to discharge or change in level of care    Document on Neuro assessment in the Assessment flowsheet    1.  Assess and monitor neurologic status per provider order/protocol/unit policy  2.  Assess level of consciousness and orientation  3.  Assess for speech, dysarthria, dysphagia, facial symmetry  4.  Assess visual field, eye movements, gaze preference, pupil reaction and size  5.  Assess muscle strength and motor response in all four extremities  6.  Assess for sensation (numbness and tingling)  7.  Assess basic neuro reflexes (cough, gag, corneal)  8.  Identify changes in neuro status and report to provider for testing/treatment orders  Outcome: Progressing       Patient is not progressing towards the following goals:

## 2023-09-19 NOTE — PROGRESS NOTES
Hospital Medicine Daily Progress Note    Date of Service  9/18/2023    Chief Complaint  Grisel Saha is a 88 y.o. female admitted 9/13/2023 with hyponatremia and generalized malaise    Hospital Course  88 y.o. female who presented 9/13/2023 with fatigue malaise  Grisel Saha is a 88 y.o. female, with history of HTN, recently diagnosed with shingles to her right mid abdomen/back 7 days ago, started on antiviral therapy and ever since with significant poor appetite, she presented to the emergency department accompanied by her daughter on 9/13/2023 with complains of generalized fatigue, body aches, nausea/dry heaving and abdominal distention.  Additionally, feeling somnolent more than usual.  At baseline she is active, leaves that assisted home.  Daughter states that she had hyponatremia prior but not sodium severely low.  Patient takes losartan for blood pressure control.     The patient was admitted to IMCU evaluated by nephrology initially started on IV fluids with improvement in her sodium levels.  She was transferred to neurology on 9/16/2023    Interval Problem Update      Patient complains of generalized fatigue and mild nausea  Sodium reviewed 124-125 I started salt tablets    WBC 10.2 hemoglobin 10.9 platelets 319  Potassium 3.0 I ordered repletion  Magnesium 1.4 I ordered repletion      I have discussed this patient's plan of care and discharge plan at IDT rounds today with Case Management, Nursing, Nursing leadership, and other members of the IDT team.    Consultants/Specialty  nephrology    Code Status  Full Code    Disposition  The patient is not medically cleared for discharge to home or a post-acute facility.  Anticipate discharge to: home with organized home healthcare and close outpatient follow-up    I have placed the appropriate orders for post-discharge needs.    Review of Systems  Review of Systems   Constitutional:  Positive for malaise/fatigue.   Respiratory:  Negative for  shortness of breath.    Cardiovascular:  Negative for chest pain.   Gastrointestinal:  Positive for nausea. Negative for abdominal pain.   All other systems reviewed and are negative.       Physical Exam  Temp:  [36.4 °C (97.5 °F)-36.5 °C (97.7 °F)] 36.4 °C (97.5 °F)  Pulse:  [56-70] 64  Resp:  [17-18] 18  BP: (112-135)/(35-55) 120/47  SpO2:  [89 %-95 %] 92 %    Physical Exam  Vitals and nursing note reviewed.   Constitutional:       General: She is not in acute distress.  HENT:      Head: Normocephalic and atraumatic.      Nose: Nose normal. No rhinorrhea.      Mouth/Throat:      Pharynx: No oropharyngeal exudate or posterior oropharyngeal erythema.   Eyes:      General: No scleral icterus.        Right eye: No discharge.         Left eye: No discharge.   Cardiovascular:      Rate and Rhythm: Normal rate and regular rhythm.      Heart sounds: Normal heart sounds. No murmur heard.     No friction rub. No gallop.   Pulmonary:      Effort: Pulmonary effort is normal. No respiratory distress.      Breath sounds: No stridor. Decreased breath sounds present. No wheezing, rhonchi or rales.   Chest:      Chest wall: No tenderness.   Abdominal:      General: Bowel sounds are normal. There is no distension.      Palpations: Abdomen is soft. There is no mass.      Tenderness: There is no abdominal tenderness. There is no rebound.   Musculoskeletal:         General: Swelling present. No tenderness.      Cervical back: Neck supple. No rigidity.   Skin:     General: Skin is warm and dry.      Coloration: Skin is not cyanotic or jaundiced.      Nails: There is no clubbing.      Comments: Rash from shingles with no surrounding erythema or new vesicles   Neurological:      General: No focal deficit present.      Mental Status: She is alert and oriented to person, place, and time.      Cranial Nerves: No cranial nerve deficit.      Motor: No weakness.   Psychiatric:         Mood and Affect: Mood normal.         Behavior: Behavior  normal.         Fluids    Intake/Output Summary (Last 24 hours) at 9/18/2023 1711  Last data filed at 9/18/2023 1000  Gross per 24 hour   Intake 120 ml   Output --   Net 120 ml         Laboratory  Recent Labs     09/18/23  0050   WBC 10.2   RBC 3.52*   HEMOGLOBIN 10.9*   HEMATOCRIT 29.8*   MCV 84.7   MCH 31.0   MCHC 36.6*   RDW 37.2   PLATELETCT 319   MPV 9.7       Recent Labs     09/16/23  0206 09/16/23  0618 09/18/23  0050 09/18/23  0657 09/18/23  1153   SODIUM 124*   < > 124* 124* 125*   POTASSIUM 3.4*  --  3.0*  --   --    CHLORIDE 93*  --  92*  --   --    CO2 19*  --  18*  --   --    GLUCOSE 118*  --  120*  --   --    BUN 18  --  18  --   --    CREATININE 1.00  --  0.92  --   --    CALCIUM 7.4*  --  7.8*  --   --     < > = values in this interval not displayed.                     Imaging  CT-CHEST,ABDOMEN,PELVIS WITH   Final Result         1.  Diverticulosis   2.  Hepatomegaly   3.  Irregular hepatic contour compatible changes of cirrhosis   4.  Atherosclerosis and atherosclerotic coronary artery disease      CT-HEAD W/O   Final Result         1.  No acute intracranial abnormality is identified, there are nonspecific white matter changes, commonly associated with small vessel ischemic disease.  Associated mild cerebral atrophy is noted.   2.  Atherosclerosis.         DX-CHEST-PORTABLE (1 VIEW)   Final Result         1.  Pulmonary edema and/or infiltrates.   2.  Right humeral head and metaphyseal fracture, age indeterminant, correlate with history.   3.  Cardiomegaly   4.  Atherosclerosis           Assessment/Plan  * Hyponatremia- (present on admission)  Assessment & Plan  -[facility] admission for severe hyponatremia and initial sodium of 114.  Nephrology following initially received IV fluids  Sodium improved to 126 but now downtrending.    Started salt tablets  Continue to monitor sodium levels  Continue free fluid restriction and reviewed with the patient getting most hydration from electrolyte rich fluids.          Shingles  Assessment & Plan  Completed antiviral treatment  Continue skin care    Pulmonary edema  Assessment & Plan  Appears euvolemic at this time  Wean off oxygen as tolerated    Hypokalemia  Assessment & Plan  Hypomagnesemia    I ordered IV magnesium sulfate and oral potassium supplement  I ordered repeat labs    Essential hypertension- (present on admission)  Assessment & Plan  Improved control continue carvedilol and losartan             VTE prophylaxis:    enoxaparin ppx      I have performed a physical exam and reviewed and updated ROS and Plan today (9/18/2023). In review of yesterday's note (9/17/2023), there are no changes except as documented above.

## 2023-09-19 NOTE — DISCHARGE PLANNING
Msg placed to Raven, daughter to discuss [facility] & D/C resources/support.     0903-Spoke with Raven, daughter regarding [facility] D/C resources/support.  She is hopeful for an admission.  Grisel will return to [facility] with increased assist as needed.  Submitted to insurance. Msg placed to Dr. Majano-seeking medical clearance.     1150-Insurance has denied. Spoke with Grisel and Raven, daughter and they wish to appeal.  Dr. Platt to f/u today and will likely request a P2P.

## 2023-09-19 NOTE — DISCHARGE PLANNING
"HTH/SCP TCN chart review completed. Collaborated with BRADEN Lozano. Current discharge considerations are home to halfway with home health vs. SNF.  Noted per CN, patient does not meet MCG criteria for IRF. Currently PT/OT recommending post acute placement, with PT noted potential to progress back to [facility]/HH within house.  Noted daughter looking into if halfway can increase tier, if needed, and would prefer that patient goes back to [facility] with home health.  However patient gave SNF choice yesterday if halfway is unable to accommodate, as back up plan. Noted per nursing Kardex, saleem ambulated 2 x 20 feet with FWW and no assist.  No further TCN needs at this time.      TCN will continue to follow and collaborate with discharge planning team as additional post acute needs arise. Thank you.    Completed:  PT recommends \"Post acute base on today's session, however anticipate that w/ increased time oob w/ nsg ie: for all meals, as well as PT, pt may indeed be able to d/c home w/ family support).'  OT recommending post acute placement - 9/19  Choice obtained: HH,  SNF  [facility]  IRF  Per CN, does not meet MCG criteria   SCP with [facility].     "

## 2023-09-19 NOTE — THERAPY
"Occupational Therapy  Daily Treatment     Patient Name: Grisel Saha  Age:  88 y.o., Sex:  female  Medical Record #: [id number]  Today's Date: 9/19/2023     Precautions  Precautions: (P) Fall Risk    Assessment    Pt seen for OT tx session. Pt now performing standing grooming and toileting at min A level. Pt primarily limited by N/V, however remained very motivated during session. Pt demo'd impaired balance, functional mobility, and activity tolerance. Will continue to follow while in house.     Plan    Treatment Plan Status: (P) Continue Current Treatment Plan  Type of Treatment: Self Care / Activities of Daily Living, Adaptive Equipment, Therapeutic Exercises, Therapeutic Activity  Treatment Frequency: 3 Times per Week  Treatment Duration: Until Therapy Goals Met    DC Equipment Recommendations: (P) Unable to determine at this time  Discharge Recommendations: (P) Recommend post-acute placement for additional occupational therapy services prior to discharge home    Subjective    \"I feel better than I thought I would on my feet\"     Objective       09/19/23 1004   Precautions   Precautions Fall Risk   Vitals   Vitals Comments found on RA, sp02 >90%   Pain 0 - 10 Group   Therapist Pain Assessment Post Activity Pain Same as Prior to Activity;Nurse Notified  (no c/o pain during session)   Cognition    Cognition / Consciousness WDL   Level of Consciousness Alert   Comments very pleasent and cooperative, motivated to return to PLOF   Balance   Sitting Balance (Static) Fair   Sitting Balance (Dynamic) Fair   Standing Balance (Static) Fair -   Standing Balance (Dynamic) Fair -   Weight Shift Sitting Good   Weight Shift Standing Good   Skilled Intervention Verbal Cuing   Comments w/ FWW   Bed Mobility    Supine to Sit Minimal Assist   Sit to Supine   (NT in chair post)   Scooting Minimal Assist   Skilled Intervention Verbal Cuing;Tactile Cuing;Facilitation   Activities of Daily Living   Grooming Minimal " Assist;Standing  (oral care)   Upper Body Dressing Minimal Assist   Toileting Minimal Assist   Skilled Intervention Verbal Cuing;Sequencing;Facilitation   How much help from another person does the patient currently need...   Putting on and taking off regular lower body clothing? 2   Bathing (including washing, rinsing, and drying)? 3   Toileting, which includes using a toilet, bedpan, or urinal? 3   Putting on and taking off regular upper body clothing? 3   Taking care of personal grooming such as brushing teeth? 3   Eating meals? 4   6 Clicks Daily Activity Score 18   Functional Mobility   Sit to Stand Contact Guard Assist   Bed, Chair, Wheelchair Transfer Contact Guard Assist   Transfer Method Stand Step   Mobility within room and bathroom w/ FWW   Skilled Intervention Verbal Cuing;Tactile Cuing   Activity Tolerance   Sitting in Chair 10+ min (up post)   Sitting Edge of Bed 5 min   Standing 6-7 min   Patient / Family Goals   Patient / Family Goal #1 regain independent PLOF   Goal #1 Outcome Progressing as expected   Short Term Goals   Short Term Goal # 1 pt will complete functional transfers with SPV   Goal Outcome # 1 Progressing as expected   Short Term Goal # 2 pt will complete toileting ADL with SPV   Goal Outcome # 2 Progressing as expected   Short Term Goal # 3 pt will tolerate >5min standing grooming at sink with SPV   Goal Outcome # 3 Progressing as expected   Education Group   Role of Occupational Therapist Patient Response Patient;Acceptance;Explanation;Verbal Demonstration;Family   ADL Patient Response Patient;Acceptance;Explanation;Action Demonstration;Verbal Demonstration;Family   Occupational Therapy Treatment Plan    O.T. Treatment Plan Continue Current Treatment Plan   Anticipated Discharge Equipment and Recommendations   DC Equipment Recommendations Unable to determine at this time   Discharge Recommendations Recommend post-acute placement for additional occupational therapy services prior to  discharge home   Interdisciplinary Plan of Care Collaboration   IDT Collaboration with  Nursing   Patient Position at End of Therapy Seated;Chair Alarm On;Call Light within Reach;Phone within Reach;Tray Table within Reach;Family / Friend in Room   Collaboration Comments report given   Session Information   Date / Session Number  9/19, 2 (2/3, 9/20)

## 2023-09-19 NOTE — THERAPY
Physical Therapy   Daily Treatment     Patient Name: Grisel Saha  Age:  88 y.o., Sex:  female  Medical Record #: [id number]  Today's Date: 9/19/2023     Precautions  Precautions: (P) Fall Risk    Assessment    The pt was willing to participate w/PT. Today the pt conts to require min assist w/bed mobility, CGA w/functional transfers, and CGA w/amb using '. The pt fatigued following her efforts, still struggling w/PO intake. The pt is below her baseline function to return to [facility], she needs to be able to amb 200' to her meals and is mod indep around her apartment w/SPC or her 4WW. The pt does get shower assistance. The pt remains w/limited activity tolerance.   Recommend post acute therapy to progress pt toward mod indep in order to return to her snf, [facility] will not provide enough support unless the [facility] can provide the necessary support.   PT will cont to follow.     Plan    Treatment Plan Status: (P) Modify Current Treatment Plan  Type of Treatment: Bed Mobility, Gait Training, Orthotics Training , Self Care / Home Evaluation, Stair Training, Therapeutic Activities, Therapeutic Exercise  Treatment Frequency: (P) 5 Times per Week  Treatment Duration: (P) Until Therapy Goals Met    DC Equipment Recommendations: (P) None  Discharge Recommendations: (P) Recommend post-acute placement for additional physical therapy services prior to discharge home    Objective       09/19/23 1555   Charge Group   PT Gait Training (Units) 1   PT Therapeutic Activities (Units) 1   Total Time Spent   PT Gait Training Time Spent (Mins) 10   PT Therapeutic Activities Time Spent (Mins) 20   PT Total Time Spent (Calculated) 30   Precautions   Precautions Fall Risk   Pain 0 - 10 Group   Pain Rating Scale (NPRS) 0   Other Treatments   Other Treatments Provided Assist needed to don brief, the pt was out of her Depends. The pt w/urinary incont.   Balance   Sitting Balance (Static) Fair +   Sitting Balance (Dynamic) Fair   Standing Balance  (Static) Fair -   Standing Balance (Dynamic) Fair -   Weight Shift Sitting Good   Weight Shift Standing Fair   Comments standing w/FWW   Bed Mobility    Supine to Sit Minimal Assist   Sit to Supine Minimal Assist   Scooting Minimal Assist   Comments The pt conts to require min assist w/sup<->sit transitions w/HOB elevated and flat.   Gait Analysis   Gait Level Of Assist Contact Guard Assist   Assistive Device Front Wheel Walker   Distance (Feet) 100   # of Times Distance was Traveled 1   Deviation Shuffled Gait   Skilled Intervention Verbal Cuing   Comments Improvement w/pt's amb efforts although below her baseline function. The pt was very fatigued following her efforts.   Functional Mobility   Sit to Stand Contact Guard Assist   Bed, Chair, Wheelchair Transfer Contact Guard Assist   How much difficulty does the patient currently have...   Turning over in bed (including adjusting bedclothes, sheets and blankets)? 1   Sitting down on and standing up from a chair with arms (e.g., wheelchair, bedside commode, etc.) 1   Moving from lying on back to sitting on the side of the bed? 1   How much help from another person does the patient currently need...   Moving to and from a bed to a chair (including a wheelchair)? 3   Need to walk in a hospital room? 3   Climbing 3-5 steps with a railing? 2   6 clicks Mobility Score 11   Short Term Goals    Short Term Goal # 1 pt will move supine<>eob with spv in 6 tx for bed mobility.   Goal Outcome # 1 Other (see comments)   Short Term Goal # 2 pt will complete spt with fww and spv in 6 tx for functional mobility.   Goal Outcome # 2 Goal not met   Short Term Goal # 3 pt will ambulate 150 ft with fww and spv in 6 tx for household distances.   Goal Outcome # 3 Goal not met   Education Group   Role of Physical Therapist Patient Response Patient;Acceptance;Explanation;Action Demonstration   Physical Therapy Treatment Plan   Physical Therapy Treatment Plan Modify Current Treatment Plan    Treatment Frequency 5 Times per Week   Duration Until Therapy Goals Met   Anticipated Discharge Equipment and Recommendations   DC Equipment Recommendations None   Discharge Recommendations Recommend post-acute placement for additional physical therapy services prior to discharge home   Interdisciplinary Plan of Care Collaboration   IDT Collaboration with  Nursing   Patient Position at End of Therapy In Bed;Call Light within Reach;Tray Table within Reach;Phone within Reach   Collaboration Comments Nrsg notified   Session Information   Date / Session Number  9/19--3 (1/5, 9/25) PTA/1   Supervising Physical Therapist (PTA Treatments Only)   Supervising Physical Therapist Yoko Patiño

## 2023-09-19 NOTE — PROGRESS NOTES
Monitor Summary: SB/SR 58-77, NV 0.17, QRS 0.07, QT 0.38, with frequent PACs/PVCs, bigeminy and trigeminy per strip from monitor room.

## 2023-09-19 NOTE — CARE PLAN
The patient is Stable - Low risk of patient condition declining or worsening    Shift Goals  Clinical Goals: Monitor Na levels; improve mobility  Patient Goals: Rest  Family Goals: Get oob/work with PT    Progress made toward(s) clinical / shift goals:  Patient remains alert and oriented X 4. Neuro checks continued with no new complications observed. Patient tolerated room air for most of the shift. Currently receiving oxygen at 0.5L/min via nasal cannula. Pain management continued, as ordered. Patient assisted with ADLs, as needed.     Problem: Knowledge Deficit - Standard  Goal: Patient and family/care givers will demonstrate understanding of plan of care, disease process/condition, diagnostic tests and medications  Outcome: Progressing     Problem: Fall Risk  Goal: Patient will remain free from falls  Outcome: Progressing     Problem: Pain - Standard  Goal: Alleviation of pain or a reduction in pain to the patient’s comfort goal  Outcome: Progressing     Problem: Skin Integrity  Goal: Skin integrity is maintained or improved  Outcome: Progressing     Problem: Neuro Status  Goal: Neuro status will remain stable or improve  Outcome: Progressing     Problem: Respiratory  Goal: Patient will achieve/maintain optimum respiratory ventilation and gas exchange  Outcome: Progressing     Problem: Urinary - Renal Perfusion  Goal: Ability to achieve and maintain adequate renal perfusion and functioning will improve  Outcome: Progressing       Patient is not progressing towards the following goals:

## 2023-09-19 NOTE — PROGRESS NOTES
"[facility] -  PROGRESS NOTE               Author: Helene Humphrey M.D. Date & Time: 9/19/2023  11:39 AM     HPI:  87 yo F PMH HTN who presented to ED with CC as above.  She was dx with shingles about 7 days PTA and was started on oral acyclovir by PCP.  Since then she has developed poor appetite and nausea with dry heaving.  As a result of this generalized fatigue, body aches, myalgias have developed also.  No chronic hx of hyponatremia but has been dx with it in past but never critically low.  Labs in ED showed SNa ~ 114 with normal renal fxn and elevated SBP ~ 233.  WOrk up in ED included a CT which showed changes c/w cirrhosis.  BNP elevated ~ 1600.  She was given 500 mL bolus of LR and admitted to ICU for further evaluation.  SNa slowly creeping up ~ 117 on most recent check.  She otherwise denies any F/C/CP/SOB.  No melena, hematochezia, hematemesis.  No HA, visual changes, or abdominal pain.     DAILY NEPHROLOGY SUMMARY:  9/14: Consult done  9/15: NAEO, no complaints; SNa ~ 120 this AM, feels a bit better  9/16: No acute events, serum sodium to 124 early AM and then back down to 122 at 6am, on NS at 100cc/hr  9/17: , Bp elevated. UOP is not documented, patient is comfortable   9/18:  S Na initially improved with IVF and then dropped to 120 , IVF discontinued yesterday and given IV Lasix 40 mg once, reports feeling weak otherwise no other c/o, started on salt tabs this am   9/19: SNa 127 at midnight and dropped to 124 this am, no accurate I/O, pt sitting up in chair, reports feeling much better today       REVIEW OF SYSTEMS:    10 point ROS reviewed and is as per HPI/daily summary or otherwise negative    PMH/PSH/SH/FH:   Reviewed and unchanged since admission note    CURRENT MEDICATIONS:   Reviewed from admission to present day    VS:  /53   Pulse 68   Temp 36.5 °C (97.7 °F) (Temporal)   Resp 16   Ht 1.499 m (4' 11\")   Wt 69.3 kg (152 lb 12.5 oz)   SpO2 95%   " BMI 30.86 kg/m²     Physical Exam  Nursing note reviewed.   Constitutional:       Appearance: Frail  Eyes:      General: No scleral icterus.  Cardiovascular:      Comments: No edema  Pulmonary:      Effort: Pulmonary effort is normal.   Abdominal:      General: There is no distension.   Musculoskeletal:         General: No deformity.   Skin:     Findings: No rash.   Neurological:      Mental Status: She is alert.   Psychiatric:         Behavior: Behavior is cooperative.   Fluids:  In: 660 [P.O.:660]  Out: 100     LABS:  Recent Labs     09/18/23  0050 09/18/23  0657 09/18/23  1808 09/19/23  0000 09/19/23  0651   SODIUM 124*   < > 125* 127* 124*   POTASSIUM 3.0*  --   --  4.3  --    CHLORIDE 92*  --   --  97  --    CO2 18*  --   --  21  --    GLUCOSE 120*  --   --  130*  --    BUN 18  --   --  18  --    CREATININE 0.92  --   --  0.92  --    CALCIUM 7.8*  --   --  7.4*  --     < > = values in this interval not displayed.         IMAGING:   All imaging reviewed from admission to present day    IMPRESSION:  # HORACE: cr from 0.6 to 1. Oliguria.  # Hyponatremia, hypotonic  - Baseline Serum Na ~128-130   - Serum Na on admission 114 9/13   - TSH, cortisol  WNL  - No signs of overt hypervolemia on exam , appears to be euvolemic on exam   - Etiology  likely related to cirrhotic pathophysiology   - Initially SIADH favored with infection, antiviral meds   - U Osm 495 > S Osm on admission, urine lytes not available for review    - Oral acyclovir less likely to cause nephrotoxicity vs IV  - +NSAID use as well as OP  - ECHO 1/22 unremarkable   - CXR on admission  with pulmonary edema and/or infiltrates and     cardiomegaly  # HTN  - Goal BP < 140/90  - Not at goal on admit  # Shingles  - On acyclovir as OP  # Cirrhosis  - Noted on imaging, no hx of it likely first dx  - Could be playing a role in her past hypoNa episode  # Hypokalemia improved      PLAN:  - Need accurate I/O   - SNa Q8, goal increase serum sodium to ~128- 130 by 9/20   am  -1 .2 L fluid restriction  - Start lasix 20 mg daily   - DC Salt tabs   -Check urine lytes  - Daily labs  - If autocorrects too fast add D5W to bring SNa down

## 2023-09-20 LAB
ALBUMIN SERPL BCP-MCNC: 3.4 G/DL (ref 3.2–4.9)
BUN SERPL-MCNC: 15 MG/DL (ref 8–22)
CALCIUM ALBUM COR SERPL-MCNC: 9 MG/DL (ref 8.5–10.5)
CALCIUM SERPL-MCNC: 8.5 MG/DL (ref 8.5–10.5)
CHLORIDE SERPL-SCNC: 96 MMOL/L (ref 96–112)
CO2 SERPL-SCNC: 23 MMOL/L (ref 20–33)
CREAT SERPL-MCNC: 0.91 MG/DL (ref 0.5–1.4)
ERYTHROCYTE [DISTWIDTH] IN BLOOD BY AUTOMATED COUNT: 39.5 FL (ref 35.9–50)
GFR SERPLBLD CREATININE-BSD FMLA CKD-EPI: 61 ML/MIN/1.73 M 2
GLUCOSE SERPL-MCNC: 121 MG/DL (ref 65–99)
HCT VFR BLD AUTO: 33.3 % (ref 37–47)
HGB BLD-MCNC: 11.7 G/DL (ref 12–16)
MCH RBC QN AUTO: 31.1 PG (ref 27–33)
MCHC RBC AUTO-ENTMCNC: 35.1 G/DL (ref 32.2–35.5)
MCV RBC AUTO: 88.6 FL (ref 81.4–97.8)
OSMOLALITY UR: 336 MOSM/KG H2O (ref 300–900)
PHOSPHATE SERPL-MCNC: 2.3 MG/DL (ref 2.5–4.5)
PLATELET # BLD AUTO: 363 K/UL (ref 164–446)
PMV BLD AUTO: 9.6 FL (ref 9–12.9)
POTASSIUM SERPL-SCNC: 3.8 MMOL/L (ref 3.6–5.5)
RBC # BLD AUTO: 3.76 M/UL (ref 4.2–5.4)
SODIUM SERPL-SCNC: 126 MMOL/L (ref 135–145)
SODIUM SERPL-SCNC: 129 MMOL/L (ref 135–145)
WBC # BLD AUTO: 9 K/UL (ref 4.8–10.8)

## 2023-09-20 PROCEDURE — 84295 ASSAY OF SERUM SODIUM: CPT

## 2023-09-20 PROCEDURE — A9270 NON-COVERED ITEM OR SERVICE: HCPCS | Performed by: HOSPITALIST

## 2023-09-20 PROCEDURE — 700102 HCHG RX REV CODE 250 W/ 637 OVERRIDE(OP): Performed by: HOSPITALIST

## 2023-09-20 PROCEDURE — 84300 ASSAY OF URINE SODIUM: CPT

## 2023-09-20 PROCEDURE — A9270 NON-COVERED ITEM OR SERVICE: HCPCS | Performed by: STUDENT IN AN ORGANIZED HEALTH CARE EDUCATION/TRAINING PROGRAM

## 2023-09-20 PROCEDURE — 770020 HCHG ROOM/CARE - TELE (206)

## 2023-09-20 PROCEDURE — 85027 COMPLETE CBC AUTOMATED: CPT

## 2023-09-20 PROCEDURE — 99233 SBSQ HOSP IP/OBS HIGH 50: CPT | Performed by: INTERNAL MEDICINE

## 2023-09-20 PROCEDURE — 36415 COLL VENOUS BLD VENIPUNCTURE: CPT

## 2023-09-20 PROCEDURE — 83935 ASSAY OF URINE OSMOLALITY: CPT

## 2023-09-20 PROCEDURE — 84133 ASSAY OF URINE POTASSIUM: CPT

## 2023-09-20 PROCEDURE — 700102 HCHG RX REV CODE 250 W/ 637 OVERRIDE(OP): Performed by: STUDENT IN AN ORGANIZED HEALTH CARE EDUCATION/TRAINING PROGRAM

## 2023-09-20 PROCEDURE — A9270 NON-COVERED ITEM OR SERVICE: HCPCS | Performed by: INTERNAL MEDICINE

## 2023-09-20 PROCEDURE — 80069 RENAL FUNCTION PANEL: CPT

## 2023-09-20 PROCEDURE — 700102 HCHG RX REV CODE 250 W/ 637 OVERRIDE(OP): Performed by: INTERNAL MEDICINE

## 2023-09-20 PROCEDURE — A9270 NON-COVERED ITEM OR SERVICE: HCPCS

## 2023-09-20 PROCEDURE — 700111 HCHG RX REV CODE 636 W/ 250 OVERRIDE (IP): Performed by: HOSPITALIST

## 2023-09-20 PROCEDURE — 700102 HCHG RX REV CODE 250 W/ 637 OVERRIDE(OP)

## 2023-09-20 PROCEDURE — 700111 HCHG RX REV CODE 636 W/ 250 OVERRIDE (IP): Mod: JZ | Performed by: HOSPITALIST

## 2023-09-20 RX ORDER — FUROSEMIDE 20 MG/1
10 TABLET ORAL
Status: DISCONTINUED | OUTPATIENT
Start: 2023-09-20 | End: 2023-09-21

## 2023-09-20 RX ORDER — FUROSEMIDE 20 MG/1
20 TABLET ORAL
Status: DISCONTINUED | OUTPATIENT
Start: 2023-09-20 | End: 2023-09-20

## 2023-09-20 RX ORDER — CHOLECALCIFEROL (VITAMIN D3) 125 MCG
5 CAPSULE ORAL NIGHTLY PRN
Status: DISCONTINUED | OUTPATIENT
Start: 2023-09-20 | End: 2023-09-22 | Stop reason: HOSPADM

## 2023-09-20 RX ADMIN — SUCRALFATE 1 G: 1 SUSPENSION ORAL at 05:08

## 2023-09-20 RX ADMIN — FUROSEMIDE 10 MG: 20 TABLET ORAL at 17:10

## 2023-09-20 RX ADMIN — LOSARTAN POTASSIUM 100 MG: 50 TABLET, FILM COATED ORAL at 05:06

## 2023-09-20 RX ADMIN — ONDANSETRON 4 MG: 2 INJECTION INTRAMUSCULAR; INTRAVENOUS at 10:40

## 2023-09-20 RX ADMIN — OMEPRAZOLE 20 MG: 20 CAPSULE, DELAYED RELEASE ORAL at 05:05

## 2023-09-20 RX ADMIN — ENOXAPARIN SODIUM 40 MG: 100 INJECTION SUBCUTANEOUS at 17:09

## 2023-09-20 RX ADMIN — CARVEDILOL 6.25 MG: 6.25 TABLET, FILM COATED ORAL at 08:56

## 2023-09-20 RX ADMIN — OXYCODONE HYDROCHLORIDE 10 MG: 10 TABLET ORAL at 00:26

## 2023-09-20 RX ADMIN — Medication 5 MG: at 01:48

## 2023-09-20 RX ADMIN — ONDANSETRON 4 MG: 2 INJECTION INTRAMUSCULAR; INTRAVENOUS at 19:12

## 2023-09-20 RX ADMIN — ONDANSETRON 4 MG: 2 INJECTION INTRAMUSCULAR; INTRAVENOUS at 01:59

## 2023-09-20 RX ADMIN — SUCRALFATE 1 G: 1 SUSPENSION ORAL at 00:27

## 2023-09-20 RX ADMIN — HYDRALAZINE HYDROCHLORIDE 20 MG: 20 INJECTION, SOLUTION INTRAMUSCULAR; INTRAVENOUS at 00:37

## 2023-09-20 RX ADMIN — SUCRALFATE 1 G: 1 SUSPENSION ORAL at 17:09

## 2023-09-20 RX ADMIN — FUROSEMIDE 20 MG: 20 TABLET ORAL at 05:07

## 2023-09-20 RX ADMIN — SENNOSIDES AND DOCUSATE SODIUM 2 TABLET: 50; 8.6 TABLET ORAL at 17:10

## 2023-09-20 RX ADMIN — SUCRALFATE 1 G: 1 SUSPENSION ORAL at 12:54

## 2023-09-20 ASSESSMENT — ENCOUNTER SYMPTOMS
SHORTNESS OF BREATH: 0
ABDOMINAL PAIN: 0
NAUSEA: 1

## 2023-09-20 ASSESSMENT — FIBROSIS 4 INDEX: FIB4 SCORE: 1.41

## 2023-09-20 ASSESSMENT — PAIN DESCRIPTION - PAIN TYPE
TYPE: ACUTE PAIN
TYPE: ACUTE PAIN

## 2023-09-20 NOTE — PROGRESS NOTES
Monitor summary: SB-SR 55-74, SD 0.16, QRS 0.10, QT 0.42, with frequent PVCs and rare Tirig  per strip from monitor room.

## 2023-09-20 NOTE — PROGRESS NOTES
[facility] -  PROGRESS NOTE               Author: Helene Humphrey M.D. Date & Time: 9/20/2023  8:44 AM     HPI:  89 yo F PMH HTN who presented to ED with CC as above.  She was dx with shingles about 7 days PTA and was started on oral acyclovir by PCP.  Since then she has developed poor appetite and nausea with dry heaving.  As a result of this generalized fatigue, body aches, myalgias have developed also.  No chronic hx of hyponatremia but has been dx with it in past but never critically low.  Labs in ED showed SNa ~ 114 with normal renal fxn and elevated SBP ~ 233.  WOrk up in ED included a CT which showed changes c/w cirrhosis.  BNP elevated ~ 1600.  She was given 500 mL bolus of LR and admitted to ICU for further evaluation.  SNa slowly creeping up ~ 117 on most recent check.  She otherwise denies any F/C/CP/SOB.  No melena, hematochezia, hematemesis.  No HA, visual changes, or abdominal pain.     DAILY NEPHROLOGY SUMMARY:  9/14: Consult done  9/15: NAEO, no complaints; SNa ~ 120 this AM, feels a bit better  9/16: No acute events, serum sodium to 124 early AM and then back down to 122 at 6am, on NS at 100cc/hr  9/17: , Bp elevated. UOP is not documented, patient is comfortable   9/18:  S Na initially improved with IVF and then dropped to 120 , IVF discontinued yesterday and given IV Lasix 40 mg once, reports feeling weak otherwise no other c/o, started on salt tabs this am   9/19: SNa 127 at midnight and dropped to 124 this am, no accurate I/O, pt sitting up in chair, reports feeling much better today   9/20: Pt more alert today, sitting up in chair getting ready to have breakfast, Na 129 at mid night and again down to 126 this am       REVIEW OF SYSTEMS:    10 point ROS reviewed and is as per HPI/daily summary or otherwise negative    PMH/PSH/SH/FH:   Reviewed and unchanged since admission note    CURRENT MEDICATIONS:   Reviewed from admission to present day    VS:  /48   " Pulse 66   Temp 36.4 °C (97.6 °F) (Temporal)   Resp 15   Ht 1.499 m (4' 11\")   Wt 70 kg (154 lb 5.2 oz)   SpO2 93%   BMI 31.17 kg/m²     Physical Exam  Nursing note reviewed.   Constitutional:       Appearance: Frail  Eyes:      General: No scleral icterus.  Cardiovascular:      Comments: No edema  Pulmonary:      Effort: Pulmonary effort is normal.   Abdominal:      General: There is no distension.   Musculoskeletal:         General: No deformity.   Skin:     Findings: No rash.   Neurological:      Mental Status: She is alert.   Psychiatric:         Behavior: Behavior is cooperative.   Fluids:  In: 360 [P.O.:360]  Out: 150     LABS:  Recent Labs     09/18/23  0050 09/18/23  0657 09/19/23  0000 09/19/23  0651 09/19/23  1734 09/20/23  0039 09/20/23  0618   SODIUM 124*   < > 127*   < > 128* 129* 126*   POTASSIUM 3.0*  --  4.3  --   --  3.8  --    CHLORIDE 92*  --  97  --   --  96  --    CO2 18*  --  21  --   --  23  --    GLUCOSE 120*  --  130*  --   --  121*  --    BUN 18  --  18  --   --  15  --    CREATININE 0.92  --  0.92  --   --  0.91  --    CALCIUM 7.8*  --  7.4*  --   --  8.5  --     < > = values in this interval not displayed.       IMAGING:   All imaging reviewed from admission to present day    IMPRESSION:  # HORACE: cr from 0.6 to 1. Oliguria.  # Hyponatremia, hypotonic  - Baseline Serum Na ~128-130   - Serum Na on admission 114 9/13   - TSH, cortisol  WNL  - No signs of overt hypervolemia on exam , appears to be euvolemic on exam   - Etiology  likely related to cirrhotic pathophysiology   - Initially SIADH favored with infection, antiviral meds   - U Osm 495 > S Osm on admission, urine lytes not available for review    - Oral acyclovir less likely to cause nephrotoxicity vs IV  - +NSAID use as well as OP  - ECHO 1/22 unremarkable   - CXR on admission  with pulmonary edema and/or infiltrates and     cardiomegaly  # HTN  - Goal BP < 140/90  - Not at goal on admit  # Shingles  - On acyclovir as OP  # " Cirrhosis  - Noted on imaging, no hx of it likely first dx  - Could be playing a role in her past hypoNa episode  # Hypokalemia improved      PLAN:  - Need accurate I/O   - SNa Q8, goal increase serum sodium to ~130-132  by 9/21 am  -1 .2 L fluid restriction  -Ct lasix 20 mg daily   -  Salt tabs discontinued yesterday   -Check urine lytes ( ordered yesterday not sent )   - Daily labs  - If autocorrects too fast add D5W to bring SNa down    Discussed with nursing the need for accurate I/O

## 2023-09-20 NOTE — PROGRESS NOTES
Monitor summary: SR/SB 59-68, MA 0.14, QRS 0.07, QT 0.38,  frquent/occasional PVCs, occasional PACs per strip from monitor room.

## 2023-09-20 NOTE — DISCHARGE PLANNING
Per Dr. Majano, patient not yet medically cleared for DC; Sodium 126, Nephrology would like it to be 130 prior to DC.   Insurance denied IRF; [facility] following & planning on requesting P2P.

## 2023-09-20 NOTE — DISCHARGE PLANNING
HTH/SCP TCN chart review completed. Collaborated with BRADEN Curtis. Current discharge considerations are for post acute placement. Per CM, pt is no longer medically cleared 2' to nephrology per discussion with MD. Note that [facility] initially denied IRF, though appears pt appealed denial and requested P2P. SCP has now authorized RIRF and current plan is dc to RIRF once medically cleared. TCN will continue to follow and collaborate with discharge planning team as additional post acute needs arise. Thank you.    Completed:  PT/OT with recommendations for post acute placement on 9/19  Choice obtained: ,  SNF  [facility]  IRF (see above)  SCP with [facility] PCP.

## 2023-09-20 NOTE — DISCHARGE PLANNING
Grisel remains medically cleared per Dr. Majano.  Msg placed to Dr. Platt-seeking need for P2P.     0920-Per Dr. Majano, Nephrology wants the Na to be @ 130 or above.  Dr. Platt is seeking a P2P. Denniese with SCP is aware of the request.    1117-Denniese with SCP has authorized.  Following for medical clearance.     1154-Anticipate an admission once the Na levels are increased to daily.  Dr. Humphrey is aware and can follow @ [facility].

## 2023-09-20 NOTE — PROGRESS NOTES
"Hospital Medicine Daily Progress Note    Date of Service  9/19/2023    Chief Complaint  Grisel Saha is a 88 y.o. female admitted 9/13/2023 with hyponatremia and generalized malaise    Hospital Course  88 y.o. female who presented 9/13/2023 with fatigue malaise  Grisel Saha is a 88 y.o. female, with history of HTN, recently diagnosed with shingles to her right mid abdomen/back 7 days ago, started on antiviral therapy and ever since with significant poor appetite, she presented to the emergency department accompanied by her daughter on 9/13/2023 with complains of generalized fatigue, body aches, nausea/dry heaving and abdominal distention.  Additionally, feeling somnolent more than usual.  At baseline she is active, leaves that senior living home.  Daughter states that she had hyponatremia prior but not sodium severely low.  Patient takes losartan for blood pressure control.     The patient was admitted to IMCU evaluated by nephrology initially started on IV fluids with improvement in her sodium levels.  She was transferred to neurology on 9/16/2023    Interval Problem Update      Patient complains of generalized fatigue and mild nausea  Sodium reviewed 124-125 I started salt tablets    WBC 10.2 hemoglobin 10.9 platelets 319  Potassium 3.0 I ordered repletion  Magnesium 1.4 I ordered repletion\"    9/19. I reviewed the chart along with vitals, labs, imaging, test (both pending and resulted) and recommendations from specialists and interdisciplinary team.  Family at bedside.  Na now 127.   Plan to discharge to rehab. I explained rehab to family and patient. Discussed with rehab coordinator.      I have discussed this patient's plan of care and discharge plan at IDT rounds today with Case Management, Nursing, Nursing leadership, and other members of the IDT team.    Consultants/Specialty  nephrology    Code Status  Full Code    Disposition  Medically Cleared  I have placed the appropriate orders for " post-discharge needs.    Review of Systems  Review of Systems   Constitutional:  Positive for malaise/fatigue.   Respiratory:  Negative for shortness of breath.    Cardiovascular:  Negative for chest pain.   Gastrointestinal:  Positive for nausea. Negative for abdominal pain.   All other systems reviewed and are negative.       Physical Exam  Temp:  [36.3 °C (97.3 °F)-36.7 °C (98.1 °F)] 36.5 °C (97.7 °F)  Pulse:  [56-94] 71  Resp:  [14-17] 16  BP: (105-153)/(46-70) 105/48  SpO2:  [90 %-95 %] 94 %    Physical Exam  Vitals and nursing note reviewed.   Constitutional:       General: She is not in acute distress.  HENT:      Head: Normocephalic and atraumatic.      Nose: Nose normal. No rhinorrhea.      Mouth/Throat:      Pharynx: No oropharyngeal exudate or posterior oropharyngeal erythema.   Eyes:      General: No scleral icterus.        Right eye: No discharge.         Left eye: No discharge.   Cardiovascular:      Rate and Rhythm: Normal rate and regular rhythm.      Heart sounds: Normal heart sounds. No murmur heard.     No friction rub. No gallop.   Pulmonary:      Effort: Pulmonary effort is normal. No respiratory distress.      Breath sounds: No stridor. Decreased breath sounds present. No wheezing, rhonchi or rales.   Chest:      Chest wall: No tenderness.   Abdominal:      General: Bowel sounds are normal. There is no distension.      Palpations: Abdomen is soft. There is no mass.      Tenderness: There is no abdominal tenderness. There is no rebound.   Musculoskeletal:         General: Swelling present. No tenderness.      Cervical back: Neck supple. No rigidity.   Skin:     General: Skin is warm and dry.      Coloration: Skin is not cyanotic or jaundiced.      Nails: There is no clubbing.      Comments: Rash from shingles with no surrounding erythema or new vesicles   Neurological:      General: No focal deficit present.      Mental Status: She is alert and oriented to person, place, and time.      Cranial  Nerves: No cranial nerve deficit.      Motor: Weakness present.   Psychiatric:         Mood and Affect: Mood normal.         Behavior: Behavior normal.         Fluids    Intake/Output Summary (Last 24 hours) at 9/19/2023 1905  Last data filed at 9/19/2023 1158  Gross per 24 hour   Intake 540 ml   Output 250 ml   Net 290 ml         Laboratory  Recent Labs     09/18/23  0050 09/19/23  0000   WBC 10.2 8.6   RBC 3.52* 3.41*   HEMOGLOBIN 10.9* 10.7*   HEMATOCRIT 29.8* 30.7*   MCV 84.7 90.0   MCH 31.0 31.4   MCHC 36.6* 34.9   RDW 37.2 40.4   PLATELETCT 319 303   MPV 9.7 9.7       Recent Labs     09/18/23  0050 09/18/23  0657 09/19/23  0000 09/19/23  0651 09/19/23  1214 09/19/23  1734   SODIUM 124*   < > 127* 124* 127* 128*   POTASSIUM 3.0*  --  4.3  --   --   --    CHLORIDE 92*  --  97  --   --   --    CO2 18*  --  21  --   --   --    GLUCOSE 120*  --  130*  --   --   --    BUN 18  --  18  --   --   --    CREATININE 0.92  --  0.92  --   --   --    CALCIUM 7.8*  --  7.4*  --   --   --     < > = values in this interval not displayed.                     Imaging  CT-CHEST,ABDOMEN,PELVIS WITH   Final Result         1.  Diverticulosis   2.  Hepatomegaly   3.  Irregular hepatic contour compatible changes of cirrhosis   4.  Atherosclerosis and atherosclerotic coronary artery disease      CT-HEAD W/O   Final Result         1.  No acute intracranial abnormality is identified, there are nonspecific white matter changes, commonly associated with small vessel ischemic disease.  Associated mild cerebral atrophy is noted.   2.  Atherosclerosis.         DX-CHEST-PORTABLE (1 VIEW)   Final Result         1.  Pulmonary edema and/or infiltrates.   2.  Right humeral head and metaphyseal fracture, age indeterminant, correlate with history.   3.  Cardiomegaly   4.  Atherosclerosis           Assessment/Plan  * Hyponatremia- (present on admission)  Assessment & Plan  -[facility] [place] admission for severe hyponatremia and initial sodium of 114.  Nephrology  "following initially received IV fluids  Sodium improved to 126 but now downtrending.    Started salt tablets  Continue to monitor sodium levels  Continue free fluid restriction and reviewed with the patient getting most hydration from electrolyte rich fluids.\"    Na improved to 127 mild-moderate range  Plan for rehab         Shingles  Assessment & Plan  Completed antiviral treatment  Continue skin care    Pulmonary edema  Assessment & Plan  Appears euvolemic at this time  Wean off oxygen as tolerated    Hypokalemia  Assessment & Plan  Hypomagnesemia    I ordered IV magnesium sulfate and oral potassium supplement  I ordered repeat labs\"    K stable.    Essential hypertension- (present on admission)  Assessment & Plan  Improved control continue carvedilol and losartan\"    Vitals:    09/19/23 1634   BP: 105/48   Pulse: 71   Resp:    Temp:    SpO2:      Stable.             VTE prophylaxis:    enoxaparin ppx      I have performed a physical exam and reviewed and updated ROS and Plan today (9/19/2023). In review of yesterday's note (9/18/2023), there are no changes except as documented above.        "

## 2023-09-21 LAB
ALBUMIN SERPL BCP-MCNC: 3 G/DL (ref 3.2–4.9)
BUN SERPL-MCNC: 16 MG/DL (ref 8–22)
CALCIUM ALBUM COR SERPL-MCNC: 8.5 MG/DL (ref 8.5–10.5)
CALCIUM SERPL-MCNC: 7.7 MG/DL (ref 8.5–10.5)
CHLORIDE SERPL-SCNC: 93 MMOL/L (ref 96–112)
CO2 SERPL-SCNC: 22 MMOL/L (ref 20–33)
CREAT SERPL-MCNC: 0.95 MG/DL (ref 0.5–1.4)
ERYTHROCYTE [DISTWIDTH] IN BLOOD BY AUTOMATED COUNT: 39.7 FL (ref 35.9–50)
GFR SERPLBLD CREATININE-BSD FMLA CKD-EPI: 58 ML/MIN/1.73 M 2
GLUCOSE SERPL-MCNC: 125 MG/DL (ref 65–99)
HCT VFR BLD AUTO: 30.9 % (ref 37–47)
HGB BLD-MCNC: 11.1 G/DL (ref 12–16)
MCH RBC QN AUTO: 31.2 PG (ref 27–33)
MCHC RBC AUTO-ENTMCNC: 35.9 G/DL (ref 32.2–35.5)
MCV RBC AUTO: 86.8 FL (ref 81.4–97.8)
OSMOLALITY UR: 306 MOSM/KG H2O (ref 300–900)
PHOSPHATE SERPL-MCNC: 2.8 MG/DL (ref 2.5–4.5)
PLATELET # BLD AUTO: 364 K/UL (ref 164–446)
PMV BLD AUTO: 9.9 FL (ref 9–12.9)
POTASSIUM SERPL-SCNC: 3.5 MMOL/L (ref 3.6–5.5)
POTASSIUM UR-SCNC: 28 MMOL/L
POTASSIUM UR-SCNC: 28 MMOL/L
RBC # BLD AUTO: 3.56 M/UL (ref 4.2–5.4)
SODIUM SERPL-SCNC: 126 MMOL/L (ref 135–145)
SODIUM SERPL-SCNC: 127 MMOL/L (ref 135–145)
SODIUM SERPL-SCNC: 127 MMOL/L (ref 135–145)
SODIUM SERPL-SCNC: 128 MMOL/L (ref 135–145)
SODIUM UR-SCNC: 50 MMOL/L
SODIUM UR-SCNC: 79 MMOL/L
WBC # BLD AUTO: 8.4 K/UL (ref 4.8–10.8)

## 2023-09-21 PROCEDURE — 83935 ASSAY OF URINE OSMOLALITY: CPT

## 2023-09-21 PROCEDURE — A9270 NON-COVERED ITEM OR SERVICE: HCPCS

## 2023-09-21 PROCEDURE — A9270 NON-COVERED ITEM OR SERVICE: HCPCS | Performed by: INTERNAL MEDICINE

## 2023-09-21 PROCEDURE — 84133 ASSAY OF URINE POTASSIUM: CPT

## 2023-09-21 PROCEDURE — 84295 ASSAY OF SERUM SODIUM: CPT | Mod: 91

## 2023-09-21 PROCEDURE — 700102 HCHG RX REV CODE 250 W/ 637 OVERRIDE(OP): Performed by: HOSPITALIST

## 2023-09-21 PROCEDURE — 700111 HCHG RX REV CODE 636 W/ 250 OVERRIDE (IP): Mod: JZ | Performed by: HOSPITALIST

## 2023-09-21 PROCEDURE — 80069 RENAL FUNCTION PANEL: CPT

## 2023-09-21 PROCEDURE — 99232 SBSQ HOSP IP/OBS MODERATE 35: CPT | Performed by: INTERNAL MEDICINE

## 2023-09-21 PROCEDURE — 700102 HCHG RX REV CODE 250 W/ 637 OVERRIDE(OP): Performed by: STUDENT IN AN ORGANIZED HEALTH CARE EDUCATION/TRAINING PROGRAM

## 2023-09-21 PROCEDURE — 700102 HCHG RX REV CODE 250 W/ 637 OVERRIDE(OP)

## 2023-09-21 PROCEDURE — 97535 SELF CARE MNGMENT TRAINING: CPT

## 2023-09-21 PROCEDURE — 97530 THERAPEUTIC ACTIVITIES: CPT

## 2023-09-21 PROCEDURE — 85027 COMPLETE CBC AUTOMATED: CPT

## 2023-09-21 PROCEDURE — 97110 THERAPEUTIC EXERCISES: CPT

## 2023-09-21 PROCEDURE — 700102 HCHG RX REV CODE 250 W/ 637 OVERRIDE(OP): Mod: JZ | Performed by: INTERNAL MEDICINE

## 2023-09-21 PROCEDURE — 770020 HCHG ROOM/CARE - TELE (206)

## 2023-09-21 PROCEDURE — A9270 NON-COVERED ITEM OR SERVICE: HCPCS | Performed by: HOSPITALIST

## 2023-09-21 PROCEDURE — 84300 ASSAY OF URINE SODIUM: CPT

## 2023-09-21 PROCEDURE — A9270 NON-COVERED ITEM OR SERVICE: HCPCS | Performed by: STUDENT IN AN ORGANIZED HEALTH CARE EDUCATION/TRAINING PROGRAM

## 2023-09-21 RX ORDER — POTASSIUM CHLORIDE 20 MEQ/1
20 TABLET, EXTENDED RELEASE ORAL 2 TIMES DAILY
Status: COMPLETED | OUTPATIENT
Start: 2023-09-21 | End: 2023-09-21

## 2023-09-21 RX ORDER — FUROSEMIDE 20 MG/1
10 TABLET ORAL EVERY 8 HOURS
Status: DISCONTINUED | OUTPATIENT
Start: 2023-09-21 | End: 2023-09-21

## 2023-09-21 RX ORDER — POTASSIUM CHLORIDE 20 MEQ/1
20 TABLET, EXTENDED RELEASE ORAL DAILY
Status: DISCONTINUED | OUTPATIENT
Start: 2023-09-22 | End: 2023-09-22 | Stop reason: HOSPADM

## 2023-09-21 RX ORDER — FUROSEMIDE 20 MG/1
10 TABLET ORAL
Status: DISCONTINUED | OUTPATIENT
Start: 2023-09-21 | End: 2023-09-22 | Stop reason: HOSPADM

## 2023-09-21 RX ORDER — CARVEDILOL 6.25 MG/1
6.25 TABLET ORAL 2 TIMES DAILY WITH MEALS
Status: DISCONTINUED | OUTPATIENT
Start: 2023-09-21 | End: 2023-09-22

## 2023-09-21 RX ORDER — CARVEDILOL 6.25 MG/1
3.12 TABLET ORAL 2 TIMES DAILY WITH MEALS
Status: DISCONTINUED | OUTPATIENT
Start: 2023-09-21 | End: 2023-09-21

## 2023-09-21 RX ADMIN — FUROSEMIDE 10 MG: 20 TABLET ORAL at 11:19

## 2023-09-21 RX ADMIN — ONDANSETRON 4 MG: 2 INJECTION INTRAMUSCULAR; INTRAVENOUS at 14:16

## 2023-09-21 RX ADMIN — SUCRALFATE 1 G: 1 SUSPENSION ORAL at 17:23

## 2023-09-21 RX ADMIN — FUROSEMIDE 10 MG: 20 TABLET ORAL at 17:29

## 2023-09-21 RX ADMIN — ONDANSETRON 4 MG: 2 INJECTION INTRAMUSCULAR; INTRAVENOUS at 09:22

## 2023-09-21 RX ADMIN — SUCRALFATE 1 G: 1 SUSPENSION ORAL at 11:19

## 2023-09-21 RX ADMIN — OXYCODONE HYDROCHLORIDE 5 MG: 5 TABLET ORAL at 00:44

## 2023-09-21 RX ADMIN — SENNOSIDES AND DOCUSATE SODIUM 2 TABLET: 50; 8.6 TABLET ORAL at 05:39

## 2023-09-21 RX ADMIN — Medication 5 MG: at 02:26

## 2023-09-21 RX ADMIN — SENNOSIDES AND DOCUSATE SODIUM 2 TABLET: 50; 8.6 TABLET ORAL at 17:24

## 2023-09-21 RX ADMIN — SUCRALFATE 1 G: 1 SUSPENSION ORAL at 00:52

## 2023-09-21 RX ADMIN — LOSARTAN POTASSIUM 100 MG: 50 TABLET, FILM COATED ORAL at 05:35

## 2023-09-21 RX ADMIN — SUCRALFATE 1 G: 1 SUSPENSION ORAL at 05:40

## 2023-09-21 RX ADMIN — FUROSEMIDE 10 MG: 20 TABLET ORAL at 05:33

## 2023-09-21 RX ADMIN — OMEPRAZOLE 20 MG: 20 CAPSULE, DELAYED RELEASE ORAL at 06:32

## 2023-09-21 RX ADMIN — ENOXAPARIN SODIUM 40 MG: 100 INJECTION SUBCUTANEOUS at 17:21

## 2023-09-21 RX ADMIN — POTASSIUM CHLORIDE 20 MEQ: 1500 TABLET, EXTENDED RELEASE ORAL at 17:23

## 2023-09-21 RX ADMIN — POTASSIUM CHLORIDE 20 MEQ: 1500 TABLET, EXTENDED RELEASE ORAL at 09:12

## 2023-09-21 ASSESSMENT — COGNITIVE AND FUNCTIONAL STATUS - GENERAL
DRESSING REGULAR UPPER BODY CLOTHING: A LITTLE
WALKING IN HOSPITAL ROOM: A LITTLE
MOVING FROM LYING ON BACK TO SITTING ON SIDE OF FLAT BED: UNABLE
MOVING TO AND FROM BED TO CHAIR: UNABLE
SUGGESTED CMS G CODE MODIFIER DAILY ACTIVITY: CK
HELP NEEDED FOR BATHING: A LITTLE
DAILY ACTIVITIY SCORE: 19
DRESSING REGULAR LOWER BODY CLOTHING: A LITTLE
CLIMB 3 TO 5 STEPS WITH RAILING: A LOT
PERSONAL GROOMING: A LITTLE
MOBILITY SCORE: 11
STANDING UP FROM CHAIR USING ARMS: A LITTLE
SUGGESTED CMS G CODE MODIFIER MOBILITY: CL
TURNING FROM BACK TO SIDE WHILE IN FLAT BAD: UNABLE
TOILETING: A LITTLE

## 2023-09-21 ASSESSMENT — PAIN DESCRIPTION - PAIN TYPE
TYPE: ACUTE PAIN

## 2023-09-21 ASSESSMENT — GAIT ASSESSMENTS
GAIT LEVEL OF ASSIST: STANDBY ASSIST
DISTANCE (FEET): 300
DEVIATION: DECREASED BASE OF SUPPORT;SHUFFLED GAIT;BRADYKINETIC
ASSISTIVE DEVICE: FRONT WHEEL WALKER

## 2023-09-21 ASSESSMENT — ENCOUNTER SYMPTOMS
NAUSEA: 1
ABDOMINAL PAIN: 0
SHORTNESS OF BREATH: 0

## 2023-09-21 ASSESSMENT — FIBROSIS 4 INDEX: FIB4 SCORE: 1.41

## 2023-09-21 NOTE — PROGRESS NOTES
Monitor summary: SB-SR, HR 57-66, CA .19, QRS .08, QT .39 with r-o big, r coup per strip from monitor room.

## 2023-09-21 NOTE — CARE PLAN
The patient is Stable - Low risk of patient condition declining or worsening    Shift Goals  Clinical Goals: increase Na  Patient Goals: nausea control  Family Goals: BRIAN    Progress made toward(s) clinical / shift goals:  pt reports nausea control with PRNs  Problem: Knowledge Deficit - Standard  Goal: Patient and family/care givers will demonstrate understanding of plan of care, disease process/condition, diagnostic tests and medications  Description: Target End Date:  1-3 days or as soon as patient condition allows    Document in Patient Education    1.  Patient and family/caregiver oriented to unit, equipment, visitation policy and means for communicating concern  2.  Complete/review Learning Assessment  3.  Assess knowledge level of disease process/condition, treatment plan, diagnostic tests and medications  4.  Explain disease process/condition, treatment plan, diagnostic tests and medications  Outcome: Progressing     Problem: Fall Risk  Goal: Patient will remain free from falls  Description: Target End Date:  Prior to discharge or change in level of care    Document interventions on the Courtney Adair Fall Risk Assessment    1.  Assess for fall risk factors  2.  Implement fall precautions  Outcome: Progressing     Problem: Skin Integrity  Goal: Skin integrity is maintained or improved  Description: Target End Date:  Prior to discharge or change in level of care    Document interventions on Skin Risk/Sergio flowsheet groups and corresponding LDA    1.  Assess and monitor skin integrity, appearance and/or temperature  2.  Assess risk factors for impaired skin integrity and/or pressures ulcers  3.  Implement precautions to protect skin integrity in collaboration with interdisciplinary team  4.  Implement pressure ulcer prevention protocol if at risk for skin breakdown  5.  Confirm wound care consult if at risk for skin breakdown  6.  Ensure patient use of pressure relieving devices  (Low air loss bed, waffle  overlay, heel protectors, ROHO cushion, etc)  Outcome: Progressing     Problem: Neuro Status  Goal: Neuro status will remain stable or improve  Description: Target End Date:  Prior to discharge or change in level of care    Document on Neuro assessment in the Assessment flowsheet    1.  Assess and monitor neurologic status per provider order/protocol/unit policy  2.  Assess level of consciousness and orientation  3.  Assess for speech, dysarthria, dysphagia, facial symmetry  4.  Assess visual field, eye movements, gaze preference, pupil reaction and size  5.  Assess muscle strength and motor response in all four extremities  6.  Assess for sensation (numbness and tingling)  7.  Assess basic neuro reflexes (cough, gag, corneal)  8.  Identify changes in neuro status and report to provider for testing/treatment orders  Outcome: Progressing     Patient is not progressing towards the following goals:

## 2023-09-21 NOTE — DISCHARGE PLANNING
"H/SCP TCN chart review completed. Collaborated with to CM.  Current discharge considerations are to RIRF when medically cleared.  See TCN note on 9/20, \"SCP has now authorized RIRF and current plan is dc to RIRF once medically cleared.\"    TCN will continue to follow and collaborate with discharge planning team as additional post acute needs arise. Thank you.    Completed:  PT/OT with recommendations for post acute placement on 9/19  Choice obtained: , SNF  [facility]  IRF (see above)  SCP with [facility] PCP.  "

## 2023-09-21 NOTE — DISCHARGE PLANNING
Current barrier is Na levels Q6H.  Anticipate an admission once transitioned to daily. Nephrology can f/u @ [facility]. Msg placed to Dr. Humphrey.    0849-Na levels can be transitioned to BID per Dr. Humphrey. Will discuss this case with Administration this am.    0930-Case discussed with Administration.  We are not able to accommodate BID labs.  Labs need to be daily.  TCC remains following.

## 2023-09-21 NOTE — CARE PLAN
The patient is Stable - Low risk of patient condition declining or worsening    Shift Goals  Clinical Goals: I+O; No complications related to neuro status; Sodium will increase; Pain management and wound care  Patient Goals: Decreased nausea;  Family Goals: BRIAN    Progress made toward(s) clinical / shift goals:  Patient remains alert and oriented X 4. Neuro checks continued with no new complications observed. Patient continues pain management and wound care, as ordered. Patient continues 1L fluid restriction. Patient assisted with ADLs, as needed.     Problem: Knowledge Deficit - Standard  Goal: Patient and family/care givers will demonstrate understanding of plan of care, disease process/condition, diagnostic tests and medications  Outcome: Progressing     Problem: Fall Risk  Goal: Patient will remain free from falls  Outcome: Progressing     Problem: Pain - Standard  Goal: Alleviation of pain or a reduction in pain to the patient’s comfort goal  Outcome: Progressing     Problem: Skin Integrity  Goal: Skin integrity is maintained or improved  Outcome: Progressing     Problem: Neuro Status  Goal: Neuro status will remain stable or improve  Outcome: Progressing     Problem: Respiratory  Goal: Patient will achieve/maintain optimum respiratory ventilation and gas exchange  Outcome: Progressing     Problem: Urinary - Renal Perfusion  Goal: Ability to achieve and maintain adequate renal perfusion and functioning will improve  Outcome: Progressing       Patient is not progressing towards the following goals:

## 2023-09-21 NOTE — PROGRESS NOTES
"Hospital Medicine Daily Progress Note    Date of Service  9/20/2023    Chief Complaint  Grisel Saha is a 88 y.o. female admitted 9/13/2023 with hyponatremia and generalized malaise    Hospital Course  88 y.o. female who presented 9/13/2023 with fatigue malaise  Grisel Saha is a 88 y.o. female, with history of HTN, recently diagnosed with shingles to her right mid abdomen/back 7 days ago, started on antiviral therapy and ever since with significant poor appetite, she presented to the emergency department accompanied by her daughter on 9/13/2023 with complains of generalized fatigue, body aches, nausea/dry heaving and abdominal distention.  Additionally, feeling somnolent more than usual.  At baseline she is active, leaves that jail home.  Daughter states that she had hyponatremia prior but not sodium severely low.  Patient takes losartan for blood pressure control.     The patient was admitted to IMCU evaluated by nephrology initially started on IV fluids with improvement in her sodium levels.  She was transferred to neurology on 9/16/2023    Interval Problem Update      Patient complains of generalized fatigue and mild nausea  Sodium reviewed 124-125 I started salt tablets    WBC 10.2 hemoglobin 10.9 platelets 319  Potassium 3.0 I ordered repletion  Magnesium 1.4 I ordered repletion\"    9/19. I reviewed the chart along with vitals, labs, imaging, test (both pending and resulted) and recommendations from specialists and interdisciplinary team.  Family at bedside.  Na now 127.   Plan to discharge to rehab. I explained rehab to family and patient. Discussed with rehab coordinator.  9/20. Spoke with CM/LANCE and Renal  Renal prefers Na to be 130  Planned for rehab  Updated patient and family.  Patient otherwise resting and not agitated.       I have discussed this patient's plan of care and discharge plan at IDT rounds today with Case Management, Nursing, Nursing leadership, and other members of " the IDT team.    Consultants/Specialty  nephrology    Code Status  Full Code    Disposition  Medically Cleared  I have placed the appropriate orders for post-discharge needs.    Review of Systems  Review of Systems   Constitutional:  Positive for malaise/fatigue.   Respiratory:  Negative for shortness of breath.    Cardiovascular:  Negative for chest pain.   Gastrointestinal:  Positive for nausea. Negative for abdominal pain.   All other systems reviewed and are negative.       Physical Exam  Temp:  [36.2 °C (97.1 °F)-36.6 °C (97.9 °F)] 36.6 °C (97.9 °F)  Pulse:  [61-71] 61  Resp:  [15-17] 16  BP: (100-166)/() 120/49  SpO2:  [92 %-95 %] 92 %    Physical Exam  Vitals and nursing note reviewed.   Constitutional:       General: She is not in acute distress.  HENT:      Head: Normocephalic and atraumatic.      Nose: Nose normal. No rhinorrhea.      Mouth/Throat:      Pharynx: No oropharyngeal exudate or posterior oropharyngeal erythema.   Eyes:      General: No scleral icterus.        Right eye: No discharge.         Left eye: No discharge.   Cardiovascular:      Rate and Rhythm: Normal rate and regular rhythm.      Heart sounds: Normal heart sounds. No murmur heard.     No friction rub. No gallop.   Pulmonary:      Effort: Pulmonary effort is normal. No respiratory distress.      Breath sounds: No stridor. Decreased breath sounds present. No wheezing, rhonchi or rales.   Chest:      Chest wall: No tenderness.   Abdominal:      General: Bowel sounds are normal. There is no distension.      Palpations: Abdomen is soft. There is no mass.      Tenderness: There is no abdominal tenderness. There is no rebound.   Musculoskeletal:         General: Swelling present. No tenderness.      Cervical back: Neck supple. No rigidity.   Skin:     General: Skin is warm and dry.      Coloration: Skin is not cyanotic or jaundiced.      Nails: There is no clubbing.      Comments: Rash from shingles with no surrounding erythema or new  vesicles   Neurological:      General: No focal deficit present.      Mental Status: She is alert and oriented to person, place, and time.      Cranial Nerves: No cranial nerve deficit.      Motor: Weakness present.      Comments: Mild cognitive deficits   Psychiatric:         Mood and Affect: Mood normal.         Behavior: Behavior normal.         Fluids    Intake/Output Summary (Last 24 hours) at 9/20/2023 2032  Last data filed at 9/20/2023 1919  Gross per 24 hour   Intake 850 ml   Output 250 ml   Net 600 ml         Laboratory  Recent Labs     09/18/23  0050 09/19/23  0000 09/20/23  0039   WBC 10.2 8.6 9.0   RBC 3.52* 3.41* 3.76*   HEMOGLOBIN 10.9* 10.7* 11.7*   HEMATOCRIT 29.8* 30.7* 33.3*   MCV 84.7 90.0 88.6   MCH 31.0 31.4 31.1   MCHC 36.6* 34.9 35.1   RDW 37.2 40.4 39.5   PLATELETCT 319 303 363   MPV 9.7 9.7 9.6       Recent Labs     09/18/23  0050 09/18/23  0657 09/19/23  0000 09/19/23  0651 09/20/23  0039 09/20/23  0618 09/20/23  1241 09/20/23  1743   SODIUM 124*   < > 127*   < > 129* 126* 126* 126*   POTASSIUM 3.0*  --  4.3  --  3.8  --   --   --    CHLORIDE 92*  --  97  --  96  --   --   --    CO2 18*  --  21  --  23  --   --   --    GLUCOSE 120*  --  130*  --  121*  --   --   --    BUN 18  --  18  --  15  --   --   --    CREATININE 0.92  --  0.92  --  0.91  --   --   --    CALCIUM 7.8*  --  7.4*  --  8.5  --   --   --     < > = values in this interval not displayed.                     Imaging  CT-CHEST,ABDOMEN,PELVIS WITH   Final Result         1.  Diverticulosis   2.  Hepatomegaly   3.  Irregular hepatic contour compatible changes of cirrhosis   4.  Atherosclerosis and atherosclerotic coronary artery disease      CT-HEAD W/O   Final Result         1.  No acute intracranial abnormality is identified, there are nonspecific white matter changes, commonly associated with small vessel ischemic disease.  Associated mild cerebral atrophy is noted.   2.  Atherosclerosis.         DX-CHEST-PORTABLE (1 VIEW)  "  Final Result         1.  Pulmonary edema and/or infiltrates.   2.  Right humeral head and metaphyseal fracture, age indeterminant, correlate with history.   3.  Cardiomegaly   4.  Atherosclerosis           Assessment/Plan  * Hyponatremia- (present on admission)  Assessment & Plan  -[facility] admission for severe hyponatremia and initial sodium of 114.  Nephrology following initially received IV fluids  Sodium improved to 126 but now downtrending.    Started salt tablets  Continue to monitor sodium levels  Continue free fluid restriction and reviewed with the patient getting most hydration from electrolyte rich fluids.\"    Na improved to  mild-moderate range  Renal team prefers Na 130  Plan for rehab         Shingles  Assessment & Plan  Completed antiviral treatment  Continue skin care    Pulmonary edema  Assessment & Plan  Appears euvolemic at this time  Wean off oxygen as tolerated    Hypokalemia  Assessment & Plan  Hypomagnesemia    I ordered IV magnesium sulfate and oral potassium supplement  I ordered repeat labs\"    K stable.    Essential hypertension- (present on admission)  Assessment & Plan  Improved control continue carvedilol and losartan\"    Vitals:    09/20/23 2354   BP: 125/41   Pulse: 60   Resp: 16   Temp: 36.5 °C (97.7 °F)   SpO2: 93%     Stable.             VTE prophylaxis:    enoxaparin ppx      I have performed a physical exam and reviewed and updated ROS and Plan today (9/20/2023). In review of yesterday's note (9/19/2023), there are no changes except as documented above.        "

## 2023-09-21 NOTE — PROGRESS NOTES
Monitor summary: SR/SB 53-78, CT 0.20, QRS 0.08, QT 0.39, with occasional PVC/big/coup per strip from monitor room.

## 2023-09-21 NOTE — THERAPY
Physical Therapy   Daily Treatment     Patient Name: Grisel Saha  Age:  88 y.o., Sex:  female  Medical Record #: [id number]  Today's Date: 9/21/2023          Assessment    Pt presents with improving strength and activity tolerance; very sharp cognition and wishes to return to [facility]; reportedly she can have more support at her [facility] if needed; from a PT perspective needs stand by assist with FWW community distances; defer to medical team if acute rehab needed, from PT perspective, pt wants to return to go back to her home rather than rehab if ok with her dtr. Pt feels her mood and motivation will be better at home;     Plan    Treatment Plan Status: Modify Current Treatment Plan  Type of Treatment: Bed Mobility, Gait Training, Orthotics Training , Self Care / Home Evaluation, Stair Training, Therapeutic Activities, Therapeutic Exercise  Treatment Frequency: 5 Times per Week  Treatment Duration: Until Therapy Goals Met    DC Equipment Recommendations: None  Discharge Recommendations: from PT perspective, can dc back to [facility] from PT perspective with home health      Abridged Subjective/Objective     09/21/23 1544   Cognition    Cognition / Consciousness WDL   Level of Consciousness Alert   Comments pleasant and cooperative; very sharp   Balance   Sitting Balance (Static) Good   Sitting Balance (Dynamic) Fair +   Standing Balance (Static) Fair   Standing Balance (Dynamic) Fair -   Weight Shift Sitting Good   Weight Shift Standing Fair   Skilled Intervention Sequencing;Tactile Cuing;Verbal Cuing   Comments B UE support in sitting/standing; no loss of balance in moving environment, can remove UE support intermittently;   Bed Mobility    Supine to Sit   (NT, up with OT; in chair post)   Gait Analysis   Gait Level Of Assist Standby Assist   Assistive Device Front Wheel Walker   Distance (Feet) 300   # of Times Distance was Traveled 1   Deviation Decreased Base Of Support;Shuffled Gait;Bradykinetic   # of Stairs Climbed 0    Weight Bearing Status full   Vision Deficits Impacting Mobility wearing glasses throughout   Skilled Intervention Tactile Cuing   Comments VC for symptom observation, able to maintain voice throughout mobility;   Functional Mobility   Sit to Stand Standby Assist   Bed, Chair, Wheelchair Transfer Standby Assist  (with FWW)   Transfer Method   (via walking)   Skilled Intervention Sequencing;Tactile Cuing   Short Term Goals    Short Term Goal # 1 pt will move supine<>eob with spv in 6 tx for bed mobility.   Goal Outcome # 1 Other (see comments)  (not observed reports she has an adjustable bed)   Short Term Goal # 2 pt will complete spt with fww and spv in 6 tx for functional mobility.   Goal Outcome # 2 Goal met   Short Term Goal # 3 pt will ambulate 150 ft with fww and spv in 6 tx for household distances.   Goal Outcome # 3 Progressing as expected

## 2023-09-21 NOTE — PROGRESS NOTES
[facility] -  PROGRESS NOTE               Author: Helene Humphrey M.D. Date & Time: 9/21/2023  7:04 AM     HPI:  87 yo F PMH HTN who presented to ED with CC as above.  She was dx with shingles about 7 days PTA and was started on oral acyclovir by PCP.  Since then she has developed poor appetite and nausea with dry heaving.  As a result of this generalized fatigue, body aches, myalgias have developed also.  No chronic hx of hyponatremia but has been dx with it in past but never critically low.  Labs in ED showed SNa ~ 114 with normal renal fxn and elevated SBP ~ 233.  WOrk up in ED included a CT which showed changes c/w cirrhosis.  BNP elevated ~ 1600.  She was given 500 mL bolus of LR and admitted to ICU for further evaluation.  SNa slowly creeping up ~ 117 on most recent check.  She otherwise denies any F/C/CP/SOB.  No melena, hematochezia, hematemesis.  No HA, visual changes, or abdominal pain.     DAILY NEPHROLOGY SUMMARY:  9/14: Consult done  9/15: NAEO, no complaints; SNa ~ 120 this AM, feels a bit better  9/16: No acute events, serum sodium to 124 early AM and then back down to 122 at 6am, on NS at 100cc/hr  9/17: , Bp elevated. UOP is not documented, patient is comfortable   9/18:  S Na initially improved with IVF and then dropped to 120 , IVF discontinued yesterday and given IV Lasix 40 mg once, reports feeling weak otherwise no other c/o, started on salt tabs this am   9/19: SNa 127 at midnight and dropped to 124 this am, no accurate I/O, pt sitting up in chair, reports feeling much better today   9/20: Pt more alert today, sitting up in chair getting ready to have breakfast, Na 129 at mid night and again down to 126 this am   9/21: Incontinent at times, no complaints, feels well, sitting up in chair       REVIEW OF SYSTEMS:    10 point ROS reviewed and is as per HPI/daily summary or otherwise negative    PMH/PSH/SH/FH:   Reviewed and unchanged since admission  "note    CURRENT MEDICATIONS:   Reviewed from admission to present day    VS:  BP (!) 145/59   Pulse 74   Temp 36.4 °C (97.5 °F) (Temporal)   Resp 16   Ht 1.499 m (4' 11\")   Wt 72.5 kg (159 lb 13.3 oz)   SpO2 94%   BMI 32.28 kg/m²     Physical Exam  Nursing note reviewed.   Constitutional:       Appearance: Frail  Eyes:      General: No scleral icterus.  Cardiovascular:      Comments: No edema  Pulmonary:      Effort: Pulmonary effort is normal.   Abdominal:      General: There is no distension.   Musculoskeletal:         General: No deformity.   Skin:     Findings: No rash.   Neurological:      Mental Status: She is alert.   Psychiatric:         Behavior: Behavior is cooperative.   Fluids:  In: 1030 [P.O.:1030]  Out: 350     LABS:  Recent Labs     09/19/23  0000 09/19/23  0651 09/20/23  0039 09/20/23  0618 09/20/23  1743 09/21/23  0010 09/21/23  0620   SODIUM 127*   < > 129*   < > 126* 127* 128*   POTASSIUM 4.3  --  3.8  --   --  3.5*  --    CHLORIDE 97  --  96  --   --  93*  --    CO2 21  --  23  --   --  22  --    GLUCOSE 130*  --  121*  --   --  125*  --    BUN 18  --  15  --   --  16  --    CREATININE 0.92  --  0.91  --   --  0.95  --    CALCIUM 7.4*  --  8.5  --   --  7.7*  --     < > = values in this interval not displayed.         IMAGING:   All imaging reviewed from admission to present day    IMPRESSION:  # HORACE: cr from 0.6 to 1. Oliguria.  # Hyponatremia, hypotonic  - Baseline Serum Na ~128-130   - Serum Na on admission 114 9/13   - TSH, cortisol  WNL  - No signs of overt hypervolemia on exam , appears to be euvolemic on exam   - Etiology  likely related to cirrhotic pathophysiology   - Initially SIADH favored with infection, antiviral meds   - U Osm 495 > S Osm on admission, urine lytes not available for review    - Oral acyclovir less likely to cause nephrotoxicity vs IV  - +NSAID use as well as OP  - ECHO 1/22 unremarkable   - CXR on admission  with pulmonary edema and/or infiltrates and     " cardiomegaly  # HTN  - Goal BP < 140/90  - Not at goal on admit  # Shingles  - On acyclovir as OP  # Cirrhosis  - Noted on imaging, no hx of it likely first dx  - Could be playing a role in her past hypoNa episode  # Hypokalemia      PLAN:  - Need accurate I/O   - Difficult to manage  hyponatremia due to no accurate I/O   - SNa Q8h, goal increase serum sodium to ~132-136  by 9/22 am  - 1L fluid restriction  - Check urine lytes  - Ct  lasix 10 mg bid  - Salt tabs discontinued  - KCL 40 mEq ordered today  - Daily labs  - If autocorrects too fast add D5W to bring SNa down  Discussed with nursing the need for accurate I/O

## 2023-09-21 NOTE — THERAPY
"Occupational Therapy  Daily Treatment     Patient Name: Grisel Saha  Age:  88 y.o., Sex:  female  Medical Record #: [id number]  Today's Date: 9/21/2023     Precautions  Precautions: Fall Risk    Assessment    Pt seen for follow up OT tx session, pt very motivated to return back to [facility] versus go to rehab feels she has a good support system at her [facility] and can receive more assistance as needed. Pt progressing very well only required Christel for lower body ADLs and toileting supervision for transfers. Anticipate pt is nearing her functional baseline, has a strong desire to go home, as well as a good support system in place will recommend home health therapy.     Plan    Treatment Plan Status: (P) Continue Current Treatment Plan  Type of Treatment: Self Care / Activities of Daily Living, Adaptive Equipment, Therapeutic Exercises, Therapeutic Activity  Treatment Frequency: 3 Times per Week  Treatment Duration: Until Therapy Goals Met    DC Equipment Recommendations: (P) None  Discharge Recommendations: (P) Recommend home health for continued occupational therapy services    Subjective    \"I'd rather go home, I have a great system there, my daughter is working on getting me more help\"     Objective       09/21/23 1520   Precautions   Precautions Fall Risk   Pain 0 - 10 Group   Therapist Pain Assessment Post Activity Pain Same as Prior to Activity;Nurse Notified   Cognition    Cognition / Consciousness WDL   Level of Consciousness Alert   Comments Very pleasant, cooperative, motivated to return home   Active ROM Upper Body   Active ROM Upper Body  WDL   Strength Upper Body   Upper Body Strength  WDL   Balance   Sitting Balance (Static) Good   Sitting Balance (Dynamic) Fair +   Standing Balance (Static) Fair   Standing Balance (Dynamic) Fair -   Weight Shift Sitting Good   Weight Shift Standing Fair   Skilled Intervention Verbal Cuing;Facilitation   Comments w/ FWW   Bed Mobility    Comments up in chair before session left " with PT   Activities of Daily Living   Grooming Standby Assist;Standing   Upper Body Dressing Supervision   Lower Body Dressing Minimal Assist   Toileting Minimal Assist   Skilled Intervention Verbal Cuing;Facilitation   How much help from another person does the patient currently need...   Putting on and taking off regular lower body clothing? 3   Bathing (including washing, rinsing, and drying)? 3   Toileting, which includes using a toilet, bedpan, or urinal? 3   Putting on and taking off regular upper body clothing? 3   Taking care of personal grooming such as brushing teeth? 3   Eating meals? 4   6 Clicks Daily Activity Score 19   Functional Mobility   Sit to Stand Standby Assist   Bed, Chair, Wheelchair Transfer Standby Assist   Toilet Transfers Standby Assist   Transfer Method Stand Step   Mobility STS from chair, bathroom mobility, left with PT   Skilled Intervention Verbal Cuing;Facilitation   Comments w/ FWW   Activity Tolerance   Sitting in Chair up in chair upon arrival   Standing 15 min   Patient / Family Goals   Patient / Family Goal #1 regain independent PLOF   Goal #1 Outcome Progressing as expected   Short Term Goals   Short Term Goal # 1 pt will complete functional transfers with SPV   Goal Outcome # 1 Progressing as expected   Short Term Goal # 2 pt will complete toileting ADL with SPV   Goal Outcome # 2 Progressing as expected   Short Term Goal # 3 pt will tolerate >5min standing grooming at sink with SPV   Goal Outcome # 3 Progressing as expected   Education Group   Education Provided Role of Occupational Therapist   Role of Occupational Therapist Patient Response Patient;Acceptance;Explanation;Verbal Demonstration;Family   Occupational Therapy Treatment Plan    O.T. Treatment Plan Continue Current Treatment Plan   Anticipated Discharge Equipment and Recommendations   DC Equipment Recommendations None   Discharge Recommendations Recommend home health for continued occupational therapy services    Interdisciplinary Plan of Care Collaboration   IDT Collaboration with  Nursing   Patient Position at End of Therapy Other (Comments)  (Left with PT to ambulate)   Collaboration Comments RN updated

## 2023-09-21 NOTE — CARE PLAN
The patient is Stable - Low risk of patient condition declining or worsening    Shift Goals  Clinical Goals: I&O, monitor sodium  Patient Goals: get to rehab  Family Goals: BRIAN    Progress made toward(s) clinical / shift goals:    Problem: Knowledge Deficit - Standard  Goal: Patient and family/care givers will demonstrate understanding of plan of care, disease process/condition, diagnostic tests and medications  Outcome: Progressing  Note: Pt verbalizes understanding of plan of care and asks appropriate questions.       Problem: Pain - Standard  Goal: Alleviation of pain or a reduction in pain to the patient’s comfort goal  Outcome: Progressing  Note: Pt educated on 0-10 pain scale, educated on both pharmacological and non-pharmacological methods of pain control. No complaints of pain.       Problem: Neuro Status  Goal: Neuro status will remain stable or improve  Outcome: Progressing  Note: Neuro status remains stable.

## 2023-09-21 NOTE — PROGRESS NOTES
"Hospital Medicine Daily Progress Note    Date of Service  9/21/2023    Chief Complaint  Grisel Saha is a 88 y.o. female admitted 9/13/2023 with hyponatremia and generalized malaise    Hospital Course  88 y.o. female who presented 9/13/2023 with fatigue malaise  Grisel Saha is a 88 y.o. female, with history of HTN, recently diagnosed with shingles to her right mid abdomen/back 7 days ago, started on antiviral therapy and ever since with significant poor appetite, she presented to the emergency department accompanied by her daughter on 9/13/2023 with complains of generalized fatigue, body aches, nausea/dry heaving and abdominal distention.  Additionally, feeling somnolent more than usual.  At baseline she is active, leaves that halfway home.  Daughter states that she had hyponatremia prior but not sodium severely low.  Patient takes losartan for blood pressure control.     The patient was admitted to [facility] evaluated by nephrology initially started on IV fluids with improvement in her sodium levels.  She was transferred to neurology on 9/16/2023    Interval Problem Update      Patient complains of generalized fatigue and mild nausea  Sodium reviewed 124-125 I started salt tablets    WBC 10.2 hemoglobin 10.9 platelets 319  Potassium 3.0 I ordered repletion  Magnesium 1.4 I ordered repletion\"    9/19. I reviewed the chart along with vitals, labs, imaging, test (both pending and resulted) and recommendations from specialists and interdisciplinary team.  Family at bedside.  Na now 127.   Plan to discharge to rehab. I explained rehab to family and patient. Discussed with rehab coordinator.  9/20. Spoke with CM/SW and Renal  Renal prefers Na to be 130  Planned for rehab  Updated patient and family.  Patient otherwise resting and not agitated.   9/21. Spoke with renal Na 128 one more day of observation. [place] Na is 130. Relayed to patient.      I have discussed this patient's plan of care and discharge " plan at IDT rounds today with Case Management, Nursing, Nursing leadership, and other members of the IDT team.    Consultants/Specialty  nephrology    Code Status  Full Code    Disposition  Medically Cleared  I have placed the appropriate orders for post-discharge needs.    Review of Systems  Review of Systems   Constitutional:  Positive for malaise/fatigue.   Respiratory:  Negative for shortness of breath.    Cardiovascular:  Negative for chest pain.   Gastrointestinal:  Positive for nausea. Negative for abdominal pain.   All other systems reviewed and are negative.       Physical Exam  Temp:  [36.4 °C (97.5 °F)-36.6 °C (97.9 °F)] 36.6 °C (97.9 °F)  Pulse:  [52-74] 67  Resp:  [16-18] 18  BP: (100-145)/(36-59) 135/58  SpO2:  [92 %-95 %] 94 %    Physical Exam  Vitals and nursing note reviewed.   Constitutional:       General: She is not in acute distress.  HENT:      Head: Normocephalic and atraumatic.      Nose: Nose normal. No rhinorrhea.      Mouth/Throat:      Pharynx: No oropharyngeal exudate or posterior oropharyngeal erythema.   Eyes:      General: No scleral icterus.        Right eye: No discharge.         Left eye: No discharge.   Cardiovascular:      Rate and Rhythm: Normal rate and regular rhythm.      Heart sounds: Normal heart sounds. No murmur heard.     No friction rub. No gallop.   Pulmonary:      Effort: Pulmonary effort is normal. No respiratory distress.      Breath sounds: No stridor. Decreased breath sounds present. No wheezing, rhonchi or rales.   Chest:      Chest wall: No tenderness.   Abdominal:      General: Bowel sounds are normal. There is no distension.      Palpations: Abdomen is soft. There is no mass.      Tenderness: There is no abdominal tenderness. There is no rebound.   Musculoskeletal:         General: Swelling present. No tenderness.      Cervical back: Neck supple. No rigidity.   Skin:     General: Skin is warm and dry.      Coloration: Skin is not cyanotic or jaundiced.       Nails: There is no clubbing.      Comments: Rash from shingles with no surrounding erythema or new vesicles   Neurological:      General: No focal deficit present.      Mental Status: She is alert and oriented to person, place, and time.      Cranial Nerves: No cranial nerve deficit.      Motor: Weakness (slightly improved) present.      Comments: Mild cognitive deficits   Psychiatric:         Mood and Affect: Mood normal.         Behavior: Behavior normal.         Fluids    Intake/Output Summary (Last 24 hours) at 9/21/2023 1632  Last data filed at 9/21/2023 1400  Gross per 24 hour   Intake 940 ml   Output 375 ml   Net 565 ml         Laboratory  Recent Labs     09/19/23  0000 09/20/23  0039 09/21/23  0010   WBC 8.6 9.0 8.4   RBC 3.41* 3.76* 3.56*   HEMOGLOBIN 10.7* 11.7* 11.1*   HEMATOCRIT 30.7* 33.3* 30.9*   MCV 90.0 88.6 86.8   MCH 31.4 31.1 31.2   MCHC 34.9 35.1 35.9*   RDW 40.4 39.5 39.7   PLATELETCT 303 363 364   MPV 9.7 9.6 9.9       Recent Labs     09/19/23  0000 09/19/23  0651 09/20/23  0039 09/20/23  0618 09/21/23  0010 09/21/23  0620 09/21/23  1144   SODIUM 127*   < > 129*   < > 127* 128* 127*   POTASSIUM 4.3  --  3.8  --  3.5*  --   --    CHLORIDE 97  --  96  --  93*  --   --    CO2 21  --  23  --  22  --   --    GLUCOSE 130*  --  121*  --  125*  --   --    BUN 18  --  15  --  16  --   --    CREATININE 0.92  --  0.91  --  0.95  --   --    CALCIUM 7.4*  --  8.5  --  7.7*  --   --     < > = values in this interval not displayed.                     Imaging  CT-CHEST,ABDOMEN,PELVIS WITH   Final Result         1.  Diverticulosis   2.  Hepatomegaly   3.  Irregular hepatic contour compatible changes of cirrhosis   4.  Atherosclerosis and atherosclerotic coronary artery disease      CT-HEAD W/O   Final Result         1.  No acute intracranial abnormality is identified, there are nonspecific white matter changes, commonly associated with small vessel ischemic disease.  Associated mild cerebral atrophy is noted.  "  2.  Atherosclerosis.         DX-CHEST-PORTABLE (1 VIEW)   Final Result         1.  Pulmonary edema and/or infiltrates.   2.  Right humeral head and metaphyseal fracture, age indeterminant, correlate with history.   3.  Cardiomegaly   4.  Atherosclerosis           Assessment/Plan  * Hyponatremia- (present on admission)  Assessment & Plan  -[facility] admission for severe hyponatremia and initial sodium of 114.  Nephrology following initially received IV fluids  Sodium improved to 126 but now downtrending.    Started salt tablets  Continue to monitor sodium levels  Continue free fluid restriction and reviewed with the patient getting most hydration from electrolyte rich fluids.\"    Recent Labs     09/19/23  0000 09/19/23  0651 09/20/23  0039 09/20/23  0618 09/21/23  0010 09/21/23  0620 09/21/23  1144 09/21/23  1823   SODIUM 127*   < > 129*   < > 127* 128* 127* 126*   POTASSIUM 4.3  --  3.8  --  3.5*  --   --   --    CHLORIDE 97  --  96  --  93*  --   --   --    CO2 21  --  23  --  22  --   --   --    GLUCOSE 130*  --  121*  --  125*  --   --   --    BUN 18  --  15  --  16  --   --   --    CREATININE 0.92  --  0.91  --  0.95  --   --   --     < > = values in this interval not displayed.         Na improved to  mild-moderate range  Renal team prefers Na 130  Low dose diuretic Lasix, make sure U/O>I/O (net negative)  On Lasix monitoe blood pressure, renal function and K+ (3.5, ordered repletion)  Intake/Output Summary (Last 24 hours) at 9/21/2023 2310  Last data filed at 9/21/2023 2200  Gross per 24 hour   Intake 630 ml   Output 700 ml   Net -70 ml       Plan for rehab         Shingles  Assessment & Plan  Completed antiviral treatment  Continue skin care    Pulmonary edema  Assessment & Plan  Appears euvolemic at this time  Wean off oxygen as tolerated    Hypokalemia  Assessment & Plan  Hypomagnesemia    I ordered IV magnesium sulfate and oral potassium supplement  I ordered repeat labs\"    K stable.    Essential " "hypertension- (present on admission)  Assessment & Plan  Improved control continue carvedilol and losartan\"    Vitals:    09/21/23 1930   BP: 134/58   Pulse: 67   Resp: 18   Temp: 36.5 °C (97.7 °F)   SpO2: 92%     Stable.             VTE prophylaxis:    enoxaparin ppx      I have performed a physical exam and reviewed and updated ROS and Plan today (9/21/2023). In review of yesterday's note (9/20/2023), there are no changes except as documented above.        "

## 2023-09-22 ENCOUNTER — HOME HEALTH ADMISSION (OUTPATIENT)
Dept: HOME HEALTH SERVICES | Facility: HOME HEALTHCARE | Age: 88
End: 2023-09-22
Payer: MEDICARE

## 2023-09-22 ENCOUNTER — PHARMACY VISIT (OUTPATIENT)
Dept: PHARMACY | Facility: MEDICAL CENTER | Age: 88
End: 2023-09-22
Payer: COMMERCIAL

## 2023-09-22 VITALS
OXYGEN SATURATION: 92 % | BODY MASS INDEX: 32.22 KG/M2 | SYSTOLIC BLOOD PRESSURE: 127 MMHG | DIASTOLIC BLOOD PRESSURE: 48 MMHG | TEMPERATURE: 98.4 F | HEART RATE: 69 BPM | WEIGHT: 159.83 LBS | RESPIRATION RATE: 16 BRPM | HEIGHT: 59 IN

## 2023-09-22 LAB
ALBUMIN SERPL BCP-MCNC: 3.2 G/DL (ref 3.2–4.9)
BUN SERPL-MCNC: 16 MG/DL (ref 8–22)
CALCIUM ALBUM COR SERPL-MCNC: 8.6 MG/DL (ref 8.5–10.5)
CALCIUM SERPL-MCNC: 8 MG/DL (ref 8.5–10.5)
CHLORIDE SERPL-SCNC: 95 MMOL/L (ref 96–112)
CO2 SERPL-SCNC: 22 MMOL/L (ref 20–33)
CREAT SERPL-MCNC: 0.92 MG/DL (ref 0.5–1.4)
ERYTHROCYTE [DISTWIDTH] IN BLOOD BY AUTOMATED COUNT: 39 FL (ref 35.9–50)
GFR SERPLBLD CREATININE-BSD FMLA CKD-EPI: 60 ML/MIN/1.73 M 2
GLUCOSE SERPL-MCNC: 131 MG/DL (ref 65–99)
HCT VFR BLD AUTO: 31.5 % (ref 37–47)
HGB BLD-MCNC: 11.3 G/DL (ref 12–16)
MCH RBC QN AUTO: 31 PG (ref 27–33)
MCHC RBC AUTO-ENTMCNC: 35.9 G/DL (ref 32.2–35.5)
MCV RBC AUTO: 86.3 FL (ref 81.4–97.8)
PHOSPHATE SERPL-MCNC: 2.8 MG/DL (ref 2.5–4.5)
PLATELET # BLD AUTO: 360 K/UL (ref 164–446)
PMV BLD AUTO: 10 FL (ref 9–12.9)
POTASSIUM SERPL-SCNC: 3.6 MMOL/L (ref 3.6–5.5)
RBC # BLD AUTO: 3.65 M/UL (ref 4.2–5.4)
SODIUM SERPL-SCNC: 127 MMOL/L (ref 135–145)
SODIUM SERPL-SCNC: 129 MMOL/L (ref 135–145)
SODIUM SERPL-SCNC: 130 MMOL/L (ref 135–145)
WBC # BLD AUTO: 9.2 K/UL (ref 4.8–10.8)

## 2023-09-22 PROCEDURE — A9270 NON-COVERED ITEM OR SERVICE: HCPCS | Performed by: HOSPITALIST

## 2023-09-22 PROCEDURE — A9270 NON-COVERED ITEM OR SERVICE: HCPCS | Performed by: INTERNAL MEDICINE

## 2023-09-22 PROCEDURE — 99239 HOSP IP/OBS DSCHRG MGMT >30: CPT | Performed by: INTERNAL MEDICINE

## 2023-09-22 PROCEDURE — 700102 HCHG RX REV CODE 250 W/ 637 OVERRIDE(OP): Performed by: INTERNAL MEDICINE

## 2023-09-22 PROCEDURE — 85027 COMPLETE CBC AUTOMATED: CPT

## 2023-09-22 PROCEDURE — 84295 ASSAY OF SERUM SODIUM: CPT

## 2023-09-22 PROCEDURE — 700102 HCHG RX REV CODE 250 W/ 637 OVERRIDE(OP): Performed by: STUDENT IN AN ORGANIZED HEALTH CARE EDUCATION/TRAINING PROGRAM

## 2023-09-22 PROCEDURE — 80069 RENAL FUNCTION PANEL: CPT

## 2023-09-22 PROCEDURE — A9270 NON-COVERED ITEM OR SERVICE: HCPCS | Performed by: STUDENT IN AN ORGANIZED HEALTH CARE EDUCATION/TRAINING PROGRAM

## 2023-09-22 PROCEDURE — RXMED WILLOW AMBULATORY MEDICATION CHARGE: Performed by: INTERNAL MEDICINE

## 2023-09-22 PROCEDURE — 700102 HCHG RX REV CODE 250 W/ 637 OVERRIDE(OP): Performed by: HOSPITALIST

## 2023-09-22 RX ORDER — CARVEDILOL 3.12 MG/1
3.12 TABLET ORAL 2 TIMES DAILY WITH MEALS
Qty: 60 TABLET | Refills: 0 | Status: ON HOLD | OUTPATIENT
Start: 2023-09-22 | End: 2023-09-28 | Stop reason: SDUPTHER

## 2023-09-22 RX ORDER — OMEPRAZOLE 20 MG/1
20 CAPSULE, DELAYED RELEASE ORAL DAILY
Qty: 30 CAPSULE | COMMUNITY
Start: 2023-09-23 | End: 2023-11-08

## 2023-09-22 RX ORDER — POTASSIUM CHLORIDE 20 MEQ/1
40 TABLET, EXTENDED RELEASE ORAL DAILY
Qty: 60 TABLET | Refills: 11 | Status: ON HOLD | OUTPATIENT
Start: 2023-09-23 | End: 2023-09-28

## 2023-09-22 RX ORDER — AMOXICILLIN 250 MG
2 CAPSULE ORAL 2 TIMES DAILY
Qty: 30 TABLET | Refills: 0 | COMMUNITY
Start: 2023-09-22

## 2023-09-22 RX ORDER — FUROSEMIDE 20 MG/1
20 TABLET ORAL DAILY
Qty: 30 TABLET | Refills: 0 | Status: ON HOLD | OUTPATIENT
Start: 2023-09-22 | End: 2023-09-28

## 2023-09-22 RX ORDER — CARVEDILOL 6.25 MG/1
6.25 TABLET ORAL 2 TIMES DAILY WITH MEALS
Qty: 60 TABLET | Refills: 0 | Status: SHIPPED | OUTPATIENT
Start: 2023-09-22 | End: 2023-09-22

## 2023-09-22 RX ORDER — POLYETHYLENE GLYCOL 3350 17 G/17G
17 POWDER, FOR SOLUTION ORAL
Refills: 3 | COMMUNITY
Start: 2023-09-22

## 2023-09-22 RX ORDER — CHOLECALCIFEROL (VITAMIN D3) 125 MCG
5 CAPSULE ORAL NIGHTLY PRN
Qty: 30 TABLET | COMMUNITY
Start: 2023-09-22 | End: 2023-11-08

## 2023-09-22 RX ORDER — SUCRALFATE ORAL 1 G/10ML
1 SUSPENSION ORAL EVERY 6 HOURS PRN
COMMUNITY
Start: 2023-09-22

## 2023-09-22 RX ORDER — CARVEDILOL 6.25 MG/1
3.12 TABLET ORAL 2 TIMES DAILY WITH MEALS
Status: DISCONTINUED | OUTPATIENT
Start: 2023-09-22 | End: 2023-09-22 | Stop reason: HOSPADM

## 2023-09-22 RX ORDER — ACETAMINOPHEN 325 MG/1
650 TABLET ORAL EVERY 6 HOURS PRN
Qty: 30 TABLET | Refills: 0 | Status: ON HOLD | COMMUNITY
Start: 2023-09-22 | End: 2023-09-28 | Stop reason: SDUPTHER

## 2023-09-22 RX ADMIN — OMEPRAZOLE 20 MG: 20 CAPSULE, DELAYED RELEASE ORAL at 06:21

## 2023-09-22 RX ADMIN — SUCRALFATE 1 G: 1 SUSPENSION ORAL at 00:01

## 2023-09-22 RX ADMIN — POTASSIUM CHLORIDE 20 MEQ: 1500 TABLET, EXTENDED RELEASE ORAL at 06:21

## 2023-09-22 RX ADMIN — SENNOSIDES AND DOCUSATE SODIUM 2 TABLET: 50; 8.6 TABLET ORAL at 06:21

## 2023-09-22 RX ADMIN — SUCRALFATE 1 G: 1 SUSPENSION ORAL at 06:21

## 2023-09-22 RX ADMIN — OXYCODONE HYDROCHLORIDE 10 MG: 10 TABLET ORAL at 00:02

## 2023-09-22 ASSESSMENT — PAIN DESCRIPTION - PAIN TYPE
TYPE: ACUTE PAIN

## 2023-09-22 NOTE — PROGRESS NOTES
Patient is being discharged home from the discharge lounge. Patient is A&Ox4. IV removed. Discharge instructions provided to patient and meds to beds delivered. Awaiting daughter for transport and to participate on discharge paperwork.

## 2023-09-22 NOTE — DISCHARGE PLANNING
Received Choice Form @: 1301  Agency/ Facility Name: [facility]  Referral Sent per Choice Form @: 7188

## 2023-09-22 NOTE — DISCHARGE PLANNING
HTH/SCP TCN chart review completed. Collaborated with PT/OT and BRADEN Marte. Current discharge considerations are home to [facility] with home health. Patient seen at bedside. Patient reports she wants to adhere to PT/OT recommendations, and return back to [facility] with home health services. TCN will continue to follow and collaborate with discharge planning team as additional post acute needs arise. Thank you.    Completed:  PT/OT with recommendations for home with home health on 9/22  Choice obtained: HH, SNF  [facility]  IRF (see above)  SCP with [facility] P[facility].

## 2023-09-22 NOTE — PROGRESS NOTES
[facility] -  PROGRESS NOTE               Author: Helene Humphrey M.D. Date & Time: 9/22/2023  6:34 AM     HPI:  89 yo F PMH HTN who presented to ED with CC as above.  She was dx with shingles about 7 days PTA and was started on oral acyclovir by PCP.  Since then she has developed poor appetite and nausea with dry heaving.  As a result of this generalized fatigue, body aches, myalgias have developed also.  No chronic hx of hyponatremia but has been dx with it in past but never critically low.  Labs in ED showed SNa ~ 114 with normal renal fxn and elevated SBP ~ 233.  WOrk up in ED included a CT which showed changes c/w cirrhosis.  BNP elevated ~ 1600.  She was given 500 mL bolus of LR and admitted to ICU for further evaluation.  SNa slowly creeping up ~ 117 on most recent check.  She otherwise denies any F/C/CP/SOB.  No melena, hematochezia, hematemesis.  No HA, visual changes, or abdominal pain.     DAILY NEPHROLOGY SUMMARY:  9/14: Consult done  9/15: NAEO, no complaints; SNa ~ 120 this AM, feels a bit better  9/16: No acute events, serum sodium to 124 early AM and then back down to 122 at 6am, on NS at 100cc/hr  9/17: , Bp elevated. UOP is not documented, patient is comfortable   9/18:  S Na initially improved with IVF and then dropped to 120 , IVF discontinued yesterday and given IV Lasix 40 mg once, reports feeling weak otherwise no other c/o, started on salt tabs this am   9/19: SNa 127 at midnight and dropped to 124 this am, no accurate I/O, pt sitting up in chair, reports feeling much better today   9/20: Pt more alert today, sitting up in chair getting ready to have breakfast, Na 129 at mid night and again down to 126 this am   9/21: Incontinent at times, no complaints, feels well, sitting up in chair   9/22: SNa 130 ,no complaints, per primary svc pt will be discharged home with HHC, Uosm still up but trending down       REVIEW OF SYSTEMS:    10 point ROS reviewed and is  "as per HPI/daily summary or otherwise negative    PMH/PSH/SH/FH:   Reviewed and unchanged since admission note    CURRENT MEDICATIONS:   Reviewed from admission to present day    VS:  BP 98/75   Pulse (!) 57   Temp 36.6 °C (97.9 °F) (Temporal)   Resp 16   Ht 1.499 m (4' 11\")   Wt 72.5 kg (159 lb 13.3 oz)   SpO2 93%   BMI 32.28 kg/m²     Physical Exam  Nursing note reviewed.   Constitutional:       Appearance: Frail  Eyes:      General: No scleral icterus.  Cardiovascular:      Comments: No edema  Pulmonary:      Effort: Pulmonary effort is normal.   Abdominal:      General: There is no distension.   Musculoskeletal:         General: No deformity.   Skin:     Findings: No rash.   Neurological:      Mental Status: She is alert.   Psychiatric:         Behavior: Behavior is cooperative.   Fluids:  In: 640 [P.O.:640]  Out: 450     LABS:  Recent Labs     09/20/23  0039 09/20/23  0618 09/21/23  0010 09/21/23  0620 09/21/23  1144 09/21/23  1823 09/22/23  0027   SODIUM 129*   < > 127*   < > 127* 126* 129*   POTASSIUM 3.8  --  3.5*  --   --   --  3.6   CHLORIDE 96  --  93*  --   --   --  95*   CO2 23  --  22  --   --   --  22   GLUCOSE 121*  --  125*  --   --   --  131*   BUN 15  --  16  --   --   --  16   CREATININE 0.91  --  0.95  --   --   --  0.92   CALCIUM 8.5  --  7.7*  --   --   --  8.0*    < > = values in this interval not displayed.         IMAGING:   All imaging reviewed from admission to present day    IMPRESSION:  # HORACE: cr from 0.6 to 1. Oliguria.  # Hyponatremia, hypotonic  - Baseline Serum Na ~128-130   - Serum Na on admission 114 9/13   - TSH, cortisol  WNL  - No signs of overt hypervolemia on exam , appears to be euvolemic on exam   - Etiology  likely related to cirrhotic pathophysiology   - Initially SIADH favored with infection, antiviral meds   - U Osm 495 > S Osm on admission, urine lytes not available for review    - Oral acyclovir less likely to cause nephrotoxicity vs IV  - +NSAID use as well as " OP  - ECHO 1/22 unremarkable   - CXR on admission  with pulmonary edema and/or infiltrates and     cardiomegaly  # HTN  # Shingles  - On acyclovir as OP  # Cirrhosis  - Noted on imaging, no hx of it likely first dx  - Could be playing a role in her past hypoNa episode  # Hypokalemia      PLAN:  - Need accurate I/O   - Difficult to manage  hyponatremia due to no accurate I/O   - 1L fluid restriction  - Ct  lasix 10 mg bid  - KCL 40 mEq daily   - Need close follow up of labs to avoid  hypokalemia in the setting of losartan and KCL  - Labs early next week Uosm,urine sodium and potasium and BMP  - Will arrange follow up as OP with nephrology next week   - Decrease carvedilol 3.125 mg bid given soft BPs  Discussed  the above with primary svc

## 2023-09-22 NOTE — DISCHARGE PLANNING
Na levels BID a barrier to [facility] admission.  When Na levels can be ordered for once a day, admission can be revisited.  TCC remains following.     1020  DAVID Garcia notified me that patient will be going home with Home Health.  TCC no longer following

## 2023-09-22 NOTE — DISCHARGE PLANNING
ATTN: Case Management  RE: Referral for Home Health    As of 9/22/2023, we have accepted the Home Health referral for the patient listed above.    A [facility] clinician will be out to see the patient within 48 hours. If you have any questions or concerns regarding the patient's transition to Home Health, please do not hesitate to contact us at [phone].      We look forward to collaborating with you,  [facility] Team

## 2023-09-22 NOTE — PROGRESS NOTES
Pt in no acute distress no SOB. Discharge lounge order placed. Communicated with daughter brendon. Daughter to pick patient up in dc lounge. All belongings gathered and with patient. [facility]

## 2023-09-22 NOTE — FACE TO FACE
Face to Face Supporting Documentation - Home Health    The encounter with this patient was in whole or in part the primary reason for home health admission.    Date of encounter:   Patient:                    MRN:                       YOB: 2023  Grisel Saha  [id number]  1935     Home health to see patient for:  Skilled Nursing care for assessment, interventions & education, Physical Therapy evaluation and treatment, and Occupational therapy evaluation and treatment    Skilled need for:  Comment: med management. Also order renal function panel or CMP and check sodium within 1 week (Monday) results to primary provider or Dr. Humphrey, Nephrology. AVOID free water, do thicker liquids instead and limit to 1.2 L fluid per day.    Skilled nursing interventions to include:  Comment: as above    Homebound status evidenced by:  Needs the assistance of another person in order to leave the home. Leaving home requires a considerable and taxing effort. There is a normal inability to leave the home.    Community Physician to provide follow up care: Cricket Miller M.D.     Optional Interventions? No      I certify the face to face encounter for this home health care referral meets the CMS requirements and the encounter/clinical assessment with the patient was, in whole, or in part, for the medical condition(s) listed above, which is the primary reason for home health care. Based on my clinical findings: the service(s) are medically necessary, support the need for home health care, and the homebound criteria are met.  I certify that this patient has had a face to face encounter by myself.  Garfield Majano M.D. - NPI: [id number]

## 2023-09-22 NOTE — PROGRESS NOTES
Monitor summary: SB-SR 56-78, SD .17, QRS .09, QT .38 with (F)PVC, PACs bigeminy, trigeminy, couplets per strip from monitor room.

## 2023-09-22 NOTE — THERAPY
Physical Therapy   Daily Treatment     Patient Name: Grisel Saha  Age:  88 y.o., Sex:  female  Medical Record #: [id number]  Today's Date: 9/21/2023     Precautions  Precautions: Fall Risk    Assessment    Returned to discuss with daughter dc recommendations per pt request; greatest concern is frequency of sodium checks and length of time it will need to be observed; per daughter has discussed with intake RN at [facility] and they will be evaluating need/frequency; pt reports they have had visiting RN for labs before; either way, defer to medical team (nephrology) for optimal location and timing of dc as from PT perspective could return home with home health PT or complete plan of care at acute rehab if medically needed. Will follow.     Plan    Treatment Plan Status: Continue Current Treatment Plan  Type of Treatment: Bed Mobility, Gait Training, Orthotics Training , Self Care / Home Evaluation, Stair Training, Therapeutic Activities, Therapeutic Exercise  Treatment Frequency: 5 Times per Week  Treatment Duration: Until Therapy Goals Met    DC Equipment Recommendations: None  Discharge Recommendations: Recommend home health for continued physical therapy services      Pt/daughter walking to bathroom and back upon attempt, pt denies any new complaints and continues to report preference for home when appropriate.

## 2023-09-22 NOTE — DISCHARGE PLANNING
CM met at bedside with patient to discuss DCP; SCP TCN, Ty, also present.   Patient confirmed plan to DC back to [facility] with HHC and selected following HH agencies:  1- [facility], 2-[facility].      HH choice form faxed to [facility].      Patient's daughter will provide return transportation to [facility].      **1219 Hrs - Patient re-assessed by [facility] , TASHI #[phone], and accepted for return to facility today.  TASHI requested for DC Summary to be faxed to #[phone]; BRADEN updated Dr. Majano & bedside nurse on DCP.    HH referral accepted by [facility].      **1308 Hrs - DC Summary faxed to TASHI @ [facility].

## 2023-09-22 NOTE — DISCHARGE SUMMARY
Discharge Summary    CHIEF COMPLAINT ON ADMISSION  Chief Complaint   Patient presents with    Dizziness    N/V       Reason for Admission  EMS     Admission Date  9/13/2023    CODE STATUS  Full Code    HPI & HOSPITAL COURSE  This is a 88 y.o. female here with Dizziness and N/V  Please review Dr. Penelope Adams M.D. notes for further details of history of present illness, past medical/social/family histories, allergies and medications. Please review Dr. Cintron, CC, Dr. Aguilar, Nephrology consultation notes.  She has a history of history of HTN, recently diagnosed with shingles to her right mid abdomen/back 7 days ago, started on antiviral therapy and ever since with significant poor appetite. She complains of generalized fatigue, body aches, nausea/dry heaving and abdominal distention, feeling somnolent more than usual.  At baseline [facility] home. Daughter states that she had hyponatremia prior but not sodium severely low. Patient takes losartan for blood pressure control.  At the  ED she is afebrile, hypertensive.  Na 114. Normal renal function. Nephrology consulted.  The patient was admitted to [facility] evaluated by Nephrology initially started on IV fluids with improvement in her sodium levels as patient was hypovolemic. 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 Imaging in the past showed cirrhosis (she is obese, could be OLIVEROS) which could contribute to hyponatremia. Her Na level improved to 130 and she was cleared for discharge. She and family declined skilled and PT.OT felt she could go home with home health care. Nephrology cleared her for discharge recommending follow up, follow up labs, fluid restriction. Instructions below.     Her blood pressures are stable. She is weaned off O2. COntinue dmanagement for shingles.       At discharge date, Grisel Plasencia  Maria A afebrile and hemodynamically stable.  Grisel Saha wanted to be discharged today.    Imaging  CT-CHEST,ABDOMEN,PELVIS WITH   Final Result         1.  Diverticulosis   2.  Hepatomegaly   3.  Irregular hepatic contour compatible changes of cirrhosis   4.  Atherosclerosis and atherosclerotic coronary artery disease      CT-HEAD W/O   Final Result         1.  No acute intracranial abnormality is identified, there are nonspecific white matter changes, commonly associated with small vessel ischemic disease.  Associated mild cerebral atrophy is noted.   2.  Atherosclerosis.         DX-CHEST-PORTABLE (1 VIEW)   Final Result         1.  Pulmonary edema and/or infiltrates.   2.  Right humeral head and metaphyseal fracture, age indeterminant, correlate with history.   3.  Cardiomegaly   4.  Atherosclerosis                   Therefore, she is discharged in fair and stable condition to home with organized home healthcare and close outpatient follow-up.    The patient met 2-midnight criteria for an inpatient stay at the time of discharge.    Discharge Date  9/22/2023    FOLLOW UP ITEMS POST DISCHARGE      DISCHARGE DIAGNOSES  Principal Problem:    Hyponatremia (Chronic) (POA: Yes)  Active Problems:    Essential hypertension (Chronic) (POA: Yes)    Hypokalemia (POA: Unknown)    Pulmonary edema (POA: Unknown)    Elevated brain natriuretic peptide (BNP) level (POA: Unknown)    Shingles (POA: Unknown)        FOLLOW UP  No future appointments.  No follow-up provider specified.  Follow up with Cricket Miller M.D. in 1 week.  Follow up with Dr. Nolasco, Nephrology in 1 week for hypontremia. Outpatient labs ordered for within a week  Follow up with gastroenterologist in 1-2 weeks for indigestion symptoms, cirrhosis?. RESULTS TO DR. NOLASCO, NEPHROLOGY.   NURSING provide resources/pamphlets for  Hyponatremia (ordered Na level for Monday (or [facility] nurse can take that) results to Dr. Nolasco, his instructions are ok to  salt food, avoid free water, avoid coffee, gatorade, soda or any other hypotonic fluids; drink thicker liquids instead, limit fluids to 1.2L per day, take your diuretics but see Nephrology to determine duration), hypoklalemia/diuretic use (for now on potassium supplemnts, checking weight, blood pressures and volume state (symptoms of swelling, shortness of breath and dizziness) is advisable. Have primary provider  to check your kidney  function at your visit), obesity (Recommended weight loss advised, 5% through reduced calorie, low carb diet and 150 mins of exercise a week once better  Recommend bariatric surgery evaluation if morbidly obese  Educated on the increase of morbidity and mortality associated with excess weight including DM, Heart Disease, HTN, stroke, and sleep apnea. Recommended outpatient monitoring  of blood sugars, lipid panel, sleep study evaluation for metabolic syndrome).    MEDICATIONS ON DISCHARGE     Medication List        START taking these medications        Instructions   acetaminophen 325 MG Tabs  Commonly known as: Tylenol   Take 2 Tablets by mouth every 6 hours as needed for Mild Pain, Moderate Pain or Fever (Mild Pain; (Pain scale 1-3); Temp greater than 100.5 F).  Dose: 650 mg     carvedilol 6.25 MG Tabs  Commonly known as: Coreg   Take 1 Tablet by mouth 2 times a day with meals.  Dose: 6.25 mg     furosemide 20 MG Tabs  Commonly known as: Lasix   Doctor's comments: See Nephrology prior to discontinuing  Take 1 Tablet by mouth every day.  Dose: 20 mg     melatonin 5 mg Tabs   Take 1 Tablet by mouth at bedtime as needed (Sleep).  Dose: 5 mg     omeprazole 20 MG delayed-release capsule  Start taking on: September 23, 2023  Commonly known as: PriLOSEC   Take 1 Capsule by mouth every day.  Dose: 20 mg     polyethylene glycol/lytes 17 g Pack  Commonly known as: Miralax   Take 1 Packet by mouth 1 time a day as needed (if sennosides and docusate ineffective after 24 hours).  Dose: 17 g      potassium chloride SA 20 MEQ Tbcr  Start taking on: September 23, 2023  Commonly known as: Kdur   Doctor's comments: STOP if not taking Lasix or discuss with your nephrologist  Take 2 Tablets by mouth every day. STOP if not taking Lasix or discuss with your nephrologist  Dose: 40 mEq     senna-docusate 8.6-50 MG Tabs  Commonly known as: Pericolace Or Senokot S   Take 2 Tablets by mouth 2 times a day.  Dose: 2 Tablet     sucralfate 1 GM/10ML Susp  Commonly known as: Carafate   Take 10 mL by mouth every 6 hours as needed (indigestion pain).  Dose: 1 g            CONTINUE taking these medications        Instructions   losartan 100 MG Tabs  Commonly known as: Cozaar   Take 1 Tablet by mouth every day. TAKE 1 TABLET BY MOUTH DAILY Patient does not have this medication  Dose: 100 mg     valacyclovir 1 GM Tabs  Commonly known as: Valtrex   Take 1,000 mg by mouth 2 times a day. 7 day course started 9/7/23  Dose: 1,000 mg            STOP taking these medications      cephALEXin 500 MG Caps  Commonly known as: Keflex     Ibuprofen 200 MG Caps     ondansetron 4 MG Tbdp  Commonly known as: Zofran ODT              Allergies  No Known Allergies    DIET  Orders Placed This Encounter   Procedures    Diet Order Diet: Cardiac; Fluid modifications: (optional): 1000 ml Fluid Restriction     Standing Status:   Standing     Number of Occurrences:   1     Order Specific Question:   Diet:     Answer:   Cardiac [6]     Order Specific Question:   Fluid modifications: (optional)     Answer:   1000 ml Fluid Restriction [7]       ACTIVITY  Avoid heavy lifting or strenuous activity    CONSULTATIONS  As above        LABORATORY  Lab Results   Component Value Date    SODIUM 130 (L) 09/22/2023    POTASSIUM 3.6 09/22/2023    CHLORIDE 95 (L) 09/22/2023    CO2 22 09/22/2023    GLUCOSE 131 (H) 09/22/2023    BUN 16 09/22/2023    CREATININE 0.92 09/22/2023        Lab Results   Component Value Date    WBC 9.2 09/22/2023    HEMOGLOBIN 11.3 (L) 09/22/2023     HEMATOCRIT 31.5 (L) 09/22/2023    PLATELETCT 360 09/22/2023        Total time of the discharge process exceeds 40 minutes.

## 2023-09-22 NOTE — DISCHARGE INSTRUCTIONS
Discharge Instructions per Garfield Majano M.D.  DIET: Resume previous diet  Healthy diet. Plenty of vegetables.  ACTIVITY: Avoid strenuous activity or heavy lifting.  DIAGNOSIS: Principal Problem:    Hyponatremia (Chronic) (POA: Yes)  Active Problems:    Essential hypertension (Chronic) (POA: Yes)    Hypokalemia (POA: Unknown)    Pulmonary edema (POA: Unknown)    Elevated brain natriuretic peptide (BNP) level (POA: Unknown)    Shingles (POA: Unknown)    Follow up instructions.  Please call [phone] to schedule and/or confirm appointments([phone] for medical group, [phone] for imaging, [phone] for specialty); we will do our part to try calling as well.  Follow up with Cricket Miller M.D. in 1 week.  Follow up with Dr. Nolasco, Nephrolgoy in 1 week for hypontremia. Outpatient labs ordered for within a week  Follow up with gastroenterologist in 1-2 weeks for indigestion symptomsRESULTS TO DR. NOLASCO, NEPHROLOGY.   NURSING provide resources/pamphlets for  Hyponatremia (ordered Na level for Monday (or [facility] nurse can take that) results to Dr. Nolasco, his instructions are ok to salt food, avoid free water, avoid coffee, gatorade, soda or any other hypotonic fluids; drink thicker liquids instead, limit fluids to 1.2L per day, take your diuretics but see Nephrology to determine duration), hypoklalemia/diuretic use (for now on potassium supplemnts, checking weight, blood pressures and volume state (symptoms of swelling, shortness of breath and dizziness) is advisable. Have primary provider  to check your kidney  function at your visit), obesity (Recommended weight loss advised, 5% through reduced calorie, low carb diet and 150 mins of exercise a week once better  Recommend bariatric surgery evaluation if morbidly obese  Educated on the increase of morbidity and mortality associated with excess weight including DM, Heart Disease, HTN, stroke, and sleep apnea. Recommended outpatient monitoring  of blood sugars, lipid panel,  sleep study evaluation for metabolic syndrome).  Return to ER in the event of new or worsening symptoms. Please note importance of compliance and the patient has agreed to proceed with all medical recommendations and follow up plan indicated above. All medications come with benefits and risks. Risks may include permanent injury or death and these risks can be minimized with close reassessment and monitoring. Please make it to your scheduled follow ups with Cricket Miller M.D., and/or specialists clinic.

## 2023-09-22 NOTE — CARE PLAN
The patient is Stable - Low risk of patient condition declining or worsening    Shift Goals  Clinical Goals: Increase Sodium, safety  Patient Goals: Rest  Family Goals: BRIAN    Progress made toward(s) clinical / shift goals:    Problem: Knowledge Deficit - Standard  Goal: Patient and family/care givers will demonstrate understanding of plan of care, disease process/condition, diagnostic tests and medications  Outcome: Progressing  Pt understands plan of care and asks appropriate questions     Problem: Fall Risk  Goal: Patient will remain free from falls  Outcome: Progressing  Pt uses call light and follows direction when ambulating     Problem: Pain - Standard  Goal: Alleviation of pain or a reduction in pain to the patient’s comfort goal  Outcome: Progressing

## 2023-09-25 ENCOUNTER — HOSPITAL ENCOUNTER (OUTPATIENT)
Dept: LAB | Facility: MEDICAL CENTER | Age: 88
DRG: 682 | End: 2023-09-25
Attending: INTERNAL MEDICINE
Payer: MEDICARE

## 2023-09-25 ENCOUNTER — PATIENT OUTREACH (OUTPATIENT)
Dept: MEDICAL GROUP | Facility: PHYSICIAN GROUP | Age: 88
End: 2023-09-25
Payer: MEDICARE

## 2023-09-25 ENCOUNTER — TELEPHONE (OUTPATIENT)
Dept: MEDICAL GROUP | Facility: PHYSICIAN GROUP | Age: 88
End: 2023-09-25
Payer: MEDICARE

## 2023-09-25 ENCOUNTER — APPOINTMENT (OUTPATIENT)
Dept: PHYSICAL THERAPY | Facility: REHABILITATION | Age: 88
End: 2023-09-25
Attending: FAMILY MEDICINE
Payer: MEDICARE

## 2023-09-25 DIAGNOSIS — E87.1 HYPONATREMIA: ICD-10-CM

## 2023-09-25 DIAGNOSIS — E87.6 HYPOKALEMIA: ICD-10-CM

## 2023-09-25 LAB
ALBUMIN SERPL BCP-MCNC: 3.9 G/DL (ref 3.2–4.9)
BUN SERPL-MCNC: 9 MG/DL (ref 8–22)
CALCIUM ALBUM COR SERPL-MCNC: 9.2 MG/DL (ref 8.5–10.5)
CALCIUM SERPL-MCNC: 9.1 MG/DL (ref 8.5–10.5)
CHLORIDE SERPL-SCNC: 88 MMOL/L (ref 96–112)
CO2 SERPL-SCNC: 24 MMOL/L (ref 20–33)
CREAT SERPL-MCNC: 0.73 MG/DL (ref 0.5–1.4)
GFR SERPLBLD CREATININE-BSD FMLA CKD-EPI: 79 ML/MIN/1.73 M 2
GLUCOSE SERPL-MCNC: 133 MG/DL (ref 65–99)
PHOSPHATE SERPL-MCNC: 3.4 MG/DL (ref 2.5–4.5)
POTASSIUM SERPL-SCNC: 4.6 MMOL/L (ref 3.6–5.5)
SODIUM SERPL-SCNC: 125 MMOL/L (ref 135–145)

## 2023-09-25 PROCEDURE — 36415 COLL VENOUS BLD VENIPUNCTURE: CPT

## 2023-09-25 PROCEDURE — 80069 RENAL FUNCTION PANEL: CPT

## 2023-09-25 NOTE — PROGRESS NOTES
Transitional Care Management  TCM Outreach Date and Time: Filed (9/25/2023  4:56 PM)    Discharge Questions  Actual Discharge Date: 09/22/23  Now that you are home, how are you feeling?: Good  Did you receive any new prescriptions?: Yes  Were you able to get them filled?: Yes  Meds to Bed or Pharmacy filled?: Meds to Bed  Do you have any questions about your current medications or new medications (Review Med Rec)?: No (unable to review med list, daughter handles medications)  Do you have a follow up appointment scheduled with your PCP?: Yes  Appointment Date: 09/29/23  Appointment Time: 1100  Any issues or paperwork you wish to discuss with your PCP?: No  Does this patient qualify for the CCM program?: No    Transitional Care  Number of attempts made to contact patient: 1  Current or previous attempts competed within two business days of discharge? : Yes  Provided education regarding treatment plan, medications, self-management, ADLs?: Yes (reminded that she needs to have her sodium rechecked and it is ready in the [facility] lab. Her daugther will need to assist with her ride. Patient states her daughter assisted with her d/c from the hospital and should have the AVS to reference.)  Has patient completed an Advanced Directive?: Yes  Is the patient's advanced directive on file?: Yes  Has the Care Manager's phone number provided?: No  Is there anything else I can help you with?: No    Discharge Summary  Chief Complaint: dizziness, nausea, vomiting  Admitting Diagnosis: Hyponatremia (E87.1)  Discharge Diagnosis: Hyponatremia

## 2023-09-26 ENCOUNTER — HOSPITAL ENCOUNTER (INPATIENT)
Facility: MEDICAL CENTER | Age: 88
LOS: 1 days | DRG: 682 | End: 2023-09-28
Attending: EMERGENCY MEDICINE | Admitting: STUDENT IN AN ORGANIZED HEALTH CARE EDUCATION/TRAINING PROGRAM
Payer: MEDICARE

## 2023-09-26 DIAGNOSIS — B02.9 HERPES ZOSTER WITHOUT COMPLICATION: ICD-10-CM

## 2023-09-26 DIAGNOSIS — R79.89 ELEVATED TROPONIN: ICD-10-CM

## 2023-09-26 DIAGNOSIS — N17.9 AKI (ACUTE KIDNEY INJURY) (HCC): ICD-10-CM

## 2023-09-26 DIAGNOSIS — E87.1 HYPONATREMIA: ICD-10-CM

## 2023-09-26 DIAGNOSIS — I10 ESSENTIAL HYPERTENSION: Chronic | ICD-10-CM

## 2023-09-26 LAB — EKG IMPRESSION: NORMAL

## 2023-09-26 PROCEDURE — 83930 ASSAY OF BLOOD OSMOLALITY: CPT

## 2023-09-26 PROCEDURE — 99285 EMERGENCY DEPT VISIT HI MDM: CPT

## 2023-09-26 PROCEDURE — 87040 BLOOD CULTURE FOR BACTERIA: CPT

## 2023-09-26 PROCEDURE — 84484 ASSAY OF TROPONIN QUANT: CPT

## 2023-09-26 PROCEDURE — 80053 COMPREHEN METABOLIC PANEL: CPT

## 2023-09-26 PROCEDURE — 93005 ELECTROCARDIOGRAM TRACING: CPT | Performed by: EMERGENCY MEDICINE

## 2023-09-26 PROCEDURE — 83605 ASSAY OF LACTIC ACID: CPT

## 2023-09-26 PROCEDURE — 36415 COLL VENOUS BLD VENIPUNCTURE: CPT

## 2023-09-26 PROCEDURE — 85025 COMPLETE CBC W/AUTO DIFF WBC: CPT

## 2023-09-26 ASSESSMENT — FIBROSIS 4 INDEX: FIB4 SCORE: 1.42

## 2023-09-27 ENCOUNTER — APPOINTMENT (OUTPATIENT)
Dept: RADIOLOGY | Facility: MEDICAL CENTER | Age: 88
DRG: 682 | End: 2023-09-27
Attending: EMERGENCY MEDICINE
Payer: MEDICARE

## 2023-09-27 PROBLEM — R53.1 GENERALIZED WEAKNESS: Status: ACTIVE | Noted: 2023-09-27

## 2023-09-27 PROBLEM — N17.9 ACUTE RENAL FAILURE (ARF) (HCC): Status: ACTIVE | Noted: 2023-09-27

## 2023-09-27 PROBLEM — I21.A1 MYOCARDIAL INFARCTION DUE TO DEMAND ISCHEMIA (HCC): Status: ACTIVE | Noted: 2023-09-27

## 2023-09-27 LAB
ALBUMIN SERPL BCP-MCNC: 3.6 G/DL (ref 3.2–4.9)
ALBUMIN/GLOB SERPL: 1.7 G/DL
ALP SERPL-CCNC: 64 U/L (ref 30–99)
ALT SERPL-CCNC: 12 U/L (ref 2–50)
ANION GAP SERPL CALC-SCNC: 11 MMOL/L (ref 7–16)
ANION GAP SERPL CALC-SCNC: 11 MMOL/L (ref 7–16)
ANION GAP SERPL CALC-SCNC: 12 MMOL/L (ref 7–16)
ANION GAP SERPL CALC-SCNC: 12 MMOL/L (ref 7–16)
APPEARANCE UR: CLEAR
AST SERPL-CCNC: 15 U/L (ref 12–45)
B PARAP IS1001 DNA NPH QL NAA+NON-PROBE: NOT DETECTED
B PERT.PT PRMT NPH QL NAA+NON-PROBE: NOT DETECTED
BASOPHILS # BLD AUTO: 1.1 % (ref 0–1.8)
BASOPHILS # BLD AUTO: 1.4 % (ref 0–1.8)
BASOPHILS # BLD: 0.09 K/UL (ref 0–0.12)
BASOPHILS # BLD: 0.11 K/UL (ref 0–0.12)
BILIRUB SERPL-MCNC: 1.2 MG/DL (ref 0.1–1.5)
BILIRUB UR QL STRIP.AUTO: NEGATIVE
BUN SERPL-MCNC: 23 MG/DL (ref 8–22)
BUN SERPL-MCNC: 24 MG/DL (ref 8–22)
BUN SERPL-MCNC: 25 MG/DL (ref 8–22)
BUN SERPL-MCNC: 26 MG/DL (ref 8–22)
C PNEUM DNA NPH QL NAA+NON-PROBE: NOT DETECTED
CALCIUM ALBUM COR SERPL-MCNC: 8.5 MG/DL (ref 8.5–10.5)
CALCIUM SERPL-MCNC: 7.7 MG/DL (ref 8.5–10.5)
CALCIUM SERPL-MCNC: 7.8 MG/DL (ref 8.5–10.5)
CALCIUM SERPL-MCNC: 7.8 MG/DL (ref 8.5–10.5)
CALCIUM SERPL-MCNC: 8.2 MG/DL (ref 8.5–10.5)
CHLORIDE SERPL-SCNC: 83 MMOL/L (ref 96–112)
CHLORIDE SERPL-SCNC: 88 MMOL/L (ref 96–112)
CHLORIDE SERPL-SCNC: 90 MMOL/L (ref 96–112)
CHLORIDE SERPL-SCNC: 93 MMOL/L (ref 96–112)
CO2 SERPL-SCNC: 22 MMOL/L (ref 20–33)
CO2 SERPL-SCNC: 24 MMOL/L (ref 20–33)
COLOR UR: YELLOW
CREAT SERPL-MCNC: 1.5 MG/DL (ref 0.5–1.4)
CREAT SERPL-MCNC: 1.63 MG/DL (ref 0.5–1.4)
CREAT SERPL-MCNC: 1.9 MG/DL (ref 0.5–1.4)
CREAT SERPL-MCNC: 2.31 MG/DL (ref 0.5–1.4)
EOSINOPHIL # BLD AUTO: 0.38 K/UL (ref 0–0.51)
EOSINOPHIL # BLD AUTO: 0.42 K/UL (ref 0–0.51)
EOSINOPHIL NFR BLD: 4.8 % (ref 0–6.9)
EOSINOPHIL NFR BLD: 5.4 % (ref 0–6.9)
ERYTHROCYTE [DISTWIDTH] IN BLOOD BY AUTOMATED COUNT: 39.4 FL (ref 35.9–50)
ERYTHROCYTE [DISTWIDTH] IN BLOOD BY AUTOMATED COUNT: 40.8 FL (ref 35.9–50)
FLUAV RNA NPH QL NAA+NON-PROBE: NOT DETECTED
FLUBV RNA NPH QL NAA+NON-PROBE: NOT DETECTED
GFR SERPLBLD CREATININE-BSD FMLA CKD-EPI: 20 ML/MIN/1.73 M 2
GFR SERPLBLD CREATININE-BSD FMLA CKD-EPI: 25 ML/MIN/1.73 M 2
GFR SERPLBLD CREATININE-BSD FMLA CKD-EPI: 30 ML/MIN/1.73 M 2
GFR SERPLBLD CREATININE-BSD FMLA CKD-EPI: 33 ML/MIN/1.73 M 2
GLOBULIN SER CALC-MCNC: 2.1 G/DL (ref 1.9–3.5)
GLUCOSE SERPL-MCNC: 106 MG/DL (ref 65–99)
GLUCOSE SERPL-MCNC: 123 MG/DL (ref 65–99)
GLUCOSE SERPL-MCNC: 140 MG/DL (ref 65–99)
GLUCOSE SERPL-MCNC: 183 MG/DL (ref 65–99)
GLUCOSE UR STRIP.AUTO-MCNC: NEGATIVE MG/DL
HADV DNA NPH QL NAA+NON-PROBE: NOT DETECTED
HCOV 229E RNA NPH QL NAA+NON-PROBE: NOT DETECTED
HCOV HKU1 RNA NPH QL NAA+NON-PROBE: NOT DETECTED
HCOV NL63 RNA NPH QL NAA+NON-PROBE: NOT DETECTED
HCOV OC43 RNA NPH QL NAA+NON-PROBE: NOT DETECTED
HCT VFR BLD AUTO: 30.5 % (ref 37–47)
HCT VFR BLD AUTO: 33.5 % (ref 37–47)
HGB BLD-MCNC: 11.1 G/DL (ref 12–16)
HGB BLD-MCNC: 11.7 G/DL (ref 12–16)
HMPV RNA NPH QL NAA+NON-PROBE: NOT DETECTED
HPIV1 RNA NPH QL NAA+NON-PROBE: NOT DETECTED
HPIV2 RNA NPH QL NAA+NON-PROBE: NOT DETECTED
HPIV3 RNA NPH QL NAA+NON-PROBE: NOT DETECTED
HPIV4 RNA NPH QL NAA+NON-PROBE: NOT DETECTED
IMM GRANULOCYTES # BLD AUTO: 0.07 K/UL (ref 0–0.11)
IMM GRANULOCYTES # BLD AUTO: 0.07 K/UL (ref 0–0.11)
IMM GRANULOCYTES NFR BLD AUTO: 0.9 % (ref 0–0.9)
IMM GRANULOCYTES NFR BLD AUTO: 0.9 % (ref 0–0.9)
KETONES UR STRIP.AUTO-MCNC: NEGATIVE MG/DL
LACTATE SERPL-SCNC: 0.7 MMOL/L (ref 0.5–2)
LACTATE SERPL-SCNC: 0.9 MMOL/L (ref 0.5–2)
LEUKOCYTE ESTERASE UR QL STRIP.AUTO: NEGATIVE
LYMPHOCYTES # BLD AUTO: 1.6 K/UL (ref 1–4.8)
LYMPHOCYTES # BLD AUTO: 1.9 K/UL (ref 1–4.8)
LYMPHOCYTES NFR BLD: 20.6 % (ref 22–41)
LYMPHOCYTES NFR BLD: 23.8 % (ref 22–41)
M PNEUMO DNA NPH QL NAA+NON-PROBE: NOT DETECTED
MCH RBC QN AUTO: 31.5 PG (ref 27–33)
MCH RBC QN AUTO: 31.7 PG (ref 27–33)
MCHC RBC AUTO-ENTMCNC: 34.9 G/DL (ref 32.2–35.5)
MCHC RBC AUTO-ENTMCNC: 36.4 G/DL (ref 32.2–35.5)
MCV RBC AUTO: 87.1 FL (ref 81.4–97.8)
MCV RBC AUTO: 90.3 FL (ref 81.4–97.8)
MICRO URNS: NORMAL
MONOCYTES # BLD AUTO: 1.06 K/UL (ref 0–0.85)
MONOCYTES # BLD AUTO: 1.09 K/UL (ref 0–0.85)
MONOCYTES NFR BLD AUTO: 13.3 % (ref 0–13.4)
MONOCYTES NFR BLD AUTO: 14 % (ref 0–13.4)
NEUTROPHILS # BLD AUTO: 4.47 K/UL (ref 1.82–7.42)
NEUTROPHILS # BLD AUTO: 4.48 K/UL (ref 1.82–7.42)
NEUTROPHILS NFR BLD: 56.1 % (ref 44–72)
NEUTROPHILS NFR BLD: 57.7 % (ref 44–72)
NITRITE UR QL STRIP.AUTO: NEGATIVE
NRBC # BLD AUTO: 0 K/UL
NRBC # BLD AUTO: 0 K/UL
NRBC BLD-RTO: 0 /100 WBC (ref 0–0.2)
NRBC BLD-RTO: 0 /100 WBC (ref 0–0.2)
OSMOLALITY SERPL: 258 MOSM/KG H2O (ref 278–298)
OSMOLALITY UR: 276 MOSM/KG H2O (ref 300–900)
PH UR STRIP.AUTO: 5.5 [PH] (ref 5–8)
PLATELET # BLD AUTO: 288 K/UL (ref 164–446)
PLATELET # BLD AUTO: 331 K/UL (ref 164–446)
PMV BLD AUTO: 9.5 FL (ref 9–12.9)
PMV BLD AUTO: 9.6 FL (ref 9–12.9)
POTASSIUM SERPL-SCNC: 3.7 MMOL/L (ref 3.6–5.5)
POTASSIUM SERPL-SCNC: 3.7 MMOL/L (ref 3.6–5.5)
POTASSIUM SERPL-SCNC: 3.9 MMOL/L (ref 3.6–5.5)
POTASSIUM SERPL-SCNC: 4.2 MMOL/L (ref 3.6–5.5)
PROT SERPL-MCNC: 5.7 G/DL (ref 6–8.2)
PROT UR QL STRIP: NEGATIVE MG/DL
RBC # BLD AUTO: 3.5 M/UL (ref 4.2–5.4)
RBC # BLD AUTO: 3.71 M/UL (ref 4.2–5.4)
RBC UR QL AUTO: NEGATIVE
RSV RNA NPH QL NAA+NON-PROBE: NOT DETECTED
RV+EV RNA NPH QL NAA+NON-PROBE: NOT DETECTED
SARS-COV-2 RNA NPH QL NAA+NON-PROBE: NOTDETECTED
SODIUM SERPL-SCNC: 118 MMOL/L (ref 135–145)
SODIUM SERPL-SCNC: 122 MMOL/L (ref 135–145)
SODIUM SERPL-SCNC: 124 MMOL/L (ref 135–145)
SODIUM SERPL-SCNC: 126 MMOL/L (ref 135–145)
SP GR UR STRIP.AUTO: 1.01
TROPONIN T SERPL-MCNC: 68 NG/L (ref 6–19)
TROPONIN T SERPL-MCNC: 80 NG/L (ref 6–19)
UROBILINOGEN UR STRIP.AUTO-MCNC: 1 MG/DL
WBC # BLD AUTO: 7.8 K/UL (ref 4.8–10.8)
WBC # BLD AUTO: 8 K/UL (ref 4.8–10.8)

## 2023-09-27 PROCEDURE — A9270 NON-COVERED ITEM OR SERVICE: HCPCS

## 2023-09-27 PROCEDURE — 700105 HCHG RX REV CODE 258: Performed by: EMERGENCY MEDICINE

## 2023-09-27 PROCEDURE — 770006 HCHG ROOM/CARE - MED/SURG/GYN SEMI*

## 2023-09-27 PROCEDURE — 99222 1ST HOSP IP/OBS MODERATE 55: CPT | Mod: GC,AI | Performed by: STUDENT IN AN ORGANIZED HEALTH CARE EDUCATION/TRAINING PROGRAM

## 2023-09-27 PROCEDURE — 87486 CHLMYD PNEUM DNA AMP PROBE: CPT

## 2023-09-27 PROCEDURE — 700105 HCHG RX REV CODE 258

## 2023-09-27 PROCEDURE — 51798 US URINE CAPACITY MEASURE: CPT

## 2023-09-27 PROCEDURE — 84484 ASSAY OF TROPONIN QUANT: CPT

## 2023-09-27 PROCEDURE — 36415 COLL VENOUS BLD VENIPUNCTURE: CPT

## 2023-09-27 PROCEDURE — 80048 BASIC METABOLIC PNL TOTAL CA: CPT

## 2023-09-27 PROCEDURE — 700111 HCHG RX REV CODE 636 W/ 250 OVERRIDE (IP)

## 2023-09-27 PROCEDURE — 87581 M.PNEUMON DNA AMP PROBE: CPT

## 2023-09-27 PROCEDURE — 700102 HCHG RX REV CODE 250 W/ 637 OVERRIDE(OP)

## 2023-09-27 PROCEDURE — 87798 DETECT AGENT NOS DNA AMP: CPT

## 2023-09-27 PROCEDURE — 700105 HCHG RX REV CODE 258: Performed by: STUDENT IN AN ORGANIZED HEALTH CARE EDUCATION/TRAINING PROGRAM

## 2023-09-27 PROCEDURE — 83605 ASSAY OF LACTIC ACID: CPT

## 2023-09-27 PROCEDURE — 83935 ASSAY OF URINE OSMOLALITY: CPT

## 2023-09-27 PROCEDURE — 85025 COMPLETE CBC W/AUTO DIFF WBC: CPT

## 2023-09-27 PROCEDURE — 87633 RESP VIRUS 12-25 TARGETS: CPT

## 2023-09-27 PROCEDURE — 87040 BLOOD CULTURE FOR BACTERIA: CPT

## 2023-09-27 PROCEDURE — 81003 URINALYSIS AUTO W/O SCOPE: CPT

## 2023-09-27 PROCEDURE — 76775 US EXAM ABDO BACK WALL LIM: CPT

## 2023-09-27 RX ORDER — CARVEDILOL 3.12 MG/1
3.12 TABLET ORAL 2 TIMES DAILY WITH MEALS
Status: DISCONTINUED | OUTPATIENT
Start: 2023-09-27 | End: 2023-09-27

## 2023-09-27 RX ORDER — HEPARIN SODIUM 5000 [USP'U]/ML
5000 INJECTION, SOLUTION INTRAVENOUS; SUBCUTANEOUS EVERY 8 HOURS
Status: DISCONTINUED | OUTPATIENT
Start: 2023-09-27 | End: 2023-09-28 | Stop reason: HOSPADM

## 2023-09-27 RX ORDER — BISACODYL 10 MG
10 SUPPOSITORY, RECTAL RECTAL
Status: DISCONTINUED | OUTPATIENT
Start: 2023-09-27 | End: 2023-09-28 | Stop reason: HOSPADM

## 2023-09-27 RX ORDER — SODIUM CHLORIDE 9 MG/ML
INJECTION, SOLUTION INTRAVENOUS CONTINUOUS
Status: DISCONTINUED | OUTPATIENT
Start: 2023-09-27 | End: 2023-09-27

## 2023-09-27 RX ORDER — POLYETHYLENE GLYCOL 3350 17 G/17G
1 POWDER, FOR SOLUTION ORAL
Status: DISCONTINUED | OUTPATIENT
Start: 2023-09-27 | End: 2023-09-28 | Stop reason: HOSPADM

## 2023-09-27 RX ORDER — AMOXICILLIN 250 MG
2 CAPSULE ORAL 2 TIMES DAILY
Status: DISCONTINUED | OUTPATIENT
Start: 2023-09-27 | End: 2023-09-28 | Stop reason: HOSPADM

## 2023-09-27 RX ORDER — SODIUM CHLORIDE 9 MG/ML
250 INJECTION, SOLUTION INTRAVENOUS ONCE
Status: COMPLETED | OUTPATIENT
Start: 2023-09-27 | End: 2023-09-27

## 2023-09-27 RX ORDER — SODIUM CHLORIDE, SODIUM LACTATE, POTASSIUM CHLORIDE, CALCIUM CHLORIDE 600; 310; 30; 20 MG/100ML; MG/100ML; MG/100ML; MG/100ML
INJECTION, SOLUTION INTRAVENOUS CONTINUOUS
Status: DISCONTINUED | OUTPATIENT
Start: 2023-09-27 | End: 2023-09-27

## 2023-09-27 RX ADMIN — HEPARIN SODIUM 5000 UNITS: 5000 INJECTION, SOLUTION INTRAVENOUS; SUBCUTANEOUS at 15:18

## 2023-09-27 RX ADMIN — HEPARIN SODIUM 5000 UNITS: 5000 INJECTION, SOLUTION INTRAVENOUS; SUBCUTANEOUS at 06:47

## 2023-09-27 RX ADMIN — SENNOSIDES AND DOCUSATE SODIUM 2 TABLET: 50; 8.6 TABLET ORAL at 06:47

## 2023-09-27 RX ADMIN — SODIUM CHLORIDE, POTASSIUM CHLORIDE, SODIUM LACTATE AND CALCIUM CHLORIDE: 600; 310; 30; 20 INJECTION, SOLUTION INTRAVENOUS at 08:53

## 2023-09-27 RX ADMIN — HEPARIN SODIUM 5000 UNITS: 5000 INJECTION, SOLUTION INTRAVENOUS; SUBCUTANEOUS at 20:51

## 2023-09-27 RX ADMIN — SODIUM CHLORIDE 1000 ML: 9 INJECTION, SOLUTION INTRAVENOUS at 02:25

## 2023-09-27 RX ADMIN — SODIUM CHLORIDE: 9 INJECTION, SOLUTION INTRAVENOUS at 03:22

## 2023-09-27 ASSESSMENT — ENCOUNTER SYMPTOMS
SHORTNESS OF BREATH: 0
BACK PAIN: 0
PALPITATIONS: 0
FEVER: 0
COUGH: 0
ABDOMINAL PAIN: 0
NAUSEA: 0
CONSTIPATION: 0
INSOMNIA: 0
VOMITING: 0
BLURRED VISION: 0
FALLS: 0
DIZZINESS: 0
CHILLS: 0
SPUTUM PRODUCTION: 0
HEADACHES: 0
DOUBLE VISION: 0
DIARRHEA: 0
BLOOD IN STOOL: 0

## 2023-09-27 ASSESSMENT — LIFESTYLE VARIABLES
TOTAL SCORE: 0
TOTAL SCORE: 0
SUBSTANCE_ABUSE: 0
TOTAL SCORE: 0
AVERAGE NUMBER OF DAYS PER WEEK YOU HAVE A DRINK CONTAINING ALCOHOL: 0
EVER FELT BAD OR GUILTY ABOUT YOUR DRINKING: NO
ON A TYPICAL DAY WHEN YOU DRINK ALCOHOL HOW MANY DRINKS DO YOU HAVE: 0
ALCOHOL_USE: NO
HAVE YOU EVER FELT YOU SHOULD CUT DOWN ON YOUR DRINKING: NO
EVER HAD A DRINK FIRST THING IN THE MORNING TO STEADY YOUR NERVES TO GET RID OF A HANGOVER: NO
HAVE PEOPLE ANNOYED YOU BY CRITICIZING YOUR DRINKING: NO
CONSUMPTION TOTAL: NEGATIVE
HOW MANY TIMES IN THE PAST YEAR HAVE YOU HAD 5 OR MORE DRINKS IN A DAY: 0

## 2023-09-27 ASSESSMENT — COGNITIVE AND FUNCTIONAL STATUS - GENERAL
TURNING FROM BACK TO SIDE WHILE IN FLAT BAD: A LOT
MOVING FROM LYING ON BACK TO SITTING ON SIDE OF FLAT BED: A LOT
TOILETING: A LOT
MOBILITY SCORE: 12
DRESSING REGULAR UPPER BODY CLOTHING: A LOT
SUGGESTED CMS G CODE MODIFIER DAILY ACTIVITY: CK
EATING MEALS: A LITTLE
HELP NEEDED FOR BATHING: A LOT
DRESSING REGULAR LOWER BODY CLOTHING: A LOT
MOVING TO AND FROM BED TO CHAIR: A LOT
WALKING IN HOSPITAL ROOM: A LOT
CLIMB 3 TO 5 STEPS WITH RAILING: A LOT
PERSONAL GROOMING: A LITTLE
DAILY ACTIVITIY SCORE: 14
STANDING UP FROM CHAIR USING ARMS: A LOT
SUGGESTED CMS G CODE MODIFIER MOBILITY: CL

## 2023-09-27 ASSESSMENT — PAIN DESCRIPTION - PAIN TYPE: TYPE: ACUTE PAIN

## 2023-09-27 NOTE — ASSESSMENT & PLAN NOTE
Likely prerenal in etiology, secondary to volume depletion.  Bladder scan 112 mL  BUN 26, Cr 2.31 (0.73 on 09/25/2023, baseline 0.73-1.00)  -NS IV continuous at 75 mL/hr  -F/u U/S renal  -Holding home furosemide 20 mg daily, losartan 100 mg daily in the setting of HORACE

## 2023-09-27 NOTE — ASSESSMENT & PLAN NOTE
-Continue home carvedilol 3.125 mg BID  -Holding home furosemide 20 mg daily, losartan 100 mg daily in the setting of HORACE

## 2023-09-27 NOTE — DISCHARGE PLANNING
HTH/SCP TCN chart review completed. Collaborated with BRADEN Velasco prior to meeting  with the pt. The most current review of medical record, knowledge of pt's PLOF and social support, LACE+ score of 64, 6 clicks scores of 12 mobility were considered.      Pt seen at bedside. Daughter Araceli present. Araceli placed other patient's Daughter Raven on Speaker phone. VERIFIED HIPAA. Pt granted this TCN permission to discuss health plan benefits and post-acute services whilst Daughters present. Introduced TCN program. Provided education regarding post acute levels of care. Discussed HTH/SCP plan benefits. Pt , Raven and Araceli verbalize understanding.   Per Daughter's report, Pt lives  in a [facility] facility called [facility] in a second floor unit equipped with an elevator. Araceli clarified that during the most recent hospital admission, pt was discharged back to [facility] and not rehab.   Discussed IRF , HH, and SNF levels of care to patient, Raven and Araceli.   Pt. Daughters are amenable to HH services and would like to wait for therapy recommendations before making post-acute choices.   Raven asked regarding daily blood draws for lab monitoring.  TCN explained to pt, Raven and Araceli the CM's process of sending out SNF choices. Raven and Araceli verbalizes understanding.  There are no additional health plan related questions during this time.  Will await therapy and MD's DC recommendations.  Will defer to Hospital CM's with obtainment of post-acute choices if indicated.  Choice proactively obtained for HH and given to BEBA Spaulding to scan to [facility]. TCN will continue to follow and collaborate with discharge planning team as additional post acute needs arise. Thank you.     Completed today:  PT recommendations pending  OT recommendations pending  Choice obtained: HH------9/27/2023  SCP with [facility] PCP. LVM to [phone] for possible hospital follow-up appointment with PCP

## 2023-09-27 NOTE — ASSESSMENT & PLAN NOTE
Likely hypovolemic hyponatremia.  Patient reports decreased PO intake x 1 day.  Na 118  Received  mL IV in the ER.  -NS IV continuous at 75 mL/hr  -Monitor BMP Q6H  -F/u serum OSM, urine Na

## 2023-09-27 NOTE — H&P
History & Physical Note    Date of Admission: 9/27/2023  Admission Status: Inpatient  [facility] Team: HÉCTOR  Attending: Shen Casanova M.D.   Senior Resident: Dr. Tracie Sawyer  Contact Number: [phone]    Chief Complaint: Weakness    History of Present Illness (HPI):  Grisel is a 88 y.o. female who presented 9/26/2023 with symptoms of weakness and decreased appetite, ongoing for the past 24 hours.  Limited HPI as patient is a poor historian, extremely somnolent but arousable on encounter, AxO x4.  Denied fever, chills, headache, dizziness, chest pain, shortness of breath, cough, recent illness/sick contacts, weight loss/weight gain, nausea/vomiting, abdominal pain, lower urinary tract symptoms, or diarrhea/constipation.  Denies NSAID intake, states she is compliant with all medications that are administered by her facility.    In the ER, vital signs temperature 97.3, HR 54, RR 16, 110/55, 91% on RA. CBC significant for normocytic anemia Hb 11.1. CHEM significant for Na 118, BUN 26, Cr 2.31 (baseline 0.73-1.00).  Troponin 80.  EKG showed sinus bradycardia, QTc 464.  Received NS to 50 mL IV in the ER.    Review of Systems:  Review of Systems   Constitutional:  Positive for malaise/fatigue. Negative for chills and fever.   Eyes:  Negative for blurred vision and double vision.   Respiratory:  Negative for cough, sputum production and shortness of breath.    Cardiovascular:  Negative for chest pain and palpitations.   Gastrointestinal:  Negative for abdominal pain, blood in stool, constipation, diarrhea, nausea and vomiting.   Genitourinary:  Negative for dysuria, frequency and urgency.   Musculoskeletal:  Negative for back pain and falls.   Neurological:  Negative for dizziness and headaches.   Psychiatric/Behavioral:  Negative for substance abuse. The patient does not have insomnia.        Past Medical History:   Past Medical History was reviewed with patient.   has a past medical history of HTN  (hypertension).    Past Surgical History: Past Surgical History was reviewed with patient.   has no past surgical history on file.    Medications: Medications have been reviewed with patient.  Prior to Admission Medications   Prescriptions Last Dose Informant Patient Reported? Taking?   acetaminophen (TYLENOL) 325 MG Tab   No No   Sig: Take 2 Tablets by mouth every 6 hours as needed for Mild Pain, Moderate Pain or Fever (Mild Pain; (Pain scale 1-3); Temp greater than 100.5 F).   carvedilol (COREG) 3.125 MG Tab   No No   Sig: Take 1 Tablet by mouth 2 times a day with meals.   furosemide (LASIX) 20 MG Tab   No No   Sig: Take 1 Tablet by mouth every day.   losartan (COZAAR) 100 MG Tab  Family Member No No   Sig: Take 1 Tablet by mouth every day. TAKE 1 TABLET BY MOUTH DAILY Patient does not have this medication   melatonin 5 mg Tab   No No   Sig: Take 1 Tablet by mouth at bedtime as needed (Sleep).   omeprazole (PRILOSEC) 20 MG delayed-release capsule   No No   Sig: Take 1 Capsule by mouth every day.   polyethylene glycol/lytes (MIRALAX) 17 g Pack   No No   Sig: Take 1 Packet by mouth 1 time a day as needed (if sennosides and docusate ineffective after 24 hours).   potassium chloride SA (KDUR) 20 MEQ Tab CR   No No   Sig: Take 2 Tablets by mouth every day. STOP if not taking Lasix or discuss with your nephrologist   senna-docusate (PERICOLACE OR SENOKOT S) 8.6-50 MG Tab   No No   Sig: Take 2 Tablets by mouth 2 times a day.   sucralfate (CARAFATE) 1 GM/10ML Suspension   No No   Sig: Take 10 mL by mouth every 6 hours as needed (indigestion pain).   valacyclovir (VALTREX) 1 GM Tab  Family Member Yes No   Sig: Take 1,000 mg by mouth 2 times a day. 7 day course started 9/7/23      Facility-Administered Medications Last Administration Doses Remaining   triamcinolone acetonide (KENALOG-40) injection 80 mg 11/12/2021  7:03 AM    triamcinolone acetonide (KENALOG-40) injection 80 mg 11/12/2021  7:03 AM            Allergies:  Allergies have been reviewed with patient.  No Known Allergies    Family History:  family history includes Cancer in her father; Stroke in her mother.     Social History:   Tobacco:  Denies   Alcohol:  Denies  Recreational drugs (illegal and prescription):  Denies   Employment:  Did not assess  Activity Level:  Did not assess   Living situation:  Lives in [facility]  Recent travel:  Did not assess  Primary Care Provider: reviewed Edgar Mcfadden D.N.P.  Other (stressors, spirituality, exposures):  Did not assess    Vitals:  Temp:  [36.3 °C (97.3 °F)-36.4 °C (97.5 °F)] 36.4 °C (97.5 °F)  Pulse:  [52-60] 60  Resp:  [13-17] 16  BP: ()/(46-55) 115/51  SpO2:  [90 %-94 %] 92 %    Physical Exam  Constitutional:       General: She is not in acute distress.     Appearance: Normal appearance.   HENT:      Head: Normocephalic and atraumatic.   Eyes:      Extraocular Movements: Extraocular movements intact.      Pupils: Pupils are equal, round, and reactive to light.   Cardiovascular:      Rate and Rhythm: Normal rate and regular rhythm.   Pulmonary:      Effort: Pulmonary effort is normal. No respiratory distress.      Breath sounds: No rhonchi.   Abdominal:      General: Abdomen is flat.      Palpations: Abdomen is soft.      Tenderness: There is no abdominal tenderness.   Musculoskeletal:         General: No swelling or tenderness.      Right lower leg: No edema.      Left lower leg: No edema.   Skin:     General: Skin is warm and dry.   Neurological:      General: No focal deficit present.      Mental Status: She is oriented to person, place, and time. She is lethargic.   Psychiatric:         Mood and Affect: Mood normal.         Behavior: Behavior normal.           Labs:   Lab Results   Component Value Date/Time    WBC 8.0 09/26/2023 11:35 PM    RBC 3.50 (L) 09/26/2023 11:35 PM    HEMOGLOBIN 11.1 (L) 09/26/2023 11:35 PM    HEMATOCRIT 30.5 (L) 09/26/2023 11:35 PM    MCV 87.1 09/26/2023 11:35 PM    MCH 31.7 09/26/2023 11:35 PM  "   MCHC 36.4 (H) 09/26/2023 11:35 PM    MPV 9.6 09/26/2023 11:35 PM    NEUTSPOLYS 56.10 09/26/2023 11:35 PM    LYMPHOCYTES 23.80 09/26/2023 11:35 PM    MONOCYTES 13.30 09/26/2023 11:35 PM    EOSINOPHILS 4.80 09/26/2023 11:35 PM    BASOPHILS 1.10 09/26/2023 11:35 PM        Lab Results   Component Value Date/Time    SODIUM 118 (LL) 09/26/2023 11:35 PM    POTASSIUM 4.2 09/26/2023 11:35 PM    CHLORIDE 83 (L) 09/26/2023 11:35 PM    CO2 24 09/26/2023 11:35 PM    GLUCOSE 123 (H) 09/26/2023 11:35 PM    BUN 26 (H) 09/26/2023 11:35 PM    CREATININE 2.31 (H) 09/26/2023 11:35 PM        No results found for: \"IMHTR43E\", \"JMPXCE415F\", \"SBOSI337C\", \"ARTHCO3\", \"ARTBE\", \"GAPBG\", \"WTX7HFV5\"    Lab Results   Component Value Date/Time    PROTHROMBTM 13.0 01/08/2022 08:55 AM    INR 1.01 01/08/2022 08:55 AM        EKG: Per my read, sinus bradycardia, QTc 464.    Imaging: N/A    Previous Data Review: reviewed    Assessment and Plan:   Patient is a 89 yo F with PMH of HTN, HLD, ILD, and Hx of shingles, admitted 09/27/2023 for hyponatremia and acute renal failure.    * Hyponatremia- (present on admission)  Assessment & Plan  Likely hypovolemic hyponatremia.  Patient reports decreased PO intake x 1 day.  Na 118  Received  mL IV in the ER.  -NS IV continuous at 75 mL/hr  -Monitor BMP Q6H  -F/u serum OSM, urine Na    Acute renal failure (ARF) (HCC)  Assessment & Plan  Likely prerenal in etiology, secondary to volume depletion.  Bladder scan 112 mL  BUN 26, Cr 2.31 (0.73 on 09/25/2023, baseline 0.73-1.00)  -NS IV continuous at 75 mL/hr  -F/u U/S renal  -Holding home furosemide 20 mg daily, losartan 100 mg daily in the setting of HORACE     Myocardial infarction due to demand ischemia (HCC)  Assessment & Plan  Elevated troponin likely secondary to HORACE.  Troponin 80  EKG: Sinus bradycardia, QTc 464.  -F/u repeat troponin    Essential hypertension- (present on admission)  Assessment & Plan  -Continue home carvedilol 3.125 mg BID  -Holding home " furosemide 20 mg daily, losartan 100 mg daily in the setting of HORACE     Generalized weakness  Assessment & Plan  -Consulted PT/OT      Code Status: DNAR/I OK  DVT prophylaxis: Heparin  Diet: Regular  GI: None  Disposition: Inpatient    Tracie Sawyer MD  PGY-2 Internal Medicine

## 2023-09-27 NOTE — ED NOTES
Ilir from Lab called with critical result of sodium at 118. Critical lab result read back to Ilir.   Dr. Navarro notified of critical lab result at 0113.  Critical lab result read back by Dr. Navarro.

## 2023-09-27 NOTE — PROGRESS NOTES
Report received from NOC RN and assumed patient care at 0700. Patient is A&Ox 3 (self, place, time), on RA, bed alarm in place. Pt denies any pain at this time. Purewick in place. Call light within reach and bed in lowest position. Reinforced the need to call for assistance. Plan of care discussed and patient does not have any further needs at this time.

## 2023-09-27 NOTE — PROGRESS NOTES
4 Eyes Skin Assessment Completed by DAVID Travis and DAVID Byrne.    Head WDL  Ears WDL  Nose WDL  Mouth WDL  Neck WDL  Breast/Chest WDL  Shoulder Blades WDL  Spine L Flank redness  (R) Arm/Elbow/Hand R hand Bruising   (L) Arm/Elbow/Hand WDL  Abdomen WDL  Groin WDL  Scrotum/Coccyx/Buttocks WDL  (R) Leg R thigh bump   (L) Leg WDL  (R) Heel/Foot/Toe Redness and Blanching  (L) Heel/Foot/Toe Redness and Blanching  Generalized trace edema.         Devices In Places Blood Pressure Cuff, Purewick       Interventions In Place Pillows    Possible Skin Injury No    Pictures Uploaded Into Epic N/A  Wound Consult Placed N/A  RN Wound Prevention Protocol Ordered No

## 2023-09-27 NOTE — ED PROVIDER NOTES
"  ER Provider Note    Scribed for Luis Antonio Navarro M.d. by Tyler Goodman. 9/26/2023  11:02 PM    Primary Care Provider: Edgar Mcfadden D.N.P.    CHIEF COMPLAINT  Chief Complaint   Patient presents with    LLQ Pain     Pt BIB EMS for LLQ and nausea x2 days. Pt describes pain as moderate and sharp. Pt endorses normal bowel movements but not eating or drinking well over the last couple of days due to not \"feeling right.\" Pt has hx of HTN. Pt aox4.     Nausea     EXTERNAL RECORDS REVIEWED  Care everywhere The patient was admitted on 9/19.2023 for hyponatremia, dizziness, nausea, and vomiting.    HPI/ROS  LIMITATION TO HISTORY   Select: : None  OUTSIDE HISTORIAN(S):  None    Grisel Saha is a 88 y.o. female who presents to the ED for evaluation of nausea onset 2 days ago. The patient presents with \"slight\" nausea with associated weakness and \"slight\" mouth dryness. She states her current nausea resembles her previous instance when she was admitted. The patient states she had \"some, but not much\" abdominal pain. The patient reports the current year is 1923. The patient states she cannot remember if she has been eating or drinking. The patient denies burning with urination, difficulty urinating, chest pain, recent falls, vomiting, dizziness, or headache.She denies needing any additional comforts at this time.     PAST MEDICAL HISTORY  Past Medical History:   Diagnosis Date    HTN (hypertension)        SURGICAL HISTORY  History reviewed. No pertinent surgical history.    FAMILY HISTORY  Family History   Problem Relation Age of Onset    Stroke Mother     Cancer Father         lung     SOCIAL HISTORY   reports that she has quit smoking. She has never used smokeless tobacco. She reports that she does not currently use alcohol after a past usage of about 8.4 oz of alcohol per week. She reports that she does not currently use drugs.    CURRENT MEDICATIONS  Previous Medications    ACETAMINOPHEN (TYLENOL) 325 MG TAB    Take 2 " "Tablets by mouth every 6 hours as needed for Mild Pain, Moderate Pain or Fever (Mild Pain; (Pain scale 1-3); Temp greater than 100.5 F).    CARVEDILOL (COREG) 3.125 MG TAB    Take 1 Tablet by mouth 2 times a day with meals.    FUROSEMIDE (LASIX) 20 MG TAB    Take 1 Tablet by mouth every day.    LOSARTAN (COZAAR) 100 MG TAB    Take 1 Tablet by mouth every day. TAKE 1 TABLET BY MOUTH DAILY Patient does not have this medication    MELATONIN 5 MG TAB    Take 1 Tablet by mouth at bedtime as needed (Sleep).    OMEPRAZOLE (PRILOSEC) 20 MG DELAYED-RELEASE CAPSULE    Take 1 Capsule by mouth every day.    POLYETHYLENE GLYCOL/LYTES (MIRALAX) 17 G PACK    Take 1 Packet by mouth 1 time a day as needed (if sennosides and docusate ineffective after 24 hours).    POTASSIUM CHLORIDE SA (KDUR) 20 MEQ TAB CR    Take 2 Tablets by mouth every day. STOP if not taking Lasix or discuss with your nephrologist    SENNA-DOCUSATE (PERICOLACE OR SENOKOT S) 8.6-50 MG TAB    Take 2 Tablets by mouth 2 times a day.    SUCRALFATE (CARAFATE) 1 GM/10ML SUSPENSION    Take 10 mL by mouth every 6 hours as needed (indigestion pain).    VALACYCLOVIR (VALTREX) 1 GM TAB    Take 1,000 mg by mouth 2 times a day. 7 day course started 9/7/23     ALLERGIES  Patient has no known allergies.    PHYSICAL EXAM  /55   Pulse (!) 54   Temp 36.3 °C (97.3 °F) (Temporal)   Resp 16   Ht 1.499 m (4' 11\")   Wt 72.5 kg (159 lb 13.3 oz)   SpO2 91%   BMI 32.28 kg/m²   Gen: Alert, no acute distress  HEENT: No nystagmus, EOMI, ATNC  Neck: trachea midline  Resp: no respiratory distress  CV: Regular rhythm, bradycardic. No JVD  Abd: Abdomen soft, non-tender, non-distended  Back: No CVA tenderness  Ext: No pedal edema, No deformities  Psych: normal mood  Neuro: Moves all extremities, GCS: 15 speech fluent        DIAGNOSTIC STUDIES    Labs:   Labs Reviewed   CBC WITH DIFFERENTIAL - Abnormal; Notable for the following components:       Result Value    RBC 3.50 (*)     " "Hemoglobin 11.1 (*)     Hematocrit 30.5 (*)     MCHC 36.4 (*)     Monos (Absolute) 1.06 (*)     All other components within normal limits   COMP METABOLIC PANEL - Abnormal; Notable for the following components:    Sodium 118 (*)     Chloride 83 (*)     Glucose 123 (*)     Bun 26 (*)     Creatinine 2.31 (*)     Calcium 8.2 (*)     Total Protein 5.7 (*)     All other components within normal limits   TROPONIN - Abnormal; Notable for the following components:    Troponin T 80 (*)     All other components within normal limits   ESTIMATED GFR - Abnormal; Notable for the following components:    GFR (CKD-EPI) 20 (*)     All other components within normal limits   LACTIC ACID   LACTIC ACID   LACTIC ACID   BLOOD CULTURE    Narrative:     1 of 2 for Blood Culture x 2 sites order. Per Hospital  Policy: Only change Specimen Src: to \"Line\" if specified by  physician order.  Release to patient->Immediate   BLOOD CULTURE    Narrative:     2 of 2 blood culture x2  Sites order. Per Hospital Policy:  Only change Specimen Src: to \"Line\" if specified by physician  order.  Release to patient->Immediate   URINALYSIS      EKG:   I have independently interpreted this EKG  Results for orders placed or performed during the hospital encounter of 23   EKG   Result Value Ref Range    Report       [facility]    Test Date:  2023  Pt Name:    HERBERT BOONE                  Department: ER  MRN:        [id number]                      Room:       [facility]  Gender:     Female                       Technician: [id number]  :        [id number]                   Requested By:ER TRIAGE PROTOCOL  Order #:    [id number]                    Reading MD: Luis Antonio Navarro    Measurements  Intervals                                Axis  Rate:       54                           P:          38  GA:         159                          QRS:        -9  QRSD:       100                          T:          47  QT:         489  QTc:        " 464    Interpretive Statements  Sinus bradycardia  Compared to ECG 09/13/2023 21:42:47  Sinus rhythm no longer present  Intraventricular conduction delay no longer present  T-wave abnormality no longer present  Electronically Signed On 09- 23:24:24 PDT by Luis Antonio Navarro            COURSE & MEDICAL DECISION MAKING     ED Observation Status? Yes; I am placing the patient in to an observation status due to a diagnostic uncertainty as well as therapeutic intensity. Patient placed in observation status at 11:10 PM, 9/26/2023.     Observation plan is as follows: Monitor for symptom management and diagnostic results     Upon Reevaluation, the patient's condition has: not improved; and will be escalated to hospitalization.    Patient discharged from ED Observation status at 1:23 AM (Time) 9/27/2023 (Date).     INITIAL ASSESSMENT, COURSE AND PLAN  Care Narrative: Patient presents with nausea, and although there is report of left lower quadrant abdominal pain from EMS and the nursing notes, the patient denies this and has a benign abdominal examination.  Low suspicion for intra-abdominal infection.  She does appear to be somewhat dehydrated.  EKG nonischemic, no obvious hyperkalemia findings.  Her lab work shows no leukocytosis to suggest infection, and her lactic acid is reassuring.  Her sodium was found to be markedly low, consistent with prior.  This has changed over the past several days, likely contributing to her condition.  She appears dehydrated will be given a very small amount of normal saline.  She is also noted to have a mild HORACE.  Also has elevated troponin.  Given reassuring EKG, no STEMI, possible demand ischemia versus poor renal clearance.  We will obtain ultrasound of the renal system to ensure no postobstructive uropathy.  No lateralizing signs to suggest stroke.        11:06 PM - Patient seen and examined at bedside. Discussed plan of care, including obtaining a urine sample and monitoring for patient  symptoms. Patient agrees to the plan of care.         Hydration: Patient received IV fluids for dehydration. After IV fluids patient is improved.          DISPOSITION AND DISCUSSIONS  I have discussed management of the patient with the following physicians and ASHER's:  Dr WORRELL resident      Decision tools and prescription drugs considered including, but not limited to: Antibiotics no signs of infection .    FINAL DIANGOSIS  1. Hyponatremia    2. HORACE (acute kidney injury) (HCC)    3. Elevated troponin          Case discussed with [facility] team, who will evaluate the patient for hospitalization. Patient will be hospitalized in guarded condition.        CRITICAL CARE  The very real possibilty of a deterioration of this patient's condition required the highest level of my preparedness for sudden, emergent intervention.  I provided critical care services, which included medication orders, frequent reevaluations of the patient's condition and response to treatment, ordering and reviewing test results, and discussing the case with various consultants.  The critical care time associated with the care of the patient was 31 minutes. Review chart for interventions. This time is exclusive of any other billable procedures.          ITyler (Lee), am scribing for, and in the presence of, Luis Antonio Navarro M.D..    Electronically signed by: Tyler Goodman (Lee), 9/26/2023    Luis Antonio DE PAZ M.D. personally performed the services described in this documentation, as scribed by Tyler Goodman in my presence, and it is both accurate and complete.     The note accurately reflects work and decisions made by me.  Luis Antonio Navarro M.D.  9/27/2023  1:26 AM

## 2023-09-27 NOTE — PROGRESS NOTES
88 y.o. female who presented 9/26/2023 with symptoms of weakness and decreased appetite, ongoing for the past 24 hours.  Limited HPI as patient is a poor historian, extremely somnolent but arousable on encounter, SteveO x4.  Denied fever, chills, headache, dizziness, chest pain, shortness of breath, cough, recent illness/sick contacts, weight loss/weight gain, nausea/vomiting, abdominal pain, lower urinary tract symptoms, or diarrhea/constipation.  Denies NSAID intake, states she is compliant with all medications that are administered by her facility.     In the ER, vital signs temperature 97.3, HR 54, RR 16, 110/55, 91% on RA. CBC significant for normocytic anemia Hb 11.1. CHEM significant for Na 118, BUN 26, Cr 2.31 (baseline 0.73-1.00).  Troponin 80.  EKG showed sinus bradycardia, QTc 464.  Received NS to 50 mL IV in the ER.    Improvement with mentation today  Sodium 125, 19, 122, 24  Creatinine trending down    A/P  Hold IV hydration for now  Serum osmolality low, pending urine osmolality  Frequent BMP  PT/OT

## 2023-09-27 NOTE — CARE PLAN
The patient is Stable - Low risk of patient condition declining or worsening         Progress made toward(s) clinical / shift goals:     New Admit. A&Ox2, self and place. Poor historian, unable to complete the entire admission assessment. Pt with no complaints and denies pain, no pain identifiers noted. Scheduled medications administered per MAR. Plan of care reviewed with the pt, pt verbally understands but reinforcement needed. Fall and safety education provided to the pt and precautions maintained. Bed kept in lowest position, call light within reach, and bed alarm on. Hourly rounding done. Pt calls appropriately. Pt remains free from falls. All needs met. Pt resting comfortably.       Problem: Knowledge Deficit - Standard  Goal: Patient and family/care givers will demonstrate understanding of plan of care, disease process/condition, diagnostic tests and medications  Outcome: Progressing     Problem: Fall Risk  Goal: Patient will remain free from falls  Outcome: Progressing       Patient is not progressing towards the following goals:

## 2023-09-27 NOTE — DISCHARGE PLANNING
Referral sent to [facility]    [facility]- [facility] accepted  1132- [facility] at [facility] asking additional questions.  BRADEN Velasco notified

## 2023-09-27 NOTE — ASSESSMENT & PLAN NOTE
Elevated troponin likely secondary to HORACE.  Troponin 80  EKG: Sinus bradycardia, QTc 464.  -F/u repeat troponin

## 2023-09-27 NOTE — ED TRIAGE NOTES
"Chief Complaint   Patient presents with    LLQ Pain     Pt BIB EMS for LLQ and nausea x2 days. Pt describes pain as moderate and sharp. Pt endorses normal bowel movements but not eating or drinking well over the last couple of days due to not \"feeling right.\" Pt has hx of HTN. Pt aox4.     Nausea       Blood Pressure : 110/55, Pulse: (!) 54, Respiration: 16, Temperature: 36.3 °C (97.3 °F), Height: 149.9 cm (4' 11\"), Weight: 72.5 kg (159 lb 13.3 oz), BMI (Calculated): 32.28, BSA (Calculated): 1.7, Pulse Oximetry: 91 %, O2 Delivery Device: None - Room Air    "

## 2023-09-28 VITALS
WEIGHT: 159.83 LBS | BODY MASS INDEX: 32.22 KG/M2 | OXYGEN SATURATION: 93 % | RESPIRATION RATE: 16 BRPM | HEART RATE: 71 BPM | TEMPERATURE: 97 F | SYSTOLIC BLOOD PRESSURE: 155 MMHG | HEIGHT: 59 IN | DIASTOLIC BLOOD PRESSURE: 56 MMHG

## 2023-09-28 LAB
ANION GAP SERPL CALC-SCNC: 10 MMOL/L (ref 7–16)
ANION GAP SERPL CALC-SCNC: 11 MMOL/L (ref 7–16)
ANION GAP SERPL CALC-SCNC: 12 MMOL/L (ref 7–16)
BUN SERPL-MCNC: 19 MG/DL (ref 8–22)
BUN SERPL-MCNC: 21 MG/DL (ref 8–22)
BUN SERPL-MCNC: 22 MG/DL (ref 8–22)
CALCIUM SERPL-MCNC: 7.6 MG/DL (ref 8.5–10.5)
CALCIUM SERPL-MCNC: 7.7 MG/DL (ref 8.5–10.5)
CALCIUM SERPL-MCNC: 7.9 MG/DL (ref 8.5–10.5)
CHLORIDE SERPL-SCNC: 92 MMOL/L (ref 96–112)
CHLORIDE SERPL-SCNC: 94 MMOL/L (ref 96–112)
CHLORIDE SERPL-SCNC: 94 MMOL/L (ref 96–112)
CO2 SERPL-SCNC: 22 MMOL/L (ref 20–33)
CO2 SERPL-SCNC: 22 MMOL/L (ref 20–33)
CO2 SERPL-SCNC: 23 MMOL/L (ref 20–33)
CREAT SERPL-MCNC: 0.96 MG/DL (ref 0.5–1.4)
CREAT SERPL-MCNC: 1.12 MG/DL (ref 0.5–1.4)
CREAT SERPL-MCNC: 1.33 MG/DL (ref 0.5–1.4)
GFR SERPLBLD CREATININE-BSD FMLA CKD-EPI: 38 ML/MIN/1.73 M 2
GFR SERPLBLD CREATININE-BSD FMLA CKD-EPI: 47 ML/MIN/1.73 M 2
GFR SERPLBLD CREATININE-BSD FMLA CKD-EPI: 57 ML/MIN/1.73 M 2
GLUCOSE SERPL-MCNC: 117 MG/DL (ref 65–99)
GLUCOSE SERPL-MCNC: 119 MG/DL (ref 65–99)
GLUCOSE SERPL-MCNC: 133 MG/DL (ref 65–99)
POTASSIUM SERPL-SCNC: 3.8 MMOL/L (ref 3.6–5.5)
POTASSIUM SERPL-SCNC: 3.9 MMOL/L (ref 3.6–5.5)
POTASSIUM SERPL-SCNC: 4.3 MMOL/L (ref 3.6–5.5)
SODIUM SERPL-SCNC: 124 MMOL/L (ref 135–145)
SODIUM SERPL-SCNC: 128 MMOL/L (ref 135–145)
SODIUM SERPL-SCNC: 128 MMOL/L (ref 135–145)

## 2023-09-28 PROCEDURE — 97163 PT EVAL HIGH COMPLEX 45 MIN: CPT

## 2023-09-28 PROCEDURE — 99239 HOSP IP/OBS DSCHRG MGMT >30: CPT | Performed by: STUDENT IN AN ORGANIZED HEALTH CARE EDUCATION/TRAINING PROGRAM

## 2023-09-28 PROCEDURE — 80048 BASIC METABOLIC PNL TOTAL CA: CPT | Mod: 91

## 2023-09-28 PROCEDURE — 36415 COLL VENOUS BLD VENIPUNCTURE: CPT

## 2023-09-28 PROCEDURE — A9270 NON-COVERED ITEM OR SERVICE: HCPCS

## 2023-09-28 PROCEDURE — 97165 OT EVAL LOW COMPLEX 30 MIN: CPT

## 2023-09-28 PROCEDURE — 97535 SELF CARE MNGMENT TRAINING: CPT

## 2023-09-28 PROCEDURE — 97162 PT EVAL MOD COMPLEX 30 MIN: CPT

## 2023-09-28 PROCEDURE — 700102 HCHG RX REV CODE 250 W/ 637 OVERRIDE(OP)

## 2023-09-28 PROCEDURE — 700111 HCHG RX REV CODE 636 W/ 250 OVERRIDE (IP)

## 2023-09-28 RX ORDER — CARVEDILOL 3.12 MG/1
3.12 TABLET ORAL 2 TIMES DAILY WITH MEALS
Qty: 60 TABLET | Refills: 0 | Status: SHIPPED | OUTPATIENT
Start: 2023-09-28 | End: 2023-10-25 | Stop reason: SDUPTHER

## 2023-09-28 RX ORDER — ACETAMINOPHEN 325 MG/1
650 TABLET ORAL EVERY 6 HOURS PRN
Qty: 30 TABLET | Refills: 0 | Status: SHIPPED | OUTPATIENT
Start: 2023-09-28

## 2023-09-28 RX ADMIN — HEPARIN SODIUM 5000 UNITS: 5000 INJECTION, SOLUTION INTRAVENOUS; SUBCUTANEOUS at 06:14

## 2023-09-28 RX ADMIN — HEPARIN SODIUM 5000 UNITS: 5000 INJECTION, SOLUTION INTRAVENOUS; SUBCUTANEOUS at 13:34

## 2023-09-28 RX ADMIN — SENNOSIDES AND DOCUSATE SODIUM 2 TABLET: 50; 8.6 TABLET ORAL at 06:14

## 2023-09-28 ASSESSMENT — COGNITIVE AND FUNCTIONAL STATUS - GENERAL
MOVING TO AND FROM BED TO CHAIR: A LITTLE
MOBILITY SCORE: 18
WALKING IN HOSPITAL ROOM: A LITTLE
CLIMB 3 TO 5 STEPS WITH RAILING: A LITTLE
SUGGESTED CMS G CODE MODIFIER MOBILITY: CK
MOVING FROM LYING ON BACK TO SITTING ON SIDE OF FLAT BED: A LITTLE
TURNING FROM BACK TO SIDE WHILE IN FLAT BAD: A LITTLE
STANDING UP FROM CHAIR USING ARMS: A LITTLE
DAILY ACTIVITIY SCORE: 24
SUGGESTED CMS G CODE MODIFIER DAILY ACTIVITY: CH

## 2023-09-28 ASSESSMENT — GAIT ASSESSMENTS
GAIT LEVEL OF ASSIST: SUPERVISED
DISTANCE (FEET): 300
ASSISTIVE DEVICE: FRONT WHEEL WALKER
DEVIATION: BRADYKINETIC

## 2023-09-28 NOTE — THERAPY
Occupational Therapy   Initial Evaluation     Patient Name: Grisel Saha  Age:  88 y.o., Sex:  female  Medical Record #: [id number]  Today's Date: 9/28/2023          Assessment  Patient is 88 y.o. female with a diagnosis of weakness.  Pt is at or near his/her functional baseline. Pt with no further skilled OT needs in the acute care setting at this time.      Plan    Occupational Therapy Initial Treatment Plan   Duration: (P) Discharge Needs Only       Discharge Recommendations: (P) Anticipate that the patient will have no further occupational therapy needs after discharge from the hospital        09/28/23 0805   Prior Living Situation   Prior Services Intermittent Physical Support for ADL Per Service   Housing / Facility Assisted Living Residence   Equipment Owned 4-Wheel Walker;Single Point Cane   Lives with - Patient's Self Care Capacity Attendant / Paid Care Giver   Prior Level of ADL Function   Self Feeding Independent   Grooming / Hygiene Independent   Bathing Independent   Dressing Independent   ADL Assessment   Grooming Supervision   Upper Body Dressing Supervision   Lower Body Dressing Standby Assist   Toileting Standby Assist   Functional Mobility   Sit to Stand Supervised   Bed, Chair, Wheelchair Transfer Supervised   Toilet Transfers Supervised   Occupational Therapy Initial Treatment Plan    Duration Discharge Needs Only   Anticipated Discharge Equipment and Recommendations   Discharge Recommendations Anticipate that the patient will have no further occupational therapy needs after discharge from the hospital

## 2023-09-28 NOTE — DISCHARGE INSTRUCTIONS
Diet    Resume your normal diet as tolerated.  A diet low in cholesterol, fat, and sodium is recommended for good health.

## 2023-09-28 NOTE — PROGRESS NOTES
Assumed care of pt  at 0700. Report received from NOC RN. Pt is A&O x 2 disoriented to time and situation. Patient stated that their pain is 0/10. Pt's pain comfort goal is 0/10. Pt repositioned in bed and brought a toothbrush and toothpaste per request. Pt is pleasant and states that they were able to get good rest last night and feels well rested. Pt educated on how to use the call light, pt verbalized understanding. Bed in lowest locked position, call light within reach, hourly rounding in place. Labs reviewed, orders reviewed, communication board updated. Pt declines any further needs at this time.

## 2023-09-28 NOTE — CARE PLAN
The patient is Stable - Low risk of patient condition declining or worsening    Shift Goals  Clinical Goals: monitor Na+, rest, comfort  Patient Goals: rest  Family Goals: n/a    Progress made toward(s) clinical / shift goals:  Patient remains free from falls. Fall prevention measures in place. Pt A&Ox3 (disoriented to time). Patient updated on plan of care, pt verbally understands but needs reinforcement. Pt sodium trending up from 118 to 122. Pt alert, pleasant and able to rest comfortably with no indications of pain. Bed kept in lowest position, call light within reach, bed alarm in place.       Problem: Fall Risk  Goal: Patient will remain free from falls  Outcome: Progressing     Problem: Knowledge Deficit - Standard  Goal: Patient and family/care givers will demonstrate understanding of plan of care, disease process/condition, diagnostic tests and medications  Outcome: Progressing       Patient is not progressing towards the following goals:

## 2023-09-28 NOTE — THERAPY
Physical Therapy   Initial Evaluation     Patient Name: Grisel Saha  Age:  88 y.o., Sex:  female  Medical Record #: [id number]  Today's Date: 9/28/2023          Assessment  Patient is 88 y.o. female with a diagnosis of hypovolemic hyponatremia who presented 9/26/2023 with symptoms of weakness and decreased appetite.   Pt lives  in a half-way facility called [facility] in a second floor unit equipped with an elevator.  Pt seen for mobility assessment, Pt demonstrates the ability to safely ambulate for over 300 ft with FWW, no LOB or path deviation noted. Pt IND with bed mobility and transfers. No skilled inpatient or post acute PT needs. No DME needs.   Plan    Physical Therapy Initial Treatment Plan   Duration: (P) Discharge Needs Only    DC Equipment Recommendations: (P) None  Discharge Recommendations: (P) Anticipate that the patient will have no further physical therapy needs after discharge from the hospital       Initial Contact Note    Initial Contact Note Order Received and Verified, Physical Therapy Evaluation in Progress with Full Report to Follow.   Pain 0 - 10 Group   Therapist Pain Assessment Post Activity Pain Same as Prior to Activity;Nurse Notified;0   Prior Living Situation   Prior Services Intermittent Physical Support for ADL Per Service   Housing / Facility Assisted Living Residence   Steps Into Home 0   Elevator Yes   Equipment Owned 4-Wheel Walker;Wheelchair;Single Point Cane   Lives with - Patient's Self Care Capacity Attendant / Paid Care Giver   Comments facility assists with bathing, cleaning and laundry, Ambulates to dining room 3 x/day   Prior Level of Functional Mobility   Bed Mobility Independent   Transfer Status Independent   Ambulation Independent   Ambulation Distance community ambulation.   Assistive Devices Used None   Cognition    Level of Consciousness Alert   Comments pleasant and cooperative   Active ROM Upper Body   Active ROM Upper Body  WDL   Strength Upper Body   Upper Body  Strength  WDL   Strength Lower Body   Lower Body Strength  WDL   Sensation Lower Body   Lower Extremity Sensation   WDL   Lower Body Muscle Tone   Lower Body Muscle Tone  WDL   Balance Assessment   Sitting Balance (Static) Good   Sitting Balance (Dynamic) Fair +   Standing Balance (Static) Fair   Standing Balance (Dynamic) Fair -   Weight Shift Sitting Good   Weight Shift Standing Fair   Comments FWW   Bed Mobility    Supine to Sit Independent   Sit to Supine Independent   Scooting Independent   Rolling Modified Independent   Gait Analysis   Gait Level Of Assist Supervised   Assistive Device Front Wheel Walker   Distance (Feet) 300   # of Times Distance was Traveled 1   Deviation Bradykinetic   Weight Bearing Status no restrictions.   Comments slow but steady gait, no LOB   Functional Mobility   Sit to Stand Supervised   Bed, Chair, Wheelchair Transfer Supervised   Toilet Transfers Supervised   Comments w/FWW   How much difficulty does the patient currently have...   Turning over in bed (including adjusting bedclothes, sheets and blankets)? 3   Sitting down on and standing up from a chair with arms (e.g., wheelchair, bedside commode, etc.) 3   Moving from lying on back to sitting on the side of the bed? 3   How much help from another person does the patient currently need...   Moving to and from a bed to a chair (including a wheelchair)? 3   Need to walk in a hospital room? 3   Climbing 3-5 steps with a railing? 3   6 clicks Mobility Score 18   Education Group   Education Provided Role of Physical Therapist;Gait Training;Use of Assistive Device   Role of Physical Therapist Patient Response Patient;Acceptance;Explanation;Verbal Demonstration   Gait Training Patient Response Patient;Acceptance;Explanation;Verbal Demonstration   Use of Assistive Device Patient Response Patient;Acceptance;Explanation;Action Demonstration;Verbal Demonstration   Physical Therapy Initial Treatment Plan    Duration Discharge Needs Only    Anticipated Discharge Equipment and Recommendations   DC Equipment Recommendations None   Discharge Recommendations Anticipate that the patient will have no further physical therapy needs after discharge from the hospital   Interdisciplinary Plan of Care Collaboration   IDT Collaboration with  Nursing   Patient Position at End of Therapy In Bed;Phone within Reach;Tray Table within Reach;Call Light within Reach   Collaboration Comments staff updated.   Session Information   Date / Session Number  9/28 DC needs only.

## 2023-09-28 NOTE — DISCHARGE SUMMARY
"Discharge Summary    CHIEF COMPLAINT ON ADMISSION  Chief Complaint   Patient presents with    LLQ Pain     Pt BIB EMS for LLQ and nausea x2 days. Pt describes pain as moderate and sharp. Pt endorses normal bowel movements but not eating or drinking well over the last couple of days due to not \"feeling right.\" Pt has hx of HTN. Pt aox4.     Nausea       Reason for Admission  EMS     Admission Date  9/26/2023    CODE STATUS  DNAR, I OK    HPI & HOSPITAL COURSE  88 y.o. female who presented 9/26/2023 with symptoms of weakness and decreased appetite, ongoing for the past 24 hours.  Limited HPI as patient is a poor historian, extremely somnolent but arousable on encounter, AxO x4.  Denied fever, chills, headache, dizziness, chest pain, shortness of breath, cough, recent illness/sick contacts, weight loss/weight gain, nausea/vomiting, abdominal pain, lower urinary tract symptoms, or diarrhea/constipation.  Denies NSAID intake, states she is compliant with all medications that are administered by her facility.     In the ER, vital signs temperature 97.3, HR 54, RR 16, 110/55, 91% on RA. CBC significant for normocytic anemia Hb 11.1. CHEM significant for Na 118, BUN 26, Cr 2.31 (baseline 0.73-1.00).  Troponin 80.  EKG showed sinus bradycardia, QTc 464.  Received NS to 50 mL IV in the ER.    Patient was admitted and started on IV hydration for which acute renal failure resolved.  She had marked improvement with hyponatremia.  Her diuretics were held.  Serum and urine osmolality low which patient was placed on free water restriction.  Hyponatremia trending up.  Patient had marked improvement in mentation as well as nausea and function.  Patient tolerating meals.  Patient was eval by physical therapy and Occupational Therapy who recommended no needs.  Stable patient with in chronic condition was discharged home and instructed to follow-up with her primary care provider in the outpatient setting.  Patient's diuretics are to be " held for now and reinitiation to be evaluated per primary care.  All results and plan of action were discussed with the patient and her daughter for which they voiced understanding and agreement with the primary care team.  Patient and daughter were instructed to return to emergency department if symptoms were to worsen.    Therefore, she is discharged in good and stable condition to home with close outpatient follow-up.    The patient met 2-midnight criteria for an inpatient stay at the time of discharge.    Discharge Date  9/28/2023    FOLLOW UP ITEMS POST DISCHARGE  Primary care provider follow posthospital discharge care    DISCHARGE DIAGNOSES  Principal Problem:    Hyponatremia (Chronic) (POA: Yes)  Active Problems:    Essential hypertension (Chronic) (POA: Yes)    Acute renal failure (ARF) (HCC) (POA: Unknown)    Myocardial infarction due to demand ischemia (HCC) (POA: Unknown)    Generalized weakness (POA: Unknown)  Resolved Problems:    * No resolved hospital problems. *      FOLLOW UP  Future Appointments   Date Time Provider Department Center   10/24/2023  2:20 PM Edgar Mcfadden D.N.P. [facility]     No follow-up provider specified.    MEDICATIONS ON DISCHARGE     Medication List        CONTINUE taking these medications        Instructions   acetaminophen 325 MG Tabs  Commonly known as: Tylenol   Take 2 Tablets by mouth every 6 hours as needed for Mild Pain, Moderate Pain or Fever (Mild Pain; (Pain scale 1-3); Temp greater than 100.5 F).  Dose: 650 mg     carvedilol 3.125 MG Tabs  Commonly known as: Coreg   Take 1 Tablet by mouth 2 times a day with meals.  Dose: 3.125 mg     melatonin 5 mg Tabs   Take 1 Tablet by mouth at bedtime as needed (Sleep).  Dose: 5 mg     omeprazole 20 MG delayed-release capsule  Commonly known as: PriLOSEC   Take 1 Capsule by mouth every day.  Dose: 20 mg     polyethylene glycol/lytes 17 g Pack  Commonly known as: Miralax   Take 1 Packet by mouth 1 time a day as needed (if  sennosides and docusate ineffective after 24 hours).  Dose: 17 g     senna-docusate 8.6-50 MG Tabs  Commonly known as: Pericolace Or Senokot S   Take 2 Tablets by mouth 2 times a day.  Dose: 2 Tablet     sucralfate 1 GM/10ML Susp  Commonly known as: Carafate   Take 10 mL by mouth every 6 hours as needed (indigestion pain).  Dose: 1 g     valacyclovir 1 GM Tabs  Commonly known as: Valtrex   Take 1,000 mg by mouth 2 times a day. 7 day course started 9/7/23  Dose: 1,000 mg            STOP taking these medications      furosemide 20 MG Tabs  Commonly known as: Lasix     losartan 100 MG Tabs  Commonly known as: Cozaar     potassium chloride SA 20 MEQ Tbcr  Commonly known as: Kdur              Allergies  No Known Allergies    DIET  Orders Placed This Encounter   Procedures    Diet Order Diet: Regular; Fluid modifications: (optional): Free Water Restrictions Only     Standing Status:   Standing     Number of Occurrences:   1     Order Specific Question:   Diet:     Answer:   Regular [1]     Order Specific Question:   Fluid modifications: (optional)     Answer:   Free Water Restrictions Only [12]       ACTIVITY  As tolerated.  Weight bearing as tolerated    CONSULTATIONS  None    PROCEDURES  None    LABORATORY  Lab Results   Component Value Date    SODIUM 128 (L) 09/28/2023    POTASSIUM 3.8 09/28/2023    CHLORIDE 94 (L) 09/28/2023    CO2 23 09/28/2023    GLUCOSE 119 (H) 09/28/2023    BUN 21 09/28/2023    CREATININE 1.12 09/28/2023        Lab Results   Component Value Date    WBC 7.8 09/27/2023    HEMOGLOBIN 11.7 (L) 09/27/2023    HEMATOCRIT 33.5 (L) 09/27/2023    PLATELETCT 288 09/27/2023        Total time of the discharge process exceeds 34 minutes.

## 2023-09-28 NOTE — DISCHARGE PLANNING
Case Management Discharge Planning    Admission Date: 9/26/2023  GMLOS: 4.3  ALOS: 1    6-Clicks ADL Score: 24  6-Clicks Mobility Score: 18      Anticipated Discharge Dispo: Discharge Disposition: D/T to facility providing detention/supportive care (04)    DME Needed: No    Action(s) Taken: Updated Provider/Nurse on Discharge Plan  Pt has been assessed by PT OT, per note, Pt will have no further Physical therapy needs, Per bedside RN, Pt's daughter Raven will provide transport back to [facility]. DAVID FELICIANO spoke to TASHI, nurse from [facility] for Pt's possible discharge today.     RN CM received a communication from bedside RN that Pt's daughter Raven would like to talk to dr. Allen before discharge, Dr. Allen informed by bedside nurse.     Escalations Completed: None    Medically Clear: Yes    Next Steps: RN CM to continue to assist as needs arise    Barriers to Discharge: Transportation    Is the patient up for discharge tomorrow: No

## 2023-09-28 NOTE — CARE PLAN
The patient is Stable - Low risk of patient condition declining or worsening    Shift Goals  Clinical Goals: monitor NA+, comfort, safety, rest  Patient Goals: rest  Family Goals: n/a    Progress made toward(s) clinical / shift goals:  Pt with no complaints and denies pain, no pain identifiers noted. Scheduled medications administered per MAR. Plan of care reviewed with the pt, pt verbally understands. Fall and safety education provided to the pt and precautions maintained. Bed kept in lowest position, call light within reach, and bed alarm on. Hourly rounding done. Pt calls appropriately. Pt remains free from falls. All needs met. Pt resting comfortably at this time. Na+ this morning = 128, trending up.     Problem: Knowledge Deficit - Standard  Goal: Patient and family/care givers will demonstrate understanding of plan of care, disease process/condition, diagnostic tests and medications  Outcome: Progressing     Problem: Fall Risk  Goal: Patient will remain free from falls  Outcome: Progressing       Patient is not progressing towards the following goals:

## 2023-09-28 NOTE — PROGRESS NOTES
Pt provided discharge education and verbalized no further concerns or questions. Pt belongings gathered and pt changed into clothes. Pt discharged unit via wheelchair with escort. Daughter notified and son in law present for discharge.

## 2023-09-28 NOTE — CARE PLAN
The patient is Stable - Low risk of patient condition declining or worsening    Shift Goals  Clinical Goals: Monitor Na+, pt comfort  Patient Goals: comfort  Family Goals: BRIAN    Progress made toward(s) clinical / shift goals:      Problem: Skin Integrity  Goal: Skin integrity is maintained or improved  Outcome: Progressing   Pt has been kept clean and dry. Pt also demonstrated ability to reposition herself in bed frequently to decrease risk of pressure injury.     Problem: Fall Risk  Goal: Patient will remain free from falls  Outcome: Progressing  Pt has been able to mobilize with OT today and ambulate in the hallway. Pt tolerated well. Pt has remained free of falls this shift, and understands the purpose of the call light and consistently has been calling appropriately.      Patient is not progressing towards the following goals:

## 2023-09-29 ENCOUNTER — APPOINTMENT (OUTPATIENT)
Dept: MEDICAL GROUP | Facility: PHYSICIAN GROUP | Age: 88
End: 2023-09-29
Payer: MEDICARE

## 2023-09-29 ENCOUNTER — PATIENT OUTREACH (OUTPATIENT)
Dept: MEDICAL GROUP | Facility: PHYSICIAN GROUP | Age: 88
End: 2023-09-29

## 2023-09-29 NOTE — PROGRESS NOTES
"Transitional Care Management  TCM Outreach Date and Time: Filed (9/29/2023 11:36 AM)    Discharge Questions  Actual Discharge Date: 09/28/23  Now that you are home, how are you feeling?: Good  Did you receive any new prescriptions?: Yes  Were you able to get them filled?: Yes  Meds to Bed or Pharmacy filled?: Pharmacy  Do you have any questions about your current medications or new medications (Review Med Rec)?: No  Do you have a follow up appointment scheduled with your PCP?: Yes  Appointment Date: 10/03/23  Appointment Time: 1420  Any issues or paperwork you wish to discuss with your PCP?: No  Does this patient qualify for the CCM program?: No (pt has home health referral, once dc'd from  may become eligible)    Transitional Care  Number of attempts made to contact patient: 1  Current or previous attempts competed within two business days of discharge? : Yes  Provided education regarding treatment plan, medications, self-management, ADLs?: Yes  Has patient completed an Advanced Directive?: Yes  Is the patient's advanced directive on file?: Yes  Has the Care Manager's phone number provided?: No  Is there anything else I can help you with?: No    Discharge Summary  Chief Complaint: LLQ Pain, nausea - Pt BIB EMS for LLQ and nausea x2 days. Pt describes pain as moderate and sharp. Pt endorses normal bowel movements but not eating or drinking well over the last couple of days due to not \"feeling right.\" Pt has hx of HTN. Pt aox4.  Admitting Diagnosis: EMS  Discharge Diagnosis: hyponatremia      "

## 2023-10-02 LAB
BACTERIA BLD CULT: NORMAL
BACTERIA BLD CULT: NORMAL
SIGNIFICANT IND 70042: NORMAL
SIGNIFICANT IND 70042: NORMAL
SITE SITE: NORMAL
SITE SITE: NORMAL
SOURCE SOURCE: NORMAL
SOURCE SOURCE: NORMAL

## 2023-10-03 ENCOUNTER — PATIENT OUTREACH (OUTPATIENT)
Dept: HEALTH INFORMATION MANAGEMENT | Facility: OTHER | Age: 88
End: 2023-10-03

## 2023-10-03 ENCOUNTER — OFFICE VISIT (OUTPATIENT)
Dept: MEDICAL GROUP | Facility: PHYSICIAN GROUP | Age: 88
End: 2023-10-03
Payer: MEDICARE

## 2023-10-03 VITALS
HEIGHT: 59 IN | DIASTOLIC BLOOD PRESSURE: 64 MMHG | TEMPERATURE: 97.7 F | BODY MASS INDEX: 29.88 KG/M2 | OXYGEN SATURATION: 93 % | HEART RATE: 62 BPM | SYSTOLIC BLOOD PRESSURE: 110 MMHG | WEIGHT: 148.2 LBS

## 2023-10-03 DIAGNOSIS — Z11.1 SCREENING-PULMONARY TB: ICD-10-CM

## 2023-10-03 DIAGNOSIS — J84.9 INTERSTITIAL LUNG DISEASE (HCC): ICD-10-CM

## 2023-10-03 DIAGNOSIS — Z09 HOSPITAL DISCHARGE FOLLOW-UP: Primary | ICD-10-CM

## 2023-10-03 DIAGNOSIS — R11.0 NAUSEA: ICD-10-CM

## 2023-10-03 DIAGNOSIS — E87.1 HYPONATREMIA: ICD-10-CM

## 2023-10-03 DIAGNOSIS — I10 ESSENTIAL HYPERTENSION: Chronic | ICD-10-CM

## 2023-10-03 PROCEDURE — 3074F SYST BP LT 130 MM HG: CPT

## 2023-10-03 PROCEDURE — 99496 TRANSJ CARE MGMT HIGH F2F 7D: CPT

## 2023-10-03 PROCEDURE — 3078F DIAST BP <80 MM HG: CPT

## 2023-10-03 RX ORDER — ONDANSETRON 4 MG/1
4 TABLET, ORALLY DISINTEGRATING ORAL EVERY 6 HOURS PRN
Qty: 10 TABLET | Refills: 0 | Status: SHIPPED | OUTPATIENT
Start: 2023-10-03

## 2023-10-03 ASSESSMENT — ENCOUNTER SYMPTOMS
WEAKNESS: 1
WEIGHT LOSS: 0
DIZZINESS: 0
SHORTNESS OF BREATH: 0
CHILLS: 0
COUGH: 0
CONSTIPATION: 0
FEVER: 0
ABDOMINAL PAIN: 0
BLURRED VISION: 0
MYALGIAS: 0
HEADACHES: 0
NAUSEA: 1
DIARRHEA: 0
VOMITING: 0

## 2023-10-03 ASSESSMENT — FIBROSIS 4 INDEX: FIB4 SCORE: 1.32

## 2023-10-03 NOTE — PROGRESS NOTES
"Subjective:     Grisel Saha is a 88 y.o. female who presents for Hospital Follow-up.    Transitional Care Management  TCM Outreach Date and Time: Filed (9/29/2023 11:36 AM)    Discharge Questions  Actual Discharge Date: 09/28/23  Now that you are home, how are you feeling?: Good  Did you receive any new prescriptions?: Yes  Were you able to get them filled?: Yes  Meds to Bed or Pharmacy filled?: Pharmacy  Do you have any questions about your current medications or new medications (Review Med Rec)?: No  Do you have a follow up appointment scheduled with your PCP?: Yes  Appointment Date: 10/03/23  Appointment Time: 1420  Any issues or paperwork you wish to discuss with your PCP?: No  Does this patient qualify for the CCM program?: No (pt has home health referral, once dc'd from  may become eligible)    Transitional Care  Number of attempts made to contact patient: 1  Current or previous attempts competed within two business days of discharge? : Yes  Provided education regarding treatment plan, medications, self-management, ADLs?: Yes  Has patient completed an Advanced Directive?: Yes  Is the patient's advanced directive on file?: Yes  Has the Care Manager's phone number provided?: No  Is there anything else I can help you with?: No    Discharge Summary  Chief Complaint: LLQ Pain, nausea - Pt BIB EMS for LLQ and nausea x2 days. Pt describes pain as moderate and sharp. Pt endorses normal bowel movements but not eating or drinking well over the last couple of days due to not \"feeling right.\" Pt has hx of HTN. Pt aox4.  Admitting Diagnosis: EMS  Discharge Diagnosis: hyponatremia        HPI:   Recently hospitalized for weakness. The cascade of events began 9/13/2023 for hyponatremia and hypovolemia. This was believed to have had an adverse reaction to antiviral medication and antibiotics for which she was given in the days prior to this for shingles.  She was repleted of sodium and fluids and then discharged on 922 " only to go back to the hospital again on 9/26/2023 for weakness and nausea.  She lives in assisted living at [facility].  She was kept inpatient for 2 days.  At this visit she was given IV hydration in which her acute renal failure resolved as well as hyponatremia resolution.  They held diuretics.  And she was instructed to follow-up with PCP.  He she is here today for hospital follow-up as well as to establish care. She is doing much better. Medication for BP was changed she was taken off losartan and Lasix and placed on carvedilol 3.125 mg twice daily.  She is doing much better on this regimen.    Current medicines (including reconciliation performed today)  Current Outpatient Medications   Medication Sig Dispense Refill    ondansetron (ZOFRAN ODT) 4 MG TABLET DISPERSIBLE Take 1 Tablet by mouth every 6 hours as needed for Nausea/Vomiting. 10 Tablet 0    carvedilol (COREG) 3.125 MG Tab Take 1 Tablet by mouth 2 times a day with meals. 60 Tablet 0    acetaminophen (TYLENOL) 325 MG Tab Take 2 Tablets by mouth every 6 hours as needed for Mild Pain, Moderate Pain or Fever (Mild Pain; (Pain scale 1-3); Temp greater than 100.5 F). 30 Tablet 0    melatonin 5 mg Tab Take 1 Tablet by mouth at bedtime as needed (Sleep). 30 Tablet     omeprazole (PRILOSEC) 20 MG delayed-release capsule Take 1 Capsule by mouth every day. 30 Capsule     senna-docusate (PERICOLACE OR SENOKOT S) 8.6-50 MG Tab Take 2 Tablets by mouth 2 times a day. 30 Tablet 0    polyethylene glycol/lytes (MIRALAX) 17 g Pack Take 1 Packet by mouth 1 time a day as needed (if sennosides and docusate ineffective after 24 hours).  3    sucralfate (CARAFATE) 1 GM/10ML Suspension Take 10 mL by mouth every 6 hours as needed (indigestion pain).       Current Facility-Administered Medications   Medication Dose Route Frequency Provider Last Rate Last Admin    triamcinolone acetonide (KENALOG-40) injection 80 mg  80 mg Intra-articular  Sebastian  "YELENA Brewster M.D.   80 mg at 11/12/21 0703    triamcinolone acetonide (KENALOG-40) injection 80 mg  80 mg Intra-articular  Sebastian Brewster M.D.   80 mg at 11/12/21 0703       Allergies:   Patient has no known allergies.    Social History     Tobacco Use    Smoking status: Former    Smokeless tobacco: Never   Vaping Use    Vaping Use: Never used   Substance Use Topics    Alcohol use: Not Currently     Alcohol/week: 8.4 oz     Types: 14 Glasses of wine per week     Comment: 2-3 ounces of liquor daily    Drug use: Not Currently       ROS:  Review of Systems   Constitutional:  Negative for chills, fever, malaise/fatigue and weight loss.   Eyes:  Negative for blurred vision.   Respiratory:  Negative for cough and shortness of breath.    Cardiovascular:  Negative for chest pain.   Gastrointestinal:  Positive for nausea (mild). Negative for abdominal pain, constipation, diarrhea and vomiting.   Musculoskeletal:  Negative for myalgias.   Skin:  Positive for itching (from shingles, residual).   Neurological:  Positive for weakness (mild weakness). Negative for dizziness and headaches.         Objective:     Vitals:    10/03/23 1409   BP: 110/64   BP Location: Left arm   Patient Position: Sitting   BP Cuff Size: Adult   Pulse: 62   Temp: 36.5 °C (97.7 °F)   TempSrc: Temporal   SpO2: 93%   Weight: 67.2 kg (148 lb 3.2 oz)   Height: 1.499 m (4' 11\")     Body mass index is 29.93 kg/m².    Physical Exam:  Physical Exam  Constitutional:       General: She is not in acute distress.     Appearance: Normal appearance. She is not ill-appearing or toxic-appearing.   HENT:      Head: Normocephalic.   Eyes:      Conjunctiva/sclera: Conjunctivae normal.   Cardiovascular:      Rate and Rhythm: Normal rate and regular rhythm.      Pulses: Normal pulses.      Heart sounds: Normal heart sounds. No murmur heard.  Pulmonary:      Effort: Pulmonary effort is normal. No respiratory distress.      Breath sounds: Normal breath sounds.   Abdominal:      " General: Bowel sounds are normal. There is no distension.      Tenderness: There is no abdominal tenderness.   Skin:     General: Skin is warm and dry.   Neurological:      General: No focal deficit present.      Mental Status: She is alert and oriented to person, place, and time.   Psychiatric:         Mood and Affect: Mood normal.         Behavior: Behavior normal.           Assessment and Plan:   1. Hospital discharge follow-up  - Comp Metabolic Panel; Future    2. Hyponatremia  - Comp Metabolic Panel; Future    3. Nausea  - Comp Metabolic Panel; Future  - ondansetron (ZOFRAN ODT) 4 MG TABLET DISPERSIBLE; Take 1 Tablet by mouth every 6 hours as needed for Nausea/Vomiting.  Dispense: 10 Tablet; Refill: 0    4. Screening-pulmonary TB  - Quantiferon Gold TB (PPD); Future  -Understands to take Carvedilol 3.125 mg BID, she has stopped lasix and losartan  - Chart and discharge summary were reviewed.   - Hospitalization and results reviewed with patient.   - Medications reviewed including instructions regarding high risk medications, dosing and side effects.  - Recommended Services: Referral to [facility] Care Coordination Services  - Advance directive/POLST on file?  Yes    Follow-up:Return in about 4 weeks (around 10/31/2023).    Face-to-face transitional care management services with HIGH (today's visit is within days post discharge & LACE+ score 59+) medical decision complexity were provided.

## 2023-10-03 NOTE — PROGRESS NOTES
I spoke with the patient and her daughter while they were in clinic today to meet with Edgar Mcfadden D.N.P. They express interest in the program and intend to contact me by phone to schedule the enrollment interview. Contact information provided.

## 2023-10-06 ENCOUNTER — TELEPHONE (OUTPATIENT)
Dept: MEDICAL GROUP | Facility: PHYSICIAN GROUP | Age: 88
End: 2023-10-06

## 2023-10-06 NOTE — TELEPHONE ENCOUNTER
DOCUMENTATION OF PAR STATUS:    1. Name of Medication & Dose:Ondansetron 4MG dispersible tablets      2. Name of Prescription Coverage Company & phone #: [email or web address]     3. Date Prior Auth Submitted: 10/06/2023      4. What information was given to obtain insurance decision? ICD10 CODE    5. Prior Auth Status? Pending   Key: [id number] - [place] Case ID: [id number]    6. Patient Notified: no

## 2023-10-09 ENCOUNTER — TELEPHONE (OUTPATIENT)
Dept: MEDICAL GROUP | Facility: PHYSICIAN GROUP | Age: 88
End: 2023-10-09

## 2023-10-09 NOTE — TELEPHONE ENCOUNTER
FINAL PRIOR AUTHORIZATION STATUS:    1.  Name of Medication & Dose: ONDANSETRON 4MG TAB     2. Prior Auth Status: Approved through 10/6/24     3. Action Taken: Pharmacy Notified: no Patient Notified: no

## 2023-10-18 ENCOUNTER — PATIENT OUTREACH (OUTPATIENT)
Dept: HEALTH INFORMATION MANAGEMENT | Facility: OTHER | Age: 88
End: 2023-10-18

## 2023-10-18 ENCOUNTER — PATIENT MESSAGE (OUTPATIENT)
Dept: HEALTH INFORMATION MANAGEMENT | Facility: OTHER | Age: 88
End: 2023-10-18

## 2023-10-18 DIAGNOSIS — J84.9 INTERSTITIAL LUNG DISEASE (HCC): ICD-10-CM

## 2023-10-18 DIAGNOSIS — I10 ESSENTIAL HYPERTENSION: ICD-10-CM

## 2023-10-18 NOTE — PROGRESS NOTES
10:42 Attempted patient outreach post identification regarding participation in the Personal Care Management Program. Patient was in the middle of another appointment. Message sent by [employer] with contact information.

## 2023-10-25 DIAGNOSIS — I10 ESSENTIAL HYPERTENSION: Chronic | ICD-10-CM

## 2023-10-25 RX ORDER — CARVEDILOL 3.12 MG/1
3.12 TABLET ORAL 2 TIMES DAILY WITH MEALS
Qty: 200 TABLET | Refills: 2 | Status: SHIPPED | OUTPATIENT
Start: 2023-10-25 | End: 2023-11-27 | Stop reason: SDUPTHER

## 2023-10-25 RX ORDER — CARVEDILOL 3.12 MG/1
3.12 TABLET ORAL 2 TIMES DAILY WITH MEALS
Qty: 200 TABLET | Refills: 2 | Status: SHIPPED | OUTPATIENT
Start: 2023-10-25 | End: 2023-10-25 | Stop reason: SDUPTHER

## 2023-10-30 ENCOUNTER — HOSPITAL ENCOUNTER (OUTPATIENT)
Dept: LAB | Facility: MEDICAL CENTER | Age: 88
End: 2023-10-30
Payer: MEDICARE

## 2023-10-30 DIAGNOSIS — Z11.1 SCREENING-PULMONARY TB: ICD-10-CM

## 2023-10-30 DIAGNOSIS — Z09 HOSPITAL DISCHARGE FOLLOW-UP: ICD-10-CM

## 2023-10-30 DIAGNOSIS — E87.1 HYPONATREMIA: ICD-10-CM

## 2023-10-30 DIAGNOSIS — R11.0 NAUSEA: ICD-10-CM

## 2023-10-30 LAB
ALBUMIN SERPL BCP-MCNC: 4 G/DL (ref 3.2–4.9)
ALBUMIN/GLOB SERPL: 1.5 G/DL
ALP SERPL-CCNC: 85 U/L (ref 30–99)
ALT SERPL-CCNC: 14 U/L (ref 2–50)
ANION GAP SERPL CALC-SCNC: 13 MMOL/L (ref 7–16)
AST SERPL-CCNC: 20 U/L (ref 12–45)
BILIRUB SERPL-MCNC: 0.6 MG/DL (ref 0.1–1.5)
BUN SERPL-MCNC: 12 MG/DL (ref 8–22)
CALCIUM ALBUM COR SERPL-MCNC: 8.8 MG/DL (ref 8.5–10.5)
CALCIUM SERPL-MCNC: 8.8 MG/DL (ref 8.5–10.5)
CHLORIDE SERPL-SCNC: 101 MMOL/L (ref 96–112)
CO2 SERPL-SCNC: 21 MMOL/L (ref 20–33)
CREAT SERPL-MCNC: 0.95 MG/DL (ref 0.5–1.4)
GFR SERPLBLD CREATININE-BSD FMLA CKD-EPI: 57 ML/MIN/1.73 M 2
GLOBULIN SER CALC-MCNC: 2.7 G/DL (ref 1.9–3.5)
GLUCOSE SERPL-MCNC: 133 MG/DL (ref 65–99)
POTASSIUM SERPL-SCNC: 3.9 MMOL/L (ref 3.6–5.5)
PROT SERPL-MCNC: 6.7 G/DL (ref 6–8.2)
SODIUM SERPL-SCNC: 135 MMOL/L (ref 135–145)

## 2023-10-30 PROCEDURE — 36415 COLL VENOUS BLD VENIPUNCTURE: CPT

## 2023-10-30 PROCEDURE — 86480 TB TEST CELL IMMUN MEASURE: CPT

## 2023-10-30 PROCEDURE — 80053 COMPREHEN METABOLIC PANEL: CPT

## 2023-11-01 LAB
GAMMA INTERFERON BACKGROUND BLD IA-ACNC: 0.04 IU/ML
M TB IFN-G BLD-IMP: NEGATIVE
M TB IFN-G CD4+ BCKGRND COR BLD-ACNC: 0.01 IU/ML
MITOGEN IGNF BCKGRD COR BLD-ACNC: 0.91 IU/ML
QFT TB2 - NIL TBQ2: 0 IU/ML

## 2023-11-02 ENCOUNTER — APPOINTMENT (OUTPATIENT)
Dept: MEDICAL GROUP | Facility: PHYSICIAN GROUP | Age: 88
End: 2023-11-02
Payer: MEDICARE

## 2023-11-08 ENCOUNTER — PATIENT OUTREACH (OUTPATIENT)
Dept: HEALTH INFORMATION MANAGEMENT | Facility: OTHER | Age: 88
End: 2023-11-08

## 2023-11-08 ENCOUNTER — OFFICE VISIT (OUTPATIENT)
Dept: MEDICAL GROUP | Facility: PHYSICIAN GROUP | Age: 88
End: 2023-11-08
Payer: MEDICARE

## 2023-11-08 VITALS
SYSTOLIC BLOOD PRESSURE: 130 MMHG | HEIGHT: 59 IN | HEART RATE: 66 BPM | WEIGHT: 146.8 LBS | BODY MASS INDEX: 29.6 KG/M2 | OXYGEN SATURATION: 92 % | TEMPERATURE: 98.1 F | DIASTOLIC BLOOD PRESSURE: 60 MMHG

## 2023-11-08 DIAGNOSIS — R10.33 PERIUMBILICAL ABDOMINAL PAIN: ICD-10-CM

## 2023-11-08 DIAGNOSIS — I10 ESSENTIAL HYPERTENSION: ICD-10-CM

## 2023-11-08 DIAGNOSIS — I10 ESSENTIAL HYPERTENSION: Chronic | ICD-10-CM

## 2023-11-08 DIAGNOSIS — J84.9 INTERSTITIAL LUNG DISEASE (HCC): ICD-10-CM

## 2023-11-08 DIAGNOSIS — R73.03 PREDIABETES: ICD-10-CM

## 2023-11-08 DIAGNOSIS — Z23 NEED FOR VACCINATION: ICD-10-CM

## 2023-11-08 PROCEDURE — G0008 ADMIN INFLUENZA VIRUS VAC: HCPCS

## 2023-11-08 PROCEDURE — 3075F SYST BP GE 130 - 139MM HG: CPT

## 2023-11-08 PROCEDURE — 3078F DIAST BP <80 MM HG: CPT

## 2023-11-08 PROCEDURE — 90662 IIV NO PRSV INCREASED AG IM: CPT

## 2023-11-08 PROCEDURE — 99214 OFFICE O/P EST MOD 30 MIN: CPT | Mod: 25

## 2023-11-08 ASSESSMENT — FIBROSIS 4 INDEX: FIB4 SCORE: 1.63

## 2023-11-08 ASSESSMENT — ENCOUNTER SYMPTOMS
FEVER: 0
COUGH: 0
VOMITING: 0
WEAKNESS: 0
CONSTIPATION: 0
DIZZINESS: 0
BLURRED VISION: 0
NAUSEA: 0
CHILLS: 0
DIARRHEA: 0
HEADACHES: 0
ABDOMINAL PAIN: 1
MYALGIAS: 0
WEIGHT LOSS: 0
SHORTNESS OF BREATH: 0

## 2023-11-08 NOTE — PROGRESS NOTES
I spoke with the patient while she was in clinic for an appointment with her PCP. Patient states that the program sounds interesting and that she will call for enrollment but that it might be in quite a while as her  has been diagnosed with a terminal illness. Will continue to follow.

## 2023-11-08 NOTE — PROGRESS NOTES
"Subjective:     CC: Follow up, abdominal pain    HPI:   Grisel presents today with    Problem   Periumbilical Abdominal Pain    Onset 2 months  Patient reports ache right side to the right side of the umbilicus region that envelops the right upper quadrant and right lower quadrant.  Pain described as sharp and moderate. Comes and goes.  Started about 2 months ago only while lying prone in bed, does report this wakes her up sometimes.  And then off and on during the daytime but not as much is now almost constant, especially when seated in a chair.  Greatly reduced when standing and walking.  The pain is growing in intensity each week.  No nausea even after eating. She denies nausea, vomiting, diarrhea or constipation.  However, does have red flag symptoms of unexplained weight loss.  Denies loss of appetite.     Prediabetes    Chronic since 2018  Not on medication  Lab Results   Component Value Date/Time    HBA1C 6.4 (H) 07/25/2023 01:07 PM    HBA1C 5.8 (H) 09/10/2021 06:22 AM        Essential Hypertension    Chronic, taking carvedilol 3.125 mg BID  BP well controlled 130/60           ROS:  Review of Systems   Constitutional:  Negative for chills, fever, malaise/fatigue and weight loss.   Eyes:  Negative for blurred vision.   Respiratory:  Negative for cough and shortness of breath.    Cardiovascular:  Negative for chest pain.   Gastrointestinal:  Positive for abdominal pain. Negative for constipation, diarrhea, nausea and vomiting.   Genitourinary:  Negative for dysuria, frequency and urgency.   Musculoskeletal:  Negative for myalgias.   Neurological:  Negative for dizziness, weakness and headaches.       Objective:     Exam:  /60 (BP Location: Left arm, Patient Position: Sitting, BP Cuff Size: Adult)   Pulse 66   Temp 36.7 °C (98.1 °F) (Temporal)   Ht 1.499 m (4' 11\")   Wt 66.6 kg (146 lb 12.8 oz)   SpO2 92%   BMI 29.65 kg/m²  Body mass index is 29.65 kg/m².    Physical Exam  Constitutional:       " "General: She is not in acute distress.     Appearance: Normal appearance. She is not ill-appearing or toxic-appearing.   HENT:      Head: Normocephalic.   Eyes:      Conjunctiva/sclera: Conjunctivae normal.   Cardiovascular:      Rate and Rhythm: Normal rate and regular rhythm.      Pulses: Normal pulses.      Heart sounds: Normal heart sounds. No murmur heard.  Pulmonary:      Effort: Pulmonary effort is normal. No respiratory distress.      Breath sounds: Normal breath sounds.   Abdominal:      General: Bowel sounds are normal. There is distension.      Palpations: There is no mass.      Tenderness: There is no abdominal tenderness. There is no guarding or rebound.      Hernia: No hernia is present.   Skin:     General: Skin is warm and dry.   Neurological:      General: No focal deficit present.      Mental Status: She is alert and oriented to person, place, and time.   Psychiatric:         Mood and Affect: Mood normal.         Behavior: Behavior normal.         Labs:   Lab Results   Component Value Date/Time    CHOLSTRLTOT 156 05/20/2021 06:49 AM    LDL 47 05/20/2021 06:49 AM     05/20/2021 06:49 AM    TRIGLYCERIDE 31 05/20/2021 06:49 AM       Lab Results   Component Value Date/Time    SODIUM 135 10/30/2023 11:14 AM    POTASSIUM 3.9 10/30/2023 11:14 AM    CHLORIDE 101 10/30/2023 11:14 AM    CO2 21 10/30/2023 11:14 AM    GLUCOSE 133 (H) 10/30/2023 11:14 AM    BUN 12 10/30/2023 11:14 AM    CREATININE 0.95 10/30/2023 11:14 AM     Lab Results   Component Value Date/Time    ALKPHOSPHAT 85 10/30/2023 11:14 AM    ASTSGOT 20 10/30/2023 11:14 AM    ALTSGPT 14 10/30/2023 11:14 AM    TBILIRUBIN 0.6 10/30/2023 11:14 AM      Lab Results   Component Value Date/Time    HBA1C 6.4 (H) 07/25/2023 01:07 PM    HBA1C 5.8 (H) 09/10/2021 06:22 AM    HBA1C 6.2 (H) 05/20/2021 06:49 AM     No results found for: \"TSH\"  No results found for: \"FREET4\"  No results found for: \"CBC\"      Assessment & Plan: Medical Decision Making     88 " y.o. female with the following -     Problem List Items Addressed This Visit       Essential hypertension (Chronic)     Chronic, stable  Continue carvedilol 3.125 mg biD         Relevant Orders    CBC WITH DIFFERENTIAL    Comp Metabolic Panel    Periumbilical abdominal pain     Undiagnosed condition of uncertain prognosis  - Order placed for CT abdomen pelvis with contrast  - Strict ED precautions given and known for worsening or progression of this condition  -Patient requesting referral to gastroenterology         Relevant Orders    CBC WITH DIFFERENTIAL    Comp Metabolic Panel    CT-ABDOMEN-PELVIS WITH    Referral to Gastroenterology    Prediabetes     Chronic condition uncontrolled  --Exercise: At least 150 minutes of moderate aerobic activity per week or 75 minutes of vigorous aerobic activity per week, +2 days/week of strength training  - Healthy lifestyle and eating habits: Mediterranean-based diet (rich in fruits, vegetables, nuts and healthy oils), proper hydration and avoiding sugary beverages, adequate sleep hygiene-(allowing 7 to 8 hours of overnight sleep).           Relevant Orders    CBC WITH DIFFERENTIAL    HEMOGLOBIN A1C    Comp Metabolic Panel     Other Visit Diagnoses       Need for vaccination        Relevant Orders    INFLUENZA VACCINE, HIGH DOSE (65+ ONLY) (Completed)            Differential diagnosis, natural history, supportive care, and indications for immediate follow-up discussed.  Shared decision making approach utilized, and patient is amendable with plan of care.  Patient understands to return to clinic or go to the emergency department if symptoms worsen. All questions and concerns addressed to the best of my knowledge.    Return in about 3 months (around 2/8/2024) for F/U Lab Review.    Please note that this dictation was created using voice recognition software. I have made every reasonable attempt to correct obvious errors, but I expect that there are errors of grammar and possibly  content that I did not discover before finalizing the note.

## 2023-11-08 NOTE — ASSESSMENT & PLAN NOTE
Undiagnosed condition of uncertain prognosis  - Order placed for CT abdomen pelvis with contrast  - Strict ED precautions given and known for worsening or progression of this condition  -Patient requesting referral to gastroenterology

## 2023-11-08 NOTE — ASSESSMENT & PLAN NOTE
Chronic condition uncontrolled  --Exercise: At least 150 minutes of moderate aerobic activity per week or 75 minutes of vigorous aerobic activity per week, +2 days/week of strength training  - Healthy lifestyle and eating habits: Mediterranean-based diet (rich in fruits, vegetables, nuts and healthy oils), proper hydration and avoiding sugary beverages, adequate sleep hygiene-(allowing 7 to 8 hours of overnight sleep).

## 2023-11-13 ENCOUNTER — HOSPITAL ENCOUNTER (OUTPATIENT)
Dept: RADIOLOGY | Facility: MEDICAL CENTER | Age: 88
End: 2023-11-13
Attending: FAMILY MEDICINE
Payer: MEDICARE

## 2023-11-13 DIAGNOSIS — R22.41 LUMP OF RIGHT THIGH: ICD-10-CM

## 2023-11-13 PROCEDURE — 73718 MRI LOWER EXTREMITY W/O DYE: CPT | Mod: RT

## 2023-11-15 ENCOUNTER — HOSPITAL ENCOUNTER (OUTPATIENT)
Dept: RADIOLOGY | Facility: MEDICAL CENTER | Age: 88
End: 2023-11-15
Payer: MEDICARE

## 2023-11-15 DIAGNOSIS — R10.33 PERIUMBILICAL ABDOMINAL PAIN: ICD-10-CM

## 2023-11-15 PROCEDURE — 700117 HCHG RX CONTRAST REV CODE 255

## 2023-11-15 PROCEDURE — 74177 CT ABD & PELVIS W/CONTRAST: CPT

## 2023-11-15 RX ADMIN — IOHEXOL 100 ML: 350 INJECTION, SOLUTION INTRAVENOUS at 11:41

## 2023-11-24 ENCOUNTER — PATIENT MESSAGE (OUTPATIENT)
Dept: MEDICAL GROUP | Facility: PHYSICIAN GROUP | Age: 88
End: 2023-11-24
Payer: MEDICARE

## 2023-11-24 DIAGNOSIS — I10 ESSENTIAL HYPERTENSION: Chronic | ICD-10-CM

## 2023-11-27 RX ORDER — CARVEDILOL 3.12 MG/1
3.12 TABLET ORAL 2 TIMES DAILY WITH MEALS
Qty: 200 TABLET | Refills: 3 | Status: SHIPPED | OUTPATIENT
Start: 2023-11-27 | End: 2023-11-28 | Stop reason: SDUPTHER

## 2023-11-28 DIAGNOSIS — I10 ESSENTIAL HYPERTENSION: Chronic | ICD-10-CM

## 2023-11-28 RX ORDER — CARVEDILOL 3.12 MG/1
3.12 TABLET ORAL 2 TIMES DAILY WITH MEALS
Qty: 200 TABLET | Refills: 3 | Status: SHIPPED | OUTPATIENT
Start: 2023-11-28

## 2023-11-28 RX ORDER — CARVEDILOL 3.12 MG/1
3.12 TABLET ORAL 2 TIMES DAILY WITH MEALS
Qty: 200 TABLET | Refills: 3 | OUTPATIENT
Start: 2023-11-28

## 2023-11-28 NOTE — TELEPHONE ENCOUNTER
Received request via: Patient    Was the patient seen in the last year in this department? Yes    Does the patient have an active prescription (recently filled or refills available) for medication(s) requested? No    Does the patient have long-term Plus and need 100 day supply (blood pressure, diabetes and cholesterol meds only)? Yes, quantity updated to 100 days

## 2023-12-05 ENCOUNTER — APPOINTMENT (OUTPATIENT)
Dept: MEDICAL GROUP | Facility: PHYSICIAN GROUP | Age: 88
End: 2023-12-05
Payer: MEDICARE

## 2024-01-31 ENCOUNTER — PATIENT MESSAGE (OUTPATIENT)
Dept: HEALTH INFORMATION MANAGEMENT | Facility: OTHER | Age: 89
End: 2024-01-31

## 2024-02-14 ENCOUNTER — DOCUMENTATION (OUTPATIENT)
Dept: HEALTH INFORMATION MANAGEMENT | Facility: OTHER | Age: 89
End: 2024-02-14
Payer: MEDICARE

## 2024-04-09 ENCOUNTER — OFFICE VISIT (OUTPATIENT)
Dept: URGENT CARE | Facility: CLINIC | Age: 89
End: 2024-04-09
Payer: MEDICARE

## 2024-04-09 VITALS
RESPIRATION RATE: 16 BRPM | DIASTOLIC BLOOD PRESSURE: 80 MMHG | HEIGHT: 59 IN | WEIGHT: 140 LBS | BODY MASS INDEX: 28.22 KG/M2 | HEART RATE: 72 BPM | TEMPERATURE: 97.7 F | SYSTOLIC BLOOD PRESSURE: 220 MMHG | OXYGEN SATURATION: 94 %

## 2024-04-09 DIAGNOSIS — I10 PRIMARY HYPERTENSION: ICD-10-CM

## 2024-04-09 DIAGNOSIS — L60.0 INGROWN TOENAIL WITHOUT INFECTION: ICD-10-CM

## 2024-04-09 PROCEDURE — 3077F SYST BP >= 140 MM HG: CPT

## 2024-04-09 PROCEDURE — 99214 OFFICE O/P EST MOD 30 MIN: CPT

## 2024-04-09 PROCEDURE — 3079F DIAST BP 80-89 MM HG: CPT

## 2024-04-09 RX ORDER — AMOXICILLIN 875 MG/1
TABLET, COATED ORAL
COMMUNITY
Start: 2024-02-01 | End: 2024-04-09

## 2024-04-09 RX ORDER — HYDROCODONE BITARTRATE AND ACETAMINOPHEN 5; 325 MG/1; MG/1
TABLET ORAL
COMMUNITY
Start: 2024-02-13 | End: 2024-04-23

## 2024-04-09 RX ORDER — CLINDAMYCIN HYDROCHLORIDE 300 MG/1
CAPSULE ORAL
COMMUNITY
Start: 2024-02-13 | End: 2024-04-23

## 2024-04-09 ASSESSMENT — FIBROSIS 4 INDEX: FIB4 SCORE: 1.63

## 2024-04-09 ASSESSMENT — ENCOUNTER SYMPTOMS
NAUSEA: 0
ORTHOPNEA: 0
DIZZINESS: 0
PALPITATIONS: 0
CHILLS: 0
COUGH: 0
HEADACHES: 0
DOUBLE VISION: 0
BLURRED VISION: 0
VOMITING: 0
FEVER: 0

## 2024-04-09 NOTE — PROGRESS NOTES
CHIEF COMPLAINT  Chief Complaint   Patient presents with    Foot Problem     Right foot, 2nd toe ingrown nail x 1 day     Subjective:   Grisel Saha is a 88 y.o. female who presents to urgent care with concerns of a ingrown nail to the second toe of her right foot.  Patient reports she started experiencing discomfort to her second toe yesterday evening.  She does report a chronic history of ingrown toenails.  She currently follows with podiatry.  Patient denies any drainage from site.  Patient denies any fever or chills.  Patient's blood pressure is elevated today in clinic.  She does report that she takes carvedilol for treatment of hypertension.  She reports consistency with administration of medication.  Patient attributes elevated blood pressure to situational circumstances as she experienced difficulty with transportation today to clinic.  Patient denies any symptoms of dizziness, headache, blurred vision,nausea, chest pains, shortness of breath or palpitations.  The only symptom she endorses today is the pain to her toe.  She denies any other pertinent past medical history.       Review of Systems   Constitutional:  Negative for chills and fever.   Eyes:  Negative for blurred vision and double vision.   Respiratory:  Negative for cough.    Cardiovascular:  Negative for chest pain, palpitations, orthopnea and leg swelling.   Gastrointestinal:  Negative for nausea and vomiting.   Neurological:  Negative for dizziness and headaches.       PAST MEDICAL HISTORY  Patient Active Problem List    Diagnosis Date Noted    Periumbilical abdominal pain 11/08/2023    Prediabetes 11/08/2023    Acute renal failure (ARF) ([facility]) 09/27/2023    Myocardial infarction due to demand ischemia ([facility]) 09/27/2023    Generalized weakness 09/27/2023    Shingles 09/17/2023    Pulmonary edema 09/14/2023    Elevated brain natriuretic peptide (BNP) level 09/14/2023    Herpes zoster with complication 09/08/2023    Lesion of nose  12/19/2022    Lump of right thigh 12/19/2022    Chronic pain of both shoulders 11/18/2022    Pain in both hands 11/18/2022    Closed fracture of nasal bone with routine healing 10/25/2022    Pain in finger of both hands 08/19/2022    Acute stress reaction 08/19/2022    Memory loss 03/29/2022    Leg swelling 01/13/2022    Edema of right upper arm 01/13/2022    Fracture, humerus, anatomical neck, right, sequela 01/11/2022    History of fall 01/11/2022    Hypokalemia 01/08/2022    Closed fracture of surgical neck of humerus 01/08/2022    Fracture of anatomical neck of humerus, right, closed, initial encounter 01/08/2022    Slow transit constipation 09/30/2021    Urticaria 09/30/2021    Hyponatremia 09/14/2021    Nausea 09/07/2021    Wound of left leg 06/03/2021    Syncope 06/03/2021    Other headache syndrome 04/20/2021    Balance problem 12/18/2020    Interstitial lung disease (HCC) 02/10/2020    Essential hypertension 09/13/2004    Hyperlipidemia 03/07/2004       SURGICAL HISTORY  patient denies any surgical history    ALLERGIES  Allergies   Allergen Reactions    Valtrex [Valacyclovir] Vomiting       CURRENT MEDICATIONS  Home Medications       Reviewed by Sarah Ruby, Med Ass't (Medical Assistant) on 04/09/24 at 0959  Med List Status: <None>     Medication Last Dose Status   acetaminophen (TYLENOL) 325 MG Tab PRN Active   amoxicillin (AMOXIL) 875 MG tablet  Flagged for Removal   carvedilol (COREG) 3.125 MG Tab  Flagged for Removal   clindamycin (CLEOCIN) 300 MG Cap  Flagged for Removal   HYDROcodone-acetaminophen (NORCO) 5-325 MG Tab per tablet  Flagged for Removal   ondansetron (ZOFRAN ODT) 4 MG TABLET DISPERSIBLE PRN Active   polyethylene glycol/lytes (MIRALAX) 17 g Pack Not Taking Active   senna-docusate (PERICOLACE OR SENOKOT S) 8.6-50 MG Tab Not Taking Active   sucralfate (CARAFATE) 1 GM/10ML Suspension Not Taking Active   triamcinolone acetonide (KENALOG-40) injection 80 mg  Active   triamcinolone acetonide  "(KENALOG-40) injection 80 mg  Active                    SOCIAL HISTORY  Social History     Tobacco Use    Smoking status: Former    Smokeless tobacco: Never   Vaping Use    Vaping Use: Never used   Substance and Sexual Activity    Alcohol use: Not Currently     Alcohol/week: 8.4 oz     Types: 14 Glasses of wine per week     Comment: 2-3 ounces of liquor daily    Drug use: Not Currently    Sexual activity: Not Currently       FAMILY HISTORY  Family History   Problem Relation Age of Onset    Stroke Mother     Cancer Father         lung         Medications, Allergies, and current problem list reviewed today in Epic.     Objective:     BP (!) 220/80 (BP Location: Left arm, Patient Position: Sitting, BP Cuff Size: Adult)   Pulse 72   Temp 36.5 °C (97.7 °F) (Temporal)   Resp 16   Ht 1.499 m (4' 11\")   Wt 63.5 kg (140 lb)   SpO2 94%     Physical Exam  Vitals reviewed.   Constitutional:       General: She is not in acute distress.     Appearance: Normal appearance. She is not ill-appearing or toxic-appearing.   HENT:      Head: Normocephalic.   Cardiovascular:      Rate and Rhythm: Normal rate and regular rhythm.      Pulses: Normal pulses.      Heart sounds: Normal heart sounds.   Pulmonary:      Effort: Pulmonary effort is normal. No respiratory distress.      Breath sounds: Normal breath sounds.   Musculoskeletal:      Cervical back: Normal range of motion. No rigidity or tenderness.        Feet:    Feet:      Right foot:      Skin integrity: Erythema present.      Toenail Condition: Right toenails are normal.      Left foot:      Toenail Condition: Left toenails are normal.      Comments: Small area of erythema along second toenail.  No swelling or induration to indicate infection.  Toenail does not appear to be abnormally thick or ingrown at this time.  Lymphadenopathy:      Cervical: No cervical adenopathy.   Skin:     General: Skin is warm.      Capillary Refill: Capillary refill takes less than 2 seconds. "   Neurological:      General: No focal deficit present.      Mental Status: She is alert.   Psychiatric:         Mood and Affect: Mood normal.         Assessment/Plan:     Diagnosis and associated orders:     1. Ingrown toenail without infection  Referral to Podiatry      2. Primary hypertension           Comments/MDM:     Upon physical exam patient is alert and in no apparent signs of distress.  She is clear to auscultation bilaterally.  Normal respiratory effort.  Small area of light erythema to second toe on patient's right foot.  No induration or swelling appreciated.  Toenail does not appear to be ingrown at this time.  Advised patient to follow-up with podiatry for further evaluation and management, she reports she currently is established with podiatrist.  Discussed elevated blood pressure today in clinic.  Patient expresses that elevation of blood pressure could be situational as she was experiencing stress earlier today getting to her appointment.  Patient does report consistency with blood pressure medications as she lives at a assisted living facility and they are responsible for administrating her medication.  She denies any symptoms of dizziness, nausea, confusion, headache, chest pain or shortness of breath.  Patient does report ability to evaluate her blood pressure at her assisted living facility.  Advised patient to follow-up with blood pressure this afternoon when she returns home.  Instructed patient to keep a log of elevated blood pressures as she does have a PCP appointment next week.  Discussed red flag signs and symptoms with patient.  Advised her to follow-up to ER immediately if symptoms of elevated blood pressure continue or worsen.         Differential diagnosis, natural history, supportive care, and indications for immediate follow-up discussed.    Advised the patient to follow-up with the primary care physician for recheck, reevaluation, and consideration of further management.    Please  note that this dictation was created using voice recognition software. I have made a reasonable attempt to correct obvious errors, but I expect that there are errors of grammar and possibly content that I did not discover before finalizing the note.    This note was electronically signed by ULI Arcos

## 2024-04-23 ENCOUNTER — PATIENT OUTREACH (OUTPATIENT)
Dept: HEALTH INFORMATION MANAGEMENT | Facility: OTHER | Age: 89
End: 2024-04-23

## 2024-04-23 ENCOUNTER — APPOINTMENT (OUTPATIENT)
Dept: MEDICAL GROUP | Facility: PHYSICIAN GROUP | Age: 89
End: 2024-04-23
Payer: MEDICARE

## 2024-04-23 VITALS
HEART RATE: 69 BPM | DIASTOLIC BLOOD PRESSURE: 70 MMHG | OXYGEN SATURATION: 94 % | SYSTOLIC BLOOD PRESSURE: 140 MMHG | TEMPERATURE: 98.9 F | HEIGHT: 59 IN | WEIGHT: 152.4 LBS | BODY MASS INDEX: 30.72 KG/M2

## 2024-04-23 DIAGNOSIS — J84.9 INTERSTITIAL LUNG DISEASE (HCC): ICD-10-CM
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
## 2024-04-23 DIAGNOSIS — L81.9 PIGMENTED SKIN LESION SUSPICIOUS FOR MALIGNANT NEOPLASM: ICD-10-CM

## 2024-04-23 DIAGNOSIS — R73.03 PREDIABETES: ICD-10-CM

## 2024-04-23 DIAGNOSIS — E87.1 HYPONATREMIA: Chronic | ICD-10-CM

## 2024-04-23 DIAGNOSIS — I10 ESSENTIAL HYPERTENSION: ICD-10-CM

## 2024-04-23 DIAGNOSIS — R26.89 BALANCE PROBLEM: Chronic | ICD-10-CM

## 2024-04-23 DIAGNOSIS — N18.31 STAGE 3A CHRONIC KIDNEY DISEASE: ICD-10-CM

## 2024-04-23 DIAGNOSIS — E78.49 OTHER HYPERLIPIDEMIA: Chronic | ICD-10-CM

## 2024-04-23 PROCEDURE — 99214 OFFICE O/P EST MOD 30 MIN: CPT

## 2024-04-23 PROCEDURE — 3077F SYST BP >= 140 MM HG: CPT

## 2024-04-23 PROCEDURE — 3078F DIAST BP <80 MM HG: CPT

## 2024-04-23 ASSESSMENT — ENCOUNTER SYMPTOMS
NAUSEA: 0
SHORTNESS OF BREATH: 0
FEVER: 0
VOMITING: 0
BLURRED VISION: 0
DIZZINESS: 0
CHILLS: 0
DIARRHEA: 0
WEIGHT LOSS: 0
ABDOMINAL PAIN: 0
MYALGIAS: 0
FALLS: 1
CONSTIPATION: 0
COUGH: 0
WEAKNESS: 0
HEADACHES: 0

## 2024-04-23 ASSESSMENT — PATIENT HEALTH QUESTIONNAIRE - PHQ9: CLINICAL INTERPRETATION OF PHQ2 SCORE: 0

## 2024-04-23 ASSESSMENT — FIBROSIS 4 INDEX: FIB4 SCORE: 1.63

## 2024-04-23 NOTE — PROGRESS NOTES
"Verbal consent was acquired by the patient to use [employer] ambient listening note generation during this visit  Subjective:     CC: Follow-up    HPI:   Grisel presents today with  History of Present Illness  The patient is an 88-year-old female who presents for evaluation of multiple medical concerns. She is accompanied by an adult female.    The patient expresses a desire to consult with a dermatologist due to the presence of lesions on her nose, which were previously diagnosed as cancerous.    The patient's  reports that the patient's A1c levels have consistently been in the prediabetic range, with no readings exceeding 6.4.    The patient denies experiencing shortness of breath and does not currently consult with a pulmonologist. She has a history of smoking, however, she has not smoked in approximately 30 years.    The patient's  reports that the patient has experienced two instances of low sodium levels. The most recent episode was accompanied by shingles, during which the patient experienced malaise, decreased appetite, and disorientation. The patient's sodium levels have since normalized, and she maintains a balanced diet. The  also reports that the patient's urination has increased.      No problems updated.      ROS:  Review of Systems   Constitutional:  Negative for chills, fever, malaise/fatigue and weight loss.   Eyes:  Negative for blurred vision.   Respiratory:  Negative for cough and shortness of breath.    Cardiovascular:  Negative for chest pain.   Gastrointestinal:  Negative for abdominal pain, constipation, diarrhea, nausea and vomiting.   Musculoskeletal:  Positive for falls. Negative for myalgias.   Neurological:  Negative for dizziness, weakness and headaches.       Objective:     Exam:  BP (!) 140/70 (BP Location: Left arm, Patient Position: Sitting, BP Cuff Size: Adult)   Pulse 69   Temp 37.2 °C (98.9 °F) (Temporal)   Ht 1.499 m (4' 11\")   Wt 69.1 kg (152 lb " 6.4 oz)   SpO2 94%   BMI 30.78 kg/m²  Body mass index is 30.78 kg/m².    Physical Exam  Constitutional:       General: She is not in acute distress.     Appearance: Normal appearance. She is not ill-appearing or toxic-appearing.   HENT:      Head: Normocephalic.   Eyes:      Conjunctiva/sclera: Conjunctivae normal.   Cardiovascular:      Rate and Rhythm: Normal rate and regular rhythm.      Pulses: Normal pulses.      Heart sounds: Normal heart sounds. No murmur heard.  Pulmonary:      Effort: Pulmonary effort is normal. No respiratory distress.      Breath sounds: Normal breath sounds.   Skin:     General: Skin is warm and dry.   Neurological:      General: No focal deficit present.      Mental Status: She is alert and oriented to person, place, and time.   Psychiatric:         Mood and Affect: Mood normal.         Behavior: Behavior normal.         Labs:   No visits with results within 1 Month(s) from this visit.   Latest known visit with results is:   Hospital Outpatient Visit on 10/30/2023   Component Date Value    QFT NIL 10/30/2023 0.04     QFT TB1 - NIL 10/30/2023 0.01     QFT TB2 - NIL 10/30/2023 0.00     QFT Mitogen - NIL 10/30/2023 0.91     QFT Gold Plus 10/30/2023 Negative     Sodium 10/30/2023 135     Potassium 10/30/2023 3.9     Chloride 10/30/2023 101     Co2 10/30/2023 21     Anion Gap 10/30/2023 13.0     Glucose 10/30/2023 133 (H)     Bun 10/30/2023 12     Creatinine 10/30/2023 0.95     Calcium 10/30/2023 8.8     Correct Calcium 10/30/2023 8.8     AST(SGOT) 10/30/2023 20     ALT(SGPT) 10/30/2023 14     Alkaline Phosphatase 10/30/2023 85     Total Bilirubin 10/30/2023 0.6     Albumin 10/30/2023 4.0     Total Protein 10/30/2023 6.7     Globulin 10/30/2023 2.7     A-G Ratio 10/30/2023 1.5     GFR (CKD-EPI) 10/30/2023 57 (A)          Assessment & Plan: Medical Decision Making     88 y.o. female with the following -   Assessment & Plan  1. Nasal lesions.  Undiagnosed condition uncertain prognosis  A  referral has been made to the [facility] [place].    2. Prediabetes.  Chronic condition stable  The patient's A1c levels have consistently been in the prediabetic range. A non-fasting lab work has been ordered to assess her A1c levels.    3.  Falls  Referral made to PT for strength and balance training  Use this checklist to minimize fall risk  Outside your home  _ [place] outside stairs with a mixture of sand and paint for better traction.   _ Keep outdoor walkways clear and well-lit.  _ Clear snow and ice and overgrown plants from entrances and sidewalks.  Inside your home  _ Remove all extraneous clutter in your house.  _ Keep telephone and electrical cords out of pathways.  _ Tack rugs and glue vinyl cheikh so they lie flat. Remove or replace rugs or runners that tend to slip, or attach non-slip backing.  _ Ensure that carpets are firmly attached to the stairs.  _ Do not stand on a chair to reach things.   _ Store frequently used objects where you can reach them easily.  Keep a well-lit home  _ Have a lamp or light switch that you can easily reach without getting out of bed.  _ Use night lights in the bedroom, bathroom and hallways.  _ Keep a flashlight handy.  _ Have light switches at both ends of stairs and halls. Install handrails.  _ Turn on the lights when you go into the house at night.  Bathroom tips  _ Add grab bars in shower, tub and toilet areas.  _ Use nonslip adhesive strips or a mat in shower or tub.  _ Consider sitting on a bench or stool in the shower.  _ Consider using an elevated toilet seat.  Use care walking  _ Use helping devices, such as canes, as directed by your healthcare provider.  _ Wear nonslip, low-heeled shoes or slippers that fit snugly.  _ Avoid walking in stocking feet.  And don’t forget...  _ Review medications with your doctor or pharmacist. Some drugs, including over the counter drugs, can make you drowsy, dizzy and unsteady.  _ Have your hearing and eyesight  tested. Inner ear problems can affect balance. Vision problems make it difficult to see potential hazards.  _ Discuss safe amounts of alcohol intake with your physician.  _ Exercise regularly to improve muscle flexibility, strength, and balance.  _ If you feel dizzy or lightheaded, sit down or stay seated until your head clears.   _ Stand up slowly to avoid unsteadiness.    4.  Chronic kidney disease stage IIIa  Recommend ample amounts of fluid at least 40 to 64 ounces per day and avoidance of nephrotoxic substances including alcohol and NSAIDs we will recheck CMP and next lab draw    Follow-up  The patient is scheduled for a follow-up visit in 6 months, or earlier if necessary.    Problem List Items Addressed This Visit       Hyponatremia (Chronic)    Relevant Orders    Comp Metabolic Panel    Prediabetes    Relevant Orders    HEMOGLOBIN A1C     Other Visit Diagnoses       Pigmented skin lesion suspicious for malignant neoplasm        Relevant Orders    Referral to Dermatology    Stage 3a chronic kidney disease        Relevant Orders    Comp Metabolic Panel    Fall, initial encounter        Relevant Orders    Referral to Physical Therapy            Differential diagnosis, natural history, supportive care, and indications for immediate follow-up discussed.  Shared decision making approach utilized, and patient is amendable with plan of care.  Patient understands to return to clinic or go to the emergency department if symptoms worsen. All questions and concerns addressed to the best of my knowledge.    Return in about 6 months (around 10/23/2024).    Please note that this dictation was created using voice recognition software. I have made every reasonable attempt to correct obvious errors, but I expect that there are errors of grammar and possibly content that I did not discover before finalizing the note.

## 2024-04-23 NOTE — PROGRESS NOTES
I spoke to the patient and her daughter today. They are definitely still interested in the program and will call to schedule enrollment. Patient requests PT referral to assist with strength and balance. PCP aware.

## 2024-05-24 DIAGNOSIS — I10 ESSENTIAL HYPERTENSION: Chronic | ICD-10-CM

## 2024-05-24 DIAGNOSIS — B02.9 HERPES ZOSTER WITHOUT COMPLICATION: ICD-10-CM

## 2024-05-24 RX ORDER — OMEPRAZOLE 20 MG/1
20 CAPSULE, DELAYED RELEASE ORAL
Qty: 30 CAPSULE | Refills: 10 | Status: SHIPPED | OUTPATIENT
Start: 2024-05-24

## 2024-05-24 RX ORDER — DOCUSATE SODIUM 50MG AND SENNOSIDES 8.6MG 8.6; 5 MG/1; MG/1
TABLET, FILM COATED ORAL
Qty: 60 TABLET | Refills: 10 | Status: SHIPPED | OUTPATIENT
Start: 2024-05-24

## 2024-05-24 RX ORDER — CARVEDILOL 3.12 MG/1
TABLET ORAL
Qty: 60 TABLET | Refills: 10 | Status: SHIPPED | OUTPATIENT
Start: 2024-05-24

## 2024-05-24 RX ORDER — UREA 10 %
5 LOTION (ML) TOPICAL
Qty: 30 TABLET | Refills: 10 | Status: SHIPPED | OUTPATIENT
Start: 2024-05-24

## 2024-05-24 RX ORDER — POLYETHYLENE GLYCOL 3350 17 G/17G
POWDER, FOR SOLUTION ORAL
Qty: 510 G | Refills: 10 | Status: SHIPPED | OUTPATIENT
Start: 2024-05-24

## 2024-05-24 RX ORDER — ACETAMINOPHEN 325 MG/1
TABLET ORAL
Qty: 120 TABLET | Refills: 10 | Status: SHIPPED | OUTPATIENT
Start: 2024-05-24

## 2024-05-24 NOTE — TELEPHONE ENCOUNTER
Received request via: Pharmacy    Was the patient seen in the last year in this department? Yes    Does the patient have an active prescription (recently filled or refills available) for medication(s) requested? No    Pharmacy Name:     Does the patient have long-term Plus and need 100 day supply (blood pressure, diabetes and cholesterol meds only)? Medication is not for cholesterol, blood pressure or diabetes

## 2024-06-27 ASSESSMENT — ACTIVITIES OF DAILY LIVING (ADL): POOR_BALANCE: 1

## 2024-07-01 ENCOUNTER — PHYSICAL THERAPY (OUTPATIENT)
Dept: PHYSICAL THERAPY | Facility: REHABILITATION | Age: 89
End: 2024-07-01
Payer: MEDICARE
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
## 2024-07-01 PROCEDURE — 97162 PT EVAL MOD COMPLEX 30 MIN: CPT

## 2024-07-01 PROCEDURE — 97112 NEUROMUSCULAR REEDUCATION: CPT

## 2024-07-09 ENCOUNTER — PHYSICAL THERAPY (OUTPATIENT)
Dept: PHYSICAL THERAPY | Facility: REHABILITATION | Age: 89
End: 2024-07-09
Payer: MEDICARE

## 2024-07-09 DIAGNOSIS — M25.562 LEFT KNEE PAIN, UNSPECIFIED CHRONICITY: ICD-10-CM
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
## 2024-07-09 PROCEDURE — 97110 THERAPEUTIC EXERCISES: CPT

## 2024-07-09 PROCEDURE — 97112 NEUROMUSCULAR REEDUCATION: CPT

## 2024-07-12 ENCOUNTER — PATIENT OUTREACH (OUTPATIENT)
Dept: HEALTH INFORMATION MANAGEMENT | Facility: OTHER | Age: 89
End: 2024-07-12
Payer: MEDICARE

## 2024-07-12 ENCOUNTER — PATIENT MESSAGE (OUTPATIENT)
Dept: HEALTH INFORMATION MANAGEMENT | Facility: OTHER | Age: 89
End: 2024-07-12

## 2024-07-12 DIAGNOSIS — J84.9 INTERSTITIAL LUNG DISEASE (HCC): ICD-10-CM

## 2024-07-12 DIAGNOSIS — E78.49 OTHER HYPERLIPIDEMIA: ICD-10-CM

## 2024-07-12 DIAGNOSIS — I10 ESSENTIAL HYPERTENSION: ICD-10-CM

## 2024-07-12 DIAGNOSIS — R26.89 BALANCE PROBLEM: ICD-10-CM

## 2024-07-15 ENCOUNTER — PHYSICAL THERAPY (OUTPATIENT)
Dept: PHYSICAL THERAPY | Facility: REHABILITATION | Age: 89
End: 2024-07-15
Payer: MEDICARE
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
## 2024-07-15 PROCEDURE — 97110 THERAPEUTIC EXERCISES: CPT

## 2024-07-15 PROCEDURE — 97112 NEUROMUSCULAR REEDUCATION: CPT

## 2024-07-22 ENCOUNTER — PHYSICAL THERAPY (OUTPATIENT)
Dept: PHYSICAL THERAPY | Facility: REHABILITATION | Age: 89
End: 2024-07-22
Payer: MEDICARE
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
## 2024-07-22 DIAGNOSIS — M25.562 LEFT KNEE PAIN, UNSPECIFIED CHRONICITY: ICD-10-CM

## 2024-07-22 PROCEDURE — 97110 THERAPEUTIC EXERCISES: CPT

## 2024-07-22 PROCEDURE — 97112 NEUROMUSCULAR REEDUCATION: CPT

## 2024-07-26 ENCOUNTER — PATIENT OUTREACH (OUTPATIENT)
Dept: HEALTH INFORMATION MANAGEMENT | Facility: OTHER | Age: 89
End: 2024-07-26
Payer: MEDICARE

## 2024-07-26 DIAGNOSIS — I10 ESSENTIAL HYPERTENSION: ICD-10-CM

## 2024-07-26 DIAGNOSIS — R26.89 BALANCE PROBLEM: ICD-10-CM

## 2024-07-26 DIAGNOSIS — J84.9 INTERSTITIAL LUNG DISEASE (HCC): ICD-10-CM

## 2024-07-26 DIAGNOSIS — E78.49 OTHER HYPERLIPIDEMIA: ICD-10-CM

## 2024-07-29 ENCOUNTER — PHYSICAL THERAPY (OUTPATIENT)
Dept: PHYSICAL THERAPY | Facility: REHABILITATION | Age: 89
End: 2024-07-29
Payer: MEDICARE
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
## 2024-07-29 PROCEDURE — 97110 THERAPEUTIC EXERCISES: CPT

## 2024-07-29 PROCEDURE — 97112 NEUROMUSCULAR REEDUCATION: CPT

## 2024-07-31 NOTE — OP THERAPY DAILY TREATMENT
Outpatient Physical Therapy  DAILY TREATMENT     [facility] Outpatient Physical Therapy [place] [address] [facility], [address]  [place] [address]  Phone:  [phone]    Date: 08/05/2024  Patient: Grisel Saha  YOB: 1935  MRN: [id number]   Time Calculation           Chief Complaint: No chief complaint on file.  Visit #: 15    SUBJECTIVE: no new complaints    OBJECTIVE:       Therapeutic Exercises (CPT 47408):     2. STS, 2x10    3. calf rasies, x30, not today    4. seated 1/2 FR PF/DF, x30, not today    5. seated leg press, with extra heavy band, x15B, not today      Therapeutic Exercise Summary:         Therapeutic Treatments and Modalities:     1. Neuromuscular Re-education (CPT 30546)    Therapeutic Treatment and Modalities Summary: High march at //bars with light BUE support on foam, x15  Large side step with light BUE support, x10B in //bars  Calf raise reaches  On Airex: static standing balance in stride stance, with head turn R and head turn L, x30 sec all   Fwd/backward gait, clearing obstacles x3laps ea in //bars  Reactive stepping with 1UE and no UE (no UE hard at first; improves as session goes on)  Gait with SPC with obstacle course/clearing small obstacles      Gait in [place] with emphasis on walking tall and lifting head to look ahead.  Then gait with exaggerated heel-toe rocker, [id number] with SBA.  Difficulty with L>R heel contact, pt demo small stride length.   Time-based treatments/modalities:   ASSESSMENT: cont to have slight improvement with less UE hand holds needed. Still with deficits in her overall dynamic balance and stepping.    PLAN/RECOMMENDATIONS:   Plan for treatment: therapy treatment to continue next visit.  Planned interventions for next visit: continue with current treatment.

## 2024-08-05 ENCOUNTER — PHYSICAL THERAPY (OUTPATIENT)
Dept: PHYSICAL THERAPY | Facility: REHABILITATION | Age: 89
End: 2024-08-05
Payer: MEDICARE
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
## 2024-08-05 DIAGNOSIS — M25.562 LEFT KNEE PAIN, UNSPECIFIED CHRONICITY: ICD-10-CM

## 2024-08-05 PROCEDURE — 97112 NEUROMUSCULAR REEDUCATION: CPT

## 2024-08-05 PROCEDURE — 97110 THERAPEUTIC EXERCISES: CPT

## 2024-08-12 ENCOUNTER — APPOINTMENT (OUTPATIENT)
Dept: PHYSICAL THERAPY | Facility: REHABILITATION | Age: 89
End: 2024-08-12
Payer: MEDICARE

## 2024-08-13 ENCOUNTER — APPOINTMENT (RX ONLY)
Dept: URBAN - METROPOLITAN AREA CLINIC 6 | Facility: CLINIC | Age: 89
Setting detail: DERMATOLOGY
End: 2024-08-13

## 2024-08-13 DIAGNOSIS — L30.0 NUMMULAR DERMATITIS: ICD-10-CM

## 2024-08-13 PROBLEM — L30.9 DERMATITIS, UNSPECIFIED: Status: ACTIVE | Noted: 2024-08-13

## 2024-08-13 PROCEDURE — ? COUNSELING

## 2024-08-13 PROCEDURE — ? PRESCRIPTION

## 2024-08-13 PROCEDURE — ? ADDITIONAL NOTES

## 2024-08-13 PROCEDURE — ? PRESCRIPTION MEDICATION MANAGEMENT

## 2024-08-13 PROCEDURE — 99213 OFFICE O/P EST LOW 20 MIN: CPT

## 2024-08-13 RX ORDER — TRIAMCINOLONE ACETONIDE 1 MG/G
CREAM TOPICAL
Qty: 80 | Refills: 1 | Status: ERX | COMMUNITY
Start: 2024-08-13

## 2024-08-13 RX ADMIN — TRIAMCINOLONE ACETONIDE: 1 CREAM TOPICAL at 00:00

## 2024-08-13 ASSESSMENT — LOCATION ZONE DERM: LOCATION ZONE: ARM

## 2024-08-13 ASSESSMENT — LOCATION SIMPLE DESCRIPTION DERM: LOCATION SIMPLE: RIGHT UPPER ARM

## 2024-08-13 ASSESSMENT — LOCATION DETAILED DESCRIPTION DERM: LOCATION DETAILED: RIGHT ANTECUBITAL SKIN

## 2024-08-13 NOTE — PROCEDURE: ADDITIONAL NOTES
Additional Notes: Recommended frequent and liberal applications of a thick emollient such as CeraVe Moisturizing Cream with one application applied within 3-5 minutes of bathing.
Detail Level: Detailed
Render Risk Assessment In Note?: no

## 2024-08-13 NOTE — PROCEDURE: PRESCRIPTION MEDICATION MANAGEMENT
Detail Level: Zone
Initiate Treatment: Triamcinolone 0.1% cream
Render In Strict Bullet Format?: No
Plan: Recommended OTC Lotrimin ultra application daily before application of triamcinolone cream.

## 2024-08-15 NOTE — OP THERAPY DAILY TREATMENT
"  Outpatient Physical Therapy  DAILY TREATMENT     [facility] Outpatient Physical Therapy [address], [address]  [place] [address]  Phone:  [phone]    Date: 08/19/2024  Patient: Grisel Saha  YOB: 1935  MRN: [id number]   Time Calculation           Chief Complaint: No chief complaint on file.  Visit #: 16    SUBJECTIVE:  no new complaints. Wants an HEP to do her balance stuff on her own and check in.    OBJECTIVE: 30 sec STS 14 reps  Mini best score of 16  1. SIT TO STAND  Instruction: \"Cross your arms across your chest. Try not to use your hands unless you must. Do not let your legs lean  against the back of the chair when you stand. Please stand up now.\"  (2) Normal: Comes to stand without use of hands and stabilizes independently.     2. RISE TO TOES  Instruction: \"Place your feet shoulder width apart. Place your hands on your hips. Try to rise as high as you can onto your  toes. I will count out loud to 3 seconds. Try to hold this pose for at least 3 seconds. Look straight ahead. Rise now.\"  (1) Severe:      3. STAND ON ONE LEG  Instruction: \"Look straight ahead. Keep your hands on your hips. Lift your leg off of the ground behind you without touching or  resting your raised leg upon your other standing leg. Stay standing on one leg as long as you can. Look straight ahead. Lift  (0) Severe: Unable.  _____________________________________________________________________________________  4. COMPENSATORY STEPPING CORRECTION- FORWARD  Instruction: \"Stand with your feet shoulder width apart, arms at your sides. Lean forward against my hands beyond your  forward limits. When I let go, do whatever is necessary, including taking a step, to avoid a fall.\"     (0) Severe: No step, OR would fall if not caught, OR falls spontaneously.     5. COMPENSATORY STEPPING CORRECTION- BACKWARD  Instruction: \"Stand with your feet shoulder width apart, arms at your sides. Lean backward against my hands beyond " "your  backward limits. When I let go, do whatever is necessary, including taking a step, to avoid a fall.\"     (0) Severe: No step, OR would fall if not caught, OR falls spontaneously.     6. COMPENSATORY STEPPING CORRECTION- LATERAL  Instruction: \"Stand with your feet together, arms down at your sides. Lean into my hand beyond your sideways limit. When I  let go, do whatever is necessary, including taking a step, to avoid a fall.\"  Left     (2) Normal: Recovers independently with 1 step  (crossover or lateral OK).     _________________________________________________________________________________________________  7. STANCE (FEET TOGETHER); EYES OPEN, FIRM SURFACE  Instruction: \"Place your hands on your hips. Place your feet together until almost touching. Look straight ahead. Be as stable  and still as possible, until I say stop.\"   Time in seconds:___30_____   (2) Normal: 30 s.     REACTIVE POSTURAL CONTROL        8. STANCE (FEET TOGETHER); EYES CLOSED, FOAM SURFACE  Instruction: \"Step onto the foam. Place your hands on your hips. Place your feet together until almost touching. Be as stable  and still as possible, until I say stop. I will start timing when you close your eyes.\"   Time in seconds:________      (1) Moderate: < 30 s.        9. INCLINE- EYES CLOSED  Instruction: \"Step onto the incline ramp. Please stand on the incline ramp with your toes toward the top. Place your feet  shoulder width apart and have your arms down at your sides. I will start timing when you close your eyes.\"   Time in seconds:________      (1) Moderate: Stands independently <30 s OR aligns with surface.     ____________________________________________________________________________________________________  10. CHANGE IN GAIT SPEED  Instruction: \"Begin walking at your normal speed, when I tell you 'fast', walk as fast as you can. When I say 'slow', walk very  slowly.\"     (1) Moderate: Unable to change walking speed or signs of " "imbalance.        11. WALK WITH HEAD TURNS - HORIZONTAL  Instruction: \"Begin walking at your normal speed, when I say \"right\", turn your head and look to the right. When I say \"left\"  turn your head and look to the left. Try to keep yourself walking in a straight line.\"  (2) Normal: performs head turns with no change in gait speed and good balance.     12. WALK WITH PIVOT TURNS  Instruction: \"Begin walking at your normal speed. When I tell you to 'turn and stop', turn as quickly as you can, face the  opposite direction, and stop. After the turn, your feet should be close together.\"     (1) Moderate: Turns with feet close SLOW (>4 steps) with good balance.        13. STEP OVER OBSTACLES  Instruction: \"Begin walking at your normal speed. When you get to the box, step over it, not around it and keep walking.\"     (0) Severe: Unable to step over box OR steps around box.     14. TIMED UP & GO WITH DUAL TASK [3 METER WALK]  Instruction TUG: \"When I say 'Go', stand up from chair, walk at your normal speed across the tape on the floor, turn around,  and come back to sit in the chair.\"  Instruction TUG with Dual Task: \"Count backwards by threes starting at ___. When I say 'Go', stand up from chair, walk at  your normal speed across the tape on the floor, turn around, and come back to sit in the chair. Continue counting backwards  the entire time.\"   TUG: ___20.01_____seconds; Dual Task TUG: __NA______seconds  (2) Normal: No noticeable change in sitting, standing or walking while backward counting when compared to TUG without  Dual Task.        Therapeutic Exercises (CPT 71035):     2. STS, 2x10    3. calf rasies, x30, not today    4. seated 1/2 FR PF/DF, x30, not today    5. seated leg press, with extra heavy band, x15B, not today      Therapeutic Exercise Summary:     HEP:  Semi tandem foam  Big forward step with arm raises  HR on foam pad at counter  Foam pad fwd/lat step ups  STS with 5-7lbs    Therapeutic Treatments and " Modalities:     1. Neuromuscular Re-education (CPT 23826)    Therapeutic Treatment and Modalities Summary: High march at //bars with light BUE support on foam, x15  Large side step with light BUE support, x10B in //bars  Calf raise reaches  On Airex: static standing balance in stride stance, with head turn R and head turn L, x30 sec all   Fwd/backward gait, clearing obstacles x3laps ea in //bars  Reactive stepping with 1UE and no UE (no UE hard at first; improves as session goes on)  Gait with SPC with obstacle course/clearing small obstacles      Gait in [place] with emphasis on walking tall and lifting head to look ahead.  Then gait with exaggerated heel-toe rocker, [id number] with SBA.  Difficulty with L>R heel contact, pt demo small stride length.   Time-based treatments/modalities:     ASSESSMENT: Cont skilled PT care; she has made some progress on her 30 sec STS as well as mini-best but only marginally. Pt wants to do HEP and check in so we modified POC in two weeks.    PLAN/RECOMMENDATIONS:   Plan for treatment: therapy treatment to continue next visit.  Planned interventions for next visit: continue with current treatment.

## 2024-08-19 ENCOUNTER — PHYSICAL THERAPY (OUTPATIENT)
Dept: PHYSICAL THERAPY | Facility: REHABILITATION | Age: 89
End: 2024-08-19
Payer: MEDICARE
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
## 2024-08-19 DIAGNOSIS — M25.562 LEFT KNEE PAIN, UNSPECIFIED CHRONICITY: ICD-10-CM

## 2024-08-19 PROCEDURE — 97112 NEUROMUSCULAR REEDUCATION: CPT

## 2024-08-19 PROCEDURE — 97110 THERAPEUTIC EXERCISES: CPT

## 2024-08-22 ENCOUNTER — TELEPHONE (OUTPATIENT)
Dept: HEALTH INFORMATION MANAGEMENT | Facility: OTHER | Age: 89
End: 2024-08-22

## 2024-08-26 ENCOUNTER — APPOINTMENT (OUTPATIENT)
Dept: PHYSICAL THERAPY | Facility: REHABILITATION | Age: 89
End: 2024-08-26
Payer: MEDICARE

## 2024-09-03 NOTE — OP THERAPY DAILY TREATMENT
"  Outpatient Physical Therapy  DAILY TREATMENT     [facility] Physical Therapy [address], [address]  [place] [address]  Phone:  [phone]    Date: 09/04/2024  Patient: Grisel Saha  YOB: 1935  MRN: [id number]   Time Calculation  Start time: 1055  Stop time: 1135 Time Calculation (min): 40 minutes   Chief Complaint: No chief complaint on file.  Visit #: 8    SUBJECTIVE: No new complaints. She thinks she is ready to do her HEP on her own. Knows she can check in within next month if needed.     OBJECTIVE:    30 sec STS 15 reps  Mini best score of 17  1. SIT TO STAND  Instruction: \"Cross your arms across your chest. Try not to use your hands unless you must. Do not let your legs lean  against the back of the chair when you stand. Please stand up now.\"  (2) Normal: Comes to stand without use of hands and stabilizes independently.     2. RISE TO TOES  Instruction: \"Place your feet shoulder width apart. Place your hands on your hips. Try to rise as high as you can onto your  toes. I will count out loud to 3 seconds. Try to hold this pose for at least 3 seconds. Look straight ahead. Rise now.\"  (1) Severe:      3. STAND ON ONE LEG  Instruction: \"Look straight ahead. Keep your hands on your hips. Lift your leg off of the ground behind you without touching or  resting your raised leg upon your other standing leg. Stay standing on one leg as long as you can. Look straight ahead. Lift  (0) Severe: Unable.  _____________________________________________________________________________________  4. COMPENSATORY STEPPING CORRECTION- FORWARD  Instruction: \"Stand with your feet shoulder width apart, arms at your sides. Lean forward against my hands beyond your  forward limits. When I let go, do whatever is necessary, including taking a step, to avoid a fall.\"     (0) Severe: No step, OR would fall if not caught, OR falls spontaneously.     5. COMPENSATORY STEPPING CORRECTION- " "BACKWARD  Instruction: \"Stand with your feet shoulder width apart, arms at your sides. Lean backward against my hands beyond your  backward limits. When I let go, do whatever is necessary, including taking a step, to avoid a fall.\"     (0) Severe: No step, OR would fall if not caught, OR falls spontaneously.     6. COMPENSATORY STEPPING CORRECTION- LATERAL  Instruction: \"Stand with your feet together, arms down at your sides. Lean into my hand beyond your sideways limit. When I  let go, do whatever is necessary, including taking a step, to avoid a fall.\"  Left     (2) Normal: Recovers independently with 1 step  (crossover or lateral OK).     _________________________________________________________________________________________________  7. STANCE (FEET TOGETHER); EYES OPEN, FIRM SURFACE  Instruction: \"Place your hands on your hips. Place your feet together until almost touching. Look straight ahead. Be as stable  and still as possible, until I say stop.\"   Time in seconds:___30_____   (2) Normal: 30 s.     REACTIVE POSTURAL CONTROL        8. STANCE (FEET TOGETHER); EYES CLOSED, FOAM SURFACE  Instruction: \"Step onto the foam. Place your hands on your hips. Place your feet together until almost touching. Be as stable  and still as possible, until I say stop. I will start timing when you close your eyes.\"   Time in seconds:________      (1) Moderate: < 30 s.        9. INCLINE- EYES CLOSED  Instruction: \"Step onto the incline ramp. Please stand on the incline ramp with your toes toward the top. Place your feet  shoulder width apart and have your arms down at your sides. I will start timing when you close your eyes.\"   Time in seconds:________      (1) Moderate: Stands independently <30 s OR aligns with surface.     ____________________________________________________________________________________________________  10. CHANGE IN GAIT SPEED  Instruction: \"Begin walking at your normal speed, when I tell you 'fast', walk " "as fast as you can. When I say 'slow', walk very  slowly.\"     (1) Moderate: Unable to change walking speed or signs of imbalance.        11. WALK WITH HEAD TURNS - HORIZONTAL  Instruction: \"Begin walking at your normal speed, when I say \"right\", turn your head and look to the right. When I say \"left\"  turn your head and look to the left. Try to keep yourself walking in a straight line.\"  (2) Normal: performs head turns with no change in gait speed and good balance.     12. WALK WITH PIVOT TURNS  Instruction: \"Begin walking at your normal speed. When I tell you to 'turn and stop', turn as quickly as you can, face the  opposite direction, and stop. After the turn, your feet should be close together.\"     (1) Moderate: Turns with feet close SLOW (>4 steps) with good balance.        13. STEP OVER OBSTACLES  Instruction: \"Begin walking at your normal speed. When you get to the box, step over it, not around it and keep walking.\"     (0) Severe: Unable to step over box OR steps around box.     14. TIMED UP & GO WITH DUAL TASK [3 METER WALK]  Instruction TUG: \"When I say 'Go', stand up from chair, walk at your normal speed across the tape on the floor, turn around,  and come back to sit in the chair.\"  Instruction TUG with Dual Task: \"Count backwards by threes starting at ___. When I say 'Go', stand up from chair, walk at  your normal speed across the tape on the floor, turn around, and come back to sit in the chair. Continue counting backwards  the entire time.\"   TUG: ___21____seconds; Dual Task TUG: __NA______seconds  (2) Normal: No noticeable change in sitting, standing or walking while backward counting when compared to TUG without  Dual Task.        Therapeutic Exercises (CPT 75941):     2. STS, 2x10    3. calf rasies, x30, with reach    4. seated 1/2 FR PF/DF, x30, not today    5. seated leg press, with extra heavy band, x15B, not today      Therapeutic Exercise Summary:     HEP:  Semi tandem foam  Big forward step " with arm raises  Foam pad fwd/lat step ups  Calf raise with reach  STS with 5-7lbs    Therapeutic Treatments and Modalities:     1. Neuromuscular Re-education (CPT 83393)    Therapeutic Treatment and Modalities Summary: Mini best  Tandem semi foam  Foam head turns  Foam step ups  Foam side step ups  Calf raise with reaches  .   Time-based treatments/modalities:    Physical Therapy Timed Treatment Charges  Neuromusc re-ed, balance, coor, post minutes (CPT 53792): 40 minutesASSESSMENT: Pt has been seen for a couple months. Re-assessment of mini-BEST and 30 sec STS shows minimal progress. Pt given updated HEP and per her report we will potentially DC case if no word >30 days from now.     PLAN/RECOMMENDATIONS:   Plan for treatment: therapy treatment to continue next visit.  Planned interventions for next visit: continue with current treatment.

## 2024-09-04 ENCOUNTER — PHYSICAL THERAPY (OUTPATIENT)
Dept: PHYSICAL THERAPY | Facility: REHABILITATION | Age: 89
End: 2024-09-04
Attending: FAMILY MEDICINE
Payer: MEDICARE

## 2024-09-04 DIAGNOSIS — M25.562 LEFT KNEE PAIN, UNSPECIFIED CHRONICITY: ICD-10-CM
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
## 2024-09-04 PROCEDURE — 97112 NEUROMUSCULAR REEDUCATION: CPT

## 2024-09-09 ENCOUNTER — APPOINTMENT (OUTPATIENT)
Dept: PHYSICAL THERAPY | Facility: REHABILITATION | Age: 89
End: 2024-09-09
Attending: FAMILY MEDICINE
Payer: MEDICARE

## 2024-09-24 PROBLEM — N18.31 CKD STAGE 3A, GFR 45-59 ML/MIN: Status: ACTIVE | Noted: 2024-09-24

## 2024-09-30 ENCOUNTER — APPOINTMENT (OUTPATIENT)
Dept: PHYSICAL THERAPY | Facility: REHABILITATION | Age: 89
End: 2024-09-30
Attending: FAMILY MEDICINE
Payer: MEDICARE

## 2024-10-03 PROBLEM — J84.9 INTERSTITIAL LUNG DISEASE (HCC): Status: ACTIVE | Noted: 2024-10-03

## 2024-10-03 PROBLEM — J84.9 INTERSTITIAL LUNG DISEASE (HCC): Status: RESOLVED | Noted: 2024-10-03 | Resolved: 2024-10-03

## 2024-10-07 ENCOUNTER — APPOINTMENT (OUTPATIENT)
Dept: PHYSICAL THERAPY | Facility: REHABILITATION | Age: 89
End: 2024-10-07
Attending: FAMILY MEDICINE
Payer: MEDICARE

## 2024-10-09 ENCOUNTER — TELEPHONE (OUTPATIENT)
Dept: PHYSICAL THERAPY | Facility: REHABILITATION | Age: 89
End: 2024-10-09
Payer: MEDICARE

## 2024-10-14 ENCOUNTER — APPOINTMENT (OUTPATIENT)
Dept: PHYSICAL THERAPY | Facility: REHABILITATION | Age: 89
End: 2024-10-14
Attending: FAMILY MEDICINE
Payer: MEDICARE

## 2024-10-21 ENCOUNTER — APPOINTMENT (OUTPATIENT)
Dept: PHYSICAL THERAPY | Facility: REHABILITATION | Age: 89
End: 2024-10-21
Attending: FAMILY MEDICINE
Payer: MEDICARE

## 2024-10-28 ENCOUNTER — APPOINTMENT (OUTPATIENT)
Dept: PHYSICAL THERAPY | Facility: REHABILITATION | Age: 89
End: 2024-10-28
Attending: FAMILY MEDICINE
Payer: MEDICARE

## 2024-12-17 ENCOUNTER — PATIENT OUTREACH (OUTPATIENT)
Dept: HEALTH INFORMATION MANAGEMENT | Facility: OTHER | Age: 89
End: 2024-12-17

## 2024-12-17 ENCOUNTER — APPOINTMENT (OUTPATIENT)
Dept: MEDICAL GROUP | Facility: PHYSICIAN GROUP | Age: 89
End: 2024-12-17
Payer: MEDICARE

## 2024-12-17 ENCOUNTER — HOSPITAL ENCOUNTER (OUTPATIENT)
Dept: LAB | Facility: MEDICAL CENTER | Age: 89
End: 2024-12-17
Payer: MEDICARE

## 2024-12-17 VITALS
TEMPERATURE: 97.5 F | HEIGHT: 59 IN | BODY MASS INDEX: 30.48 KG/M2 | HEART RATE: 85 BPM | SYSTOLIC BLOOD PRESSURE: 136 MMHG | DIASTOLIC BLOOD PRESSURE: 60 MMHG | OXYGEN SATURATION: 95 % | WEIGHT: 151.2 LBS

## 2024-12-17 DIAGNOSIS — R73.03 PREDIABETES: ICD-10-CM

## 2024-12-17 DIAGNOSIS — N18.31 CKD STAGE 3A, GFR 45-59 ML/MIN: ICD-10-CM

## 2024-12-17 DIAGNOSIS — Z00.00 ENCOUNTER FOR MEDICARE ANNUAL WELLNESS EXAM: ICD-10-CM

## 2024-12-17 DIAGNOSIS — Z91.81 HISTORY OF FALL: ICD-10-CM

## 2024-12-17 DIAGNOSIS — I10 ESSENTIAL HYPERTENSION: ICD-10-CM

## 2024-12-17 DIAGNOSIS — J84.9 INTERSTITIAL LUNG DISEASE (HCC): ICD-10-CM

## 2024-12-17 DIAGNOSIS — E78.49 OTHER HYPERLIPIDEMIA: ICD-10-CM

## 2024-12-17 PROBLEM — N17.9 ACUTE RENAL FAILURE (ARF) (HCC): Status: RESOLVED | Noted: 2023-09-27 | Resolved: 2024-12-17

## 2024-12-17 PROBLEM — S42.291S: Chronic | Status: RESOLVED | Noted: 2022-01-11 | Resolved: 2024-12-17

## 2024-12-17 PROBLEM — L50.9 URTICARIA: Status: RESOLVED | Noted: 2021-09-30 | Resolved: 2024-12-17

## 2024-12-17 PROBLEM — S42.213A CLOSED FRACTURE OF SURGICAL NECK OF HUMERUS: Status: RESOLVED | Noted: 2022-01-08 | Resolved: 2024-12-17

## 2024-12-17 PROBLEM — M79.642 PAIN IN BOTH HANDS: Status: RESOLVED | Noted: 2022-11-18 | Resolved: 2024-12-17

## 2024-12-17 PROBLEM — R60.0 EDEMA OF RIGHT UPPER ARM: Chronic | Status: RESOLVED | Noted: 2022-01-13 | Resolved: 2024-12-17

## 2024-12-17 PROBLEM — B02.9 SHINGLES: Status: RESOLVED | Noted: 2023-09-17 | Resolved: 2024-12-17

## 2024-12-17 PROBLEM — M79.644 PAIN IN FINGER OF BOTH HANDS: Status: RESOLVED | Noted: 2022-08-19 | Resolved: 2024-12-17

## 2024-12-17 PROBLEM — R10.33 PERIUMBILICAL ABDOMINAL PAIN: Status: RESOLVED | Noted: 2023-11-08 | Resolved: 2024-12-17

## 2024-12-17 PROBLEM — B02.8 HERPES ZOSTER WITH COMPLICATION: Status: RESOLVED | Noted: 2023-09-08 | Resolved: 2024-12-17

## 2024-12-17 PROBLEM — S81.802A WOUND OF LEFT LEG: Status: RESOLVED | Noted: 2021-06-03 | Resolved: 2024-12-17

## 2024-12-17 PROBLEM — I21.A1 MYOCARDIAL INFARCTION DUE TO DEMAND ISCHEMIA (HCC): Status: RESOLVED | Noted: 2023-09-27 | Resolved: 2024-12-17

## 2024-12-17 PROBLEM — S02.2XXD CLOSED FRACTURE OF NASAL BONE WITH ROUTINE HEALING: Status: RESOLVED | Noted: 2022-10-25 | Resolved: 2024-12-17

## 2024-12-17 PROBLEM — M79.645 PAIN IN FINGER OF BOTH HANDS: Status: RESOLVED | Noted: 2022-08-19 | Resolved: 2024-12-17

## 2024-12-17 PROBLEM — M79.641 PAIN IN BOTH HANDS: Status: RESOLVED | Noted: 2022-11-18 | Resolved: 2024-12-17

## 2024-12-17 PROBLEM — S42.291A: Status: RESOLVED | Noted: 2022-01-08 | Resolved: 2024-12-17

## 2024-12-17 LAB
ALBUMIN SERPL BCP-MCNC: 4.2 G/DL (ref 3.2–4.9)
ALBUMIN/GLOB SERPL: 1.8 G/DL
ALP SERPL-CCNC: 66 U/L (ref 30–99)
ALT SERPL-CCNC: 13 U/L (ref 2–50)
ANION GAP SERPL CALC-SCNC: 11 MMOL/L (ref 7–16)
AST SERPL-CCNC: 20 U/L (ref 12–45)
BILIRUB SERPL-MCNC: 0.5 MG/DL (ref 0.1–1.5)
BUN SERPL-MCNC: 13 MG/DL (ref 8–22)
CALCIUM ALBUM COR SERPL-MCNC: 8.5 MG/DL (ref 8.5–10.5)
CALCIUM SERPL-MCNC: 8.7 MG/DL (ref 8.5–10.5)
CHLORIDE SERPL-SCNC: 104 MMOL/L (ref 96–112)
CO2 SERPL-SCNC: 23 MMOL/L (ref 20–33)
CREAT SERPL-MCNC: 0.9 MG/DL (ref 0.5–1.4)
ERYTHROCYTE [DISTWIDTH] IN BLOOD BY AUTOMATED COUNT: 41.8 FL (ref 35.9–50)
EST. AVERAGE GLUCOSE BLD GHB EST-MCNC: 137 MG/DL
GFR SERPLBLD CREATININE-BSD FMLA CKD-EPI: 61 ML/MIN/1.73 M 2
GLOBULIN SER CALC-MCNC: 2.4 G/DL (ref 1.9–3.5)
GLUCOSE SERPL-MCNC: 127 MG/DL (ref 65–99)
HBA1C MFR BLD: 6.4 % (ref 4–5.6)
HCT VFR BLD AUTO: 38.6 % (ref 37–47)
HGB BLD-MCNC: 13.2 G/DL (ref 12–16)
MCH RBC QN AUTO: 32.1 PG (ref 27–33)
MCHC RBC AUTO-ENTMCNC: 34.2 G/DL (ref 32.2–35.5)
MCV RBC AUTO: 93.9 FL (ref 81.4–97.8)
PLATELET # BLD AUTO: 285 K/UL (ref 164–446)
PMV BLD AUTO: 10.5 FL (ref 9–12.9)
POTASSIUM SERPL-SCNC: 4.6 MMOL/L (ref 3.6–5.5)
PROT SERPL-MCNC: 6.6 G/DL (ref 6–8.2)
RBC # BLD AUTO: 4.11 M/UL (ref 4.2–5.4)
SODIUM SERPL-SCNC: 138 MMOL/L (ref 135–145)
WBC # BLD AUTO: 7.6 K/UL (ref 4.8–10.8)

## 2024-12-17 PROCEDURE — G0439 PPPS, SUBSEQ VISIT: HCPCS

## 2024-12-17 PROCEDURE — 3075F SYST BP GE 130 - 139MM HG: CPT

## 2024-12-17 PROCEDURE — 85027 COMPLETE CBC AUTOMATED: CPT

## 2024-12-17 PROCEDURE — 80053 COMPREHEN METABOLIC PANEL: CPT

## 2024-12-17 PROCEDURE — 3078F DIAST BP <80 MM HG: CPT

## 2024-12-17 PROCEDURE — 36415 COLL VENOUS BLD VENIPUNCTURE: CPT

## 2024-12-17 PROCEDURE — 83036 HEMOGLOBIN GLYCOSYLATED A1C: CPT

## 2024-12-17 SDOH — HEALTH STABILITY: PHYSICAL HEALTH: ON AVERAGE, HOW MANY DAYS PER WEEK DO YOU ENGAGE IN MODERATE TO STRENUOUS EXERCISE (LIKE A BRISK WALK)?: 7 DAYS

## 2024-12-17 SDOH — ECONOMIC STABILITY: INCOME INSECURITY: IN THE LAST 12 MONTHS, WAS THERE A TIME WHEN YOU WERE NOT ABLE TO PAY THE MORTGAGE OR RENT ON TIME?: NO

## 2024-12-17 SDOH — HEALTH STABILITY: PHYSICAL HEALTH: ON AVERAGE, HOW MANY MINUTES DO YOU ENGAGE IN EXERCISE AT THIS LEVEL?: 10 MIN

## 2024-12-17 SDOH — ECONOMIC STABILITY: FOOD INSECURITY: WITHIN THE PAST 12 MONTHS, THE FOOD YOU BOUGHT JUST DIDN'T LAST AND YOU DIDN'T HAVE MONEY TO GET MORE.: NEVER TRUE

## 2024-12-17 SDOH — ECONOMIC STABILITY: FOOD INSECURITY: WITHIN THE PAST 12 MONTHS, YOU WORRIED THAT YOUR FOOD WOULD RUN OUT BEFORE YOU GOT MONEY TO BUY MORE.: NEVER TRUE

## 2024-12-17 SDOH — ECONOMIC STABILITY: INCOME INSECURITY: HOW HARD IS IT FOR YOU TO PAY FOR THE VERY BASICS LIKE FOOD, HOUSING, MEDICAL CARE, AND HEATING?: NOT HARD AT ALL

## 2024-12-17 ASSESSMENT — SOCIAL DETERMINANTS OF HEALTH (SDOH)
HOW MANY DRINKS CONTAINING ALCOHOL DO YOU HAVE ON A TYPICAL DAY WHEN YOU ARE DRINKING: 1 OR 2
HOW OFTEN DO YOU ATTENT MEETINGS OF THE CLUB OR ORGANIZATION YOU BELONG TO?: NEVER
HOW OFTEN DO YOU GET TOGETHER WITH FRIENDS OR RELATIVES?: TWICE A WEEK
IN A TYPICAL WEEK, HOW MANY TIMES DO YOU TALK ON THE PHONE WITH FAMILY, FRIENDS, OR NEIGHBORS?: MORE THAN THREE TIMES A WEEK
IN A TYPICAL WEEK, HOW MANY TIMES DO YOU TALK ON THE PHONE WITH FAMILY, FRIENDS, OR NEIGHBORS?: MORE THAN THREE TIMES A WEEK
DO YOU BELONG TO ANY CLUBS OR ORGANIZATIONS SUCH AS CHURCH GROUPS UNIONS, FRATERNAL OR ATHLETIC GROUPS, OR SCHOOL GROUPS?: NO
HOW OFTEN DO YOU HAVE A DRINK CONTAINING ALCOHOL: 4 OR MORE TIMES A WEEK
DO YOU BELONG TO ANY CLUBS OR ORGANIZATIONS SUCH AS CHURCH GROUPS UNIONS, FRATERNAL OR ATHLETIC GROUPS, OR SCHOOL GROUPS?: NO
HOW OFTEN DO YOU GET TOGETHER WITH FRIENDS OR RELATIVES?: TWICE A WEEK
HOW OFTEN DO YOU HAVE SIX OR MORE DRINKS ON ONE OCCASION: NEVER
WITHIN THE PAST 12 MONTHS, YOU WORRIED THAT YOUR FOOD WOULD RUN OUT BEFORE YOU GOT THE MONEY TO BUY MORE: NEVER TRUE
HOW OFTEN DO YOU ATTEND CHURCH OR RELIGIOUS SERVICES?: NEVER
HOW HARD IS IT FOR YOU TO PAY FOR THE VERY BASICS LIKE FOOD, HOUSING, MEDICAL CARE, AND HEATING?: NOT HARD AT ALL
IN THE PAST 12 MONTHS, HAS THE ELECTRIC, GAS, OIL, OR WATER COMPANY THREATENED TO SHUT OFF SERVICE IN YOUR HOME?: NO
HOW OFTEN DO YOU ATTENT MEETINGS OF THE CLUB OR ORGANIZATION YOU BELONG TO?: NEVER
HOW OFTEN DO YOU ATTEND CHURCH OR RELIGIOUS SERVICES?: NEVER

## 2024-12-17 ASSESSMENT — LIFESTYLE VARIABLES
HOW MANY STANDARD DRINKS CONTAINING ALCOHOL DO YOU HAVE ON A TYPICAL DAY: 1 OR 2
HOW OFTEN DO YOU HAVE SIX OR MORE DRINKS ON ONE OCCASION: NEVER
SKIP TO QUESTIONS 9-10: 1
HOW OFTEN DO YOU HAVE A DRINK CONTAINING ALCOHOL: 4 OR MORE TIMES A WEEK
AUDIT-C TOTAL SCORE: 4

## 2024-12-17 ASSESSMENT — ACTIVITIES OF DAILY LIVING (ADL): BATHING_REQUIRES_ASSISTANCE: 0

## 2024-12-17 ASSESSMENT — ENCOUNTER SYMPTOMS: GENERAL WELL-BEING: EXCELLENT

## 2024-12-17 ASSESSMENT — PATIENT HEALTH QUESTIONNAIRE - PHQ9: CLINICAL INTERPRETATION OF PHQ2 SCORE: 0

## 2024-12-17 ASSESSMENT — FIBROSIS 4 INDEX: FIB4 SCORE: 2.02

## 2024-12-17 NOTE — ASSESSMENT & PLAN NOTE
Chronic condition uncertain progression therefore will check metabolic panel.  Encourage patient to stay amply hydrated and avoid nephrotoxic substances.

## 2024-12-17 NOTE — ASSESSMENT & PLAN NOTE
Patient denies recent falls  Use this checklist to minimize fall risk  Outside your home  _ [place] outside stairs with a mixture of sand and paint for better traction.   _ Keep outdoor walkways clear and well-lit.  _ Clear snow and ice and overgrown plants from entrances and sidewalks.  Inside your home  _ Remove all extraneous clutter in your house.  _ Keep telephone and electrical cords out of pathways.  _ Tack rugs and glue vinyl cheikh so they lie flat. Remove or replace rugs or runners that tend to slip, or attach non-slip backing.  _ Ensure that carpets are firmly attached to the stairs.  _ Do not stand on a chair to reach things.   _ Store frequently used objects where you can reach them easily.  Keep a well-lit home  _ Have a lamp or light switch that you can easily reach without getting out of bed.  _ Use night lights in the bedroom, bathroom and hallways.  _ Keep a flashlight handy.  _ Have light switches at both ends of stairs and halls. Install handrails.  _ Turn on the lights when you go into the house at night.  Bathroom tips  _ Add grab bars in shower, tub and toilet areas.  _ Use nonslip adhesive strips or a mat in shower or tub.  _ Consider sitting on a bench or stool in the shower.  _ Consider using an elevated toilet seat.  Use care walking  _ Use helping devices, such as canes, as directed by your healthcare provider.  _ Wear nonslip, low-heeled shoes or slippers that fit snugly.  _ Avoid walking in stocking feet.  And don’t forget...  _ Review medications with your doctor or pharmacist. Some drugs, including over the counter drugs, can make you drowsy, dizzy and unsteady.  _ Have your hearing and eyesight tested. Inner ear problems can affect balance. Vision problems make it difficult to see potential hazards.  _ Discuss safe amounts of alcohol intake with your physician.  _ Exercise regularly to improve muscle flexibility, strength, and balance.  _ If you feel dizzy or lightheaded, sit down or  stay seated until your head clears.   _ Stand up slowly to avoid unsteadiness.

## 2024-12-17 NOTE — PROGRESS NOTES
Chief Complaint   Patient presents with    Annual Wellness Visit       HPI:  Grisel Saha is a 89 y.o. here for Medicare Annual Wellness Visit     Patient Active Problem List    Diagnosis Date Noted    CKD stage 3a, GFR 45-59 ml/min 09/24/2024    Prediabetes 11/08/2023    Generalized weakness 09/27/2023    Pulmonary edema 09/14/2023    Elevated brain natriuretic peptide (BNP) level 09/14/2023    Lesion of nose 12/19/2022    Lump of right thigh 12/19/2022    Chronic pain of both shoulders 11/18/2022    Acute stress reaction 08/19/2022    Memory loss 03/29/2022    Leg swelling 01/13/2022    History of fall 01/11/2022    Hypokalemia 01/08/2022    Slow transit constipation 09/30/2021    Hyponatremia 09/14/2021    Nausea 09/07/2021    Syncope 06/03/2021    Other headache syndrome 04/20/2021    Balance problem 12/18/2020    Essential hypertension 09/13/2004    Hyperlipidemia 03/07/2004       Current Outpatient Medications   Medication Sig Dispense Refill    polyethylene glycol 3350 (MIRALAX) 17 GM/SCOOP Powder TAKE ONE CAP (17GM) MIX WITH 6OZ OF FLUID BY MOUTH ONE TIME A DAY AS NEEDED FOR CONSTIPATION IF SENNOSIDES AND DOCUSATE INEFFECTIVE AFTER 24HRS 510 g 10    acetaminophen (TYLENOL) 325 MG Tab TAKE 2 TABS BY MOUTH EVERY 6 HOURS AS NEEDED FOR MILD PAIN 120 Tablet 10    carvedilol (COREG) 3.125 MG Tab TAKE 1 TAB BY MOUTH TWICE DAILY WITH MEALS 60 Tablet 10    melatonin 5 mg Tab TAKE 1 TAB BY MOUTH AT BEDTIME AS NEEDED FOR SLEEP 30 Tablet 10    omeprazole (PRILOSEC) 20 MG delayed-release capsule TAKE 1 CAP BY MOUTH EVERY DAY 30 Capsule 10    SENEXON-S 8.6-50 MG Tab TAKE 2 TABS BY MOUTH TWICE DAILY AS NEEDED FOR CONSTIPATION 60 Tablet 10    ondansetron (ZOFRAN ODT) 4 MG TABLET DISPERSIBLE Take 1 Tablet by mouth every 6 hours as needed for Nausea/Vomiting. 10 Tablet 0     No current facility-administered medications for this visit.          Current supplements as per medication list.     Allergies: Valtrex  [valacyclovir]    Current social contact/activities:  Lives at the [facility], engaged in activities at the facility, spending time with family/friends    She  reports that she has quit smoking. She has never used smokeless tobacco. She reports that she does not currently use alcohol after a past usage of about 8.4 oz of alcohol per week. She reports that she does not currently use drugs.  Counseling given: Not Answered      ROS:    Gait: Uses a walker  Ostomy: No  Other tubes: No  Amputations: No  Chronic oxygen use: No  Last eye exam: 2023  Wears hearing aids: No   : Denies any urinary leakage during the last 6 months    Screening:    Depression Screening  Little interest or pleasure in doing things?  0 - not at all  Feeling down, depressed , or hopeless? 0 - not at all  Patient Health Questionnaire Score: 0     If depressive symptoms identified deferred to follow up visit unless specifically addressed in assessment and plan.    Interpretation of PHQ-9 Total Score   Score Severity   1-4 No Depression   5-9 Mild Depression   10-14 Moderate Depression   15-19 Moderately Severe Depression   20-27 Severe Depression    Screening for Cognitive Impairment  Do you or any of your friends or family members have any concern about your memory? No  Three Minute Recall (Leader, Season, Table) 3/3    Thad clock face with all 12 numbers and set the hands to show 10 minutes after 11.  Yes    Cognitive concerns identified deferred for follow up unless specifically addressed in assessment and plan.    Fall Risk Assessment  Has the patient had two or more falls in the last year or any fall with injury in the last year?  No    Safety Assessment  Do you always wear your seatbelt?  Yes  Any changes to home needed to function safely? No  Difficulty hearing.  No  Patient counseled about all safety risks that were identified.    Functional Assessment ADLs  Are there any barriers preventing you from cooking for yourself or meeting nutritional  needs?  Yes. Assisted living  Are there any barriers preventing you from driving safely or obtaining transportation?  No.    Are there any barriers preventing you from using a telephone or calling for help?  No    Are there any barriers preventing you from shopping?  No.    Are there any barriers preventing you from taking care of your own finances?  No    Are there any barriers preventing you from managing your medications?  No    Are there any barriers preventing you from showering, bathing or dressing yourself? No    Are there any barriers preventing you from doing housework or laundry? No  Are there any barriers preventing you from using the toilet?No  Are you currently engaging in any exercise or physical activity?  Yes.      Self-Assessment of Health  What is your perception of your health? Excellent    Do you sleep more than six hours a night? Yes    In the past 7 days, how much did pain keep you from doing your normal work? None    Do you spend quality time with family or friends (virtually or in person)? Yes    Do you usually eat a heart healthy diet that constists of a variety of fruits, vegetables, whole grains and fiber? Yes    Do you eat foods high in fat and/or Fast Food more than three times per week? No    How concerned are you that your medical conditions are not being well managed? Not at all    Are you worried that in the next 2 months, you may not have stable housing that you own, rent, or stay in as part of a household? No      Advance Care Planning  Do you have an Advance Directive, Living Will, Durable Power of , or POLST? Yes      Durable Power of    is on file      Health Maintenance Summary            Overdue - Zoster (Shingles) Vaccines (2 of 3) Overdue since 2/7/2014 12/13/2013  Imm Admin: Zoster Vaccine Live (ZVL) (Zostavax) - HISTORICAL DATA              Bone Density Scan (Every 5 Years) Tentatively due on 7/20/2025 07/20/2020  DS-BONE DENSITY STUDY (DEXA)               Annual Wellness Visit (Yearly) Next due on 12/17/2025 12/17/2024  Visit Dx: Encounter for Medicare annual wellness exam              IMM DTaP/Tdap/Td Vaccine (4 - Td or Tdap) Next due on 5/25/2031 05/25/2021  Imm Admin: Tdap Vaccine    06/13/2011  Imm Admin: Tdap Vaccine    02/18/2004  Imm Admin: Tdap Vaccine              Hepatitis A Vaccine (Hep A) (Series Information) Aged Out      04/28/2006  Imm Admin: Hepatitis A Vaccine, Adult    03/03/2003  Imm Admin: Hepatitis A Vaccine, Adult              Pneumococcal Vaccine: 65+ Years (Series Information) Completed      02/29/2016  Imm Admin: Pneumococcal Conjugate Vaccine (Prevnar/PCV-13)    01/09/2003  Imm Admin: Pneumococcal polysaccharide vaccine (PPSV-23)              COVID-19 Vaccine (Series Information) Completed      11/05/2024  Imm Admin: COVID-19, mRNA, LNP-S, PF, tigist-sucrose, 30 mcg/0.3 mL    12/20/2022  Outside Immunization: COVID mRNA Bivalent(MOD)    10/11/2021  Imm Admin: PFIZER PURPLE CAP SARS-COV-2 VACCINATION (12+)    02/18/2021  Imm Admin: PFIZER PURPLE CAP SARS-COV-2 VACCINATION (12+)    01/26/2021  Imm Admin: PFIZER PURPLE CAP SARS-COV-2 VACCINATION (12+)    Only the first 5 history entries have been loaded, but more history exists.              Influenza Vaccine (Series Information) Completed      11/07/2024  Outside Immunization: Influenza Recombinan    11/08/2023  Imm Admin: Influenza Vaccine Adult HD    10/06/2022  Imm Admin: Influenza Vaccine Adult HD    01/10/2022  Imm Admin: Influenza Vaccine Adult HD    12/18/2020  Imm Admin: Influenza Vaccine Adult HD    Only the first 5 history entries have been loaded, but more history exists.              Hepatitis B Vaccine (Hep B) (Series Information) Aged Out      No completion history exists for this topic.              HPV Vaccines (Series Information) Aged Out      No completion history exists for this topic.              Polio Vaccine (Inactivated Polio) (Series Information)  Aged Out      No completion history exists for this topic.              Meningococcal Immunization (Series Information) Aged Out      No completion history exists for this topic.              Discontinued - Diabetes: Retinopathy Screening  Discontinued        Frequency changed to Never automatically (Topic No Longer Applies)    02/29/2016  Outside Procedure: PB FUNDAL PHOTOGRAPHY              Discontinued - A1c Screening  Ordered on 12/17/2024        Frequency changed to Never automatically (Topic No Longer Applies)    07/25/2023  Hemoglobin A1c    09/10/2021  HEMOGLOBIN A1C    05/20/2021  HEMOGLOBIN A1C    01/08/2021  HEMOGLOBIN A1C    Only the first 5 history entries have been loaded, but more history exists.              Discontinued - Fasting Lipid Profile  Discontinued        Frequency changed to Never automatically (Topic No Longer Applies)    05/20/2021  Lipid Profile    02/29/2016  Outside Procedure: LIPID PROFILE              Discontinued - Diabetes: Urine Protein Screening  Discontinued        Frequency changed to Never automatically (Topic No Longer Applies)    07/27/2020  Outside Procedure: MI MICROALBUMIN, QUANTITATIVE    06/12/2019  Outside Procedure: PB MICROALBUMIN, QUANTITATIVE    02/29/2016  Outside Procedure: PB MICROALBUMIN, QUANTITATIVE    10/10/2014  Outside Procedure: MI MICROALBUMIN, QUANTITATIVE    Only the first 5 history entries have been loaded, but more history exists.              Discontinued - SERUM CREATININE  Ordered on 12/17/2024        Frequency changed to Never automatically (Topic No Longer Applies)    09/29/2024  BASIC METABOLIC PANEL    09/28/2024  BASIC METABOLIC PANEL    10/30/2023  Comp Metabolic Panel    09/28/2023  Basic Metabolic Panel    Only the first 5 history entries have been loaded, but more history exists.                    Patient Care Team:  Edgar Mcfadden D.N.P. as PCP - General (Nurse Practitioner Family)  Cricket Mliler M.D. as PCP - [facility] Paneled  [facility] as Home Health Provider  Araceli Welsh, PT, DPT as Transitional Care Navigator  [facility]  Daphney Bazzi R.N.        Social History     Tobacco Use    Smoking status: Former    Smokeless tobacco: Never   Vaping Use    Vaping status: Never Used   Substance Use Topics    Alcohol use: Not Currently     Alcohol/week: 8.4 oz     Types: 14 Glasses of wine per week     Comment: 2-3 ounces of liquor daily    Drug use: Not Currently     Family History   Problem Relation Age of Onset    Stroke Mother     Cancer Father         lung     She  has a past medical history of Closed fracture of nasal bone with routine healing (10/25/2022), Closed fracture of surgical neck of humerus (01/08/2022), Edema of right upper arm (01/13/2022), Fracture of anatomical neck of humerus, right, closed, initial encounter (01/08/2022), Fracture, humerus, anatomical neck, right, sequela (01/11/2022), Herpes zoster with complication (09/08/2023), HTN (hypertension), Myocardial infarction due to demand ischemia (HCC) (09/27/2023), Pain in both hands (11/18/2022), Pain in finger of both hands (08/19/2022), Periumbilical abdominal pain (11/08/2023), Shingles (09/17/2023), Urticaria (09/30/2021), and Wound of left leg (06/03/2021).   History reviewed. No pertinent surgical history.    Exam:   /60 (BP Location: Left arm, Patient Position: Sitting, BP Cuff Size: Adult)   Pulse 85   Temp 36.4 °C (97.5 °F) (Temporal)   Ht 1.499 m (4' 11\")   Wt 68.6 kg (151 lb 3.2 oz)   SpO2 95%  Body mass index is 30.54 kg/m².    Hearing fair.    Dentition fair  Alert, oriented in no acute distress.  Eye contact is good, speech goal directed, affect calm    Assessment and Plan. The following treatment and monitoring plan is recommended:    1. Encounter for Medicare annual wellness exam  - CBC WITHOUT DIFFERENTIAL; Future  - Comp Metabolic Panel; Future  - HEMOGLOBIN A1C; Future    2. CKD stage 3a, GFR 45-59 ml/min  - Comp Metabolic " Panel; Future    3. Prediabetes  - Comp Metabolic Panel; Future  - HEMOGLOBIN A1C; Future    4. Essential hypertension  - CBC WITHOUT DIFFERENTIAL; Future  - Comp Metabolic Panel; Future    5. History of fall    Problem List Items Addressed This Visit          Family Medicine Problems    Essential hypertension (Chronic)     Chronic, stable  Continue carvedilol 3.125 mg biD         Relevant Orders    CBC WITHOUT DIFFERENTIAL    Comp Metabolic Panel       Other    History of fall     Patient denies recent falls  Use this checklist to minimize fall risk  Outside your home  _ [place] outside stairs with a mixture of sand and paint for better traction.   _ Keep outdoor walkways clear and well-lit.  _ Clear snow and ice and overgrown plants from entrances and sidewalks.  Inside your home  _ Remove all extraneous clutter in your house.  _ Keep telephone and electrical cords out of pathways.  _ Tack rugs and glue vinyl cheikh so they lie flat. Remove or replace rugs or runners that tend to slip, or attach non-slip backing.  _ Ensure that carpets are firmly attached to the stairs.  _ Do not stand on a chair to reach things.   _ Store frequently used objects where you can reach them easily.  Keep a well-lit home  _ Have a lamp or light switch that you can easily reach without getting out of bed.  _ Use night lights in the bedroom, bathroom and hallways.  _ Keep a flashlight handy.  _ Have light switches at both ends of stairs and halls. Install handrails.  _ Turn on the lights when you go into the house at night.  Bathroom tips  _ Add grab bars in shower, tub and toilet areas.  _ Use nonslip adhesive strips or a mat in shower or tub.  _ Consider sitting on a bench or stool in the shower.  _ Consider using an elevated toilet seat.  Use care walking  _ Use helping devices, such as canes, as directed by your healthcare provider.  _ Wear nonslip, low-heeled shoes or slippers that fit snugly.  _ Avoid walking in stocking  feet.  And don’t forget...  _ Review medications with your doctor or pharmacist. Some drugs, including over the counter drugs, can make you drowsy, dizzy and unsteady.  _ Have your hearing and eyesight tested. Inner ear problems can affect balance. Vision problems make it difficult to see potential hazards.  _ Discuss safe amounts of alcohol intake with your physician.  _ Exercise regularly to improve muscle flexibility, strength, and balance.  _ If you feel dizzy or lightheaded, sit down or stay seated until your head clears.   _ Stand up slowly to avoid unsteadiness.           Prediabetes     Chronic condition without current A1c therefore we will check lab after visit today.  -Exercise: At least 150 minutes of moderate aerobic activity per week or 75 minutes of vigorous aerobic activity per week, +2 days/week of strength training  - Healthy lifestyle and eating habits: Mediterranean-based diet (rich in fruits, vegetables, nuts and healthy oils), proper hydration and avoiding sugary beverages, adequate sleep hygiene-(allowing 7 to 8 hours of overnight sleep).           Relevant Orders    Comp Metabolic Panel    HEMOGLOBIN A1C    CKD stage 3a, GFR 45-59 ml/min     Chronic condition uncertain progression therefore will check metabolic panel.  Encourage patient to stay amply hydrated and avoid nephrotoxic substances.         Relevant Orders    Comp Metabolic Panel     Other Visit Diagnoses       Encounter for Medicare annual wellness exam        Relevant Orders    CBC WITHOUT DIFFERENTIAL    Comp Metabolic Panel    HEMOGLOBIN A1C            Services suggested: No services needed at this time  Health Care Screening: Age-appropriate preventive services recommended by USPTF and ACIP covered by Medicare were discussed today. Services ordered if indicated and agreed upon by the patient.  Referrals offered: Community-based lifestyle interventions to reduce health risks and promote self-management and wellness, fall  prevention, nutrition, physical activity, tobacco-use cessation, weight loss, and mental health services as per orders if indicated.    Discussion today about general wellness and lifestyle habits:    Prevent falls and reduce trip hazards; Cautioned about securing or removing rugs.  Have a working fire alarm and carbon monoxide detector;   Engage in regular physical activity and social activities     Follow-up: Return in about 1 year (around 12/17/2025), or if symptoms worsen or fail to improve.

## 2024-12-17 NOTE — ASSESSMENT & PLAN NOTE
Chronic condition without current A1c therefore we will check lab after visit today.  -Exercise: At least 150 minutes of moderate aerobic activity per week or 75 minutes of vigorous aerobic activity per week, +2 days/week of strength training  - Healthy lifestyle and eating habits: Mediterranean-based diet (rich in fruits, vegetables, nuts and healthy oils), proper hydration and avoiding sugary beverages, adequate sleep hygiene-(allowing 7 to 8 hours of overnight sleep).

## 2024-12-17 NOTE — ASSESSMENT & PLAN NOTE
Acute kidney injury in September 2024 due to dehydration.  Patient feeling well, will recheck metabolic panel and assess kidney function

## 2024-12-17 NOTE — PROGRESS NOTES
I spoke with the patient and her daughter today while they were in clinic for the patient's Annual Wellness Visit. The patient and her daughter state that she has recently moved into [facility] assisted living and that they don't feel that there are any services she needs at this time. She is very well managed. The patient and her daughter report having my contact information should further resources be needed in the future.   
No significant past surgical history

## 2025-02-17 ENCOUNTER — PATIENT MESSAGE (OUTPATIENT)
Dept: HEALTH INFORMATION MANAGEMENT | Facility: OTHER | Age: OVER 89
End: 2025-02-17

## 2025-03-13 DIAGNOSIS — I10 ESSENTIAL HYPERTENSION: Chronic | ICD-10-CM

## 2025-03-14 NOTE — TELEPHONE ENCOUNTER
Received request via: Pharmacy    Was the patient seen in the last year in this department? Yes    Does the patient have an active prescription (recently filled or refills available) for medication(s) requested? No    Pharmacy Name: [facility]    Does the patient have nursing home Plus and need 100-day supply? (This applies to ALL medications) Yes, quantity updated to 100 days

## 2025-03-17 RX ORDER — CARVEDILOL 3.12 MG/1
TABLET ORAL
Qty: 200 TABLET | Refills: 2 | Status: SHIPPED | OUTPATIENT
Start: 2025-03-17

## 2025-05-22 RX ORDER — OMEPRAZOLE 20 MG/1
20 CAPSULE, DELAYED RELEASE ORAL
Qty: 100 CAPSULE | Refills: 3 | Status: SHIPPED | OUTPATIENT
Start: 2025-05-22

## 2025-05-22 NOTE — TELEPHONE ENCOUNTER
Received request via: Pharmacy    Was the patient seen in the last year in this department? Yes    Does the patient have an active prescription (recently filled or refills available) for medication(s) requested? No    [facility] Name: [facility]    Does the patient have custodial Plus and need 100-day supply? (This applies to ALL medications) Yes, quantity updated to 100 days

## 2025-06-09 ENCOUNTER — TELEPHONE (OUTPATIENT)
Dept: FAMILY PLANNING/WOMEN'S HEALTH CLINIC | Facility: PHYSICIAN GROUP | Age: OVER 89
End: 2025-06-09
Payer: MEDICARE

## 2025-06-18 ENCOUNTER — TELEPHONE (OUTPATIENT)
Dept: FAMILY PLANNING/WOMEN'S HEALTH CLINIC | Facility: PHYSICIAN GROUP | Age: OVER 89
End: 2025-06-18
Payer: MEDICARE

## 2025-06-18 NOTE — TELEPHONE ENCOUNTER
Message: Called and left message for patient & daughter Raven 3 times returning call to schedule annual Comprehensive Health Assessment visit with the [facility]. Left phone number for patient to call [facility] at [phone] to schedule. Wishes home visit.

## 2025-08-07 ENCOUNTER — DOCUMENTATION (OUTPATIENT)
Dept: HEALTH INFORMATION MANAGEMENT | Facility: OTHER | Age: OVER 89
End: 2025-08-07
Payer: MEDICARE

## 2025-08-19 DIAGNOSIS — K59.01 SLOW TRANSIT CONSTIPATION: Chronic | ICD-10-CM

## 2025-08-20 RX ORDER — AMOXICILLIN 250 MG
2 CAPSULE ORAL 2 TIMES DAILY
Qty: 30 TABLET | Refills: 0 | Status: SHIPPED | OUTPATIENT
Start: 2025-08-20 | End: 2025-08-21

## 2025-08-21 DIAGNOSIS — K59.01 SLOW TRANSIT CONSTIPATION: Chronic | ICD-10-CM

## 2025-08-21 RX ORDER — AMOXICILLIN 250 MG
2 CAPSULE ORAL 2 TIMES DAILY PRN
Qty: 30 TABLET | Refills: 11 | Status: SHIPPED | OUTPATIENT
Start: 2025-08-21